# Patient Record
Sex: FEMALE | Race: BLACK OR AFRICAN AMERICAN | Employment: OTHER | URBAN - METROPOLITAN AREA
[De-identification: names, ages, dates, MRNs, and addresses within clinical notes are randomized per-mention and may not be internally consistent; named-entity substitution may affect disease eponyms.]

---

## 2017-01-18 ENCOUNTER — OFFICE VISIT (OUTPATIENT)
Dept: CARDIOLOGY CLINIC | Age: 82
End: 2017-01-18

## 2017-01-18 VITALS
OXYGEN SATURATION: 95 % | WEIGHT: 231 LBS | DIASTOLIC BLOOD PRESSURE: 82 MMHG | BODY MASS INDEX: 38.49 KG/M2 | HEIGHT: 65 IN | HEART RATE: 66 BPM | SYSTOLIC BLOOD PRESSURE: 158 MMHG

## 2017-01-18 DIAGNOSIS — E78.2 MIXED HYPERLIPIDEMIA: ICD-10-CM

## 2017-01-18 DIAGNOSIS — R60.0 LOCALIZED EDEMA: ICD-10-CM

## 2017-01-18 DIAGNOSIS — Z95.820 S/P ANGIOPLASTY WITH STENT: ICD-10-CM

## 2017-01-18 DIAGNOSIS — I25.10 CORONARY ARTERY DISEASE INVOLVING NATIVE CORONARY ARTERY OF NATIVE HEART WITHOUT ANGINA PECTORIS: Primary | ICD-10-CM

## 2017-01-18 DIAGNOSIS — I10 ESSENTIAL HYPERTENSION, BENIGN: ICD-10-CM

## 2017-01-18 NOTE — PROGRESS NOTES
[facility]   [address] [facility], [address] [place] [address]                                          NEW PATIENT HPI/FOLLOW-UP      NAME:  Rob Hall   :   1933   MRN:   [id number]   PCP:  Kyara Car MD           Subjective: The patient is a 80y.o. year old female who returns for a routine follow-up. Since the last visit on 6/3/2016, patient reports no new symptoms. States MURILLO and LE edema which is constant and unchanged. Denies change in exercise tolerance, chest pain, medication intolerance, palpitations,  PND/orthopnea wheezing, sputum, syncope, dizziness or light headedness. Doing satisfactorily. Review of Systems  General: Pt denies excessive weight gain or loss. Pt is able to conduct ADL's. Respiratory: +shortness of breath, MURILLO, Denies wheezing or stridor.   Cardiovascular: +edema, Denies precordial pain, palpitations, or PND  Gastrointestinal: Denies poor appetite, indigestion, abdominal pain or blood in stool  Peripheral vascular: Denies claudication, leg cramps  Neuropsychiatric: Denies paresthesias,tingling,numbness,anxiety,depression,fatigue  Musculoskeletal: Denies pain,tenderness, soreness,swelling      Past Medical History   Diagnosis Date    Arthritis 3/25/2010    Back pain 3/26/2010    CAD (coronary artery disease)     Chest pain, unspecified     Chronic pain     Edema     Essential hypertension     High cholesterol 3/25/2010    HTN (hypertension) 3/25/2010    Kidney failure, acute ([address] 2012    Lacrimal passage stenosis 2014    Onychomycosis 2010    Other acute and subacute form of ischemic heart disease     Other and unspecified angina pectoris ([address]     Other ill-defined conditions(799.89)      excessive watering of right eye    Shortness of breath     Shoulder pain, bilateral 3/26/2010     Patient Active Problem List    Diagnosis Date Noted    Edema--non-pitting 2016    Lacrimal passage stenosis 2014  Preop cardiovascular exam 04/09/2014    Prediabetes 01/03/2014    Abnormal urinalysis 08/30/2013    Lacrimal duct stenosis 08/14/2013    Conjunctivitis 05/10/2013    Watery eyes 08/29/2012    S/P angioplasty with stent--RCA 6/12 06/22/2012    Multiple bruises 06/22/2012    CAD (coronary artery disease) 06/07/2012    Post PTCA 06/07/2012    Mixed hyperlipidemia 06/01/2012    Essential hypertension, benign 06/01/2012    Abnormal nuclear stress test 06/01/2012    Chest pain, unspecified 04/25/2012    Essential hypertension, benign 04/20/2012    Type 2 diabetes mellitus without complication ([facility] 88/86/1027    SOB (shortness of breath) on exertion 04/06/2012    Vitamin D deficiency 04/06/2012    Kidney failure, acute ([facility] 04/06/2012    Heme positive stool 11/12/2011    Abdominal pain, other specified site 11/12/2011    Inguinal hernia, left 11/12/2011    Decreased vision 10/12/2011    Edema leg 10/12/2011    Cough 08/17/2011    Tick bite, infected 08/17/2011    Onychomycosis 08/19/2010    Shoulder pain, bilateral 03/26/2010    Back pain 03/26/2010    Arthritis 03/25/2010      Past Surgical History   Procedure Laterality Date    Hx lumbar fusion      Hx knee replacement      Hx heent       bilat cateract extraction    Hx hysterectomy      Hx orthopaedic       bilateral TKR    Hx orthopaedic       neck and back surgery    Hx orthopaedic       bilateral bunionectomy    Hx orthopaedic       bilateral cataracts removed    Hx heart catheterization      Hx ptca       Allergies   Allergen Reactions    Lisinopril Cough      Family History   Problem Relation Age of Onset    Hypertension Mother     Coronary Artery Disease Father     Hypertension Sister     Diabetes Sister     Coronary Artery Disease Sister     Coronary Artery Disease Brother     Stroke Mother     Other Mother      arthritis    Heart Disease Father     Cancer Sister      lung    Cancer Brother      lung    Heart Disease Brother       Social History     Social History    Marital status:      Spouse name: N/A    Number of children: N/A    Years of education: N/A     Occupational History    Not on file. Social History Main Topics    Smoking status: Never Smoker    Smokeless tobacco: Never Used    Alcohol use Yes      Comment: occasional     Drug use: Yes     Special: Prescription, OTC    Sexual activity: No     Other Topics Concern    Not on file     Social History Narrative    ** Merged History Encounter **           Current Outpatient Prescriptions   Medication Sig    atenolol (TENORMIN) 50 mg tablet Take 1 Tab by mouth daily.  bumetanide (BUMEX) 1 mg tablet Take 1 Tab by mouth daily.  ergocalciferol (ERGOCALCIFEROL) 50,000 unit capsule Take 1 Cap by mouth every seven (7) days.  losartan (COZAAR) 100 mg tablet Take 1 Tab by mouth daily.  NIFEdipine ER (NIFEDICAL XL) 30 mg ER tablet Take 1 Tab by mouth daily.  oxyCODONE-acetaminophen (PERCOCET) 5-325 mg per tablet Take 2 Tabs by mouth every eight (8) hours as needed for Pain.  nitroglycerin (NITROSTAT) 0.4 mg SL tablet 1 Tab by SubLINGual route every five (5) minutes as needed for Chest Pain.  potassium chloride (K-DUR, KLOR-CON) 10 mEq tablet Take 1 Tab by mouth daily.  pravastatin (PRAVACHOL) 40 mg tablet take 1 tablet by mouth NIGHTLY    WELCHOL 3.75 gram pwpk powder packet take 1 packet by mouth once daily    MISCELLANEOUS MEDICAL SUPPLY (COMPRESSION STOCKINGS) daily.  ASPIRIN LOW DOSE 81 mg tablet daily.  fenofibrate nanocrystallized (TRICOR) 48 mg tablet Take 1 Tab by mouth daily.  aspirin delayed-release 81 mg tablet Take 1 Tab by mouth daily. No current facility-administered medications for this visit. I have reviewed the nurses notes, vitals, problem list, allergy list, medical history, family medical, social history and medications.       Objective:     Physical Exam:     Vitals:    01/18/17 0853   BP: 158/82   Pulse: 66   SpO2: 95%   Weight: 231 lb (104.8 kg)   Height: 5' 5\" (1.651 m)    Body mass index is 38.44 kg/(m^2). General: WDWN adult female, in no acute distress. Pleasant affect. HEENT: No carotid bruits, no JVD, trach is midline. Heart:  Normal S1/S2 negative S3 or S4. Regular, no murmur, gallop or rub.   Respiratory: Clear bilaterally, no wheezing or rales  Abdomen:   Soft, non-tender, bowel sounds are active.   Extremities:  1+ pitting edema BLE to mid-tibia, non-ttp, normal cap refill, no cyanosis. Neuro: A&Ox3, speech clear, gait stable. Skin: Skin color is normal. No rashes or lesions. No diaphoresis. Vascular: 2+ pulses symmetric in all extremities        Data Review:       Cardiographics:    EKG: NSR.      Cardiology Labs:    Results for orders placed or performed during the hospital encounter of 06/06/12   EKG, 12 LEAD, INITIAL   Result Value Ref Range    Ventricular Rate 66 BPM    Atrial Rate 66 BPM    P-R Interval 188 ms    QRS Duration 84 ms    Q-T Interval 418 ms    QTC Calculation (Bezet) 438 ms    Calculated P Axis 58 degrees    Calculated R Axis 16 degrees    Calculated T Axis 42 degrees    Diagnosis       Sinus rhythm with premature atrial complexes  When compared with ECG of 06-JUN-2012 10:45,  premature atrial complexes are now present  Confirmed by Neel [place] ([id number]) on 6/7/2012 8:37:17 AM       Lab Results   Component Value Date/Time    Cholesterol, total 191 11/10/2016 08:38 AM    HDL Cholesterol 61 11/10/2016 08:38 AM    LDL, calculated 110 11/10/2016 08:38 AM    Triglyceride 102 11/10/2016 08:38 AM    CHOL/HDL Ratio 4.5 08/19/2010 12:27 PM       Lab Results   Component Value Date/Time    Sodium 142 11/10/2016 08:38 AM    Potassium 4.7 11/10/2016 08:38 AM    Chloride 103 11/10/2016 08:38 AM    CO2 23 11/10/2016 08:38 AM    Anion gap 7 06/07/2012 03:00 AM    Glucose 116 11/10/2016 08:38 AM    BUN 32 11/10/2016 08:38 AM    Creatinine 1.32 11/10/2016 08:38 AM    BUN/Creatinine ratio 24 11/10/2016 08:38 AM    GFR est AA 43 11/10/2016 08:38 AM    GFR est non-AA 37 11/10/2016 08:38 AM    Calcium 10.1 11/10/2016 08:38 AM    Bilirubin, total 0.3 11/10/2016 08:38 AM    ALT 16 11/10/2016 08:38 AM    AST 23 11/10/2016 08:38 AM    Alk. phosphatase 60 11/10/2016 08:38 AM    Protein, total 6.8 11/10/2016 08:38 AM    Albumin 4.3 11/10/2016 08:38 AM    Globulin 4.0 11/12/2011 11:03 AM    A-G Ratio 1.7 11/10/2016 08:38 AM          Assessment:       ICD-10-CM ICD-9-CM    1. 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 Essential hypertension, benign I10 401.1 AMB POC EKG ROUTINE W/ 12 LEADS, INTER & REP   3. Localized edema R60.0 782.3    4. Mixed hyperlipidemia E78.2 272.2          Discussion: Patient presents at this time stable from a cardiac perspective. Pleased with present status. Plan: 1. Continue same meds. Lipid profile and labs followed by PCP. 2.Encouraged to exercise to tolerance, lose weight and follow low fat, low cholesterol, low sodium predominantly Plant-based (consider Mediterranean) diet. Call with questions or concerns. Will follow up any test results by phone and/or f/u here in office if needed. Jose Szymanski 3.Follow up: 6 months    I have discussed the diagnosis with the patient and the intended plan as seen in the above orders. The patient has received an after-visit summary and questions were answered concerning future plans. I have discussed any concerning medication side effects and warnings with the patient as well. Patient seen and examined. All pertinent data reviewed. I have reviewed detailed note as outlined by Galo Mauricio. Case discussed with Nursing/medical assistant staff and Galo Mauricio. Patient cardiac stable. Plans as outlined.     Esvin Gray PA-C  1/18/2017     PATY MERCADO Vijay Arita

## 2017-01-18 NOTE — MR AVS SNAPSHOT
Visit Information Date & Time Provider Department Dept. Phone Encounter #  
 1/18/2017  9:00 AM Shannon Acosta MD [address] [facility] 1 [id number] Your Appointments 3/9/2017  8:30 AM  
ROUTINE CARE with Ewa Zapata MD  
[address] [place] [address] ([address]) Appt Note: 6 mo f/u; 6 mo f/u  
 [address] [facility] [id number] [phone] [id number] Upcoming Health Maintenance Date Due ZOSTER VACCINE AGE 60> 1/14/1993 MEDICARE YEARLY EXAM 11/30/2016 EYE EXAM RETINAL OR DILATED Q1 12/4/2016 HEMOGLOBIN A1C Q6M 5/10/2017 FOOT EXAM Q1 6/15/2017 MICROALBUMIN Q1 6/15/2017 LIPID PANEL Q1 11/10/2017 GLAUCOMA SCREENING Q2Y 12/4/2017 DTaP/Tdap/Td series (2 - Td) 11/30/2025 Allergies as of 1/18/2017  Review Complete On: 1/18/2017 By: Hammad Smith Severity Noted Reaction Type Reactions Lisinopril  08/17/2011    Cough Current Immunizations  Reviewed on 11/10/2016 Name Date Influenza High Dose Vaccine PF 10/31/2016, 11/1/2015 Influenza Vaccine Whole 10/22/2011 Pneumococcal Polysaccharide (PPSV-23) 8/15/2014 Pneumococcal Vaccine (Unspecified Type) 1/15/1996 Tdap 11/30/2015 Not reviewed this visit You Were Diagnosed With   
  
 Codes Comments Coronary artery disease involving native coronary artery of native heart without angina pectoris    -  Primary ICD-10-CM: I25.10 ICD-9-CM: 414.01 Essential hypertension, benign     ICD-10-CM: I10 
ICD-9-CM: 401.1 Vitals BP Pulse Height(growth percentile) Weight(growth percentile) SpO2 BMI  
 158/82 66 5' 5\" (1.651 m) 231 lb (104.8 kg) 95% 38.44 kg/m2 OB Status Smoking Status Hysterectomy Never Smoker Vitals History BMI and BSA Data Body Mass Index Body Surface Area 38.44 kg/m 2 2.19 m 2 Preferred Pharmacy Pharmacy Name Phone [phone] [place]. Peggy [place], [address] [phone] Your Updated Medication List  
  
   
This list is accurate as of: 1/18/17  9:20 AM.  Always use your most recent med list.  
  
  
  
  
 * aspirin delayed-release 81 mg tablet Take 1 Tab by mouth daily. * ASPIRIN LOW DOSE 81 mg tablet Generic drug:  aspirin delayed-release  
daily. atenolol 50 mg tablet Commonly known as:  TENORMIN Take 1 Tab by mouth daily. bumetanide 1 mg tablet Commonly known as:  [place] [employer] Take 1 Tab by mouth daily. COMPRESSION STOCKINGS  
daily. ergocalciferol 50,000 unit capsule Commonly known as:  ERGOCALCIFEROL Take 1 Cap by mouth every seven (7) days. fenofibrate nanocrystallized 48 mg tablet Commonly known as:  [employer] Take 1 Tab by mouth daily. losartan 100 mg tablet Commonly known as:  COZAAR Take 1 Tab by mouth daily. NIFEdipine ER 30 mg ER tablet Commonly known as:  NIFEDICAL XL Take 1 Tab by mouth daily. nitroglycerin 0.4 mg SL tablet Commonly known as:  NITROSTAT  
1 Tab by SubLINGual route every five (5) minutes as needed for Chest Pain. oxyCODONE-acetaminophen 5-325 mg per tablet Commonly known as:  PERCOCET Take 2 Tabs by mouth every eight (8) hours as needed for Pain.  
  
 potassium chloride 10 mEq tablet Commonly known as:  K-DUR, KLOR-CON Take 1 Tab by mouth daily. pravastatin 40 mg tablet Commonly known as:  PRAVACHOL  
take 1 tablet by mouth NIGHTLY WELCHOL 3.75 gram Pwpk powder packet Generic drug:  colesevelam  
take 1 packet by mouth once daily * Notice: This list has 2 medication(s) that are the same as other medications prescribed for you. Read the directions carefully, and ask your doctor or other care provider to review them with you. We Performed the Following AMB POC EKG ROUTINE W/ 12 LEADS, INTER & REP [29106 CPT(R)] Patient Instructions -- Make follow up appointment in 6 months 
-- Please call with any questions or medication needs in the meantime Heart-Healthy Diet: Care Instructions Your Care Instructions A heart-healthy diet has lots of vegetables, fruits, nuts, beans, and whole grains, and is low in salt. It limits foods that are high in saturated fat, such as meats, cheeses, and fried foods. It may be hard to change your diet, but even small changes can lower your risk of heart attack and heart disease. Follow-up care is a key part of your treatment and safety. Be sure to make and go to all appointments, and call your doctor if you are having problems. It's also a good idea to know your test results and keep a list of the medicines you take. How can you care for yourself at home? Watch your portions · Learn what a serving is. A \"serving\" and a \"portion\" are not always the same thing. Make sure that you are not eating larger portions than are recommended. For example, a serving of pasta is ½ cup. A serving size of meat is 2 to 3 ounces. A 3-ounce serving is about the size of a deck of cards. Measure serving sizes until you are good at [place]" them. Keep in mind that restaurants often serve portions that are 2 or 3 times the size of one serving. · To keep your energy level up and keep you from feeling hungry, eat often but in smaller portions. · Eat only the number of calories you need to stay at a healthy weight. If you need to lose weight, eat fewer calories than your body burns (through exercise and other physical activity). Eat more fruits and vegetables · Eat a variety of fruit and vegetables every day. Dark green, deep orange, red, or yellow fruits and vegetables are especially good for you. Examples include spinach, carrots, peaches, and berries. · Keep carrots, celery, and other veggies handy for snacks.  Buy fruit that is in season and store it where you can see it so that you will be tempted to eat it. · Cook dishes that have a lot of veggies in them, such as stir-fries and soups. Limit saturated and trans fat · Read food labels, and try to avoid saturated and trans fats. They increase your risk of heart disease. Trans fat is found in many processed foods such as cookies and crackers. · Use olive or canola oil when you cook. Try cholesterol-lowering spreads, such as Benecol or Take Control. · Bake, broil, grill, or steam foods instead of frying them. · Choose lean meats instead of high-fat meats such as hot dogs and sausages. Cut off all visible fat when you prepare meat. · Eat fish, skinless poultry, and meat alternatives such as soy products instead of high-fat meats. Soy products, such as tofu, may be especially good for your heart. · Choose low-fat or fat-free milk and dairy products. Eat fish · Eat at least two servings of fish a week. Certain fish, such as salmon and tuna, contain omega-3 fatty acids, which may help reduce your risk of heart attack. Eat foods high in fiber · Eat a variety of grain products every day. Include whole-grain foods that have lots of fiber and nutrients. Examples of whole-grain foods include oats, whole wheat bread, and brown rice. · Buy whole-grain breads and cereals, instead of white bread or pastries. Limit salt and sodium · Limit how much salt and sodium you eat to help lower your blood pressure. · Taste food before you salt it. Add only a little salt when you think you need it. With time, your taste buds will adjust to less salt. · Eat fewer snack items, fast foods, and other high-salt, processed foods. Check food labels for the amount of sodium in packaged foods. · Choose low-sodium versions of canned goods (such as soups, vegetables, and beans). Limit sugar · Limit drinks and foods with added sugar. These include candy, desserts, and soda pop. Limit alcohol · Limit alcohol to no more than 2 drinks a day for men and 1 drink a day for women. Too much alcohol can cause health problems. When should you call for help? Watch closely for changes in your health, and be sure to contact your doctor if: 
· You would like help planning heart-healthy meals. Where can you learn more? Go to http://brianne-ger.info/. Enter V137 in the search box to learn more about \"Heart-Healthy Diet: Care Instructions. \" Current as of: January 27, 2016 Content Version: 11.1 © 7681-3817 [employer]. Care instructions adapted under license by [employer] (which disclaims liability or warranty for this information). If you have questions about a medical condition or this instruction, always ask your healthcare professional. [address] any warranty or liability for your use of this information. Introducing [facility]! Dear Nick Us: 
Thank you for requesting a INCHRON account. Our records indicate that you have previously registered for a INCHRON account but its currently inactive. Please call our INCHRON support line at [phone]. Additional Information If you have questions, please visit the Frequently Asked Questions section of the INCHRON website at https://[employer] [place]. [employer] [place]/. Remember, INCHRON is NOT to be used for urgent needs. For medical emergencies, dial 911. Now available from your iPhone and Android! Please provide this summary of care documentation to your next provider. Your primary care clinician is listed as Hakeem Tabares. If you have any questions after today's visit, please call [phone].

## 2017-01-18 NOTE — PATIENT INSTRUCTIONS
-- Make follow up appointment in 6 months  -- Please call with any questions or medication needs in the meantime     Heart-Healthy Diet: Care Instructions  Your Care Instructions    A heart-healthy diet has lots of vegetables, fruits, nuts, beans, and whole grains, and is low in salt. It limits foods that are high in saturated fat, such as meats, cheeses, and fried foods. It may be hard to change your diet, but even small changes can lower your risk of heart attack and heart disease. Follow-up care is a key part of your treatment and safety. Be sure to make and go to all appointments, and call your doctor if you are having problems. It's also a good idea to know your test results and keep a list of the medicines you take. How can you care for yourself at home? Watch your portions  · Learn what a serving is. A \"serving\" and a \"portion\" are not always the same thing. Make sure that you are not eating larger portions than are recommended. For example, a serving of pasta is ½ cup. A serving size of meat is 2 to 3 ounces. A 3-ounce serving is about the size of a deck of cards. Measure serving sizes until you are good at [place]" them. Keep in mind that restaurants often serve portions that are 2 or 3 times the size of one serving. · To keep your energy level up and keep you from feeling hungry, eat often but in smaller portions. · Eat only the number of calories you need to stay at a healthy weight. If you need to lose weight, eat fewer calories than your body burns (through exercise and other physical activity). Eat more fruits and vegetables  · Eat a variety of fruit and vegetables every day. Dark green, deep orange, red, or yellow fruits and vegetables are especially good for you. Examples include spinach, carrots, peaches, and berries. · Keep carrots, celery, and other veggies handy for snacks. Buy fruit that is in season and store it where you can see it so that you will be tempted to eat it.   · Jason Deluna dishes that have a lot of veggies in them, such as stir-fries and soups. Limit saturated and trans fat  · Read food labels, and try to avoid saturated and trans fats. They increase your risk of heart disease. Trans fat is found in many processed foods such as cookies and crackers. · Use olive or canola oil when you cook. Try cholesterol-lowering spreads, such as Benecol or Take Control. · Bake, broil, grill, or steam foods instead of frying them. · Choose lean meats instead of high-fat meats such as hot dogs and sausages. Cut off all visible fat when you prepare meat. · Eat fish, skinless poultry, and meat alternatives such as soy products instead of high-fat meats. Soy products, such as tofu, may be especially good for your heart. · Choose low-fat or fat-free milk and dairy products. Eat fish  · Eat at least two servings of fish a week. Certain fish, such as salmon and tuna, contain omega-3 fatty acids, which may help reduce your risk of heart attack. Eat foods high in fiber  · Eat a variety of grain products every day. Include whole-grain foods that have lots of fiber and nutrients. Examples of whole-grain foods include oats, whole wheat bread, and brown rice. · Buy whole-grain breads and cereals, instead of white bread or pastries. Limit salt and sodium  · Limit how much salt and sodium you eat to help lower your blood pressure. · Taste food before you salt it. Add only a little salt when you think you need it. With time, your taste buds will adjust to less salt. · Eat fewer snack items, fast foods, and other high-salt, processed foods. Check food labels for the amount of sodium in packaged foods. · Choose low-sodium versions of canned goods (such as soups, vegetables, and beans). Limit sugar  · Limit drinks and foods with added sugar. These include candy, desserts, and soda pop. Limit alcohol  · Limit alcohol to no more than 2 drinks a day for men and 1 drink a day for women.  Too much alcohol can cause health problems. When should you call for help? Watch closely for changes in your health, and be sure to contact your doctor if:  · You would like help planning heart-healthy meals. Where can you learn more? Go to http://brianne-ger.info/. Enter V137 in the search box to learn more about \"Heart-Healthy Diet: Care Instructions. \"  Current as of: January 27, 2016  Content Version: 11.1  © 2006-2016 [employer], [employer]. Care instructions adapted under license by [employer] (which disclaims liability or warranty for this information). If you have questions about a medical condition or this instruction, always ask your healthcare professional. [place] [address] any warranty or liability for your use of this information.

## 2017-03-09 ENCOUNTER — OFFICE VISIT (OUTPATIENT)
Dept: FAMILY MEDICINE CLINIC | Age: 82
End: 2017-03-09

## 2017-03-09 VITALS
BODY MASS INDEX: 38.29 KG/M2 | SYSTOLIC BLOOD PRESSURE: 141 MMHG | TEMPERATURE: 97.7 F | DIASTOLIC BLOOD PRESSURE: 69 MMHG | RESPIRATION RATE: 14 BRPM | OXYGEN SATURATION: 95 % | WEIGHT: 229.8 LBS | HEIGHT: 65 IN | HEART RATE: 66 BPM

## 2017-03-09 DIAGNOSIS — M54.50 CHRONIC MIDLINE LOW BACK PAIN WITHOUT SCIATICA: ICD-10-CM

## 2017-03-09 DIAGNOSIS — M19.90 ARTHRITIS: ICD-10-CM

## 2017-03-09 DIAGNOSIS — E78.2 MIXED HYPERLIPIDEMIA: ICD-10-CM

## 2017-03-09 DIAGNOSIS — G89.29 CHRONIC LEFT-SIDED THORACIC BACK PAIN: ICD-10-CM

## 2017-03-09 DIAGNOSIS — M25.512 CHRONIC PAIN OF BOTH SHOULDERS: ICD-10-CM

## 2017-03-09 DIAGNOSIS — R60.0 EDEMA OF LOWER EXTREMITY, UNSPECIFIED LATERALITY: ICD-10-CM

## 2017-03-09 DIAGNOSIS — G89.29 CHRONIC PAIN OF BOTH SHOULDERS: ICD-10-CM

## 2017-03-09 DIAGNOSIS — E11.9 TYPE 2 DIABETES MELLITUS WITHOUT COMPLICATION, UNSPECIFIED LONG TERM INSULIN USE STATUS: Primary | ICD-10-CM

## 2017-03-09 DIAGNOSIS — M25.511 CHRONIC PAIN OF BOTH SHOULDERS: ICD-10-CM

## 2017-03-09 DIAGNOSIS — M54.6 CHRONIC LEFT-SIDED THORACIC BACK PAIN: ICD-10-CM

## 2017-03-09 DIAGNOSIS — E78.00 HIGH CHOLESTEROL: ICD-10-CM

## 2017-03-09 DIAGNOSIS — G89.29 CHRONIC MIDLINE LOW BACK PAIN WITHOUT SCIATICA: ICD-10-CM

## 2017-03-09 RX ORDER — OXYCODONE AND ACETAMINOPHEN 5; 325 MG/1; MG/1
2 TABLET ORAL
Qty: 180 TAB | Refills: 0 | Status: SHIPPED | OUTPATIENT
Start: 2017-03-09 | End: 2017-09-11 | Stop reason: SDUPTHER

## 2017-03-09 RX ORDER — NIFEDIPINE 30 MG/1
30 TABLET, EXTENDED RELEASE ORAL DAILY
Qty: 90 TAB | Refills: 4 | Status: SHIPPED | OUTPATIENT
Start: 2017-03-09 | End: 2017-12-05 | Stop reason: SDUPTHER

## 2017-03-09 RX ORDER — BUMETANIDE 1 MG/1
1 TABLET ORAL DAILY
Qty: 90 TAB | Refills: 4 | Status: SHIPPED | OUTPATIENT
Start: 2017-03-09 | End: 2017-06-14 | Stop reason: SDUPTHER

## 2017-03-09 NOTE — PROGRESS NOTES
Adonis Mcgowan        Name and  verified        Chief Complaint   Patient presents with    Diabetes    Swelling     Edema         Health Maintenance reviewed-discussed with patient.

## 2017-03-09 NOTE — MR AVS SNAPSHOT
Visit Information Date & Time Provider Department Dept. Phone Encounter #  
 3/9/2017  8:30 AM Cindy Sinclair MD [address] [place] [address] OFFICE-ANNEX [phone] [id number] Your Appointments 7/18/2017  9:15 AM  
ESTABLISHED PATIENT with Bud Song MD  
[facility]) Appt Note: 6 MO. F/U  
 [address]  
[phone] [address] Upcoming Health Maintenance Date Due ZOSTER VACCINE AGE 60> 1/14/1993 MEDICARE YEARLY EXAM 11/30/2016 EYE EXAM RETINAL OR DILATED Q1 12/4/2016 HEMOGLOBIN A1C Q6M 5/10/2017 FOOT EXAM Q1 6/15/2017 MICROALBUMIN Q1 6/15/2017 LIPID PANEL Q1 11/10/2017 GLAUCOMA SCREENING Q2Y 12/4/2017 DTaP/Tdap/Td series (2 - Td) 11/30/2025 Allergies as of 3/9/2017  Review Complete On: 1/18/2017 By: Bud Song MD  
  
 Severity Noted Reaction Type Reactions Lisinopril  08/17/2011    Cough Current Immunizations  Reviewed on 11/10/2016 Name Date Influenza High Dose Vaccine PF 10/31/2016, 11/1/2015 Influenza Vaccine Whole 10/22/2011 Pneumococcal Polysaccharide (PPSV-23) 8/15/2014 Pneumococcal Vaccine (Unspecified Type) 1/15/1996 Tdap 11/30/2015 Not reviewed this visit You Were Diagnosed With   
  
 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   
 Mixed hyperlipidemia     ICD-10-CM: E78.2 ICD-9-CM: 272.2 Type 2 diabetes mellitus without complication, unspecified long term insulin use status (HCC)     ICD-10-CM: E11.9 ICD-9-CM: 250.00 Vitals OB Status Smoking Status Hysterectomy Never Smoker Preferred Pharmacy [facility]acy Name Phone [facility] [id number] [address] [phone] Your Updated Medication List  
  
   
This list is accurate as of: 3/9/17  8:43 AM.  Always use your most recent med list.  
  
  
  
  
 * aspirin delayed-release 81 mg tablet Take 1 Tab by mouth daily. * ASPIRIN LOW DOSE 81 mg tablet Generic drug:  aspirin delayed-release  
daily. atenolol 50 mg tablet Commonly known as:  TENORMIN Take 1 Tab by mouth daily. bumetanide 1 mg tablet Commonly known as:  Caro King Take 1 Tab by mouth daily. COMPRESSION STOCKINGS  
daily. ergocalciferol 50,000 unit capsule Commonly known as:  ERGOCALCIFEROL Take 1 Cap by mouth every seven (7) days. fenofibrate nanocrystallized 48 mg tablet Commonly known as:  [employer] Take 1 Tab by mouth daily. losartan 100 mg tablet Commonly known as:  COZAAR Take 1 Tab by mouth daily. NIFEdipine ER 30 mg ER tablet Commonly known as:  NIFEDICAL XL Take 1 Tab by mouth daily. nitroglycerin 0.4 mg SL tablet Commonly known as:  NITROSTAT  
1 Tab by SubLINGual route every five (5) minutes as needed for Chest Pain. oxyCODONE-acetaminophen 5-325 mg per tablet Commonly known as:  PERCOCET Take 2 Tabs by mouth every eight (8) hours as needed for Pain.  
  
 potassium chloride 10 mEq tablet Commonly known as:  K-DUR, KLOR-CON Take 1 Tab by mouth daily. pravastatin 40 mg tablet Commonly known as:  PRAVACHOL  
take 1 tablet by mouth NIGHTLY WELCHOL 3.75 gram Pwpk powder packet Generic drug:  colesevelam  
 take 1 packet by mouth once daily * Notice: This list has 2 medication(s) that are the same as other medications prescribed for you. Read the directions carefully, and ask your doctor or other care provider to review them with you. Prescriptions Printed Refills  
 oxyCODONE-acetaminophen (PERCOCET) 5-325 mg per tablet 0 Sig: Take 2 Tabs by mouth every eight (8) hours as needed for Pain. Class: Print Route: Oral  
  
Prescriptions Sent to Pharmacy Refills NIFEdipine ER (NIFEDICAL XL) 30 mg ER tablet 4 Sig: Take 1 Tab by mouth daily. Class: Normal  
 [facility] [address] Ph #: [phone] Route: Oral  
 bumetanide (BUMEX) 1 mg tablet 4 Sig: Take 1 Tab by mouth daily. Class: Normal  
 [facility]: [facility] [address] Ph #: [phone] Route: Oral  
  
We Performed the Following CBC W/O DIFF [99609 CPT(R)] HEMOGLOBIN A1C WITH EAG [56828 CPT(R)] LIPID PANEL [67071 CPT(R)] METABOLIC PANEL, COMPREHENSIVE [38237 CPT(R)] TSH 3RD GENERATION [85404 CPT(R)] Patient Instructions Learning About How to Have a Healthy Back What causes back pain? Back pain is often caused by overuse, strain, or injury. For example, people often hurt their backs playing sports or working in the yard, being jolted in a car accident, or lifting something too heavy. Aging plays a part too. Your bones and muscles tend to lose strength as you age, which makes injury more likely. The spongy discs between the bones of the spine (vertebrae) may suffer from wear and tear and no longer provide enough cushion between the bones. A disc that bulges or breaks open (herniated disc) can press on nerves, causing back pain. In some people, back pain is the result of arthritis, broken vertebrae caused by bone loss (osteoporosis), illness, or a spine problem. Although most people have back pain at one time or another, there are steps you can take to make it less likely. How can you have a healthy back? Reduce stress on your back through good posture Slumping or slouching alone may not cause low back pain. But after the back has been strained or injured, bad posture can make pain worse. · Sleep in a position that maintains your back's normal curves and on a mattress that feels comfortable. Sleep on your side with a pillow between your knees, or sleep on your back with a pillow under your knees. These positions can reduce strain on your back. · Stand and sit up straight. \"Good posture\" generally means your ears, shoulders, and hips are in a straight line. · If you must stand for a long time, put one foot on a stool, ledge, or box. Switch feet every now and then. · Sit in a chair that is low enough to let you place both feet flat on the floor with both knees nearly level with your hips. If your chair or desk is too high, use a footrest to raise your knees. Place a small pillow, a rolled-up towel, or a lumbar roll in the curve of your back if you need extra support. · Try a kneeling chair, which helps tilt your hips forward. This takes pressure off your lower back. · Try sitting on an exercise ball. It can rock from side to side, which helps keep your back loose. · When driving, keep your knees nearly level with your hips. Sit straight, and drive with both hands on the steering wheel. Your arms should be in a slightly bent position. Reduce stress on your back through careful lifting · Squat down, bending at the hips and knees only. If you need to, put one knee to the floor and extend your other knee in front of you, bent at a right angle (half kneeling). · Press your chest straight forward. This helps keep your upper back straight while keeping a slight arch in your low back.  
· Hold the load as close to your body as possible, at the level of your belly button (navel). · Use your feet to change direction, taking small steps. · Lead with your hips as you change direction. Keep your shoulders in line with your hips as you move. · Set down your load carefully, squatting with your knees and hips only. Exercise and stretch your back · Do some exercise on most days of the week, if your doctor says it is okay. You can walk, run, swim, or cycle. · Stretch your back muscles. Here are a few exercises to try: ¨ Lie on your back, and gently pull one bent knee to your chest. Put that foot back on the floor, and then pull the other knee to your chest. 
¨ Do pelvic tilts. Lie on your back with your knees bent. Tighten your stomach muscles. Pull your belly button (navel) in and up toward your ribs. You should feel like your back is pressing to the floor and your hips and pelvis are slightly lifting off the floor. Hold for 6 seconds while breathing smoothly. ¨ Sit with your back flat against a wall. · Keep your core muscles strong. The muscles of your back, belly (abdomen), and buttocks support your spine. ¨ Pull in your belly and imagine pulling your navel toward your spine. Hold this for 6 seconds, then relax. Remember to keep breathing normally as you tense your muscles. ¨ Do curl-ups. Always do them with your knees bent. Keep your low back on the floor, and curl your shoulders toward your knees using a smooth, slow motion. Keep your arms folded across your chest. If this bothers your neck, try putting your hands behind your neck (not your head), with your elbows spread apart. ¨ Lie on your back with your knees bent and your feet flat on the floor. Tighten your belly muscles, and then push with your feet and raise your buttocks up a few inches. Hold this position 6 seconds as you continue to breathe normally, then lower yourself slowly to the floor. Repeat 8 to 12 times. ¨ If you like group exercise, try Pilates or yoga.  These classes have poses that strengthen the core muscles. Lead a healthy lifestyle · Stay at a healthy weight to avoid strain on your back. · Do not smoke. Smoking increases the risk of osteoporosis, which weakens the spine. If you need help quitting, talk to your doctor about stop-smoking programs and medicines. These can increase your chances of quitting for good. Where can you learn more? Go to http://brianne-ger.info/. Enter L315 in the search box to learn more about \"Learning About How to Have a Healthy Back. \" Current as of: May 23, 2016 Content Version: 11.1 © 3399-2863 [employer]. Care instructions adapted under license by [employer] (which disclaims liability or warranty for this information). If you have questions about a medical condition or this instruction, always ask your healthcare professional. [address] any warranty or liability for your use of this information. Learning About Relief for Back Pain What is back tension and strain? Back strain happens when you overstretch, or pull, a muscle in your back. You may hurt your back in an accident or when you exercise or lift something. Most back pain will get better with rest and time. You can take care of yourself at home to help your back heal. 
What can you do first to relieve back pain? When you first feel back pain, try these steps: 
· Walk. Take a short walk (10 to 20 minutes) on a level surface (no slopes, hills, or stairs) every 2 to 3 hours. Walk only distances you can manage without pain, especially leg pain. · Relax. Find a comfortable position for rest. Some people are comfortable on the floor or a medium-firm bed with a small pillow under their head and another under their knees. Some people prefer to lie on their side with a pillow between their knees. Don't stay in one position for too long. · Try heat or ice.  Try using a heating pad on a low or medium setting, or take a warm shower, for 15 to 20 minutes every 2 to 3 hours. Or you can buy single-use heat wraps that last up to 8 hours. You can also try an ice pack for 10 to 15 minutes every 2 to 3 hours. You can use an ice pack or a bag of frozen vegetables wrapped in a thin towel. There is not strong evidence that either heat or ice will help, but you can try them to see if they help. You may also want to try switching between heat and cold. · Take pain medicine exactly as directed. ¨ If the doctor gave you a prescription medicine for pain, take it as prescribed. ¨ If you are not taking a prescription pain medicine, ask your doctor if you can take an over-the-counter medicine. What else can you do? · Stretch and exercise. Exercises that increase flexibility may relieve your pain and make it easier for your muscles to keep your spine in a good, neutral position. And don't forget to keep walking. · Do self-massage. You can use self-massage to unwind after work or school or to energize yourself in the morning. You can easily massage your feet, hands, or neck. Self-massage works best if you are in comfortable clothes and are sitting or lying in a comfortable position. Use oil or lotion to massage bare skin. · Reduce stress. Back pain can lead to a vicious [place]: Distress about the pain tenses the muscles in your back, which in turn causes more pain. Learn how to relax your mind and your muscles to lower your stress. Where can you learn more? Go to http://brianne-ger.info/. Enter Q274 in the search box to learn more about \"Learning About Relief for Back Pain. \" Current as of: May 23, 2016 Content Version: 11.1 © 9396-5982 [employer]. Care instructions adapted under license by [employer] (which disclaims liability or warranty for this information).  If you have questions about a medical condition or this instruction, always ask your healthcare professional. [employer] disclaims any warranty or liability for your use of this information. Back Pain, Emergency or Urgent Symptoms: Care Instructions Your Care Instructions Many people have back pain at one time or another. In most cases, pain gets better with self-care that includes over-the-counter pain medicine, ice, heat, and exercises. Unless you have symptoms of a severe injury or heart attack, you may be able to give yourself a few days before you call a doctor. But some back problems are very serious. Do not ignore symptoms that need to be checked right away. Follow-up care is a key part of your treatment and safety. Be sure to make and go to all appointments, and call your doctor if you are having problems. It's also a good idea to know your test results and keep a list of the medicines you take. How can you care for yourself at home? · Sit or lie in positions that are most comfortable and that reduce your pain. Try one of these positions when you lie down: ¨ Lie on your back with your knees bent and supported by large pillows. ¨ Lie on the floor with your legs on the seat of a sofa or chair. Dotty Able on your side with your knees and hips bent and a pillow between your legs. ¨ Lie on your stomach if it does not make pain worse. · Do not sit up in bed, and avoid soft couches and twisted positions. Bed rest can help relieve pain at first, but it delays healing. Avoid bed rest after the first day. · Change positions every 30 minutes. If you must sit for long periods of time, take breaks from sitting. Get up and walk around, or lie flat. · Try using a heating pad on a low or medium setting, for 15 to 20 minutes every 2 or 3 hours. Try a warm shower in place of one session with the heating pad. You can also buy single-use heat wraps that last up to 8 hours. You can also try ice or cold packs on your back for 10 to 20 minutes at a time, several times a day.  (Put a thin cloth between the ice pack and your skin.) This reduces pain and makes it easier to be active and exercise. · Take pain medicines exactly as directed. ¨ If the doctor gave you a prescription medicine for pain, take it as prescribed. ¨ If you are not taking a prescription pain medicine, ask your doctor if you can take an over-the-counter medicine. When should you call for help? Call 911 anytime you think you may need emergency care. For example, call if: 
· You are unable to move a leg at all. · You have back pain with severe belly pain. · You have symptoms of a heart attack. These may include: ¨ Chest pain or pressure, or a strange feeling in the chest. 
¨ Sweating. ¨ Shortness of breath. ¨ Nausea or vomiting. ¨ Pain, pressure, or a strange feeling in the back, neck, jaw, or upper belly or in one or both shoulders or arms. ¨ Lightheadedness or sudden weakness. ¨ A fast or irregular heartbeat. After you call 911, the  may tell you to chew 1 adult-strength or 2 to 4 low-dose aspirin. Wait for an ambulance. Do not try to drive yourself. Call your doctor now or seek immediate medical care if: 
· You have new or worse symptoms in your arms, legs, chest, belly, or buttocks. Symptoms may include: ¨ Numbness or tingling. ¨ Weakness. ¨ Pain. · You lose bladder or bowel control. · You have back pain and: 
¨ You have injured your back while lifting or doing some other activity. Call if the pain is severe, has not gone away after 1 or 2 days, and you cannot do your normal daily activities. ¨ You have had a back injury before that needed treatment. ¨ Your pain has lasted longer than 4 weeks. ¨ You have had weight loss you cannot explain. ¨ You are age 48 or older. ¨ You have cancer now or have had it before. Watch closely for changes in your health, and be sure to contact your doctor if you are not getting better as expected. Where can you learn more? Go to http://brianne-ger.info/. Enter J890 in the search box to learn more about \"Back Pain, Emergency or Urgent Symptoms: Care Instructions. \" Current as of: May 27, 2016 Content Version: 11.1 © 6519-8429 [employer]. Care instructions adapted under license by [employer] (which disclaims liability or warranty for this information). If you have questions about a medical condition or this instruction, always ask your healthcare professional. [place] [address] any warranty or liability for your use of this information. Back Stretches: Exercises Your Care Instructions Here are some examples of exercises for stretching your back. Start each exercise slowly. Ease off the exercise if you start to have pain. Your doctor or physical therapist will tell you when you can start these exercises and which ones will work best for you. How to do the exercises Overhead stretch 1. Stand comfortably with your feet shoulder-width apart. 2. Looking straight ahead, raise both arms over your head and reach toward the ceiling. Do not allow your head to tilt back. 3. Hold for 15 to 30 seconds, then lower your arms to your sides. 4. Repeat 2 to 4 times. Side stretch 1. Stand comfortably with your feet shoulder-width apart. 2. Raise one arm over your head, and then lean to the other side. 3. Slide your hand down your leg as you let the weight of your arm gently stretch your side muscles. Hold for 15 to 30 seconds. 4. Repeat 2 to 4 times on each side. Press-up 1. Lie on your stomach, supporting your body with your forearms. 2. Press your elbows down into the floor to raise your upper back. As you do this, relax your stomach muscles and allow your back to arch without using your back muscles. As your press up, do not let your hips or pelvis come off the floor. 3. Hold for 15 to 30 seconds, then relax. 4. Repeat 2 to 4 times.  
Relax and rest 
 
 1. Lie on your back with a rolled towel under your neck and a pillow under your knees. Extend your arms comfortably to your sides. 2. Relax and breathe normally. 3. Remain in this position for about 10 minutes. 4. If you can, do this 2 or 3 times each day. Follow-up care is a key part of your treatment and safety. Be sure to make and go to all appointments, and call your doctor if you are having problems. It's also a good idea to know your test results and keep a list of the medicines you take. Where can you learn more? Go to http://brianne-ger.info/. Enter N750 in the search box to learn more about \"Back Stretches: Exercises. \" Current as of: May 23, 2016 Content Version: 11.1 © 2631-9615 [employer]. Care instructions adapted under license by [employer] (which disclaims liability or warranty for this information). If you have questions about a medical condition or this instruction, always ask your healthcare professional. [address] any warranty or liability for your use of this information. Learning About Diabetes Food Guidelines Your Care Instructions Meal planning is important to manage diabetes. It helps keep your blood sugar at a target level (which you set with your doctor). You don't have to eat special foods. You can eat what your family eats, including sweets once in a while. But you do have to pay attention to how often you eat and how much you eat of certain foods. You may want to work with a dietitian or a certified diabetes educator (CDE) to help you plan meals and snacks. A dietitian or CDE can also help you lose weight if that is one of your goals. What should you know about eating carbs? Managing the amount of carbohydrate (carbs) you eat is an important part of healthy meals when you have diabetes. Carbohydrate is found in many foods. · Learn which foods have carbs.  And learn the amounts of carbs in different foods. ¨ Bread, cereal, pasta, and rice have about 15 grams of carbs in a serving. A serving is 1 slice of bread (1 ounce), ½ cup of cooked cereal, or 1/3 cup of cooked pasta or rice. ¨ Fruits have 15 grams of carbs in a serving. A serving is 1 small fresh fruit, such as an apple or orange; ½ of a banana; ½ cup of cooked or canned fruit; ½ cup of fruit juice; 1 cup of melon or raspberries; or 2 tablespoons of dried fruit. ¨ Milk and no-sugar-added yogurt have 15 grams of carbs in a serving. A serving is 1 cup of milk or 2/3 cup of no-sugar-added yogurt. ¨ Starchy vegetables have 15 grams of carbs in a serving. A serving is ½ cup of mashed potatoes or sweet potato; 1 cup winter squash; ½ of a small baked potato; ½ cup of cooked beans; or ½ cup cooked corn or green peas. · Learn how much carbs to eat each day and at each meal. A dietitian or CDE can teach you how to keep track of the amount of carbs you eat. This is called carbohydrate counting. · If you are not sure how to count carbohydrate grams, use the Plate Method to plan meals. It is a good, quick way to make sure that you have a balanced meal. It also helps you spread carbs throughout the day. ¨ Divide your plate by types of foods. Put non-starchy vegetables on half the plate, meat or other protein food on one-quarter of the plate, and a grain or starchy vegetable in the final quarter of the plate. To this you can add a small piece of fruit and 1 cup of milk or yogurt, depending on how many carbs you are supposed to eat at a meal. 
· Try to eat about the same amount of carbs at each meal. Do not \"save up\" your daily allowance of carbs to eat at one meal. 
· Proteins have very little or no carbs per serving. Examples of proteins are beef, chicken, turkey, fish, eggs, tofu, cheese, cottage cheese, and peanut butter.  A serving size of meat is 3 ounces, which is about the size of a deck of cards. Examples of meat substitute serving sizes (equal to 1 ounce of meat) are 1/4 cup of cottage cheese, 1 egg, 1 tablespoon of peanut butter, and ½ cup of tofu. How can you eat out and still eat healthy? · Learn to estimate the serving sizes of foods that have carbohydrate. If you measure food at home, it will be easier to estimate the amount in a serving of restaurant food. · If the meal you order has too much carbohydrate (such as potatoes, corn, or baked beans), ask to have a low-carbohydrate food instead. Ask for a salad or green vegetables. · If you use insulin, check your blood sugar before and after eating out to help you plan how much to eat in the future. · If you eat more carbohydrate at a meal than you had planned, take a walk or do other exercise. This will help lower your blood sugar. What else should you know? · Limit saturated fat, such as the fat from meat and dairy products. This is a healthy choice because people who have diabetes are at higher risk of heart disease. So choose lean cuts of meat and nonfat or low-fat dairy products. Use olive or canola oil instead of butter or shortening when cooking. · Don't skip meals. Your blood sugar may drop too low if you skip meals and take insulin or certain medicines for diabetes. · Check with your doctor before you drink alcohol. Alcohol can cause your blood sugar to drop too low. Alcohol can also cause a bad reaction if you take certain diabetes medicines. Follow-up care is a key part of your treatment and safety. Be sure to make and go to all appointments, and call your doctor if you are having problems. It's also a good idea to know your test results and keep a list of the medicines you take. Where can you learn more? Go to http://brianne-ger.info/. Enter K902 in the search box to learn more about \"Learning About Diabetes Food Guidelines. \" Current as of: May 23, 2016 Content Version: 11.1 © 9559-5102 Healthwise, [employer]. Care instructions adapted under license by [employer] (which disclaims liability or warranty for this information). If you have questions about a medical condition or this instruction, always ask your healthcare professional. [address] any warranty or liability for your use of this information. Nutrition Tips for Diabetes: After Your Visit Your Care Instructions A healthy diet is important to manage diabetes. It helps you lose weight (if you need to) and keep it off. It gives you the nutrition and energy your body needs and helps prevent heart disease. But a diet for diabetes does not mean that you have to eat special foods. You can eat what your family eats, including occasional sweets and other favorites. But you do have to pay attention to how often you eat and how much you eat of certain foods. The right plan for you will give you meals that help you keep your blood sugar at healthy levels. Try to eat a variety of foods and to spread carbohydrate throughout the day. Carbohydrate raises blood sugar higher and more quickly than any other nutrient does. Carbohydrate is found in sugar, breads and cereals, fruit, starchy vegetables such as potatoes and corn, and milk and yogurt. You may want to work with a dietitian or diabetes educator to help you plan meals and snacks. A dietitian or diabetes educator also can help you lose weight if that is one of your goals. The following tips can help you enjoy your meals and stay healthy. Follow-up care is a key part of your treatment and safety. Be sure to make and go to all appointments, and call your doctor if you are having problems. Its also a good idea to know your test results and keep a list of the medicines you take. How can you care for yourself at home? · Learn which foods have carbohydrate and how much carbohydrate to eat.  A dietitian or diabetes educator can help you learn to keep track of how much carbohydrate you eat. · Spread carbohydrate throughout the day. Eat some carbohydrate at all meals, but do not eat too much at any one time. · Plan meals to include food from all the food groups. These are the food groups and some example portion sizes: ¨ Grains: 1 slice of bread (1 ounce), ½ cup of cooked cereal, and 1/3 cup of cooked pasta or rice. These have about 15 grams of carbohydrate in a serving. Choose whole grains such as whole wheat bread or crackers, oatmeal, and brown rice more often than refined grains. ¨ Fruit: 1 small fresh fruit, such as an apple or orange; ½ of a banana; ½ cup of chopped, cooked, or canned fruit; ½ cup of fruit juice; 1 cup of melon or raspberries; and 2 tablespoons of dried fruit. These have about 15 grams of carbohydrate in a serving. ¨ Dairy: 1 cup of nonfat or low-fat milk and 2/3 cup of plain yogurt. These have about 15 grams of carbohydrate in a serving. ¨ Protein foods: Beef, chicken, turkey, fish, eggs, tofu, cheese, cottage cheese, and peanut butter. A serving size of meat is 3 ounces, which is about the size of a deck of cards. Examples of meat substitute serving sizes (equal to 1 ounce of meat) are 1/4 cup of cottage cheese, 1 egg, 1 tablespoon of peanut butter, and ½ cup of tofu. These have very little or no carbohydrate per serving. ¨ Vegetables: Starchy vegetables such as ½ cup of cooked dried beans, peas, potatoes, or corn have about 15 grams of carbohydrate. Nonstarchy vegetables have very little carbohydrate, such as 1 cup of raw leafy vegetables (such as spinach), ½ cup of other vegetables (cooked or chopped), and 3/4 cup of vegetable juice. · Use the plate format to plan meals. It is a good, quick way to make sure that you have a balanced meal. It also helps you spread carbohydrate throughout the day. You divide your plate by types of foods.  Put vegetables on half the plate, meat or meat substitutes on one-quarter of the plate, and a grain or starchy vegetable (such as brown rice or a potato) in the final quarter of the plate. To this you can add a small piece of fruit and 1 cup of milk or yogurt, depending on how much carbohydrate you are supposed to eat at a meal. 
· Talk to your dietitian or diabetes educator about ways to add limited amounts of sweets into your meal plan. You can eat these foods now and then, as long as you include the amount of carbohydrate they have in your daily carbohydrate allowance. · If you drink alcohol, limit it to no more than 1 drink a day for women and 2 drinks a day for men. If you are pregnant, no amount of alcohol is known to be safe. · Protein, fat, and fiber do not raise blood sugar as much as carbohydrate does. If you eat a lot of these nutrients in a meal, your blood sugar will rise more slowly than it would otherwise. · Limit saturated fats, such as those from meat and dairy products. Try to replace it with monounsaturated fat, such as olive oil. This is a healthier choice because people who have diabetes are at higher-than-average risk of heart disease. But use a modest amount of olive oil. A tablespoon of olive oil has 14 grams of fat and 120 calories. · Exercise lowers blood sugar. If you take insulin by shots or pump, you can use less than you would if you were not exercising. Keep in mind that timing matters. If you exercise within 1 hour after a meal, your body may need less insulin for that meal than it would if you exercised 3 hours after the meal. Test your blood sugar to find out how exercise affects your need for insulin. · Exercise on most days of the week. Aim for at least 30 minutes. Exercise helps you stay at a healthy weight and helps your body use insulin. Walking is an easy way to get exercise.  Gradually increase the amount you walk every day. You also may want to swim, bike, or do other activities. When you eat out · Learn to estimate the serving sizes of foods that have carbohydrate. If you measure food at home, it will be easier to estimate the amount in a serving of restaurant food. · If the meal you order has too much carbohydrate (such as potatoes, corn, or baked beans), ask to have a low-carbohydrate food instead. Ask for a salad or green vegetables. · If you use insulin, check your blood sugar before and after eating out to help you plan how much to eat in the future. · If you eat more carbohydrate at a meal than you had planned, take a walk or do other exercise. This will help lower your blood sugar. Where can you learn more? Go to Last.fm.be Enter U359 in the search box to learn more about \"Nutrition Tips for Diabetes: After Your Visit. \"  
© 9758-9607 Healthwise, [employer]. Care instructions adapted under license by [employer] (which disclaims liability or warranty for this information). This care instruction is for use with your licensed healthcare professional. If you have questions about a medical condition or this instruction, always ask your healthcare professional. [address] any warranty or liability for your use of this information. Content Version: 87.6.061508; Current as of: June 4, 2014 Introducing [facility]! Baljeet [employer] introduces [employer] patient portal. Now you can access parts of your medical record, email your doctor's office, and request medication refills online. 1. In your internet browser, go to [email or web address] 2. Click on the First Time User? Click Here link in the Sign In box. You will see the New Member Sign Up page. 3. Enter your [employer] [facility] Access Code exactly as it appears below. You will not need to use this code after youve completed the sign-up process.  If you do not sign up before the expiration date, you must request a new code. · [employer] Access Code: [email or web address] Expires: 4/18/2017  9:24 AM 
 
4. Enter the last four digits of your Social Security Number (xxxx) and Date of Birth (mm/dd/yyyy) as indicated and click Submit. You will be taken to the next sign-up page. 5. Create a [employer] ID. This will be your [employer] login ID and cannot be changed, so think of one that is secure and easy to remember. 6. Create a [employer] password. You can change your password at any time. 7. Enter your Password Reset Question and Answer. This can be used at a later time if you forget your password. 8. Enter your e-mail address. You will receive e-mail notification when new information is available in [address] 9. Click Sign Up. You can now view and download portions of your medical record. 10. Click the Download Summary menu link to download a portable copy of your medical information. If you have questions, please visit the Frequently Asked Questions section of the [employer] website. Remember, [employer] is NOT to be used for urgent needs. For medical emergencies, dial 911. Now available from your iPhone and Android! Please provide this summary of care documentation to your next provider. Your primary care clinician is listed as Bartolo Steel. If you have any questions after today's visit, please call [phone].

## 2017-03-09 NOTE — PROGRESS NOTES
HISTORY OF PRESENT ILLNESS  Sophy Moody is a 80 y.o. female. HPI     Chronic low-mid back and knees pain syndrome  The patient's pain has been an ongoing concerning problem, it all Started few yrs ago when the wt started to creep out of control little by little and is aware that the current BMI is a big contributor to the pt current joints pain,   Patient states that the current dosage of the meds given, not strong enough, but it is helping, regardless of all the concerns of the opioid based medications side effects,    In addition with the current medications dosage, the pt capable of doing things specially the activity of the daily living, and also they are improving the quality of the pt's life which the pt states are very cumbersome.    The pt has tried otc pain meds, prescribed pain meds but they did not help and not only that,  they created ++ abdominal upset from NSAIDS, this pt has tried all the other Non- Narcotic meds and none have been able to help ease down the pain, the current opioid based pain meds are the only ones when taken ease the current pain level at this time,   pt states that the pt is not [facility] shopping     The intensity of the pain is at10/10 w/out med,  persist without medication, a chronic condition, dull radiating to the lower legs, not better, and, compliant with medication takes it as prescribed,     Patient present with b/lSwelling of the lower ext,   Stating that she does a lot of walking but unfortunately walking has not been helping the swelling to go away, on the pt last visits, she did not have much of the legs swelling and her weight was also better than today's weight,  Today the patient denies leg pain, denies rash, and legs lesion, in addition the pt stated that denial of dizziness,  the legs B swelling not only not better but also the wt went up by close to 10 pounds,     ckf   Stable getting better if this time worse then will do referral to nephrologist    Current Outpatient Prescriptions   Medication Sig Dispense Refill    NIFEdipine ER (NIFEDICAL XL) 30 mg ER tablet Take 1 Tab by mouth daily. 90 Tab 4    bumetanide (BUMEX) 1 mg tablet Take 1 Tab by mouth daily. 90 Tab 4    oxyCODONE-acetaminophen (PERCOCET) 5-325 mg per tablet Take 2 Tabs by mouth every eight (8) hours as needed for Pain. 180 Tab 0    atenolol (TENORMIN) 50 mg tablet Take 1 Tab by mouth daily. 90 Tab 4    ergocalciferol (ERGOCALCIFEROL) 50,000 unit capsule Take 1 Cap by mouth every seven (7) days. 4 Cap 11    losartan (COZAAR) 100 mg tablet Take 1 Tab by mouth daily. 90 Tab 3    nitroglycerin (NITROSTAT) 0.4 mg SL tablet 1 Tab by SubLINGual route every five (5) minutes as needed for Chest Pain. 15 Tab 1    potassium chloride (K-DUR, KLOR-CON) 10 mEq tablet Take 1 Tab by mouth daily. 90 Tab 3    pravastatin (PRAVACHOL) 40 mg tablet take 1 tablet by mouth NIGHTLY 90 Tab 2    WELCHOL 3.75 gram pwpk powder packet take 1 packet by mouth once daily 90 Packet 6    MISCELLANEOUS MEDICAL SUPPLY (COMPRESSION STOCKINGS) daily.  ASPIRIN LOW DOSE 81 mg tablet daily.  fenofibrate nanocrystallized (TRICOR) 48 mg tablet Take 1 Tab by mouth daily. 90 Tab 3    aspirin delayed-release 81 mg tablet Take 1 Tab by mouth daily.  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    Onychomycosis 8/19/2010    Other acute and subacute form of ischemic heart disease     Other and unspecified angina pectoris ([facility] [address]     Other ill-defined conditions(799.89)     excessive watering of right eye    Shortness of breath     Shoulder pain, bilateral 3/26/2010     Past Surgical History:   Procedure Laterality Date    HX HEART CATHETERIZATION      HX HEENT      bilat cateract extraction    HX HYSTERECTOMY      HX KNEE REPLACEMENT      HX LUMBAR FUSION      HX ORTHOPAEDIC      bilateral TKR    HX ORTHOPAEDIC      neck and back surgery    HX ORTHOPAEDIC      bilateral bunionectomy    HX ORTHOPAEDIC      bilateral cataracts removed    HX PTCA       Family History   Problem Relation Age of Onset    Hypertension Mother     Coronary Artery Disease Father     Hypertension Sister     Diabetes Sister     Coronary Artery Disease Sister     Coronary Artery Disease Brother     Stroke Mother     Other Mother      arthritis    Heart Disease Father     Cancer Sister      lung    Cancer Brother      lung    Heart Disease Brother      Social History   Substance Use Topics    Smoking status: Never Smoker    Smokeless tobacco: Never Used    Alcohol use Yes      Comment: occasional       Lab Results  Component Value Date/Time   WBC 5.4 11/10/2016 08:38 AM   WBC 7.2 06/19/2012 08:28 AM   HGB 12.9 11/10/2016 08:38 AM   HCT 38.7 11/10/2016 08:38 AM   PLATELET 569 19/94/8268 08:38 AM   MCV 89 11/10/2016 08:38 AM       Lab Results  Component Value Date/Time   Hemoglobin A1c 6.1 11/30/2015 10:00 AM   Hemoglobin A1c 6.4 08/15/2014 09:39 AM   Hemoglobin A1c 6.2 04/09/2014 01:25 PM   Glucose 116 11/10/2016 08:38 AM   Microalb/Creat ratio (ug/mg creat.) 13.9 06/15/2016 08:40 AM   LDL, calculated 110 11/10/2016 08:38 AM   Creatinine (POC) 1.3 11/30/2009 01:34 PM   Creatinine 1.32 11/10/2016 08:38 AM      Lab Results  Component Value Date/Time   Cholesterol, total 191 11/10/2016 08:38 AM   HDL Cholesterol 61 11/10/2016 08:38 AM   LDL, calculated 110 11/10/2016 08:38 AM   Triglyceride 102 11/10/2016 08:38 AM   CHOL/HDL Ratio 4.5 08/19/2010 12:27 PM       Lab Results  Component Value Date/Time   ALT (SGPT) 16 11/10/2016 08:38 AM   AST (SGOT) 23 11/10/2016 08:38 AM   Alk. phosphatase 60 11/10/2016 08:38 AM   Bilirubin, total 0.3 11/10/2016 08:38 AM       Lab Results  Component Value Date/Time   GFR est AA 43 11/10/2016 08:38 AM   GFR est non-AA 37 11/10/2016 08:38 AM   Creatinine (POC) 1.3 11/30/2009 01:34 PM   Creatinine 1.32 11/10/2016 08:38 AM   BUN 32 11/10/2016 08:38 AM   Sodium 142 11/10/2016 08:38 AM   Potassium 4.7 11/10/2016 08:38 AM   Chloride 103 11/10/2016 08:38 AM   CO2 23 11/10/2016 08:38 AM      Lab Results  Component Value Date/Time   TSH 1.320 11/10/2016 08:38 AM         Review of Systems   Constitutional: Negative for chills and fever. HENT: Negative for ear pain and nosebleeds. Eyes: Negative for blurred vision, pain and discharge. Respiratory: Negative for shortness of breath. Cardiovascular: Negative for chest pain and leg swelling. Gastrointestinal: Negative for constipation, diarrhea, nausea and vomiting. Genitourinary: Negative for frequency. Musculoskeletal: Negative for joint pain. Skin: Negative for itching and rash. Neurological: Negative for headaches. Psychiatric/Behavioral: Negative for depression. The patient is not nervous/anxious. Physical Exam   Constitutional: She is oriented to person, place, and time. She appears well-developed and well-nourished. HENT:   Head: Normocephalic and atraumatic. Eyes: Conjunctivae and EOM are normal.   Neck: Normal range of motion. Neck supple. Cardiovascular: Normal rate, regular rhythm and normal heart sounds. No murmur heard. Pulmonary/Chest: Effort normal and breath sounds normal.   Abdominal: Soft. Bowel sounds are normal. She exhibits no distension. Musculoskeletal: Normal range of motion. She exhibits edema, tenderness and deformity. Neurological: She is alert and oriented to person, place, and time. Skin: No erythema. Psychiatric: Her behavior is normal.   Nursing note and vitals reviewed.       ASSESSMENT and PLAN  Sophy was seen today for diabetes and swelling. Diagnoses and all orders for this visit:    Type 2 diabetes mellitus without complication, unspecified long term insulin use status (HCC)  -     HEMOGLOBIN A1C WITH EAG  -     REFERRAL TO OPHTHALMOLOGY    High cholesterol  -     NIFEdipine ER (NIFEDICAL XL) 30 mg ER tablet; Take 1 Tab by mouth daily. -     bumetanide (BUMEX) 1 mg tablet; Take 1 Tab by mouth daily.  -     CBC W/O DIFF  -     METABOLIC PANEL, COMPREHENSIVE  -     TSH 3RD GENERATION  -     LIPID PANEL    Arthritis  -     bumetanide (BUMEX) 1 mg tablet; Take 1 Tab by mouth daily. -     oxyCODONE-acetaminophen (PERCOCET) 5-325 mg per tablet; Take 2 Tabs by mouth every eight (8) hours as needed for Pain.  -     CBC W/O DIFF  -     METABOLIC PANEL, COMPREHENSIVE  -     TSH 3RD GENERATION  -     LIPID PANEL    Chronic pain of both shoulders  -     oxyCODONE-acetaminophen (PERCOCET) 5-325 mg per tablet; Take 2 Tabs by mouth every eight (8) hours as needed for Pain.  -     CBC W/O DIFF  -     METABOLIC PANEL, COMPREHENSIVE  -     TSH 3RD GENERATION  -     LIPID PANEL    Chronic left-sided thoracic back pain  -     oxyCODONE-acetaminophen (PERCOCET) 5-325 mg per tablet; Take 2 Tabs by mouth every eight (8) hours as needed for Pain.  -     CBC W/O DIFF  -     METABOLIC PANEL, COMPREHENSIVE  -     TSH 3RD GENERATION  -     LIPID PANEL    Chronic midline low back pain without sciatica  -     oxyCODONE-acetaminophen (PERCOCET) 5-325 mg per tablet;  Take 2 Tabs by mouth every eight (8) hours as needed for Pain.  -     CBC W/O DIFF  -     METABOLIC PANEL, COMPREHENSIVE  -     TSH 3RD GENERATION  -     LIPID PANEL    Edema of lower extremity, unspecified laterality    Mixed hyperlipidemia  -     HEMOGLOBIN A1C WITH EAG

## 2017-03-09 NOTE — PATIENT INSTRUCTIONS
Learning About How to Have a Healthy Back  What causes back pain? Back pain is often caused by overuse, strain, or injury. For example, people often hurt their backs playing sports or working in the yard, being jolted in a car accident, or lifting something too heavy. Aging plays a part too. Your bones and muscles tend to lose strength as you age, which makes injury more likely. The spongy discs between the bones of the spine (vertebrae) may suffer from wear and tear and no longer provide enough cushion between the bones. A disc that bulges or breaks open (herniated disc) can press on nerves, causing back pain. In some people, back pain is the result of arthritis, broken vertebrae caused by bone loss (osteoporosis), illness, or a spine problem. Although most people have back pain at one time or another, there are steps you can take to make it less likely. How can you have a healthy back? Reduce stress on your back through good posture  Slumping or slouching alone may not cause low back pain. But after the back has been strained or injured, bad posture can make pain worse. · Sleep in a position that maintains your back's normal curves and on a mattress that feels comfortable. Sleep on your side with a pillow between your knees, or sleep on your back with a pillow under your knees. These positions can reduce strain on your back. · Stand and sit up straight. \"Good posture\" generally means your ears, shoulders, and hips are in a straight line. · If you must stand for a long time, put one foot on a stool, ledge, or box. Switch feet every now and then. · Sit in a chair that is low enough to let you place both feet flat on the floor with both knees nearly level with your hips. If your chair or desk is too high, use a footrest to raise your knees. Place a small pillow, a rolled-up towel, or a lumbar roll in the curve of your back if you need extra support.   · Try a kneeling chair, which helps tilt your hips forward. This takes pressure off your lower back. · Try sitting on an exercise ball. It can rock from side to side, which helps keep your back loose. · When driving, keep your knees nearly level with your hips. Sit straight, and drive with both hands on the steering wheel. Your arms should be in a slightly bent position. Reduce stress on your back through careful lifting  · Squat down, bending at the hips and knees only. If you need to, put one knee to the floor and extend your other knee in front of you, bent at a right angle (half kneeling). · Press your chest straight forward. This helps keep your upper back straight while keeping a slight arch in your low back. · Hold the load as close to your body as possible, at the level of your belly button (navel). · Use your feet to change direction, taking small steps. · Lead with your hips as you change direction. Keep your shoulders in line with your hips as you move. · Set down your load carefully, squatting with your knees and hips only. Exercise and stretch your back  · Do some exercise on most days of the week, if your doctor says it is okay. You can walk, run, swim, or cycle. · Stretch your back muscles. Here are a few exercises to try:  Chris German on your back, and gently pull one bent knee to your chest. Put that foot back on the floor, and then pull the other knee to your chest.  ¨ Do pelvic tilts. Lie on your back with your knees bent. Tighten your stomach muscles. Pull your belly button (navel) in and up toward your ribs. You should feel like your back is pressing to the floor and your hips and pelvis are slightly lifting off the floor. Hold for 6 seconds while breathing smoothly. ¨ Sit with your back flat against a wall. · Keep your core muscles strong. The muscles of your back, belly (abdomen), and buttocks support your spine. ¨ Pull in your belly and imagine pulling your navel toward your spine. Hold this for 6 seconds, then relax.  Remember to keep breathing normally as you tense your muscles. ¨ Do curl-ups. Always do them with your knees bent. Keep your low back on the floor, and curl your shoulders toward your knees using a smooth, slow motion. Keep your arms folded across your chest. If this bothers your neck, try putting your hands behind your neck (not your head), with your elbows spread apart. ¨ Lie on your back with your knees bent and your feet flat on the floor. Tighten your belly muscles, and then push with your feet and raise your buttocks up a few inches. Hold this position 6 seconds as you continue to breathe normally, then lower yourself slowly to the floor. Repeat 8 to 12 times. ¨ If you like group exercise, try Pilates or yoga. These classes have poses that strengthen the core muscles. Lead a healthy lifestyle  · Stay at a healthy weight to avoid strain on your back. · Do not smoke. Smoking increases the risk of osteoporosis, which weakens the spine. If you need help quitting, talk to your doctor about stop-smoking programs and medicines. These can increase your chances of quitting for good. Where can you learn more? Go to http://brianneEd4Uger.info/. Enter L315 in the search box to learn more about \"Learning About How to Have a Healthy Back. \"  Current as of: May 23, 2016  Content Version: 11.1  © 8399-4508 [employer]. Care instructions adapted under license by [employer] (which disclaims liability or warranty for this information). If you have questions about a medical condition or this instruction, always ask your healthcare professional. [place] [address] any warranty or liability for your use of this information. Learning About Relief for Back Pain  What is back tension and strain? Back strain happens when you overstretch, or pull, a muscle in your back. You may hurt your back in an accident or when you exercise or lift something.   Most back pain will get better with rest and time. You can take care of yourself at home to help your back heal.  What can you do first to relieve back pain? When you first feel back pain, try these steps:  · Walk. Take a short walk (10 to 20 minutes) on a level surface (no slopes, hills, or stairs) every 2 to 3 hours. Walk only distances you can manage without pain, especially leg pain. · Relax. Find a comfortable position for rest. Some people are comfortable on the floor or a medium-firm bed with a small pillow under their head and another under their knees. Some people prefer to lie on their side with a pillow between their knees. Don't stay in one position for too long. · Try heat or ice. Try using a heating pad on a low or medium setting, or take a warm shower, for 15 to 20 minutes every 2 to 3 hours. Or you can buy single-use heat wraps that last up to 8 hours. You can also try an ice pack for 10 to 15 minutes every 2 to 3 hours. You can use an ice pack or a bag of frozen vegetables wrapped in a thin towel. There is not strong evidence that either heat or ice will help, but you can try them to see if they help. You may also want to try switching between heat and cold. · Take pain medicine exactly as directed. ¨ If the doctor gave you a prescription medicine for pain, take it as prescribed. ¨ If you are not taking a prescription pain medicine, ask your doctor if you can take an over-the-counter medicine. What else can you do? · Stretch and exercise. Exercises that increase flexibility may relieve your pain and make it easier for your muscles to keep your spine in a good, neutral position. And don't forget to keep walking. · Do self-massage. You can use self-massage to unwind after work or school or to energize yourself in the morning. You can easily massage your feet, hands, or neck. Self-massage works best if you are in comfortable clothes and are sitting or lying in a comfortable position.  Use oil or lotion to massage bare skin.  · Reduce stress. Back pain can lead to a vicious [place]: Distress about the pain tenses the muscles in your back, which in turn causes more pain. Learn how to relax your mind and your muscles to lower your stress. Where can you learn more? Go to http://brianne-ger.info/. Enter R582 in the search box to learn more about \"Learning About Relief for Back Pain. \"  Current as of: May 23, 2016  Content Version: 11.1  © 0156-6035 [employer]. Care instructions adapted under license by [employer] (which disclaims liability or warranty for this information). If you have questions about a medical condition or this instruction, always ask your healthcare professional. [place] [address] any warranty or liability for your use of this information. Back Pain, Emergency or Urgent Symptoms: Care Instructions  Your Care Instructions  Many people have back pain at one time or another. In most cases, pain gets better with self-care that includes over-the-counter pain medicine, ice, heat, and exercises. Unless you have symptoms of a severe injury or heart attack, you may be able to give yourself a few days before you call a doctor. But some back problems are very serious. Do not ignore symptoms that need to be checked right away. Follow-up care is a key part of your treatment and safety. Be sure to make and go to all appointments, and call your doctor if you are having problems. It's also a good idea to know your test results and keep a list of the medicines you take. How can you care for yourself at home? · Sit or lie in positions that are most comfortable and that reduce your pain. Try one of these positions when you lie down:  ¨ Lie on your back with your knees bent and supported by large pillows. ¨ Lie on the floor with your legs on the seat of a sofa or chair. Dotty Able on your side with your knees and hips bent and a pillow between your legs.   Dotty Able on your stomach if it does not make pain worse. · Do not sit up in bed, and avoid soft couches and twisted positions. Bed rest can help relieve pain at first, but it delays healing. Avoid bed rest after the first day. · Change positions every 30 minutes. If you must sit for long periods of time, take breaks from sitting. Get up and walk around, or lie flat. · Try using a heating pad on a low or medium setting, for 15 to 20 minutes every 2 or 3 hours. Try a warm shower in place of one session with the heating pad. You can also buy single-use heat wraps that last up to 8 hours. You can also try ice or cold packs on your back for 10 to 20 minutes at a time, several times a day. (Put a thin cloth between the ice pack and your skin.) This reduces pain and makes it easier to be active and exercise. · Take pain medicines exactly as directed. ¨ If the doctor gave you a prescription medicine for pain, take it as prescribed. ¨ If you are not taking a prescription pain medicine, ask your doctor if you can take an over-the-counter medicine. When should you call for help? Call 911 anytime you think you may need emergency care. For example, call if:  · You are unable to move a leg at all. · You have back pain with severe belly pain. · You have symptoms of a heart attack. These may include:  ¨ Chest pain or pressure, or a strange feeling in the chest.  ¨ Sweating. ¨ Shortness of breath. ¨ Nausea or vomiting. ¨ Pain, pressure, or a strange feeling in the back, neck, jaw, or upper belly or in one or both shoulders or arms. ¨ Lightheadedness or sudden weakness. ¨ A fast or irregular heartbeat. After you call 911, the  may tell you to chew 1 adult-strength or 2 to 4 low-dose aspirin. Wait for an ambulance. Do not try to drive yourself. Call your doctor now or seek immediate medical care if:  · You have new or worse symptoms in your arms, legs, chest, belly, or buttocks.  Symptoms may include:  ¨ Numbness or tingling. ¨ Weakness. ¨ Pain. · You lose bladder or bowel control. · You have back pain and:  ¨ You have injured your back while lifting or doing some other activity. Call if the pain is severe, has not gone away after 1 or 2 days, and you cannot do your normal daily activities. ¨ You have had a back injury before that needed treatment. ¨ Your pain has lasted longer than 4 weeks. ¨ You have had weight loss you cannot explain. ¨ You are age 48 or older. ¨ You have cancer now or have had it before. Watch closely for changes in your health, and be sure to contact your doctor if you are not getting better as expected. Where can you learn more? Go to http://brianneSilicon Biologyger.info/. Enter F120 in the search box to learn more about \"Back Pain, Emergency or Urgent Symptoms: Care Instructions. \"  Current as of: May 27, 2016  Content Version: 11.1  © 8418-8855 [employer]. Care instructions adapted under license by [employer] (which disclaims liability or warranty for this information). If you have questions about a medical condition or this instruction, always ask your healthcare professional. [place] [address] any warranty or liability for your use of this information. Back Stretches: Exercises  Your Care Instructions  Here are some examples of exercises for stretching your back. Start each exercise slowly. Ease off the exercise if you start to have pain. Your doctor or physical therapist will tell you when you can start these exercises and which ones will work best for you. How to do the exercises  Overhead stretch    1. Stand comfortably with your feet shoulder-width apart. 2. Looking straight ahead, raise both arms over your head and reach toward the ceiling. Do not allow your head to tilt back. 3. Hold for 15 to 30 seconds, then lower your arms to your sides. 4. Repeat 2 to 4 times.   Side stretch    1. Stand comfortably with your feet shoulder-width apart. 2. Raise one arm over your head, and then lean to the other side. 3. Slide your hand down your leg as you let the weight of your arm gently stretch your side muscles. Hold for 15 to 30 seconds. 4. Repeat 2 to 4 times on each side. Press-up    1. Lie on your stomach, supporting your body with your forearms. 2. Press your elbows down into the floor to raise your upper back. As you do this, relax your stomach muscles and allow your back to arch without using your back muscles. As your press up, do not let your hips or pelvis come off the floor. 3. Hold for 15 to 30 seconds, then relax. 4. Repeat 2 to 4 times. Relax and rest    1. Lie on your back with a rolled towel under your neck and a pillow under your knees. Extend your arms comfortably to your sides. 2. Relax and breathe normally. 3. Remain in this position for about 10 minutes. 4. If you can, do this 2 or 3 times each day. Follow-up care is a key part of your treatment and safety. Be sure to make and go to all appointments, and call your doctor if you are having problems. It's also a good idea to know your test results and keep a list of the medicines you take. Where can you learn more? Go to http://brianne-ger.info/. Enter W648 in the search box to learn more about \"Back Stretches: Exercises. \"  Current as of: May 23, 2016  Content Version: 11.1  © 2303-0566 [employer]. Care instructions adapted under license by [employer] (which disclaims liability or warranty for this information). If you have questions about a medical condition or this instruction, always ask your healthcare professional. [address] any warranty or liability for your use of this information. Learning About Diabetes Food Guidelines  Your Care Instructions  Meal planning is important to manage diabetes. It helps keep your blood sugar at a target level (which you set with your doctor). You don't have to eat special foods. You can eat what your family eats, including sweets once in a while. But you do have to pay attention to how often you eat and how much you eat of certain foods. You may want to work with a dietitian or a certified diabetes educator (CDE) to help you plan meals and snacks. A dietitian or CDE can also help you lose weight if that is one of your goals. What should you know about eating carbs? Managing the amount of carbohydrate (carbs) you eat is an important part of healthy meals when you have diabetes. Carbohydrate is found in many foods. · Learn which foods have carbs. And learn the amounts of carbs in different foods. ¨ Bread, cereal, pasta, and rice have about 15 grams of carbs in a serving. A serving is 1 slice of bread (1 ounce), ½ cup of cooked cereal, or 1/3 cup of cooked pasta or rice. ¨ Fruits have 15 grams of carbs in a serving. A serving is 1 small fresh fruit, such as an apple or orange; ½ of a banana; ½ cup of cooked or canned fruit; ½ cup of fruit juice; 1 cup of melon or raspberries; or 2 tablespoons of dried fruit. ¨ Milk and no-sugar-added yogurt have 15 grams of carbs in a serving. A serving is 1 cup of milk or 2/3 cup of no-sugar-added yogurt. ¨ Starchy vegetables have 15 grams of carbs in a serving. A serving is ½ cup of mashed potatoes or sweet potato; 1 cup winter squash; ½ of a small baked potato; ½ cup of cooked beans; or ½ cup cooked corn or green peas. · Learn how much carbs to eat each day and at each meal. A dietitian or CDE can teach you how to keep track of the amount of carbs you eat. This is called carbohydrate counting. · If you are not sure how to count carbohydrate grams, use the Plate Method to plan meals. It is a good, quick way to make sure that you have a balanced meal. It also helps you spread carbs throughout the day. ¨ Divide your plate by types of foods.  Put non-starchy vegetables on half the plate, meat or other protein food on one-quarter of the plate, and a grain or starchy vegetable in the final quarter of the plate. To this you can add a small piece of fruit and 1 cup of milk or yogurt, depending on how many carbs you are supposed to eat at a meal.  · Try to eat about the same amount of carbs at each meal. Do not \"save up\" your daily allowance of carbs to eat at one meal.  · Proteins have very little or no carbs per serving. Examples of proteins are beef, chicken, turkey, fish, eggs, tofu, cheese, cottage cheese, and peanut butter. A serving size of meat is 3 ounces, which is about the size of a deck of cards. Examples of meat substitute serving sizes (equal to 1 ounce of meat) are 1/4 cup of cottage cheese, 1 egg, 1 tablespoon of peanut butter, and ½ cup of tofu. How can you eat out and still eat healthy? · Learn to estimate the serving sizes of foods that have carbohydrate. If you measure food at home, it will be easier to estimate the amount in a serving of restaurant food. · If the meal you order has too much carbohydrate (such as potatoes, corn, or baked beans), ask to have a low-carbohydrate food instead. Ask for a salad or green vegetables. · If you use insulin, check your blood sugar before and after eating out to help you plan how much to eat in the future. · If you eat more carbohydrate at a meal than you had planned, take a walk or do other exercise. This will help lower your blood sugar. What else should you know? · Limit saturated fat, such as the fat from meat and dairy products. This is a healthy choice because people who have diabetes are at higher risk of heart disease. So choose lean cuts of meat and nonfat or low-fat dairy products. Use olive or canola oil instead of butter or shortening when cooking. · Don't skip meals. Your blood sugar may drop too low if you skip meals and take insulin or certain medicines for diabetes. · Check with your doctor before you drink alcohol.  Alcohol can cause your blood sugar to drop too low. Alcohol can also cause a bad reaction if you take certain diabetes medicines. Follow-up care is a key part of your treatment and safety. Be sure to make and go to all appointments, and call your doctor if you are having problems. It's also a good idea to know your test results and keep a list of the medicines you take. Where can you learn more? Go to http://brianne-ger.info/. Enter I382 in the search box to learn more about \"Learning About Diabetes Food Guidelines. \"  Current as of: May 23, 2016  Content Version: 11.1  © 6503-8766 [employer]. Care instructions adapted under license by [employer] (which disclaims liability or warranty for this information). If you have questions about a medical condition or this instruction, always ask your healthcare professional. [address] any warranty or liability for your use of this information. Nutrition Tips for Diabetes: After Your Visit  Your Care Instructions  A healthy diet is important to manage diabetes. It helps you lose weight (if you need to) and keep it off. It gives you the nutrition and energy your body needs and helps prevent heart disease. But a diet for diabetes does not mean that you have to eat special foods. You can eat what your family eats, including occasional sweets and other favorites. But you do have to pay attention to how often you eat and how much you eat of certain foods. The right plan for you will give you meals that help you keep your blood sugar at healthy levels. Try to eat a variety of foods and to spread carbohydrate throughout the day. Carbohydrate raises blood sugar higher and more quickly than any other nutrient does. Carbohydrate is found in sugar, breads and cereals, fruit, starchy vegetables such as potatoes and corn, and milk and yogurt. You may want to work with a dietitian or diabetes educator to help you plan meals and snacks.  A dietitian or diabetes educator also can help you lose weight if that is one of your goals. The following tips can help you enjoy your meals and stay healthy. Follow-up care is a key part of your treatment and safety. Be sure to make and go to all appointments, and call your doctor if you are having problems. Its also a good idea to know your test results and keep a list of the medicines you take. How can you care for yourself at home? · Learn which foods have carbohydrate and how much carbohydrate to eat. A dietitian or diabetes educator can help you learn to keep track of how much carbohydrate you eat. · Spread carbohydrate throughout the day. Eat some carbohydrate at all meals, but do not eat too much at any one time. · Plan meals to include food from all the food groups. These are the food groups and some example portion sizes:  ¨ Grains: 1 slice of bread (1 ounce), ½ cup of cooked cereal, and 1/3 cup of cooked pasta or rice. These have about 15 grams of carbohydrate in a serving. Choose whole grains such as whole wheat bread or crackers, oatmeal, and brown rice more often than refined grains. ¨ Fruit: 1 small fresh fruit, such as an apple or orange; ½ of a banana; ½ cup of chopped, cooked, or canned fruit; ½ cup of fruit juice; 1 cup of melon or raspberries; and 2 tablespoons of dried fruit. These have about 15 grams of carbohydrate in a serving. ¨ Dairy: 1 cup of nonfat or low-fat milk and 2/3 cup of plain yogurt. These have about 15 grams of carbohydrate in a serving. ¨ Protein foods: Beef, chicken, turkey, fish, eggs, tofu, cheese, cottage cheese, and peanut butter. A serving size of meat is 3 ounces, which is about the size of a deck of cards. Examples of meat substitute serving sizes (equal to 1 ounce of meat) are 1/4 cup of cottage cheese, 1 egg, 1 tablespoon of peanut butter, and ½ cup of tofu. These have very little or no carbohydrate per serving.   ¨ Vegetables: Starchy vegetables such as ½ cup of cooked dried beans, peas, potatoes, or corn have about 15 grams of carbohydrate. Nonstarchy vegetables have very little carbohydrate, such as 1 cup of raw leafy vegetables (such as spinach), ½ cup of other vegetables (cooked or chopped), and 3/4 cup of vegetable juice. · Use the plate format to plan meals. It is a good, quick way to make sure that you have a balanced meal. It also helps you spread carbohydrate throughout the day. You divide your plate by types of foods. Put vegetables on half the plate, meat or meat substitutes on one-quarter of the plate, and a grain or starchy vegetable (such as brown rice or a potato) in the final quarter of the plate. To this you can add a small piece of fruit and 1 cup of milk or yogurt, depending on how much carbohydrate you are supposed to eat at a meal.  · Talk to your dietitian or diabetes educator about ways to add limited amounts of sweets into your meal plan. You can eat these foods now and then, as long as you include the amount of carbohydrate they have in your daily carbohydrate allowance. · If you drink alcohol, limit it to no more than 1 drink a day for women and 2 drinks a day for men. If you are pregnant, no amount of alcohol is known to be safe. · Protein, fat, and fiber do not raise blood sugar as much as carbohydrate does. If you eat a lot of these nutrients in a meal, your blood sugar will rise more slowly than it would otherwise. · Limit saturated fats, such as those from meat and dairy products. Try to replace it with monounsaturated fat, such as olive oil. This is a healthier choice because people who have diabetes are at higher-than-average risk of heart disease. But use a modest amount of olive oil. A tablespoon of olive oil has 14 grams of fat and 120 calories. · Exercise lowers blood sugar. If you take insulin by shots or pump, you can use less than you would if you were not exercising. Keep in mind that timing matters.  If you exercise within 1 hour after a meal, your body may need less insulin for that meal than it would if you exercised 3 hours after the meal. Test your blood sugar to find out how exercise affects your need for insulin. · Exercise on most days of the week. Aim for at least 30 minutes. Exercise helps you stay at a healthy weight and helps your body use insulin. Walking is an easy way to get exercise. Gradually increase the amount you walk every day. You also may want to swim, bike, or do other activities. When you eat out  · Learn to estimate the serving sizes of foods that have carbohydrate. If you measure food at home, it will be easier to estimate the amount in a serving of restaurant food. · If the meal you order has too much carbohydrate (such as potatoes, corn, or baked beans), ask to have a low-carbohydrate food instead. Ask for a salad or green vegetables. · If you use insulin, check your blood sugar before and after eating out to help you plan how much to eat in the future. · If you eat more carbohydrate at a meal than you had planned, take a walk or do other exercise. This will help lower your blood sugar. Where can you learn more? Go to Fittr.be  Enter W692 in the search box to learn more about \"Nutrition Tips for Diabetes: After Your Visit. \"   © 4581-3772 Healthwise, Incorporated. Care instructions adapted under license by [employer] (which disclaims liability or warranty for this information). This care instruction is for use with your licensed healthcare professional. If you have questions about a medical condition or this instruction, always ask your healthcare professional. [place] [address] any warranty or liability for your use of this information.   Content Version: 11.3.228495; Current as of: June 4, 2014

## 2017-03-10 LAB
ALBUMIN SERPL-MCNC: 4.2 G/DL (ref 3.5–4.7)
ALBUMIN/GLOB SERPL: 1.8 {RATIO} (ref 1.1–2.5)
ALP SERPL-CCNC: 59 IU/L (ref 39–117)
ALT SERPL-CCNC: 15 IU/L (ref 0–32)
AST SERPL-CCNC: 22 IU/L (ref 0–40)
BILIRUB SERPL-MCNC: 0.4 MG/DL (ref 0–1.2)
BUN SERPL-MCNC: 28 MG/DL (ref 8–27)
BUN/CREAT SERPL: 20 (ref 11–26)
CALCIUM SERPL-MCNC: 9.7 MG/DL (ref 8.7–10.3)
CHLORIDE SERPL-SCNC: 101 MMOL/L (ref 96–106)
CHOLEST SERPL-MCNC: 180 MG/DL (ref 100–199)
CO2 SERPL-SCNC: 26 MMOL/L (ref 18–29)
CREAT SERPL-MCNC: 1.4 MG/DL (ref 0.57–1)
ERYTHROCYTE [DISTWIDTH] IN BLOOD BY AUTOMATED COUNT: 13.5 % (ref 12.3–15.4)
EST. AVERAGE GLUCOSE BLD GHB EST-MCNC: 131 MG/DL
GLOBULIN SER CALC-MCNC: 2.4 G/DL (ref 1.5–4.5)
GLUCOSE SERPL-MCNC: 106 MG/DL (ref 65–99)
HBA1C MFR BLD: 6.2 % (ref 4.8–5.6)
HCT VFR BLD AUTO: 39.6 % (ref 34–46.6)
HDLC SERPL-MCNC: 69 MG/DL
HGB BLD-MCNC: 13.1 G/DL (ref 11.1–15.9)
INTERPRETATION: NORMAL
LDLC SERPL CALC-MCNC: 91 MG/DL (ref 0–99)
Lab: NORMAL
MCH RBC QN AUTO: 29 PG (ref 26.6–33)
MCHC RBC AUTO-ENTMCNC: 33.1 G/DL (ref 31.5–35.7)
MCV RBC AUTO: 88 FL (ref 79–97)
PLATELET # BLD AUTO: 266 X10E3/UL (ref 150–379)
POTASSIUM SERPL-SCNC: 4.8 MMOL/L (ref 3.5–5.2)
PROT SERPL-MCNC: 6.6 G/DL (ref 6–8.5)
RBC # BLD AUTO: 4.52 X10E6/UL (ref 3.77–5.28)
SODIUM SERPL-SCNC: 142 MMOL/L (ref 134–144)
TRIGL SERPL-MCNC: 98 MG/DL (ref 0–149)
TSH SERPL DL<=0.005 MIU/L-ACNC: 1.63 UIU/ML (ref 0.45–4.5)
VLDLC SERPL CALC-MCNC: 20 MG/DL (ref 5–40)
WBC # BLD AUTO: 5.8 X10E3/UL (ref 3.4–10.8)

## 2017-06-14 ENCOUNTER — OFFICE VISIT (OUTPATIENT)
Dept: FAMILY MEDICINE CLINIC | Age: 82
End: 2017-06-14

## 2017-06-14 ENCOUNTER — TELEPHONE (OUTPATIENT)
Dept: FAMILY MEDICINE CLINIC | Age: 82
End: 2017-06-14

## 2017-06-14 VITALS
TEMPERATURE: 98 F | SYSTOLIC BLOOD PRESSURE: 134 MMHG | RESPIRATION RATE: 18 BRPM | HEIGHT: 65 IN | HEART RATE: 62 BPM | OXYGEN SATURATION: 99 % | BODY MASS INDEX: 37.59 KG/M2 | WEIGHT: 225.6 LBS | DIASTOLIC BLOOD PRESSURE: 54 MMHG

## 2017-06-14 DIAGNOSIS — M54.40 ACUTE RIGHT-SIDED LOW BACK PAIN WITH SCIATICA, SCIATICA LATERALITY UNSPECIFIED: ICD-10-CM

## 2017-06-14 DIAGNOSIS — M25.512 CHRONIC PAIN OF BOTH SHOULDERS: Primary | ICD-10-CM

## 2017-06-14 DIAGNOSIS — M19.90 ARTHRITIS: ICD-10-CM

## 2017-06-14 DIAGNOSIS — R60.0 BILATERAL EDEMA OF LOWER EXTREMITY: ICD-10-CM

## 2017-06-14 DIAGNOSIS — M25.511 CHRONIC PAIN OF BOTH SHOULDERS: Primary | ICD-10-CM

## 2017-06-14 DIAGNOSIS — G89.29 CHRONIC PAIN OF BOTH SHOULDERS: Primary | ICD-10-CM

## 2017-06-14 DIAGNOSIS — E78.00 HIGH CHOLESTEROL: ICD-10-CM

## 2017-06-14 DIAGNOSIS — I10 ESSENTIAL HYPERTENSION: ICD-10-CM

## 2017-06-14 RX ORDER — METHYLPREDNISOLONE 4 MG/1
TABLET ORAL
Qty: 1 DOSE PACK | Refills: 0 | Status: SHIPPED | OUTPATIENT
Start: 2017-06-14 | End: 2017-07-18 | Stop reason: ALTCHOICE

## 2017-06-14 RX ORDER — BUMETANIDE 1 MG/1
2 TABLET ORAL DAILY
Qty: 90 TAB | Refills: 1
Start: 2017-06-14 | End: 2017-12-05 | Stop reason: SDUPTHER

## 2017-06-14 RX ORDER — DICLOFENAC SODIUM 75 MG/1
75 TABLET, DELAYED RELEASE ORAL
Qty: 60 TAB | Refills: 1 | Status: SHIPPED | OUTPATIENT
Start: 2017-06-14 | End: 2017-07-18 | Stop reason: ALTCHOICE

## 2017-06-14 RX ORDER — POTASSIUM CHLORIDE 750 MG/1
20 TABLET, EXTENDED RELEASE ORAL DAILY
Qty: 90 TAB | Refills: 3
Start: 2017-06-14 | End: 2017-12-05 | Stop reason: SDUPTHER

## 2017-06-14 RX ORDER — DM/PSEUDOEPHED/ACETAMINOPH/CPM
TABLET ORAL
COMMUNITY
Start: 2017-03-13 | End: 2017-07-18 | Stop reason: ALTCHOICE

## 2017-06-14 NOTE — TELEPHONE ENCOUNTER
Returned call to pt,  Spoke with grand daughter,  Pt c/o SOB and right shoulder pain x 5 days, advised her to take pt to urgent care for SOB,  She stated pt   Declines urgent care or ER,  Requesting appt.   appt scheduled for [email or web address]

## 2017-06-14 NOTE — PROGRESS NOTES
HISTORY OF PRESENT ILLNESS  Sophy Gutierrez is a 80 y.o. female. HPI    ROS    Physical Exam    ASSESSMENT and PLAN  Sophy was seen today for shoulder pain. Diagnoses and all orders for this visit:    Chronic pain of both shoulders  -     DEXA BONE DENSITY STUDY AXIAL; Future  -     diclofenac EC (VOLTAREN) 75 mg EC tablet; Take 1 Tab by mouth two (2) times daily as needed. -     methylPREDNISolone (MEDROL DOSEPACK) 4 mg tablet; As directed for 6 days  -     bumetanide (BUMEX) 1 mg tablet; Take 2 Tabs by mouth daily. -     XR SHOULDER LT AP/LAT MIN 2 V; Future  -     REFERRAL TO PHYSICAL THERAPY  -     METABOLIC PANEL, COMPREHENSIVE    Acute right-sided low back pain with sciatica, sciatica laterality unspecified  -     DEXA BONE DENSITY STUDY AXIAL; Future  -     diclofenac EC (VOLTAREN) 75 mg EC tablet; Take 1 Tab by mouth two (2) times daily as needed. -     methylPREDNISolone (MEDROL DOSEPACK) 4 mg tablet; As directed for 6 days  -     bumetanide (BUMEX) 1 mg tablet; Take 2 Tabs by mouth daily. -     XR SHOULDER LT AP/LAT MIN 2 V; Future  -     METABOLIC PANEL, COMPREHENSIVE    Arthritis  -     DEXA BONE DENSITY STUDY AXIAL; Future  -     diclofenac EC (VOLTAREN) 75 mg EC tablet; Take 1 Tab by mouth two (2) times daily as needed. -     methylPREDNISolone (MEDROL DOSEPACK) 4 mg tablet; As directed for 6 days  -     bumetanide (BUMEX) 1 mg tablet; Take 2 Tabs by mouth daily. -     XR SHOULDER LT AP/LAT MIN 2 V; Future  -     METABOLIC PANEL, COMPREHENSIVE    High cholesterol  -     bumetanide (BUMEX) 1 mg tablet; Take 2 Tabs by mouth daily.  -     METABOLIC PANEL, COMPREHENSIVE    Bilateral edema of lower extremity  -     potassium chloride (KLOR-CON) 10 mEq tablet; Take 2 Tabs by mouth daily.  -     METABOLIC PANEL, COMPREHENSIVE    Essential hypertension  -     potassium chloride (KLOR-CON) 10 mEq tablet;  Take 2 Tabs by mouth daily.  -     METABOLIC PANEL, COMPREHENSIVE    Other orders  -     CKD REPORT

## 2017-06-14 NOTE — TELEPHONE ENCOUNTER
----- Message from Aspen Holbrook sent at 6/14/2017  8:39 AM EDT -----  Regarding: Dr. Weller Likes would like to see about getting her grandmother in sooner then July for shoulder pain and shortness of breath. Declined ER.  Contact is [phone]

## 2017-06-14 NOTE — PROGRESS NOTES
HISTORY OF PRESENT ILLNESS  Sophy Bolivar is a 80 y.o. female. HPI     Shoulder Pain   The history is provided by the patient. This is a chronic problem. Episode onset: few yrs ago, + obese, she is adifficulty with overhead activity, raising her arm and awakens her at night,  The problem occurs constantly. The problem has not changed since onset. The pain is present in the shoulder. The quality of the pain is described as dull. The pain is at a severity of 8/10. Associated symptoms include limited range of motion. Pertinent negatives include no numbness, no stiffness, no tingling, no itching, no back pain and + neck pain. The symptoms are aggravated by movement and palpation. There has been no history of extremity trauma. Current Outpatient Prescriptions   Medication Sig Dispense Refill    TRIPLE ANTIBIOTIC PLUS 3.5-500-10,000 mg-unit-unit/g oint       NIFEdipine ER (NIFEDICAL XL) 30 mg ER tablet Take 1 Tab by mouth daily. 90 Tab 4    bumetanide (BUMEX) 1 mg tablet Take 1 Tab by mouth daily. 90 Tab 4    oxyCODONE-acetaminophen (PERCOCET) 5-325 mg per tablet Take 2 Tabs by mouth every eight (8) hours as needed for Pain. 180 Tab 0    atenolol (TENORMIN) 50 mg tablet Take 1 Tab by mouth daily. 90 Tab 4    ergocalciferol (ERGOCALCIFEROL) 50,000 unit capsule Take 1 Cap by mouth every seven (7) days. 4 Cap 11    losartan (COZAAR) 100 mg tablet Take 1 Tab by mouth daily. 90 Tab 3    potassium chloride (K-DUR, KLOR-CON) 10 mEq tablet Take 1 Tab by mouth daily. 90 Tab 3    pravastatin (PRAVACHOL) 40 mg tablet take 1 tablet by mouth NIGHTLY 90 Tab 2    WELCHOL 3.75 gram pwpk powder packet take 1 packet by mouth once daily 90 Packet 6    MISCELLANEOUS MEDICAL SUPPLY (COMPRESSION STOCKINGS) daily.  fenofibrate nanocrystallized (TRICOR) 48 mg tablet Take 1 Tab by mouth daily. 90 Tab 3    aspirin delayed-release 81 mg tablet Take 1 Tab by mouth daily.  30 Tab 11    nitroglycerin (NITROSTAT) 0.4 mg SL tablet 1 Tab by SubLINGual route every five (5) minutes as needed for Chest Pain.  15 Tab 1     Allergies   Allergen Reactions    Lisinopril Cough     Past Medical History:   Diagnosis Date    Arthritis 3/25/2010    Back pain 3/26/2010    CAD (coronary artery disease)     Chest pain, unspecified     Chronic pain     Edema     Essential hypertension     High cholesterol 3/25/2010    HTN (hypertension) 3/25/2010    Kidney failure, acute ([address] 4/6/2012    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    Onychomycosis 8/19/2010    Other acute and subacute form of ischemic heart disease     Other and unspecified angina pectoris     Other ill-defined conditions     excessive watering of right eye    Shortness of breath     Shoulder pain, bilateral 3/26/2010     Past Surgical History:   Procedure Laterality Date    HX HEART CATHETERIZATION      HX HEENT      bilat cateract extraction    HX HYSTERECTOMY      HX KNEE REPLACEMENT      HX LUMBAR FUSION      HX ORTHOPAEDIC      bilateral TKR    HX ORTHOPAEDIC      neck and back surgery    HX ORTHOPAEDIC      bilateral bunionectomy    HX ORTHOPAEDIC      bilateral cataracts removed    HX PTCA       Family History   Problem Relation Age of Onset    Hypertension Mother     Stroke Mother     Other Mother      arthritis    Coronary Artery Disease Father     Heart Disease Father     Hypertension Sister     Diabetes Sister     Coronary Artery Disease Sister     Cancer Sister      lung    Coronary Artery Disease Brother     Cancer Brother      lung    Heart Disease Brother      Social History   Substance Use Topics    Smoking status: Never Smoker    Smokeless tobacco: Never Used    Alcohol use Yes      Comment: occasional       Lab Results  Component Value Date/Time   WBC 5.8 03/09/2017 08:57 AM   HGB 13.1 03/09/2017 08:57 AM   HCT 39.6 03/09/2017 08:57 AM   PLATELET 226 10/39/5977 08:57 AM   MCV 88 03/09/2017 08:57 AM     Lab Results  Component Value Date/Time   GFR est non-AA 35 03/09/2017 08:57 AM   GFR, non-AA (POC) 40 11/30/2009 01:34 PM   GFR est AA 40 03/09/2017 08:57 AM   GFR-AA (POC) 48 11/30/2009 01:34 PM   Creatinine 1.40 03/09/2017 08:57 AM   Creatinine (POC) 1.3 11/30/2009 01:34 PM   BUN 28 03/09/2017 08:57 AM   Sodium 142 03/09/2017 08:57 AM   Potassium 4.8 03/09/2017 08:57 AM   Chloride 101 03/09/2017 08:57 AM   CO2 26 03/09/2017 08:57 AM        Review of Systems   Constitutional: Negative for chills and fever. HENT: Negative for ear pain and nosebleeds. Eyes: Negative for blurred vision, pain and discharge. Respiratory: Negative for shortness of breath. Cardiovascular: Negative for chest pain and leg swelling. Gastrointestinal: Negative for constipation, diarrhea, nausea and vomiting. Genitourinary: Negative for frequency. Musculoskeletal: Positive for joint pain and myalgias. Skin: Negative for itching and rash. Neurological: Negative for headaches. Psychiatric/Behavioral: Negative for depression. The patient is not nervous/anxious. Physical Exam   Constitutional: She is oriented to person, place, and time. She appears well-developed and well-nourished. HENT:   Head: Normocephalic and atraumatic. Eyes: Conjunctivae and EOM are normal.   Neck: Normal range of motion. Neck supple. Cardiovascular: Normal rate, regular rhythm and normal heart sounds. No murmur heard. Pulmonary/Chest: Effort normal and breath sounds normal.   Abdominal: Soft. Bowel sounds are normal. She exhibits no distension. Musculoskeletal: She exhibits tenderness and deformity. She exhibits no edema. Right shoulder: She exhibits decreased range of motion, tenderness, pain and spasm. Left shoulder: She exhibits decreased range of motion, tenderness, pain and spasm. Neurological: She is alert and oriented to person, place, and time. Skin: No erythema.    Psychiatric: Her behavior is normal.   Nursing note and vitals reviewed. ASSESSMENT and PLAN  Sophy was seen today for shoulder pain. Diagnoses and all orders for this visit:    Chronic pain of both shoulders  -     DEXA BONE DENSITY STUDY AXIAL; Future  -     diclofenac EC (VOLTAREN) 75 mg EC tablet; Take 1 Tab by mouth two (2) times daily as needed. -     methylPREDNISolone (MEDROL DOSEPACK) 4 mg tablet; As directed for 6 days  -     bumetanide (BUMEX) 1 mg tablet; Take 2 Tabs by mouth daily. -     XR SHOULDER LT AP/LAT MIN 2 V; Future  -     REFERRAL TO PHYSICAL THERAPY  -     METABOLIC PANEL, COMPREHENSIVE    Acute right-sided low back pain with sciatica, sciatica laterality unspecified  -     DEXA BONE DENSITY STUDY AXIAL; Future  -     diclofenac EC (VOLTAREN) 75 mg EC tablet; Take 1 Tab by mouth two (2) times daily as needed. -     methylPREDNISolone (MEDROL DOSEPACK) 4 mg tablet; As directed for 6 days  -     bumetanide (BUMEX) 1 mg tablet; Take 2 Tabs by mouth daily. -     XR SHOULDER LT AP/LAT MIN 2 V; Future  -     METABOLIC PANEL, COMPREHENSIVE    Arthritis  -     DEXA BONE DENSITY STUDY AXIAL; Future  -     diclofenac EC (VOLTAREN) 75 mg EC tablet; Take 1 Tab by mouth two (2) times daily as needed. -     methylPREDNISolone (MEDROL DOSEPACK) 4 mg tablet; As directed for 6 days  -     bumetanide (BUMEX) 1 mg tablet; Take 2 Tabs by mouth daily. -     XR SHOULDER LT AP/LAT MIN 2 V; Future  -     METABOLIC PANEL, COMPREHENSIVE    High cholesterol  -     bumetanide (BUMEX) 1 mg tablet; Take 2 Tabs by mouth daily.  -     METABOLIC PANEL, COMPREHENSIVE    Bilateral edema of lower extremity  -     potassium chloride (KLOR-CON) 10 mEq tablet; Take 2 Tabs by mouth daily.  -     METABOLIC PANEL, COMPREHENSIVE    Essential hypertension  -     potassium chloride (KLOR-CON) 10 mEq tablet;  Take 2 Tabs by mouth daily.  -     METABOLIC PANEL, COMPREHENSIVE    Other orders  -     CKD REPORT        Patient was provided evidence based informations with the regard of their expected course,  In addition was told to help with weight reduction, spinal manipulations, massage therapy, exercise therapy:  Patient was also told to remain active but to avoid heavy lifting and pushing at this time for the next 6 weeks which is the time of the recovery  Advised for self cared options such as: 1. NSAID's and Tylenol for pain, take meds w/ food and water, if develop abdominal upsets, such as heart burn and sour stomach. Please take some OTC antacids or Nexium 30 min before the first meal once daily and watch for discolored stool. Also do the exercise therapy: Ice therapy 2-30min tid daily, daily stretching x2 daily for 5-10 min, rom strengthening with resistance banding 3-4 times per week  Most of the how to do informations printed and handed out to the patient,   and finally the stool softner if opioid base meds given, in addition, pt was told to avoid machinary operation and driving while on any opioid based medications that will cause dizziness, drowsiness, and sleepiness. Dependency and tolerancy were also addressed,  meds side effects and compliancy advised,  Call or rtc if worsens,   Pt agreed with today's recommendations. ;

## 2017-06-14 NOTE — MR AVS SNAPSHOT
Visit Information Date & Time Provider Department Dept. Phone Encounter #  
 6/14/2017  3:45 PM Jose Edwards MD [address] [facility] [phone] [id number] Your Appointments 7/18/2017  9:15 AM  
ESTABLISHED PATIENT with José Luis Munoz MD  
[facility]) Appt Note: 6 MO. F/U  
 [address]  
[phone] [address]  
  
    
 9/11/2017  8:30 AM  
ROUTINE CARE with Jose Edwards MD  
[address] [facility] ([address]) Appt Note: 6 mo f/u  
 [address] [facility] [id number] [phone] [id number] Upcoming Health Maintenance Date Due ZOSTER VACCINE AGE 60> 1/14/1993 MEDICARE YEARLY EXAM 11/30/2016 EYE EXAM RETINAL OR DILATED Q1 12/4/2016 FOOT EXAM Q1 6/15/2017 MICROALBUMIN Q1 6/15/2017 INFLUENZA AGE 9 TO ADULT 8/1/2017 HEMOGLOBIN A1C Q6M 9/9/2017 GLAUCOMA SCREENING Q2Y 12/4/2017 LIPID PANEL Q1 3/9/2018 DTaP/Tdap/Td series (2 - Td) 11/30/2025 Allergies as of 6/14/2017  Review Complete On: 6/14/2017 By: Vamshi Gonzalez LPN Severity Noted Reaction Type Reactions Lisinopril  08/17/2011    Cough Current Immunizations  Reviewed on 11/10/2016 Name Date Influenza High Dose Vaccine PF 10/31/2016, 11/1/2015 Influenza Vaccine Whole 10/22/2011 Pneumococcal Polysaccharide (PPSV-23) 8/15/2014 Pneumococcal Vaccine (Unspecified Type) 1/15/1996 Tdap 11/30/2015 Not reviewed this visit You Were Diagnosed With   
  
 Codes Comments Chronic pain of both shoulders    -  Primary ICD-10-CM: M25.512, G89.29, M25.511 ICD-9-CM: 719.41, 338.29 Acute right-sided low back pain with sciatica, sciatica laterality unspecified     ICD-10-CM: M54.40 ICD-9-CM: 724.2, 724.3 Arthritis     ICD-10-CM: M19.90 ICD-9-CM: 716.90 High cholesterol     ICD-10-CM: E78.00 ICD-9-CM: 272.0 Bilateral edema of lower extremity     ICD-10-CM: R60.0 ICD-9-CM: 782.3 Essential hypertension     ICD-10-CM: I10 
ICD-9-CM: 401.9 Vitals BP Pulse Temp Resp Height(growth percentile) Weight(growth percentile) 134/54 (BP 1 Location: Right arm, BP Patient Position: Sitting) 62 98 °F (36.7 °C) (Oral) 18 5' 5\" (1.651 m) 225 lb 9.6 oz (102.3 kg) SpO2 BMI OB Status Smoking Status 99% 37.54 kg/m2 Hysterectomy Never Smoker BMI and BSA Data Body Mass Index Body Surface Area  
 37.54 kg/m 2 2.17 m 2 Preferred Pharmacy Pharmacy Name Phone [facility] [id number] [place], [address] [facility] [phone] Your Updated Medication List  
  
   
This list is accurate as of: 6/14/17  4:33 PM.  Always use your most recent med list.  
  
  
  
  
 aspirin delayed-release 81 mg tablet Take 1 Tab by mouth daily. atenolol 50 mg tablet Commonly known as:  TENORMIN Take 1 Tab by mouth daily. bumetanide 1 mg tablet Commonly known as:  Shelva Mulligan Take 2 Tabs by mouth daily. COMPRESSION STOCKINGS  
daily. diclofenac EC 75 mg EC tablet Commonly known as:  VOLTAREN Take 1 Tab by mouth two (2) times daily as needed. ergocalciferol 50,000 unit capsule Commonly known as:  ERGOCALCIFEROL Take 1 Cap by mouth every seven (7) days. fenofibrate nanocrystallized 48 mg tablet Commonly known as:  [employer] Take 1 Tab by mouth daily. losartan 100 mg tablet Commonly known as:  COZAAR Take 1 Tab by mouth daily. methylPREDNISolone 4 mg tablet Commonly known as:  Auther Faulkner As directed for 6 days NIFEdipine ER 30 mg ER tablet Commonly known as:  NIFEDICAL XL Take 1 Tab by mouth daily. nitroglycerin 0.4 mg SL tablet Commonly known as:  NITROSTAT 1 Tab by SubLINGual route every five (5) minutes as needed for Chest Pain. oxyCODONE-acetaminophen 5-325 mg per tablet Commonly known as:  PERCOCET Take 2 Tabs by mouth every eight (8) hours as needed for Pain.  
  
 potassium chloride 10 mEq tablet Commonly known as:  KLOR-CON Take 2 Tabs by mouth daily. pravastatin 40 mg tablet Commonly known as:  PRAVACHOL  
take 1 tablet by mouth NIGHTLY TRIPLE ANTIBIOTIC PLUS 3.5-500-10,000 mg-unit-unit/g Oint Generic drug:  Oqeqz-Zwopf-Qythbhd-Pramoxine WELCHOL 3.75 gram Pwpk powder packet Generic drug:  colesevelam  
take 1 packet by mouth once daily Prescriptions Sent to Pharmacy Refills  
 diclofenac EC (VOLTAREN) 75 mg EC tablet 1 Sig: Take 1 Tab by mouth two (2) times daily as needed. Class: Normal  
 Pharmacy: [address] Ph #: [phone] Route: Oral  
 methylPREDNISolone (MEDROL DOSEPACK) 4 mg tablet 0 Sig: As directed for 6 days Class: Normal  
 Pharmacy: [address] Ph #: [phone] We Performed the Following METABOLIC PANEL, COMPREHENSIVE [55726 CPT(R)] REFERRAL TO PHYSICAL THERAPY [UWE49 Custom] Comments:  
 Please evaluate patient for rotator cuff Please schedule and authorize patient for services 3x times daily To-Do List   
 06/14/2017 Imaging:  DEXA BONE DENSITY STUDY AXIAL   
  
 06/14/2017 Imaging:  XR SHOULDER LT AP/LAT MIN 2 V Referral Information Referral ID Referred By Referred To  
  
 [id number] Jones Melendez Not Available Visits Status Start Date End Date 1 New Request 6/14/17 6/14/18 If your referral has a status of pending review or denied, additional information will be sent to support the outcome of this decision. Introducing [facility]! [facility] introduces [employer] patient portal. Now you can access parts of your medical record, email your doctor's office, and request medication refills online. 1. In your internet browser, go to [email or web address] 2. Click on the First Time User? Click Here link in the Sign In box. You will see the New Member Sign Up page. 3. Enter your [employer] Things Access Code exactly as it appears below. You will not need to use this code after youve completed the sign-up process. If you do not sign up before the expiration date, you must request a new code. · Bright Things Access Code: [email or web address] Expires: 9/12/2017  4:33 PM 
 
4. Enter the last four digits of your Social Security Number (xxxx) and Date of Birth (mm/dd/yyyy) as indicated and click Submit. You will be taken to the next sign-up page. 5. Create a Bright Things ID. This will be your [employer] Things login ID and cannot be changed, so think of one that is secure and easy to remember. 6. Create a [employer] Things password. You can change your password at any time. 7. Enter your Password Reset Question and Answer. This can be used at a later time if you forget your password. 8. Enter your e-mail address. You will receive e-mail notification when new information is available in [address] 9. Click Sign Up. You can now view and download portions of your medical record. 10. Click the Download Summary menu link to download a portable copy of your medical information. If you have questions, please visit the Frequently Asked Questions section of the [employer] website. Remember, [employer] Things is NOT to be used for urgent needs. For medical emergencies, dial 911. Now available from your iPhone and Android! Please provide this summary of care documentation to your next provider. Your primary care clinician is listed as Yazmin Keenan. If you have any questions after today's visit, please call [phone].

## 2017-06-15 LAB
ALBUMIN SERPL-MCNC: 4.2 G/DL (ref 3.5–4.7)
ALBUMIN/GLOB SERPL: 1.4 {RATIO} (ref 1.2–2.2)
ALP SERPL-CCNC: 62 IU/L (ref 39–117)
ALT SERPL-CCNC: 14 IU/L (ref 0–32)
AST SERPL-CCNC: 25 IU/L (ref 0–40)
BILIRUB SERPL-MCNC: 0.3 MG/DL (ref 0–1.2)
BUN SERPL-MCNC: 28 MG/DL (ref 8–27)
BUN/CREAT SERPL: 19 (ref 12–28)
CALCIUM SERPL-MCNC: 9.9 MG/DL (ref 8.7–10.3)
CHLORIDE SERPL-SCNC: 101 MMOL/L (ref 96–106)
CO2 SERPL-SCNC: 23 MMOL/L (ref 18–29)
CREAT SERPL-MCNC: 1.45 MG/DL (ref 0.57–1)
GLOBULIN SER CALC-MCNC: 2.9 G/DL (ref 1.5–4.5)
GLUCOSE SERPL-MCNC: 86 MG/DL (ref 65–99)
INTERPRETATION: NORMAL
POTASSIUM SERPL-SCNC: 4.9 MMOL/L (ref 3.5–5.2)
PROT SERPL-MCNC: 7.1 G/DL (ref 6–8.5)
SODIUM SERPL-SCNC: 143 MMOL/L (ref 134–144)

## 2017-06-21 DIAGNOSIS — E78.2 MIXED HYPERLIPIDEMIA: ICD-10-CM

## 2017-06-26 RX ORDER — FENOFIBRATE 48 MG/1
TABLET, COATED ORAL
Qty: 90 TAB | Refills: 3 | Status: SHIPPED | OUTPATIENT
Start: 2017-06-26 | End: 2017-12-05 | Stop reason: ALTCHOICE

## 2017-07-18 ENCOUNTER — OFFICE VISIT (OUTPATIENT)
Dept: CARDIOLOGY CLINIC | Age: 82
End: 2017-07-18

## 2017-07-18 VITALS
HEART RATE: 63 BPM | DIASTOLIC BLOOD PRESSURE: 67 MMHG | HEIGHT: 66 IN | WEIGHT: 225 LBS | OXYGEN SATURATION: 97 % | SYSTOLIC BLOOD PRESSURE: 126 MMHG | BODY MASS INDEX: 36.16 KG/M2

## 2017-07-18 DIAGNOSIS — M15.9 PRIMARY OSTEOARTHRITIS INVOLVING MULTIPLE JOINTS: ICD-10-CM

## 2017-07-18 DIAGNOSIS — E78.2 MIXED HYPERLIPIDEMIA: ICD-10-CM

## 2017-07-18 DIAGNOSIS — I25.10 CORONARY ARTERY DISEASE INVOLVING NATIVE CORONARY ARTERY OF NATIVE HEART WITHOUT ANGINA PECTORIS: Primary | ICD-10-CM

## 2017-07-18 DIAGNOSIS — R60.9 EDEMA, UNSPECIFIED TYPE: ICD-10-CM

## 2017-07-18 DIAGNOSIS — R07.9 CHEST PAIN, UNSPECIFIED: ICD-10-CM

## 2017-07-18 DIAGNOSIS — I10 ESSENTIAL HYPERTENSION, BENIGN: ICD-10-CM

## 2017-07-18 NOTE — MR AVS SNAPSHOT
Visit Information Date & Time Provider Department Dept. Phone Encounter #  
 7/18/2017  9:15 AM Jazzy De Leon MD [facility] at [address] [place], [place] [id number] Your Appointments 9/11/2017  8:30 AM  
ROUTINE CARE with Syed Pagan MD  
[facility] OFFICE[facility] ([address]) Appt Note: 6 mo f/u  
 [address] [facility] [place] [address] [phone]  
[phone] [id number] Upcoming Health Maintenance Date Due ZOSTER VACCINE AGE 60> 1/14/1993 MEDICARE YEARLY EXAM 11/30/2016 EYE EXAM RETINAL OR DILATED Q1 12/4/2016 FOOT EXAM Q1 6/15/2017 MICROALBUMIN Q1 6/15/2017 INFLUENZA AGE 9 TO ADULT 8/1/2017 HEMOGLOBIN A1C Q6M 9/9/2017 GLAUCOMA SCREENING Q2Y 12/4/2017 LIPID PANEL Q1 3/9/2018 DTaP/Tdap/Td series (2 - Td) 11/30/2025 Allergies as of 7/18/2017  Review Complete On: 6/14/2017 By: Larisa King LPN Severity Noted Reaction Type Reactions Lisinopril  08/17/2011    Cough Current Immunizations  Reviewed on 11/10/2016 Name Date Influenza High Dose Vaccine PF 10/31/2016, 11/1/2015 Influenza Vaccine Whole 10/22/2011 Pneumococcal Polysaccharide (PPSV-23) 8/15/2014 Pneumococcal Vaccine (Unspecified Type) 1/15/1996 Tdap 11/30/2015 Not reviewed this visit You Were Diagnosed With   
  
 Codes Comments Coronary artery disease involving native coronary artery of native heart without angina pectoris    -  Primary ICD-10-CM: I25.10 ICD-9-CM: 414.01 Essential hypertension, benign     ICD-10-CM: I10 
ICD-9-CM: 223. 1 Chest pain, unspecified     ICD-10-CM: R07.9 ICD-9-CM: 786.50 Vitals BP Pulse Height(growth percentile) Weight(growth percentile) SpO2 BMI  
 126/67 63 5' 6\" (1.676 m) 225 lb (102.1 kg) 97% 36.32 kg/m2 OB Status Smoking Status Hysterectomy Never Smoker Vitals History BMI and BSA Data Body Mass Index Body Surface Area  
 36.32 kg/m 2 2.18 m 2 Preferred Pharmacy Pharmacy Name Phone [facility] [id number] [place], [address] [facility] [phone] Your Updated Medication List  
  
   
This list is accurate as of: 7/18/17  9:40 AM.  Always use your most recent med list.  
  
  
  
  
 aspirin delayed-release 81 mg tablet Take 1 Tab by mouth daily. atenolol 50 mg tablet Commonly known as:  TENORMIN Take 1 Tab by mouth daily. bumetanide 1 mg tablet Commonly known as:  Rumalda Fritter Take 2 Tabs by mouth daily. COMPRESSION STOCKINGS  
daily. ergocalciferol 50,000 unit capsule Commonly known as:  ERGOCALCIFEROL Take 1 Cap by mouth every seven (7) days. fenofibrate nanocrystallized 48 mg tablet Commonly known as:  TRICOR  
take 1 tablet by mouth once daily  
  
 losartan 100 mg tablet Commonly known as:  COZAAR Take 1 Tab by mouth daily. NIFEdipine ER 30 mg ER tablet Commonly known as:  NIFEDICAL XL Take 1 Tab by mouth daily. nitroglycerin 0.4 mg SL tablet Commonly known as:  NITROSTAT  
1 Tab by SubLINGual route every five (5) minutes as needed for Chest Pain. oxyCODONE-acetaminophen 5-325 mg per tablet Commonly known as:  PERCOCET Take 2 Tabs by mouth every eight (8) hours as needed for Pain.  
  
 potassium chloride 10 mEq tablet Commonly known as:  KLOR-CON Take 2 Tabs by mouth daily. pravastatin 40 mg tablet Commonly known as:  PRAVACHOL  
take 1 tablet by mouth NIGHTLY WELCHOL 3.75 gram Pwpk powder packet Generic drug:  colesevelam  
take 1 packet by mouth once daily We Performed the Following AMB POC EKG ROUTINE W/ 12 LEADS, INTER & REP [05431 CPT(R)] Introducing [facility]!    
 [facility] introduces [employer] patient portal. Now you can access parts of your medical record, email your doctor's office, and request medication refills online. 1. In your internet browser, go to [email or web address] 2. Click on the First Time User? Click Here link in the Sign In box. You will see the New Member Sign Up page. 3. Enter your [email or web address] Access Code exactly as it appears below. You will not need to use this code after youve completed the sign-up process. If you do not sign up before the expiration date, you must request a new code. · [email or web address] Access Code: [email or web address] Expires: 9/12/2017  4:33 PM 
 
4. Enter the last four digits of your Social Security Number (xxxx) and Date of Birth (mm/dd/yyyy) as indicated and click Submit. You will be taken to the next sign-up page. 5. Create a [email or web address] ID. This will be your [email or web address] login ID and cannot be changed, so think of one that is secure and easy to remember. 6. Create a [email or web address] password. You can change your password at any time. 7. Enter your Password Reset Question and Answer. This can be used at a later time if you forget your password. 8. Enter your e-mail address. You will receive e-mail notification when new information is available in [address] 9. Click Sign Up. You can now view and download portions of your medical record. 10. Click the Download Summary menu link to download a portable copy of your medical information. If you have questions, please visit the Frequently Asked Questions section of the [email or web address] website. Remember, [email or web address] is NOT to be used for urgent needs. For medical emergencies, dial 911. Now available from your iPhone and Android! Please provide this summary of care documentation to your next provider. Your primary care clinician is listed as Bautista Leija. If you have any questions after today's visit, please call [phone].

## 2017-07-18 NOTE — PROGRESS NOTES
[facility]   [address] [facility], [address] [place] [address]                                          NEW PATIENT HPI/FOLLOW-UP      NAME:  Alysa Posey   :   1933   MRN:   [id number]   PCP:  Betty Campbell MD           Subjective: The patient is a 80y.o. year old female  who returns for a routine follow-up. Since the last visit, patient reports no new symptoms. Denies change in exercise tolerance, chest pain, edema, medication intolerance, palpitations, shortness of breath, PND/orthopnea wheezing, sputum, syncope, dizziness or light headedness. Doing satisfactorily. Review of Systems  General: Pt denies excessive weight gain or loss. Pt is able to conduct ADL's. Respiratory: Denies shortness of breath, MURILLO, wheezing or stridor.   Cardiovascular: Denies precordial pain, palpitations, edema or PND  Gastrointestinal: Denies poor appetite, indigestion, abdominal pain or blood in stool  Peripheral vascular: Denies claudication, leg cramps  Neuropsychiatric: Denies paresthesias,tingling,numbness,anxiety,depression,fatigue  Musculoskeletal: Denies pain,tenderness, soreness,swelling      Past Medical History:   Diagnosis Date    Arthritis 3/25/2010    Back pain 3/26/2010    CAD (coronary artery disease)     Chest pain, unspecified     Chronic pain     Edema     Essential hypertension     High cholesterol 3/25/2010    HTN (hypertension) 3/25/2010    Kidney failure, acute ([facility] [address] 2012    Lacrimal passage stenosis 2014    Onychomycosis 2010    Other acute and subacute form of ischemic heart disease     Other and unspecified angina pectoris     Other ill-defined conditions     excessive watering of right eye    Shortness of breath     Shoulder pain, bilateral 3/26/2010     Patient Active Problem List    Diagnosis Date Noted    Edema--non-pitting 2016    Lacrimal passage stenosis 2014    Preop cardiovascular exam 2014    Prediabetes 01/03/2014    Abnormal urinalysis 08/30/2013    Lacrimal duct stenosis 08/14/2013    Conjunctivitis 05/10/2013    Watery eyes 08/29/2012    S/P angioplasty with stent--RCA 6/12 06/22/2012    Multiple bruises 06/22/2012    CAD (coronary artery disease) 06/07/2012    Post PTCA 06/07/2012    Mixed hyperlipidemia 06/01/2012    Essential hypertension, benign 06/01/2012    Abnormal nuclear stress test 06/01/2012    Chest pain, unspecified 04/25/2012    Essential hypertension, benign 04/20/2012    Type 2 diabetes mellitus without complication ([facility] [address] 85/07/4413    SOB (shortness of breath) on exertion 04/06/2012    Vitamin D deficiency 04/06/2012    Kidney failure, acute ([facility] [address] 04/06/2012    Heme positive stool 11/12/2011    Abdominal pain, other specified site 11/12/2011    Inguinal hernia, left 11/12/2011    Decreased vision 10/12/2011    Edema leg 10/12/2011    Cough 08/17/2011    Tick bite, infected 08/17/2011    Onychomycosis 08/19/2010    Shoulder pain, bilateral 03/26/2010    Back pain 03/26/2010    Arthritis 03/25/2010      Past Surgical History:   Procedure Laterality Date    HX HEART CATHETERIZATION      HX HEENT      bilat cateract extraction    HX HYSTERECTOMY      HX KNEE REPLACEMENT      HX LUMBAR FUSION      HX ORTHOPAEDIC      bilateral TKR    HX ORTHOPAEDIC      neck and back surgery    HX ORTHOPAEDIC      bilateral bunionectomy    HX ORTHOPAEDIC      bilateral cataracts removed    HX PTCA       Allergies   Allergen Reactions    Lisinopril Cough      Family History   Problem Relation Age of Onset    Hypertension Mother     Stroke Mother     Other Mother      arthritis    Coronary Artery Disease Father     Heart Disease Father     Hypertension Sister     Diabetes Sister     Coronary Artery Disease Sister     Cancer Sister      lung    Coronary Artery Disease Brother     Cancer Brother      lung    Heart Disease Brother       Social History     Social History  Marital status:      Spouse name: N/A    Number of children: N/A    Years of education: N/A     Occupational History    Not on file. Social History Main Topics    Smoking status: Never Smoker    Smokeless tobacco: Never Used    Alcohol use Yes      Comment: occasional     Drug use: Yes     Special: Prescription, OTC    Sexual activity: No     Other Topics Concern    Not on file     Social History Narrative    ** Merged History Encounter **           Current Outpatient Prescriptions   Medication Sig    fenofibrate nanocrystallized (TRICOR) 48 mg tablet take 1 tablet by mouth once daily    bumetanide (BUMEX) 1 mg tablet Take 2 Tabs by mouth daily.  potassium chloride (KLOR-CON) 10 mEq tablet Take 2 Tabs by mouth daily.  NIFEdipine ER (NIFEDICAL XL) 30 mg ER tablet Take 1 Tab by mouth daily.  oxyCODONE-acetaminophen (PERCOCET) 5-325 mg per tablet Take 2 Tabs by mouth every eight (8) hours as needed for Pain.  atenolol (TENORMIN) 50 mg tablet Take 1 Tab by mouth daily.  ergocalciferol (ERGOCALCIFEROL) 50,000 unit capsule Take 1 Cap by mouth every seven (7) days.  losartan (COZAAR) 100 mg tablet Take 1 Tab by mouth daily.  nitroglycerin (NITROSTAT) 0.4 mg SL tablet 1 Tab by SubLINGual route every five (5) minutes as needed for Chest Pain.  pravastatin (PRAVACHOL) 40 mg tablet take 1 tablet by mouth NIGHTLY    WELCHOL 3.75 gram pwpk powder packet take 1 packet by mouth once daily    MISCELLANEOUS MEDICAL SUPPLY (COMPRESSION STOCKINGS) daily.  aspirin delayed-release 81 mg tablet Take 1 Tab by mouth daily. No current facility-administered medications for this visit. I have reviewed the nurses notes, vitals, problem list, allergy list, medical history, family medical, social history and medications.         Objective:     Physical Exam:     Vitals:    07/18/17 0913   BP: 126/67   Pulse: 63   SpO2: 97%   Weight: 225 lb (102.1 kg)   Height: 5' 6\" (1.676 m) Body mass index is 36.32 kg/(m^2). General: Well developed, in no acute distress. Wwalks with cane. HEENT: No carotid bruits, no JVD, trach is midline. Heart:  Normal S1/S2 negative S3 or S4. Regular, no murmur, gallop or rub.   Respiratory: Clear bilaterally, no wheezing or rales  Abdomen:   Soft, non-tender, bowel sounds are active.   Extremities:  No edema, normal cap refill, no cyanosis. Neuro: A&Ox3, speech clear, gait stable. Skin: Skin color is normal. No rashes or lesions. No diaphoresis.   Vascular: 2+ pulses symmetric in all extremities        Data Review:       Cardiographics:    EKG: NSR,1 st degree AV HB    Cardiology Labs:    Results for orders placed or performed during the hospital encounter of 06/06/12   EKG, 12 LEAD, INITIAL   Result Value Ref Range    Ventricular Rate 66 BPM    Atrial Rate 66 BPM    P-R Interval 188 ms    QRS Duration 84 ms    Q-T Interval 418 ms    QTC Calculation (Bezet) 438 ms    Calculated P Axis 58 degrees    Calculated R Axis 16 degrees    Calculated T Axis 42 degrees    Diagnosis       Sinus rhythm with premature atrial complexes  When compared with ECG of 06-JUN-2012 10:45,  premature atrial complexes are now present  Confirmed by Mark Dean ([id number]) on 6/7/2012 8:37:17 AM       Lab Results   Component Value Date/Time    Cholesterol, total 180 03/09/2017 08:57 AM    HDL Cholesterol 69 03/09/2017 08:57 AM    LDL, calculated 91 03/09/2017 08:57 AM    Triglyceride 98 03/09/2017 08:57 AM    CHOL/HDL Ratio 4.5 08/19/2010 12:27 PM       Lab Results   Component Value Date/Time    Sodium 143 06/14/2017 04:35 PM    Potassium 4.9 06/14/2017 04:35 PM    Chloride 101 06/14/2017 04:35 PM    CO2 23 06/14/2017 04:35 PM    Anion gap 7 06/07/2012 03:00 AM    Glucose 86 06/14/2017 04:35 PM    BUN 28 06/14/2017 04:35 PM    Creatinine 1.45 06/14/2017 04:35 PM    BUN/Creatinine ratio 19 06/14/2017 04:35 PM    GFR est AA 38 06/14/2017 04:35 PM    GFR est non-AA 33 06/14/2017 04:35 PM    Calcium 9.9 06/14/2017 04:35 PM    Bilirubin, total 0.3 06/14/2017 04:35 PM    AST (SGOT) 25 06/14/2017 04:35 PM    Alk. phosphatase 62 06/14/2017 04:35 PM    Protein, total 7.1 06/14/2017 04:35 PM    Albumin 4.2 06/14/2017 04:35 PM    Globulin 4.0 11/12/2011 11:03 AM    A-G Ratio 1.4 06/14/2017 04:35 PM    ALT (SGPT) 14 06/14/2017 04:35 PM          Assessment:       ICD-10-CM ICD-9-CM    1. 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 Essential hypertension, benign I10 401.1 AMB POC EKG ROUTINE W/ 12 LEADS, INTER & REP   3. Chest pain, unspecified R07.9 786.50 AMB POC EKG ROUTINE W/ 12 LEADS, INTER & REP   4. Mixed hyperlipidemia E78.2 272.2    5. Edema, unspecified type R60.9 782.3    6. Primary osteoarthritis involving multiple joints M15.0 715.09          Discussion: Patient presents at this time stable from a cardiac perspective. Pleased with present status. Plan: 1. Continue same meds. Lipid profile and labs followed by PCP. 2.Encouraged to exercise to tolerance, lose weight and follow low fat, low cholesterol, low sodium predominantly Plant-based (consider Mediterranean) diet. Call with questions or concerns. Will follow up any test results by phone and/or f/u here in office if needed. Deya Gannon 3.Follow up: 6 monthsd    I have discussed the diagnosis with the patient and the intended plan as seen in the above orders. The patient has received an after-visit summary and questions were answered concerning future plans. I have discussed any concerning medication side effects and warnings with the patient as well.     Nola Ash MD  7/18/2017

## 2017-07-19 DIAGNOSIS — R73.03 PREDIABETES: ICD-10-CM

## 2017-07-19 DIAGNOSIS — I25.10 CORONARY ARTERY DISEASE DUE TO LIPID RICH PLAQUE: ICD-10-CM

## 2017-07-19 DIAGNOSIS — I25.83 CORONARY ARTERY DISEASE DUE TO LIPID RICH PLAQUE: ICD-10-CM

## 2017-07-19 DIAGNOSIS — E78.00 HIGH CHOLESTEROL: ICD-10-CM

## 2017-07-20 RX ORDER — PRAVASTATIN SODIUM 40 MG/1
TABLET ORAL
Qty: 90 TAB | Refills: 2 | Status: SHIPPED | OUTPATIENT
Start: 2017-07-20 | End: 2017-12-05 | Stop reason: SDUPTHER

## 2017-07-20 RX ORDER — COLESEVELAM HYDROCHLORIDE 3.75 G/1
FOR SUSPENSION ORAL
Qty: 90 PACKET | Refills: 6 | Status: SHIPPED | OUTPATIENT
Start: 2017-07-20 | End: 2017-12-05 | Stop reason: ALTCHOICE

## 2017-09-11 ENCOUNTER — OFFICE VISIT (OUTPATIENT)
Dept: FAMILY MEDICINE CLINIC | Age: 82
End: 2017-09-11

## 2017-09-11 VITALS
SYSTOLIC BLOOD PRESSURE: 146 MMHG | TEMPERATURE: 96.9 F | WEIGHT: 224.4 LBS | HEART RATE: 98 BPM | BODY MASS INDEX: 36.07 KG/M2 | DIASTOLIC BLOOD PRESSURE: 63 MMHG | HEIGHT: 66 IN | RESPIRATION RATE: 14 BRPM

## 2017-09-11 DIAGNOSIS — Z13.31 SCREENING FOR DEPRESSION: ICD-10-CM

## 2017-09-11 DIAGNOSIS — G89.29 CHRONIC PAIN OF BOTH SHOULDERS: ICD-10-CM

## 2017-09-11 DIAGNOSIS — M15.9 PRIMARY OSTEOARTHRITIS INVOLVING MULTIPLE JOINTS: ICD-10-CM

## 2017-09-11 DIAGNOSIS — Z13.39 SCREENING FOR ALCOHOLISM: ICD-10-CM

## 2017-09-11 DIAGNOSIS — G89.29 CHRONIC MIDLINE LOW BACK PAIN WITHOUT SCIATICA: ICD-10-CM

## 2017-09-11 DIAGNOSIS — G89.29 CHRONIC LEFT-SIDED THORACIC BACK PAIN: ICD-10-CM

## 2017-09-11 DIAGNOSIS — G89.29 CHRONIC LOW BACK PAIN WITH SCIATICA, SCIATICA LATERALITY UNSPECIFIED, UNSPECIFIED BACK PAIN LATERALITY: ICD-10-CM

## 2017-09-11 DIAGNOSIS — M54.40 CHRONIC LOW BACK PAIN WITH SCIATICA, SCIATICA LATERALITY UNSPECIFIED, UNSPECIFIED BACK PAIN LATERALITY: ICD-10-CM

## 2017-09-11 DIAGNOSIS — M54.6 CHRONIC LEFT-SIDED THORACIC BACK PAIN: ICD-10-CM

## 2017-09-11 DIAGNOSIS — M19.90 ARTHRITIS: ICD-10-CM

## 2017-09-11 DIAGNOSIS — M25.511 CHRONIC PAIN OF BOTH SHOULDERS: ICD-10-CM

## 2017-09-11 DIAGNOSIS — M54.50 CHRONIC MIDLINE LOW BACK PAIN WITHOUT SCIATICA: ICD-10-CM

## 2017-09-11 DIAGNOSIS — E11.9 DIABETES MELLITUS WITHOUT COMPLICATION (HCC): Primary | ICD-10-CM

## 2017-09-11 DIAGNOSIS — M25.512 CHRONIC PAIN OF BOTH SHOULDERS: ICD-10-CM

## 2017-09-11 LAB
BILIRUB UR QL STRIP: NEGATIVE
GLUCOSE UR-MCNC: NEGATIVE MG/DL
HBA1C MFR BLD HPLC: 6 %
KETONES P FAST UR STRIP-MCNC: NEGATIVE MG/DL
PH UR STRIP: 7 [PH] (ref 4.6–8)
PROT UR QL STRIP: NEGATIVE MG/DL
SP GR UR STRIP: 1.01 (ref 1–1.03)
UA UROBILINOGEN AMB POC: NORMAL (ref 0.2–1)
URINALYSIS CLARITY POC: CLEAR
URINALYSIS COLOR POC: YELLOW
URINE BLOOD POC: NEGATIVE
URINE LEUKOCYTES POC: NEGATIVE
URINE NITRITES POC: NEGATIVE

## 2017-09-11 RX ORDER — ASPIRIN 325 MG
TABLET, DELAYED RELEASE (ENTERIC COATED) ORAL
COMMUNITY
End: 2017-12-05 | Stop reason: SDUPTHER

## 2017-09-11 RX ORDER — OXYCODONE AND ACETAMINOPHEN 5; 325 MG/1; MG/1
2 TABLET ORAL
Qty: 180 TAB | Refills: 0 | Status: SHIPPED | OUTPATIENT
Start: 2017-09-11 | End: 2018-02-06 | Stop reason: SDUPTHER

## 2017-09-11 NOTE — PROGRESS NOTES
This is a Subsequent Medicare Annual Wellness Exam (AWV) (Performed 12 months after IPPE or effective date of Medicare Part B enrollment, Once in a lifetime)    I have reviewed the patient's medical history in detail and updated the computerized patient record. History   Present for CPE, last Complete Physical exam was couple yrs ago  ,  Up todate w/ all vaccination, last tetanus vaccine was in < 7yrs aog   .      last mammog was nl and  In ,  last pap smear normal and was in  Few yrs  . last colonoscopy was normal and was 5 yrs Ago,   No past surgical hx,  last bone dexa scan was never     No family hx of breast cancer   no family hx of colon cancer, parent  of old age, not sexaully active and uses Safe sex, not physically active,  compliant w/ meds,++ Rf needed for today for her meds.        Chronic low-mid back and knees pain syndrome  The patient's pain has been an ongoing concerning problem, it all Started few yrs ago when the wt started to creep out of control little by little and is aware that the current BMI is a big contributor to the pt current joints pain,   Patient states that the current dosage of the meds given, not strong enough, but it is helping, regardless of all the concerns of the opioid based medications side effects,    In addition with the current medications dosage, the pt capable of doing things specially the activity of the daily living, and also they are improving the quality of the pt's life which the pt states are very cumbersome.    The pt has tried otc pain meds, prescribed pain meds but they did not help and not only that,  they created ++ abdominal upset from NSAIDS, this pt has tried all the other Non- Narcotic meds     pt states that the pt is not CHERYL RICHARD [facility] shopping  if foul finding pt would be terminated, for which the pt agreed and finally,     The intensity of the pain is at10/10 w/out med,  persist without medication, a chronic condition, dull radiating to the lower legs,compliant with medication takes it as prescribed, keeps meds at a safe place, on stool softener, not operating  machinery while on this med. Past Medical History:   Diagnosis Date    Arthritis 3/25/2010    Back pain 3/26/2010    CAD (coronary artery disease)     Chest pain, unspecified     Chronic pain     Edema     Essential hypertension     High cholesterol 3/25/2010    HTN (hypertension) 3/25/2010    Kidney failure, acute ([facility] [address] 4/6/2012    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    Onychomycosis 8/19/2010    Other acute and subacute form of ischemic heart disease     Other and unspecified angina pectoris     Other ill-defined conditions     excessive watering of right eye    Shortness of breath     Shoulder pain, bilateral 3/26/2010      Past Surgical History:   Procedure Laterality Date    HX HEART CATHETERIZATION      HX HEENT      bilat cateract extraction    HX HYSTERECTOMY      HX KNEE REPLACEMENT      HX LUMBAR FUSION      HX ORTHOPAEDIC      bilateral TKR    HX ORTHOPAEDIC      neck and back surgery    HX ORTHOPAEDIC      bilateral bunionectomy    HX ORTHOPAEDIC      bilateral cataracts removed    HX PTCA       Current Outpatient Prescriptions   Medication Sig Dispense Refill    FLUZONE HIGH-DOSE 2017-18, PF, syrg injection inject 0.5 milliliter intramuscularly  0    Cholecalciferol, Vitamin D3, 50,000 unit cap Take  by mouth every seven (7) days.  WELCHOL 3.75 gram pwpk powder packet USE 1 PACKET BY MOUTH ONCE DAILY 90 Packet 6    pravastatin (PRAVACHOL) 40 mg tablet take 1 tablet by mouth NIGHTLY 90 Tab 2    fenofibrate nanocrystallized (TRICOR) 48 mg tablet take 1 tablet by mouth once daily 90 Tab 3    bumetanide (BUMEX) 1 mg tablet Take 2 Tabs by mouth daily. (Patient taking differently: Take 1 mg by mouth daily.) 90 Tab 1    potassium chloride (KLOR-CON) 10 mEq tablet Take 2 Tabs by mouth daily.  90 Tab 3    NIFEdipine ER (NIFEDICAL XL) 30 mg ER tablet Take 1 Tab by mouth daily. 90 Tab 4    oxyCODONE-acetaminophen (PERCOCET) 5-325 mg per tablet Take 2 Tabs by mouth every eight (8) hours as needed for Pain. 180 Tab 0    atenolol (TENORMIN) 50 mg tablet Take 1 Tab by mouth daily. 90 Tab 4    ergocalciferol (ERGOCALCIFEROL) 50,000 unit capsule Take 1 Cap by mouth every seven (7) days. 4 Cap 11    losartan (COZAAR) 100 mg tablet Take 1 Tab by mouth daily. 90 Tab 3    nitroglycerin (NITROSTAT) 0.4 mg SL tablet 1 Tab by SubLINGual route every five (5) minutes as needed for Chest Pain. 15 Tab 1    MISCELLANEOUS MEDICAL SUPPLY (COMPRESSION STOCKINGS) daily.  aspirin delayed-release 81 mg tablet Take 1 Tab by mouth daily. 27 Tab 11     Allergies   Allergen Reactions    Lisinopril Cough     Family History   Problem Relation Age of Onset   [facility] Hypertension Mother     Stroke Mother     Other Mother      arthritis    Coronary Artery Disease Father     Heart Disease Father     Hypertension Sister     Diabetes Sister     Coronary Artery Disease Sister     Cancer Sister      lung    Coronary Artery Disease Brother     Cancer Brother      lung    Heart Disease Brother      Social History   Substance Use Topics    Smoking status: Never Smoker    Smokeless tobacco: Never Used    Alcohol use Yes      Comment: occasional      Patient Active Problem List   Diagnosis Code    Primary osteoarthritis involving multiple joints M15.0    Shoulder pain, bilateral M25.511, M25.512    Back pain M54.9    Onychomycosis B35.1    Cough R05    Tick bite, infected W57. Bernerd Dapper    Decreased vision H54.7    Edema leg R60.0    Heme positive stool R19.5    Abdominal pain, other specified site R10.9    Inguinal hernia, left K40.90    SOB (shortness of breath) on exertion R06.02    Vitamin D deficiency E55.9    Kidney failure, acute (HCC) N17.9    Chest pain, unspecified R07.9    Mixed hyperlipidemia E78.2    Essential hypertension, benign I10    Abnormal nuclear stress test R94.39    CAD (coronary artery disease) I25.10    Post PTCA Z98.61    Essential hypertension, benign I10    Type 2 diabetes mellitus without complication (HCC) W02.3    S/P angioplasty with stent--RCA 6/12 Z95.9    Multiple bruises T14.8    Watery eyes H04.203    Conjunctivitis H10.9    Lacrimal duct stenosis H04.559    Abnormal urinalysis R82.90    Prediabetes R73.03    Preop cardiovascular exam Z01.810    Lacrimal passage stenosis H04.549    Edema--non-pitting R60.9       Depression Risk Factor Screening:     PHQ over the last two weeks 9/11/2017   Little interest or pleasure in doing things Not at all   Feeling down, depressed or hopeless Not at all   Total Score PHQ 2 0     Alcohol Risk Factor Screening: You do not drink alcohol or very rarely. Functional Ability and Level of Safety:   Hearing Loss  Hearing is good. Activities of Daily Living  The home contains: no safety equipment  Patient does total self care    Fall RiskFall Risk Assessment, last 12 mths 9/11/2017   Able to walk? Yes   Fall in past 12 months?  No       Abuse Screen  Patient is not abused    Cognitive Screening   Evaluation of Cognitive Function:  Has your family/caregiver stated any concerns about your memory: no  Normal    Patient Care Team   Patient Care Team:  Chastity Erickson MD as PCP - General  Melina Arreola RN as [id number] Duque MD as Physician (Cardiology)    Assessment/Plan   Education and counseling provided:  Are appropriate based on today's review and evaluation     Are appropriate based on today's review and evaluation  End-of-Life planning (with patient's consent): Initial ACP discussion, pt wants the DaughterTahir [phone] as SDM,  Pneumococcal Vaccuptodateine, uptodate  Influenza Vaccine,   Hepatitis B Vaccine, declined    Colorectal cancer screening tests,not ordered today  Cardiovascular screening blood test, addressed  Bone mass measurement (DEXA), not ordered await for approval  Screening for glaucoma, done  Diabetes screening test, done today, no hx of it  Medical nutrition therapy for individuals with diabetes or renal disease, addressed info given  Diabetes outpatient self-management training services, informed    Diagnoses and all orders for this visit:    1. Diabetes mellitus without complication (HCC)  -     AMB POC HEMOGLOBIN A1C  -     MICROALBUMIN, UR, RAND W/ MICROALBUMIN/CREA RATIO  -     AMB POC FUNDUS PHOTOGRAPHY WITH INTERP AND REPORT  -     CBC W/O DIFF  -     AMB POC URINALYSIS DIP STICK AUTO W/O MICRO  -     METABOLIC PANEL, COMPREHENSIVE  -     REFERRAL TO PODIATRY  -     REFERRAL TO PHYSICAL THERAPY    2. Screening for alcoholism  -     Annual  Alcohol Screen 15 min ()  -     REFERRAL TO PHYSICAL THERAPY    3. Screening for depression  -     Depression Screen Annual  -     REFERRAL TO PHYSICAL THERAPY    4. Primary osteoarthritis involving multiple joints  -     REFERRAL TO PHYSICAL THERAPY  -     PAIN MGMT PANEL W/REFL, UR    5. Chronic pain of both shoulders  -     oxyCODONE-acetaminophen (PERCOCET) 5-325 mg per tablet; Take 2 Tabs by mouth every eight (8) hours as needed for Pain.  -     REFERRAL TO PHYSICAL THERAPY  -     PAIN MGMT PANEL W/REFL, UR    6. Chronic low back pain with sciatica, sciatica laterality unspecified, unspecified back pain laterality  -     REFERRAL TO PHYSICAL THERAPY  -     PAIN MGMT PANEL W/REFL, UR    7. Arthritis  -     oxyCODONE-acetaminophen (PERCOCET) 5-325 mg per tablet; Take 2 Tabs by mouth every eight (8) hours as needed for Pain.  -     REFERRAL TO PHYSICAL THERAPY  -     PAIN MGMT PANEL W/REFL, UR    8. Chronic left-sided thoracic back pain  -     oxyCODONE-acetaminophen (PERCOCET) 5-325 mg per tablet; Take 2 Tabs by mouth every eight (8) hours as needed for Pain.  -     REFERRAL TO PHYSICAL THERAPY  -     PAIN MGMT PANEL W/REFL, UR    9.  Chronic midline low back pain without sciatica  - oxyCODONE-acetaminophen (PERCOCET) 5-325 mg per tablet;  Take 2 Tabs by mouth every eight (8) hours as needed for Pain.  -     REFERRAL TO PHYSICAL THERAPY  -     PAIN MGMT PANEL W/REFL, UR    Other orders  -     CKD REPORT  -     DIABETES PATIENT EDUCATION        Health Maintenance Due   Topic Date Due    ZOSTER VACCINE AGE 60>  11/14/1992    MEDICARE YEARLY EXAM  11/30/2016    EYE EXAM RETINAL OR DILATED Q1  12/04/2016    FOOT EXAM Q1  06/15/2017    MICROALBUMIN Q1  06/15/2017    HEMOGLOBIN A1C Q6M  09/09/2017

## 2017-09-11 NOTE — PATIENT INSTRUCTIONS
Medicare Wellness Visit, Female    The best way to live healthy is to have a healthy lifestyle by eating a well-balanced diet, exercising regularly, limiting alcohol and stopping smoking. Regular physical exams and screening tests are another way to keep healthy. Preventive exams provided by your health care provider can find health problems before they become diseases or illnesses. Preventive services including immunizations, screening tests, monitoring and exams can help you take care of your own health. All people over age 72 should have a pneumovax  and and a prevnar shot to prevent pneumonia. These are once in a lifetime unless you and your provider decide differently. All people over 65 should have a yearly flu shot and a tetanus vaccine every 10 years. A bone mass density to screen for osteoporosis or thinning of the bones should be done every 2 years after 65. Screening for diabetes mellitus with a blood sugar test should be done every year. Glaucoma is a disease of the eye due to increased ocular pressure that can lead to blindness and it should be done every year by an eye professional.    Cardiovascular screening tests that check for elevated lipids (fatty part of blood) which can lead to heart disease and strokes should be done every 5 years. Colorectal screening that evaluates for blood or polyps in your colon should be done yearly as a stool test or every five years as a flexible sigmoidoscope or every 10 years as a colonoscopy up to age 76. Breast cancer screening with a mammogram is recommended biennially  for women age 54-69. Screening for cervical cancer with a pap smear and pelvic exam is recommended for women after age 72 years every 2 years up to age 79 or when the provider and patient decide to stop. If there is a history of cervical abnormalities or other increased risk for cancer then the test is recommended yearly.     Hepatitis C screening is also recommended for anyone born between [birth year] through [address]. A shingles vaccine is also recommended once in a lifetime after age 61. Your Medicare Wellness Exam is recommended annually. Here is a list of your current Health Maintenance items with a due date:  Health Maintenance Due   Topic Date Due    Shingles Vaccine  11/14/1992    Annual Well Visit  11/30/2016    Eye Exam  12/04/2016    Diabetic Foot Care  06/15/2017    Albumin Urine Test  06/15/2017    Hemoglobin A1C    09/09/2017            Kidney Disease and Diabetes: Care Instructions  Your Care Instructions  When you have diabetes, your body cannot make enough insulin or use it the way it should. Your body needs insulin to help sugar move from the blood to the cells. Without it, your blood sugar gets too high. High blood sugar damages your kidneys and makes it hard for them to filter blood. This causes fluid and waste to build up in your blood. If you have diabetes, it is very important to keep your blood sugar in your target range. There are many steps you can take to do this. If you can control your blood sugar, you will have the best chance to slow or stop damage to your kidneys. Follow-up care is a key part of your treatment and safety. Be sure to make and go to all appointments, and call your doctor if you are having problems. It's also a good idea to know your test results and keep a list of the medicines you take. How can you care for yourself at home? To control your diabetes and slow or stop damage to your kidneys  · Keep your blood sugar in your target range. The American Diabetes Association recommends a hemoglobin A1c (Hb A1c) target level of less than 7%. Talk to your doctor about your target. The lower your A1c, the better your chance of stopping kidney damage. · Keep your blood pressure in your target range. Doctors recommend specific types of blood pressure medicines for people who have diabetes and kidney disease.  Examples include ACE inhibitors and angiotensin II receptor blockers (ARBs). Your doctor may have you take one of these even if you don't have high blood pressure. · Take all of your medicines. You may have several. For example, you may take medicines for diabetes, cholesterol, and blood pressure. It's very important to take all of them just as your doctor tells you to and to keep taking them. · Make good food choices. Follow an eating plan that is best for your diabetes and your kidneys. You may want to work with a dietitian to make a plan. A good plan will make sure that you spread carbohydrate throughout the day. It will also make sure that you get the right amount of salt (sodium), fluids, and protein. · Stay at a healthy weight. If you need help to lose weight, talk to your doctor or dietitian. Even small changes can make a difference. Try to be aware of your portion sizes, eat more fruits and vegetables, and add some activity to your daily routine. · Exercise. Get at least 30 minutes of activity on most days of the week. Walking is a great exercise that most people can do. Being more active can help you control your blood sugar and stay at a healthy weight. It also can help you lower cholesterol and blood pressure. To improve your kidney health  · Lower your cholesterol. Keep your LDL less than 100 mg/dL and HDL levels more than 40 mg/dL for men and 50 mg/dL for women. If you have high cholesterol, your doctor may prescribe medicine. He or she may also tell you to eat less saturated fat. · Follow your treatment plan. Check your blood sugar as many times a day as your doctor recommends. Go to all of your follow-up appointments, and be sure to have all the tests your doctor orders. Call your doctor if you think you are having a problem with your medicines. · Take a low-dose aspirin every day if your doctor suggests it. Most doctors believe this can reduce the risk of heart disease and stroke.  Your risk of these diseases is much greater than your risk of kidney failure. · Avoid tobacco. Do not smoke or use other tobacco products. If you need help quitting, talk to your doctor about stop-smoking programs and medicines. These can increase your chances of quitting for good. When should you call for help? Call 911 anytime you think you may need emergency care. For example, call if:  · You passed out (lost consciousness). Call your doctor now or seek immediate medical care if:  · You have high blood sugar that stays above the desired range after you take steps to lower blood sugar. · Your breath smells fruity. · You are confused or have trouble thinking clearly. · You feel weaker or more tired than usual.  · You are very thirsty, lightheaded, or dizzy. · You have nausea and vomiting. Watch closely for changes in your health, and be sure to contact your doctor if:  · You have new or worse swelling of your arms or feet. Where can you learn more? Go to http://brianne-ger.info/. Enter C726 in the search box to learn more about \"Kidney Disease and Diabetes: Care Instructions. \"  Current as of: April 3, 2017  Content Version: 11.3  © 0872-5465 [employer]. Care instructions adapted under license by [employer] (which disclaims liability or warranty for this information). If you have questions about a medical condition or this instruction, always ask your healthcare professional. [place] [address] any warranty or liability for your use of this information. Statins: Care Instructions  Your Care Instructions  Statins are medicines that lower your cholesterol and your risk for a heart attack and stroke. Cholesterol is a type of fat in your blood. If you have too much cholesterol, it can build up in blood vessels. This raises your risk of heart disease, heart attack, and stroke. Statins lower cholesterol by blocking how much cholesterol your body makes.  This prevents cholesterol from building up in your blood vessels. This is called hardening of the arteries. It is the starting point for some heart and blood flow problems, such as heart disease. Statins may also reduce inflammation around the buildup (called plaque). This can lower the risk that the plaque will break apart and lead to a heart attack or stroke. A heart-healthy lifestyle is important for lowering your risk whether you take statins or not. This includes eating healthy foods, being active, staying at a healthy weight, and not smoking. You must take statins regularly for them to work well. If you stop, your cholesterol and your risk will go back up. Examples of statins include:  · Atorvastatin (Lipitor). · Lovastatin (Mevacor). · Pravastatin (Pravachol). · Simvastatin (Zocor). Statins interact with many medicines. So tell your doctor all of the other medicines that you take. These include prescription medicines, over-the-counter medicines, dietary supplements, and herbal products. Follow-up care is a key part of your treatment and safety. Be sure to make and go to all appointments, and call your doctor if you are having problems. It's also a good idea to know your test results and keep a list of the medicines you take. How can you care for yourself at home? · Take statins exactly as your doctor tells you. High cholesterol has no symptoms. So it is easy to forget to take the pills. Try to make a system that reminds you to take them. · Do not take two or more medicines at the same time unless the doctor told you to. Statins can interact with other medicines. · Always tell your doctor if you think you are having a side effect. If side effects are a problem with one medicine, a different one may be used. · Keep making the lifestyle changes your doctor suggests. Eat heart-healthy foods, be active, don't smoke, and stay at a healthy weight.   · Talk to your doctor about avoiding grapefruit juice if you take statins. Grapefruit juice can raise the level of this medicine in your blood. This could increase side effects. When should you call for help? Watch closely for changes in your health, and be sure to contact your doctor if:  · You have side effects of statins. These include:  ¨ Fatigue. ¨ Upset stomach. ¨ Gas. ¨ Constipation. ¨ Pain or cramps in the belly. ¨ Muscle aches. · You have any new symptoms or side effects. Where can you learn more? Go to http://brianne-ger.info/. Enter R358 in the search box to learn more about \"Statins: Care Instructions. \"  Current as of: April 3, 2017  Content Version: 11.3  © 9833-9241 [employer]. Care instructions adapted under license by [employer] (which disclaims liability or warranty for this information). If you have questions about a medical condition or this instruction, always ask your healthcare professional. [address] any warranty or liability for your use of this information. Shoulder Stretches: Exercises  Your Care Instructions  Here are some examples of exercises for your shoulder. Start each exercise slowly. Ease off the exercise if you start to have pain. Your doctor or physical therapist will tell you when you can start these exercises and which ones will work best for you. How to do the exercises  Note: These exercises should cause you to feel a gentle stretch, but no pain. Shoulder stretch    1.  a doorway and place one arm against the door frame. Your elbow should be a little higher than your shoulder. 2. Relax your shoulders as you lean forward, allowing your chest and shoulder muscles to stretch. You can also turn your body slightly away from your arm to stretch the muscles even more. 3. Hold for 15 to 30 seconds. 4. Repeat 2 to 4 times with each arm. Shoulder and chest stretch    Shoulder and chest stretch  1.  While sitting, relax your upper body so you slump slightly in your chair. 2. As you breathe in, straighten your back and open your arms out to the sides. 3. Gently pull your shoulder blades back and downward. 4. Hold for 15 to 30 seconds as your breathe normally. 5. Repeat 2 to 4 times. Overhead stretch    1. Reach up over your head with both arms. 2. Hold for 15 to 30 seconds. 3. Repeat 2 to 4 times. Follow-up care is a key part of your treatment and safety. Be sure to make and go to all appointments, and call your doctor if you are having problems. It's also a good idea to know your test results and keep a list of the medicines you take. Where can you learn more? Go to http://brianne-ger.info/. Enter S254 in the search box to learn more about \"Shoulder Stretches: Exercises. \"  Current as of: March 21, 2017  Content Version: 11.3  © 3380-9053 [employer] Care instructions adapted under license by [employer] (which disclaims liability or warranty for this information). If you have questions about a medical condition or this instruction, always ask your healthcare professional. [place] [address] any warranty or liability for your use of this information. Rotator Cuff Rehabilitation  What is rotator cuff rehabilitation? Rotator cuff rehabilitation is a series of exercises you do after your surgery. It helps you get back your shoulder's range of motion and strength. You will work with your doctor and physical therapist to plan this exercise program. To get the best results, you need to do the exercises correctly and as often as your doctor tells you. Before you start any exercises, talk with your doctor or physical therapist. It is important that you know exactly how to do the exercises. Stop and call your doctor if you are not sure that you are doing the exercises correctly or if you have any pain.  Hearing clicks and pops during exercise is not always cause for concern, but a grinding feeling may mean a more serious problem. Ice your shoulder after exercising if it is sore. Follow-up care is a key part of your treatment and safety. Be sure to make and go to all appointments, and call your doctor if you are having problems. It's also a good idea to know your test results and keep a list of the medicines you take. Stretching exercises  Do not start doing stretching exercises until your doctor says you can. Your doctor will tell you which exercises to do, and how often and how long to do them. Posterior stretch  · Stand upright with your feet shoulder-width apart. · Put the hand of your affected arm on the opposite shoulder, and hold the elbow to your body. · Then, using your good arm, hold the elbow of your affected arm and move it gently up, away from, and across your body. External rotation  · Hold a lightweight stick or laure in your good arm. It should be about 2 feet long. A curtain laure may work well. · Lie on your back with your elbows next to your sides. Rest the elbow of your affected arm on a small pillow or folded towel. · Set your arms so that the elbows are bent at a 90-degree angle, like the letter \"L. \" Your hands will point straight up. · Hold the stick with both hands. Use your good arm to push the stick toward the affected arm so the affected arm moves outward, away from your body. Stop when you feel the arm stretching. Strength exercises  Do not start strength exercises until your doctor says you can. Usually, this is at least 6 to 8 weeks after surgery. Your doctor will tell you how often and how long to do the exercises. Arm raises to the side  · Stand upright with your feet shoulder-width apart and your affected arm at your side. · Slowly raise your injured arm to the side, with your thumb facing up. Raise your arm 60 degrees at the most (shoulder level is 90 degrees). · After holding the position for 3 to 5 seconds, lower your arm back to your side.  If you need to, bring your \"good\" arm across your body and place it under the elbow as you lower your injured arm. Use your good arm to keep your injured arm from dropping down too fast during the downward motion. · Repeat 8 to 12 times. · When you first start out, don't hold any additional weight in your hand. As your strength improves, you may use a 1- to 2-pound dumbbell or a small can of food. Shoulder flexor  · Stand facing a wall. Your body should be about 6 inches away from the wall. · Keep your affected arm and elbow to your side, and bend your elbow so that your arm is pointing toward the wall. · Make a closed fist with your thumb on top. · Push your hand into the wall and hold it for 6 seconds. Push with 25% to 50% of the force you have. Shoulder extension  · Stand with your back flat against a wall. · Keep your affected arm and elbow at your side, and bend your elbow so that your upper arm is against the wall and your lower arm is pointing straight ahead. Make a closed fist with your thumb on top. · Push your elbow gently back against the wall, holding for 6 seconds. Push with 25% to 50% of the force you have. Where can you learn more? Go to http://brianne-ger.info/. Enter Y563 in the search box to learn more about \"Rotator Cuff Rehabilitation. \"  Current as of: March 21, 2017  Content Version: 11.3  © 2835-6740 [employer]. Care instructions adapted under license by [employer] (which disclaims liability or warranty for this information). If you have questions about a medical condition or this instruction, always ask your healthcare professional. [address] any warranty or liability for your use of this information. Shoulder Stretches: Exercises  Your Care Instructions  Here are some examples of exercises for your shoulder. Start each exercise slowly. Ease off the exercise if you start to have pain.   Your doctor or physical therapist will tell you when you can start these exercises and which ones will work best for you. How to do the exercises  Note: These exercises should cause you to feel a gentle stretch, but no pain. Shoulder stretch    5.  a doorway and place one arm against the door frame. Your elbow should be a little higher than your shoulder. 6. Relax your shoulders as you lean forward, allowing your chest and shoulder muscles to stretch. You can also turn your body slightly away from your arm to stretch the muscles even more. 7. Hold for 15 to 30 seconds. 8. Repeat 2 to 4 times with each arm. Shoulder and chest stretch    Shoulder and chest stretch  6. While sitting, relax your upper body so you slump slightly in your chair. 7. As you breathe in, straighten your back and open your arms out to the sides. 8. Gently pull your shoulder blades back and downward. 9. Hold for 15 to 30 seconds as your breathe normally. 10. Repeat 2 to 4 times. Overhead stretch    4. Reach up over your head with both arms. 5. Hold for 15 to 30 seconds. 6. Repeat 2 to 4 times. Follow-up care is a key part of your treatment and safety. Be sure to make and go to all appointments, and call your doctor if you are having problems. It's also a good idea to know your test results and keep a list of the medicines you take. Where can you learn more? Go to http://brianne-ger.info/. Enter S254 in the search box to learn more about \"Shoulder Stretches: Exercises. \"  Current as of: March 21, 2017  Content Version: 11.3  © 7701-7598 [employer]. Care instructions adapted under license by [employer] (which disclaims liability or warranty for this information). If you have questions about a medical condition or this instruction, always ask your healthcare professional. [address] any warranty or liability for your use of this information.

## 2017-09-11 NOTE — PROGRESS NOTES
Rochelle Rona          Name and  verified        Chief Complaint   Patient presents with   [facility] Annual Wellness Visit         Health Maintenance reviewed-discussed with patient. Advance Directives Care Planning Information given, patient acknowledged understanding.

## 2017-09-12 LAB
ALBUMIN SERPL-MCNC: 4.5 G/DL (ref 3.5–4.7)
ALBUMIN/GLOB SERPL: 1.7 {RATIO} (ref 1.2–2.2)
ALP SERPL-CCNC: 61 IU/L (ref 39–117)
ALT SERPL-CCNC: 13 IU/L (ref 0–32)
AST SERPL-CCNC: 24 IU/L (ref 0–40)
BILIRUB SERPL-MCNC: 0.4 MG/DL (ref 0–1.2)
BUN SERPL-MCNC: 31 MG/DL (ref 8–27)
BUN/CREAT SERPL: 23 (ref 12–28)
CALCIUM SERPL-MCNC: 10.2 MG/DL (ref 8.7–10.3)
CHLORIDE SERPL-SCNC: 103 MMOL/L (ref 96–106)
CO2 SERPL-SCNC: 26 MMOL/L (ref 18–29)
CREAT SERPL-MCNC: 1.37 MG/DL (ref 0.57–1)
ERYTHROCYTE [DISTWIDTH] IN BLOOD BY AUTOMATED COUNT: 13.8 % (ref 12.3–15.4)
GLOBULIN SER CALC-MCNC: 2.7 G/DL (ref 1.5–4.5)
GLUCOSE SERPL-MCNC: 107 MG/DL (ref 65–99)
HCT VFR BLD AUTO: 41.4 % (ref 34–46.6)
HGB BLD-MCNC: 13.6 G/DL (ref 11.1–15.9)
INTERPRETATION: NORMAL
Lab: NORMAL
MCH RBC QN AUTO: 29.5 PG (ref 26.6–33)
MCHC RBC AUTO-ENTMCNC: 32.9 G/DL (ref 31.5–35.7)
MCV RBC AUTO: 90 FL (ref 79–97)
PLATELET # BLD AUTO: 266 X10E3/UL (ref 150–379)
POTASSIUM SERPL-SCNC: 4.7 MMOL/L (ref 3.5–5.2)
PROT SERPL-MCNC: 7.2 G/DL (ref 6–8.5)
RBC # BLD AUTO: 4.61 X10E6/UL (ref 3.77–5.28)
SODIUM SERPL-SCNC: 145 MMOL/L (ref 134–144)
WBC # BLD AUTO: 5.6 X10E3/UL (ref 3.4–10.8)

## 2017-09-18 LAB
ALBUMIN/CREAT UR: <2.9 MG/G CREAT (ref 0–30)
AMPHETAMINES UR QL SCN: NEGATIVE NG/ML
BARBITURATES UR QL SCN: NEGATIVE NG/ML
BENZODIAZ UR QL SCN: NEGATIVE NG/ML
BZE UR QL SCN: NEGATIVE NG/ML
CANNABINOIDS UR QL SCN: NEGATIVE NG/ML
CREAT UR-MCNC: 102.1 MG/DL
CREAT UR-MCNC: 111.2 MG/DL (ref 20–300)
FENTANYL+NORFENTANYL UR QL SCN: NEGATIVE PG/ML
Lab: NORMAL
MEPERIDINE UR QL: NEGATIVE NG/ML
METHADONE UR QL SCN: NEGATIVE NG/ML
MICROALBUMIN UR-MCNC: <3 UG/ML
OPIATES UR QL SCN: NEGATIVE NG/ML
OXYCODONE+OXYMORPHONE UR QL SCN: POSITIVE
PCP UR QL: NEGATIVE NG/ML
PH UR: 6.4 [PH] (ref 4.5–8.9)
PLEASE NOTE:, 733157: ABNORMAL
PROPOXYPH UR QL SCN: NEGATIVE NG/ML
SP GR UR: 1.01
TRAMADOL UR QL SCN: NEGATIVE NG/ML

## 2017-11-06 DIAGNOSIS — E55.9 VITAMIN D DEFICIENCY: ICD-10-CM

## 2017-11-06 RX ORDER — ERGOCALCIFEROL 1.25 MG/1
CAPSULE ORAL
Qty: 4 CAP | Refills: 11 | Status: SHIPPED | OUTPATIENT
Start: 2017-11-06 | End: 2017-12-05 | Stop reason: ALTCHOICE

## 2017-12-05 ENCOUNTER — TELEPHONE (OUTPATIENT)
Dept: FAMILY MEDICINE CLINIC | Age: 82
End: 2017-12-05

## 2017-12-05 ENCOUNTER — OFFICE VISIT (OUTPATIENT)
Dept: FAMILY MEDICINE CLINIC | Age: 82
End: 2017-12-05

## 2017-12-05 VITALS
OXYGEN SATURATION: 98 % | BODY MASS INDEX: 35.32 KG/M2 | SYSTOLIC BLOOD PRESSURE: 142 MMHG | DIASTOLIC BLOOD PRESSURE: 61 MMHG | HEIGHT: 66 IN | TEMPERATURE: 96.7 F | WEIGHT: 219.8 LBS | RESPIRATION RATE: 14 BRPM | HEART RATE: 59 BPM

## 2017-12-05 DIAGNOSIS — M25.512 CHRONIC PAIN OF BOTH SHOULDERS: ICD-10-CM

## 2017-12-05 DIAGNOSIS — N18.30 CHRONIC KIDNEY FAILURE, STAGE 3 (MODERATE) (HCC): ICD-10-CM

## 2017-12-05 DIAGNOSIS — I25.83 CORONARY ARTERY DISEASE DUE TO LIPID RICH PLAQUE: ICD-10-CM

## 2017-12-05 DIAGNOSIS — M54.50 CHRONIC MIDLINE LOW BACK PAIN WITHOUT SCIATICA: ICD-10-CM

## 2017-12-05 DIAGNOSIS — Z01.818 PREOPERATIVE GENERAL PHYSICAL EXAMINATION: ICD-10-CM

## 2017-12-05 DIAGNOSIS — M19.90 ARTHRITIS: Primary | ICD-10-CM

## 2017-12-05 DIAGNOSIS — M54.6 CHRONIC LEFT-SIDED THORACIC BACK PAIN: ICD-10-CM

## 2017-12-05 DIAGNOSIS — M54.40 ACUTE RIGHT-SIDED LOW BACK PAIN WITH SCIATICA, SCIATICA LATERALITY UNSPECIFIED: ICD-10-CM

## 2017-12-05 DIAGNOSIS — I25.10 CORONARY ARTERY DISEASE DUE TO CALCIFIED CORONARY LESION: ICD-10-CM

## 2017-12-05 DIAGNOSIS — G89.29 CHRONIC MIDLINE LOW BACK PAIN WITHOUT SCIATICA: ICD-10-CM

## 2017-12-05 DIAGNOSIS — R73.03 PREDIABETES: ICD-10-CM

## 2017-12-05 DIAGNOSIS — M25.511 CHRONIC PAIN OF BOTH SHOULDERS: ICD-10-CM

## 2017-12-05 DIAGNOSIS — G89.29 CHRONIC LEFT-SIDED THORACIC BACK PAIN: ICD-10-CM

## 2017-12-05 DIAGNOSIS — G89.29 CHRONIC PAIN OF BOTH SHOULDERS: ICD-10-CM

## 2017-12-05 DIAGNOSIS — R60.0 BILATERAL EDEMA OF LOWER EXTREMITY: ICD-10-CM

## 2017-12-05 DIAGNOSIS — I25.10 CORONARY ARTERY DISEASE DUE TO LIPID RICH PLAQUE: ICD-10-CM

## 2017-12-05 DIAGNOSIS — E78.00 HIGH CHOLESTEROL: ICD-10-CM

## 2017-12-05 DIAGNOSIS — I10 ESSENTIAL HYPERTENSION: ICD-10-CM

## 2017-12-05 DIAGNOSIS — I25.84 CORONARY ARTERY DISEASE DUE TO CALCIFIED CORONARY LESION: ICD-10-CM

## 2017-12-05 RX ORDER — BUMETANIDE 1 MG/1
1 TABLET ORAL DAILY
Qty: 90 TAB | Refills: 1
Start: 2017-12-05 | End: 2019-04-04 | Stop reason: ALTCHOICE

## 2017-12-05 RX ORDER — LOSARTAN POTASSIUM 100 MG/1
100 TABLET ORAL DAILY
Qty: 90 TAB | Refills: 1 | Status: SHIPPED | OUTPATIENT
Start: 2017-12-05 | End: 2018-05-07 | Stop reason: SDUPTHER

## 2017-12-05 RX ORDER — POTASSIUM CHLORIDE 750 MG/1
20 TABLET, EXTENDED RELEASE ORAL DAILY
Qty: 90 TAB | Refills: 1 | Status: SHIPPED | OUTPATIENT
Start: 2017-12-05 | End: 2019-04-04 | Stop reason: SDUPTHER

## 2017-12-05 RX ORDER — ATENOLOL 50 MG/1
50 TABLET ORAL DAILY
Qty: 90 TAB | Refills: 1 | Status: SHIPPED | OUTPATIENT
Start: 2017-12-05 | End: 2018-05-07 | Stop reason: SDUPTHER

## 2017-12-05 RX ORDER — ACETAMINOPHEN 500 MG
TABLET ORAL
COMMUNITY
End: 2019-04-04 | Stop reason: ALTCHOICE

## 2017-12-05 RX ORDER — OXYCODONE AND ACETAMINOPHEN 5; 325 MG/1; MG/1
2 TABLET ORAL
Qty: 180 TAB | Refills: 0 | Status: CANCELLED | OUTPATIENT
Start: 2017-12-05

## 2017-12-05 RX ORDER — MULTIVITAMIN
1 TABLET ORAL 2 TIMES DAILY
Qty: 60 TAB | Refills: 11 | Status: SHIPPED | OUTPATIENT
Start: 2017-12-05 | End: 2018-08-06 | Stop reason: SDUPTHER

## 2017-12-05 RX ORDER — PRAVASTATIN SODIUM 40 MG/1
TABLET ORAL
Qty: 90 TAB | Refills: 1 | Status: SHIPPED | OUTPATIENT
Start: 2017-12-05 | End: 2018-05-07 | Stop reason: SDUPTHER

## 2017-12-05 RX ORDER — NIFEDIPINE 30 MG/1
30 TABLET, EXTENDED RELEASE ORAL DAILY
Qty: 90 TAB | Refills: 1 | Status: SHIPPED | OUTPATIENT
Start: 2017-12-05 | End: 2018-08-06 | Stop reason: SDUPTHER

## 2017-12-05 NOTE — PROGRESS NOTES
Elise Rico      Name and  verified      Chief Complaint   Patient presents with    Hypertension         Health Maintenance reviewed-discussed with patient. 1. Have you been to the ER, urgent care clinic since your last visit? Hospitalized since your last visit? No    2. Have you seen or consulted any other health care providers outside of the [address] [place] [address] since your last visit? Include any pap smears or colon screening.  No

## 2017-12-05 NOTE — MR AVS SNAPSHOT
Visit Information Date & Time Provider Department Dept. Phone Encounter #  
 12/5/2017  8:45 AM Beryle Clam, MD [address] [facility] [phone] [id number] Follow-up Instructions Return in about 4 months (around 4/5/2018), or if symptoms worsen or fail to improve. Your Appointments 1/23/2018  9:15 AM  
ESTABLISHED PATIENT with Allison Drake MD  
[facility]) Appt Note: 6 MO. F/U  
 [address] [place]  
[phone] [address]  
  
    
 3/12/2018  8:30 AM  
ROUTINE CARE with Beryle Clam, MD  
[address] [facility] ([address]) Appt Note: 6 mo f/u  
 [address] [place] [id number]  
[phone] [address] [id number] Upcoming Health Maintenance Date Due  
 GLAUCOMA SCREENING Q2Y 12/4/2017 LIPID PANEL Q1 3/9/2018 HEMOGLOBIN A1C Q6M 3/11/2018 MICROALBUMIN Q1 9/11/2018 EYE EXAM RETINAL OR DILATED Q1 9/11/2018 MEDICARE YEARLY EXAM 9/12/2018 FOOT EXAM Q1 9/29/2018 DTaP/Tdap/Td series (2 - Td) 11/30/2025 Allergies as of 12/5/2017  Review Complete On: 7/18/2017 By: Allison Drake MD  
  
 Severity Noted Reaction Type Reactions Lisinopril  08/17/2011    Cough Current Immunizations  Reviewed on 11/10/2016 Name Date Influenza High Dose Vaccine PF 8/17/2017, 10/31/2016, 11/1/2015 Influenza Vaccine Whole 10/22/2011 Pneumococcal Polysaccharide (PPSV-23) 8/15/2014 Tdap 11/30/2015 ZZZ-RETIRED (DO NOT USE) Pneumococcal Vaccine (Unspecified Type) 1/15/1996 Zoster Vaccine, Live 11/11/2016 Not reviewed this visit You Were Diagnosed With   
  
 Codes Comments Arthritis    -  Primary ICD-10-CM: M19.90 ICD-9-CM: 716.90   
 Chronic pain of both shoulders     ICD-10-CM: M25.511, G89.29, M25.512 ICD-9-CM: 719.41, 338.29 Chronic left-sided thoracic back pain     ICD-10-CM: M54.6, G89.29 ICD-9-CM: 724.1, 338.29 Chronic midline low back pain without sciatica     ICD-10-CM: M54.5, G89.29 ICD-9-CM: 724.2, 338.29 High cholesterol     ICD-10-CM: E78.00 ICD-9-CM: 272.0 Coronary artery disease due to calcified coronary lesion     ICD-10-CM: I25.10, I25.84 ICD-9-CM: 414.00, 414.4 Bilateral edema of lower extremity     ICD-10-CM: R60.0 ICD-9-CM: 782.3 Essential hypertension     ICD-10-CM: I10 
ICD-9-CM: 401.9 Preoperative general physical examination     ICD-10-CM: N20.943 ICD-9-CM: V72.83 Coronary artery disease due to lipid rich plaque     ICD-10-CM: I25.10, I25.83 ICD-9-CM: 414.00, 414.3 Prediabetes     ICD-10-CM: R73.03 
ICD-9-CM: 790.29 Acute right-sided low back pain with sciatica, sciatica laterality unspecified     ICD-10-CM: M54.40 ICD-9-CM: 724.2, 724.3 Chronic kidney failure, stage 3 (moderate)     ICD-10-CM: N18.3 ICD-9-CM: 472. 3 Vitals BP Pulse Temp Resp Height(growth percentile) Weight(growth percentile) 142/61 (BP 1 Location: Left arm, BP Patient Position: At rest) (!) 59 96.7 °F (35.9 °C) (Oral) 14 5' 6\" (1.676 m) 219 lb 12.8 oz (99.7 kg) SpO2 BMI OB Status Smoking Status 98% 35.48 kg/m2 Hysterectomy Never Smoker BMI and BSA Data Body Mass Index Body Surface Area  
 35.48 kg/m 2 2.15 m 2 Preferred Pharmacy Pharmacy Name Phone [facility] [id number] [facility], [address] [phone] Your Updated Medication List  
  
   
This list is accurate as of: 12/5/17  9:49 AM.  Always use your most recent med list.  
  
  
  
  
 acetaminophen 500 mg tablet Commonly known as:  TYLENOL Take  by mouth every six (6) hours as needed for Pain. aspirin delayed-release 81 mg tablet Take 1 Tab by mouth daily. atenolol 50 mg tablet Commonly known as:  TENORMIN Take 1 Tab by mouth daily. bumetanide 1 mg tablet Commonly known as:  Marygorge Baas Take 1 Tab by mouth daily. calcium-cholecalciferol (D3) tablet Commonly known as:  CALTRATE 600+D Take 1 Tab by mouth two (2) times a day. COMPRESSION STOCKINGS  
daily. FLUZONE HIGH-DOSE 2017-18 (PF) Syrg injection Generic drug:  influenza vaccine 2017-18 (65 yrs+)(PF)  
inject 0.5 milliliter intramuscularly  
  
 losartan 100 mg tablet Commonly known as:  COZAAR Take 1 Tab by mouth daily. NIFEdipine ER 30 mg ER tablet Commonly known as:  NIFEDICAL XL Take 1 Tab by mouth daily. nitroglycerin 0.4 mg SL tablet Commonly known as:  NITROSTAT  
1 Tab by SubLINGual route every five (5) minutes as needed for Chest Pain. oxyCODONE-acetaminophen 5-325 mg per tablet Commonly known as:  PERCOCET Take 2 Tabs by mouth every eight (8) hours as needed for Pain.  
  
 potassium chloride 10 mEq tablet Commonly known as:  KLOR-CON Take 2 Tabs by mouth daily. pravastatin 40 mg tablet Commonly known as:  PRAVACHOL  
take 1 tablet by mouth NIGHTLY Prescriptions Sent to Pharmacy Refills  
 losartan (COZAAR) 100 mg tablet 1 Sig: Take 1 Tab by mouth daily. Class: Normal  
 Pharmacy: [address] Ph #: [phone] Route: Oral  
 potassium chloride (KLOR-CON) 10 mEq tablet 1 Sig: Take 2 Tabs by mouth daily. Class: Normal  
 Pharmacy: [address] Ph #: [phone] Route: Oral  
 NIFEdipine ER (NIFEDICAL XL) 30 mg ER tablet 1 Sig: Take 1 Tab by mouth daily. Class: Normal  
 Pharmacy: [address] Ph #: [phone]  Route: Oral  
 atenolol (TENORMIN) 50 mg tablet 1  
 Sig: Take 1 Tab by mouth daily. Class: Normal  
 [facility] [address] Ph #: [phone] Route: Oral  
 pravastatin (PRAVACHOL) 40 mg tablet 1 Sig: take 1 tablet by mouth NIGHTLY Class: Normal  
 Pharmacy:  [address] Ph #: [phone]  
 calcium-cholecalciferol, D3, (CALTRATE 600+D) tablet 11 Sig: Take 1 Tab by mouth two (2) times a day. Class: Normal  
 [facility] [address] Ph #: [phone] Route: Oral  
  
We Performed the Following REFERRAL TO NEPHROLOGY [TKV92 Custom] REFERRAL TO PHYSICAL THERAPY [DST44 Custom] Follow-up Instructions Return in about 4 months (around 4/5/2018), or if symptoms worsen or fail to improve. Referral Information Referral ID Referred By Referred To  
  
 [id number] Romana Drew Not Available Visits Status Start Date End Date 1 New Request 12/5/17 12/5/18 If your referral has a status of pending review or denied, additional information will be sent to support the outcome of this decision. Referral ID Referred By Referred To  
 [id number] SERGIO, [address] [facility] [address] [facility] Phone: [phone] Visits Status Start Date End Date 1 New Request 12/5/17 12/5/18 If your referral has a status of pending review or denied, additional information will be sent to support the outcome of this decision. Patient Instructions Medicines to Avoid With Kidney Disease: Care Instructions Your Care Instructions Kidney disease means that your kidneys are not able to get rid of waste from the blood. So they can't keep your body's fluids and chemicals in balance. Usually, the kidneys get rid of waste from the blood through the urine. And they balance the fluids in the body. When your kidneys don't work as they should, you have to be careful about some medicines. They may harm your kidneys. Your doctor may tell you not to take them. Or he or she may change the dose. Medicines for pain and swelling, such as ibuprofen (Advil or Motrin) or naproxen (Aleve), can cause harm. So can some antibiotics and antacids. And you need to be careful about some drugs that treat cancer, lower blood pressure, or get rid of water from the body. Some herbal products could cause harm too. Follow-up care is a key part of your treatment and safety. Be sure to make and go to all appointments, and call your doctor if you are having problems. It's also a good idea to know your test results and keep a list of the medicines you take. How can you care for yourself at home? · Tell your doctor all the prescription, herbal, or over-the-counter medicines you take. Do not take any new ones unless you talk to your doctor first. 
· Do not take anti-inflammatory medicines. These include ibuprofen (Advil, Motrin) and naproxen (Aleve). You can use acetaminophen (Tylenol) for pain. · Do not take two or more pain medicines at the same time unless the doctor told you to. Many pain medicines have acetaminophen, which is Tylenol. Too much acetaminophen (Tylenol) can be harmful. · Tell all doctors and others who work with your health care that you have kidney disease. · Wear medical alert jewelry that lists your health problem. You can buy this at most drugstores. Where can you learn more? Go to http://brianne-ger.info/. Enter G384 in the search box to learn more about \"Medicines to Avoid With Kidney Disease: Care Instructions. \" Current as of: May 12, 2017 Content Version: 11.4 © 2910-7933 [employer]. Care instructions adapted under license by [employer] (which disclaims liability or warranty for this information).  If you have questions about a medical condition or this instruction, always ask your healthcare professional. [address] any warranty or liability for your use of this information. Kidney Disease and Your Bones: Care Instructions Your Care Instructions Healthy bones are constantly growing and changing. To keep your bones strong, your kidneys must keep the right balance of several important substances. These include phosphorus, calcium, parathyroid hormone, and vitamin D. When you have chronic kidney disease, your body no longer keeps the right balance of these substances. This can lead to bone disease. When bone disease is caused by kidney problems, it is called renal bone disease. Renal bone disease is called a \"silent disease\" because the bone changes begin long before symptoms occur. As renal bone disease gets worse, it may cause: 
· Calcium deposits in the blood vessels, which may lead to heart disease. · Itchy skin. · Pain in your bones and joints. · Weak muscles. · Broken bones. · Crooked bones or short height in children. By working with your doctor, you can take steps to reduce your chance of having weak or broken bones. Follow-up care is a key part of your treatment and safety. Be sure to make and go to all appointments, and call your doctor if you are having problems. It's also a good idea to know your test results and keep a list of the medicines you take. How can you care for yourself at home? How can you prevent renal bone disease? You may be able to avoid renal bone disease by: · Getting regular exercise, which can help keep your bones strong. · Getting the right amount of phosphorus, calcium, and vitamin D. Follow the diet your doctor or dietitian gives you. How can you treat renal bone disease? There are many types of treatment for renal bone disease. You may need to: 
· Eat a diet that is low in phosphorus.  A dietitian can help you make an eating plan that is low in phosphorus. Your eating plan may advise you to: ¨ Limit dairy products, such as milk, yogurt, or ice cream. 
¨ Avoid nuts, peanut butter, seeds, lentils, peas, and beans. ¨ Avoid drinks such as beer, cola, and cocoa. · Take a medicine called a phosphate binder. A phosphate binder helps control the level of phosphorus in your blood. · Take vitamin D and calcium pills. · Take medicine to control your parathyroid glands. In some cases, these glands need to be removed by a surgeon. · Exercise to strengthen your bones. To make sure that an exercise program is safe for you, talk to your doctor before you begin. What else can you do if you have renal bone disease? · Work closely with your doctor, have regular testing, and follow all treatment steps. · Your doctor can advise you about how often you need testing. If you are on dialysis, you will probably be tested every 6 months. When should you call for help? Call 911 anytime you think you may need emergency care. For example, call if: 
? · You passed out (lost consciousness). ?Call your doctor now or seek immediate medical care if: 
? · You have new or worse nausea and vomiting. ? · You have much less urine than normal, or you have no urine. ? · You are feeling confused or cannot think clearly. ? · You have new or more blood in your urine. ? · You have new swelling. ? · You are dizzy or lightheaded, or feel like you may faint. ? Watch closely for changes in your health, and be sure to contact your doctor if: 
? · You do not get better as expected. Where can you learn more? Go to http://brianne-ger.info/. Enter X955 in the search box to learn more about \"Kidney Disease and Your Bones: Care Instructions. \" Current as of: May 12, 2017 Content Version: 11.4 © 4692-0141 Healthwise, [employer].  Care instructions adapted under license by [employer] (which disclaims liability or warranty for this information). If you have questions about a medical condition or this instruction, always ask your healthcare professional. [address] any warranty or liability for your use of this information. Kidney Disease and High Blood Pressure: Care Instructions Your Care Instructions Long-term (chronic) kidney disease happens when the kidneys cannot remove waste and keep your body's fluids and chemicals in balance. Usually, the kidneys remove waste from the blood through the urine. When the kidneys are not working well, waste can build up so much that it poisons the body. Kidney disease can make you very tired. It also can cause swelling, or edema, in your legs or other areas of your body. High blood pressure is one of the major causes of chronic kidney disease. And kidney disease can also cause high blood pressure. No matter which came first, having high blood pressure damages the tiny blood vessels in the kidneys. If you have high blood pressure, it is important to lower it. There are many things you can do to lower your blood pressure, which may help slow or stop the damage to your kidneys. Follow-up care is a key part of your treatment and safety. Be sure to make and go to all appointments, and call your doctor if you are having problems. It's also a good idea to know your test results and keep a list of the medicines you take. How can you care for yourself at home? · Be safe with medicines. Take your medicines exactly as prescribed. Call your doctor if you have any problems with your medicine. You will probably need more than one medicine to lower your blood pressure. You will get more details on the specific medicines your doctor prescribes. · Work with your doctor and a dietitian to plan meals that have the right amount of nutrients for you. You will probably have to limit salt, fluids, and protein. · Stay at a healthy weight. This is very important if you put on weight around the waist. Losing even 10 pounds can help you lower your blood pressure. · Manage other health problems such as diabetes and high cholesterol. You can help lower your risk for heart disease and blood vessel problems with a healthy lifestyle along with medicines. · Do not take ibuprofen (Advil, Motrin) or naproxen (Aleve), or similar medicines, unless your doctor tells you to. They may make chronic kidney disease worse. It is okay to take acetaminophen (Tylenol). · If your doctor recommends it, get more exercise. Walking is a good choice. Bit by bit, increase the amount you walk every day. Try for at least 30 minutes on most days of the week. You also may want to swim, bike, or do other activities. · Limit or avoid alcohol. Talk to your doctor about whether you can drink any alcohol. · Do not smoke or allow others to smoke around you. If you need help quitting, talk to your doctor about stop-smoking programs and medicines. These can increase your chances of quitting for good. When should you call for help? Call 911 anytime you think you may need emergency care. For example, call if: 
? · You passed out (lost consciousness). ?Call your doctor now or seek immediate medical care if: 
? · You have new or worse nausea and vomiting. ? · You have much less urine than normal, or you have no urine. ? · You are feeling confused or cannot think clearly. ? · You have new or more blood in your urine. ? · You have new swelling. ? · You are dizzy or lightheaded, or you feel like you may faint. ? Watch closely for changes in your health, and be sure to contact your doctor if: 
? · You do not get better as expected. Where can you learn more? Go to http://brianne-ger.info/. Enter S431 in the search box to learn more about \"Kidney Disease and High Blood Pressure: Care Instructions. \" Current as of: May 12, 2017 Content Version: 11.4 © 3873-6496 [employer]Fox. Care instructions adapted under license by [employer] (which disclaims liability or warranty for this information). If you have questions about a medical condition or this instruction, always ask your healthcare professional. [address] any warranty or liability for your use of this information. Low Sodium Diet (2,000 Milligram): Care Instructions Your Care Instructions Too much sodium causes your body to hold on to extra water. This can raise your blood pressure and force your heart and kidneys to work harder. In very serious cases, this could cause you to be put in the hospital. It might even be life-threatening. By limiting sodium, you will feel better and lower your risk of serious problems. The most common source of sodium is salt. People get most of the salt in their diet from canned, prepared, and packaged foods. Fast food and restaurant meals also are very high in sodium. Your doctor will probably limit your sodium to less than 2,000 milligrams (mg) a day. This limit counts all the sodium in prepared and packaged foods and any salt you add to your food. Follow-up care is a key part of your treatment and safety. Be sure to make and go to all appointments, and call your doctor if you are having problems. It's also a good idea to know your test results and keep a list of the medicines you take. How can you care for yourself at home? Read food labels · Read labels on cans and food packages. The labels tell you how much sodium is in each serving. Make sure that you look at the serving size. If you eat more than the serving size, you have eaten more sodium. · Food labels also tell you the Percent Daily Value for sodium. Choose products with low Percent Daily Values for sodium.  
· Be aware that sodium can come in forms other than salt, including monosodium glutamate (MSG), sodium citrate, and sodium bicarbonate (baking soda). MSG is often added to Asian food. When you eat out, you can sometimes ask for food without MSG or added salt. Buy low-sodium foods · Buy foods that are labeled \"unsalted\" (no salt added), \"sodium-free\" (less than 5 mg of sodium per serving), or \"low-sodium\" (less than 140 mg of sodium per serving). Foods labeled \"reduced-sodium\" and \"light sodium\" may still have too much sodium. Be sure to read the label to see how much sodium you are getting. · Buy fresh vegetables, or frozen vegetables without added sauces. Buy low-sodium versions of canned vegetables, soups, and other canned goods. Prepare low-sodium meals · Cut back on the amount of salt you use in cooking. This will help you adjust to the taste. Do not add salt after cooking. One teaspoon of salt has about 2,300 mg of sodium. · Take the salt shaker off the table. · Flavor your food with garlic, lemon juice, onion, vinegar, herbs, and spices. Do not use soy sauce, lite soy sauce, steak sauce, onion salt, garlic salt, celery salt, mustard, or ketchup on your food. · Use low-sodium salad dressings, sauces, and ketchup. Or make your own salad dressings and sauces without adding salt. · Use less salt (or none) when recipes call for it. You can often use half the salt a recipe calls for without losing flavor. Other foods such as rice, pasta, and grains do not need added salt. · Rinse canned vegetables, and cook them in fresh water. This removes some-but not all-of the salt. · Avoid water that is naturally high in sodium or that has been treated with water softeners, which add sodium. Call your local water company to find out the sodium content of your water supply. If you buy bottled water, read the label and choose a sodium-free brand. Avoid high-sodium foods · Avoid eating: ¨ Smoked, cured, salted, and canned meat, fish, and poultry. ¨ Ham, castle, hot dogs, and luncheon meats. ¨ Regular, hard, and processed cheese and regular peanut butter. ¨ Crackers with salted tops, and other salted snack foods such as pretzels, chips, and salted popcorn. ¨ Frozen prepared meals, unless labeled low-sodium. ¨ Canned and dried soups, broths, and bouillon, unless labeled sodium-free or low-sodium. ¨ Canned vegetables, unless labeled sodium-free or low-sodium. ¨ Western Lisa fries, pizza, tacos, and other fast foods. ¨ Pickles, olives, ketchup, and other condiments, especially soy sauce, unless labeled sodium-free or low-sodium. Where can you learn more? Go to http://brianne-ger.info/. Enter U909 in the search box to learn more about \"Low Sodium Diet (2,000 Milligram): Care Instructions. \" Current as of: May 12, 2017 Content Version: 11.4 © 0261-5506 [employer]. Care instructions adapted under license by [employer] (which disclaims liability or warranty for this information). If you have questions about a medical condition or this instruction, always ask your healthcare professional. [place] [address] any warranty or liability for your use of this information. Diet for Chronic Kidney Disease (Before Dialysis): Care Instructions Your Care Instructions When you have chronic kidney disease, you need to change your diet to avoid foods that make your kidneys worse. You may need to limit salt, fluids, and protein. You also may need to limit minerals such as potassium and phosphorus. A diet for chronic kidney disease takes planning. A dietitian who specializes in kidney disease can help you plan meals that meet your needs. These guidelines are for people who are not on dialysis. Talk with your doctor or dietitian to make sure your diet is right for your condition. Do not change your diet without talking to your doctor or dietitian. Follow-up care is a key part of your treatment and safety. Be sure to make and go to all appointments, and call your doctor if you are having problems. It's also a good idea to know your test results and keep a list of the medicines you take. How can you care for yourself at home? · Work with your doctor or dietitian to create a food plan. · Do not skip meals or go for many hours without eating. If you do not feel very hungry, try to eat 4 or 5 small meals instead of 1 or 2 big meals. · If you have a hard time eating enough, talk to your doctor or dietitian about ways you can add calories to your diet. · Do not take any vitamins or minerals, supplements, or herbal products without talking to your doctor first. 
· Check with your doctor about whether it is safe for you to drink alcohol. To get the right amount of protein · Ask your doctor or dietitian how much protein you can have each day. Most people with chronic kidney disease need to limit the amount of protein they eat. But you still need some protein to stay healthy. · Include all sources of protein in your daily protein count. Besides meat, poultry, fish, and eggs, protein is found in milk and milk products, beans and nuts, breads, cereals, and vegetables. To limit salt · Read food labels on cans and food packages. The labels tell you how much sodium is in each serving. Make sure that you look at the serving size. If you eat more than the serving size, you will get more sodium than what is listed on the label. · Do not add salt to your food. Avoid foods that list salt, sodium, or monosodium glutamate (MSG) as an ingredient. · Buy foods that are labeled \"no salt added,\" \"sodium-free,\" or \"low-sodium. \" Foods labeled \"reduced-sodium\" and \"light sodium\" may still have too much sodium. · Limit processed foods, fast food, and restaurant foods. These types of food are very high in sodium. · Avoid salted pretzels, chips, popcorn, and other salted snacks. · Avoid smoked, cured, salted, and canned meat, fish, and poultry. This includes ham, castle, hot dogs, and luncheon meats. · You may use lemon, herbs, and spices to flavor your meals. To limit potassium · Choose low-potassium fruits such as applesauce, pineapple, grapes, blueberries, and raspberries. · Choose low-potassium vegetables such as lettuce, green beans, cucumber, and radishes. · Choose low-potassium foods such as hummus, spaghetti, macaroni, rice, tortillas, and bagels. · Limit or avoid high-potassium foods such as milk, bananas, oranges, cantaloupe, potatoes, spinach, tomatoes, broccoli, and sweet potatoes. · Do not use a salt substitute or lite salt unless your doctor says it is okay. Most salt substitutes and lite salts are high in potassium. To limit phosphorus · Follow your food plan to know how much milk and milk products you can have. · Limit nuts, peanut butter, seeds, lentils, beans, organ meats, and sardines. · Avoid cola drinks. · Avoid bran breads or bran cereals. If you need to limit fluids · Know how much fluid you can drink. Every day fill a pitcher with that amount of water. If you drink another fluid (such as coffee) that day, pour an equal amount of water out of the pitcher. · Count foods that are liquid at room temperature, such as gelatin dessert and ice cream, as fluids. Where can you learn more? Go to http://brianne-ger.info/. Enter X527 in the search box to learn more about \"Diet for Chronic Kidney Disease (Before Dialysis): Care Instructions. \" Current as of: May 12, 2017 Content Version: 11.4 © 3430-0594 [employer]. Care instructions adapted under license by [employer] (which disclaims liability or warranty for this information).  If you have questions about a medical condition or this instruction, always ask your healthcare professional. [employer] disclaims any warranty or liability for your use of this information. Introducing [facility]! [employer] introduces [employer] patient portal. Now you can access parts of your medical record, email your doctor's office, and request medication refills online. 1. In your internet browser, go to [email or web address] 2. Click on the First Time User? Click Here link in the Sign In box. You will see the New Member Sign Up page. 3. Enter your [employer] Access Code exactly as it appears below. You will not need to use this code after youve completed the sign-up process. If you do not sign up before the expiration date, you must request a new code. · [employer] Access Code: [email or web address] Expires: 3/5/2018  9:49 AM 
 
4. Enter the last four digits of your Social Security Number (xxxx) and Date of Birth (mm/dd/yyyy) as indicated and click Submit. You will be taken to the next sign-up page. 5. Create a [employer] ID. This will be your [employer] login ID and cannot be changed, so think of one that is secure and easy to remember. 6. Create a [employer] password. You can change your password at any time. 7. Enter your Password Reset Question and Answer. This can be used at a later time if you forget your password. 8. Enter your e-mail address. You will receive e-mail notification when new information is available in [address] 9. Click Sign Up. You can now view and download portions of your medical record. 10. Click the Download Summary menu link to download a portable copy of your medical information. If you have questions, please visit the Frequently Asked Questions section of the [employer] website. Remember, [employer] is NOT to be used for urgent needs. For medical emergencies, dial 911. Now available from your iPhone and Android! Please provide this summary of care documentation to your next provider. Your primary care clinician is listed as Reese Borrego. If you have any questions after today's visit, please call [phone].

## 2017-12-05 NOTE — TELEPHONE ENCOUNTER
Received call from pt,  Please call pts daughter with referral appointments.        Kelsea Guy  [phone]      Message sent to Farhad Samaniego, vik

## 2017-12-05 NOTE — PROGRESS NOTES
HISTORY OF PRESENT ILLNESS  Sophy Dunham is a 80 y.o. female. HPI   1./Want to come off some of her med  bumex once and recently bid lost 12 pounds  2. Wanted to know if she wants diabetic med  3. Results  4. Arm pain, was calcium deposit and now the other shoulder acting up  5. Wanted to talke abotu the kidney  6. Also concening about loosing wt the daughter worries that this is the wrong thing to do with pt with bmi of >35 to loose wt        Current Outpatient Prescriptions   Medication Sig Dispense Refill    acetaminophen (TYLENOL) 500 mg tablet Take  by mouth every six (6) hours as needed for Pain.  VITAMIN D2 50,000 unit capsule take 1 capsule BY MOUTH EVERY 7 DAYS 4 Cap 11    FLUZONE HIGH-DOSE 2017-18, PF, syrg injection inject 0.5 milliliter intramuscularly  0    oxyCODONE-acetaminophen (PERCOCET) 5-325 mg per tablet Take 2 Tabs by mouth every eight (8) hours as needed for Pain. 180 Tab 0    WELCHOL 3.75 gram pwpk powder packet USE 1 PACKET BY MOUTH ONCE DAILY 90 Packet 6    pravastatin (PRAVACHOL) 40 mg tablet take 1 tablet by mouth NIGHTLY 90 Tab 2    fenofibrate nanocrystallized (TRICOR) 48 mg tablet take 1 tablet by mouth once daily 90 Tab 3    bumetanide (BUMEX) 1 mg tablet Take 2 Tabs by mouth daily. (Patient taking differently: Take 1 mg by mouth daily.) 90 Tab 1    potassium chloride (KLOR-CON) 10 mEq tablet Take 2 Tabs by mouth daily. 90 Tab 3    NIFEdipine ER (NIFEDICAL XL) 30 mg ER tablet Take 1 Tab by mouth daily. 90 Tab 4    atenolol (TENORMIN) 50 mg tablet Take 1 Tab by mouth daily. 90 Tab 4    losartan (COZAAR) 100 mg tablet Take 1 Tab by mouth daily. 90 Tab 3    nitroglycerin (NITROSTAT) 0.4 mg SL tablet 1 Tab by SubLINGual route every five (5) minutes as needed for Chest Pain. 15 Tab 1    MISCELLANEOUS MEDICAL SUPPLY (COMPRESSION STOCKINGS) daily.  aspirin delayed-release 81 mg tablet Take 1 Tab by mouth daily.  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    Onychomycosis 8/19/2010    Other acute and subacute form of ischemic heart disease     Other and unspecified angina pectoris     Other ill-defined conditions(799.89)     excessive watering of right eye    Shortness of breath     Shoulder pain, bilateral 3/26/2010     Past Surgical History:   Procedure Laterality Date    HX HEART CATHETERIZATION      HX HEENT      bilat cateract extraction    HX HYSTERECTOMY      HX KNEE REPLACEMENT      HX LUMBAR FUSION      HX ORTHOPAEDIC      bilateral TKR    HX ORTHOPAEDIC      neck and back surgery    HX ORTHOPAEDIC      bilateral bunionectomy    HX ORTHOPAEDIC      bilateral cataracts removed    HX PTCA       Family History   Problem Relation Age of Onset    Hypertension Mother     Stroke Mother     Other Mother      arthritis    Coronary Artery Disease Father     Heart Disease Father     Hypertension Sister     Diabetes Sister     Coronary Artery Disease Sister     Cancer Sister      lung    Coronary Artery Disease Brother     Cancer Brother      lung    Heart Disease Brother      Social History   Substance Use Topics    Smoking status: Never Smoker    Smokeless tobacco: Never Used    Alcohol use Yes      Comment: occasional       Lab Results  Component Value Date/Time   WBC 5.6 09/11/2017 09:56 AM   HGB 13.6 09/11/2017 09:56 AM   HCT 41.4 09/11/2017 09:56 AM   PLATELET 203 80/95/6645 09:56 AM   MCV 90 09/11/2017 09:56 AM     Lab Results  Component Value Date/Time   Hemoglobin A1c 6.2 03/09/2017 08:57 AM   Hemoglobin A1c 6.1 11/30/2015 10:00 AM   Hemoglobin A1c 6.4 08/15/2014 09:39 AM   Glucose 107 09/11/2017 09:56 AM   Microalb/Creat ratio (ug/mg creat.) <2.9 09/11/2017 10:04 AM   LDL, calculated 91 03/09/2017 08:57 AM   Creatinine (POC) 1.3 11/30/2009 01:34 PM   Creatinine 1.37 09/11/2017 09:56 AM      Lab Results  Component Value Date/Time   Cholesterol, total 180 03/09/2017 08:57 AM   HDL Cholesterol 69 03/09/2017 08:57 AM   LDL, calculated 91 03/09/2017 08:57 AM   Triglyceride 98 03/09/2017 08:57 AM   CHOL/HDL Ratio 4.5 08/19/2010 12:27 PM   Lab Results  Component Value Date/Time   ALT (SGPT) 13 09/11/2017 09:56 AM   AST (SGOT) 24 09/11/2017 09:56 AM   Alk. phosphatase 61 09/11/2017 09:56 AM   Bilirubin, total 0.4 09/11/2017 09:56 AM   Albumin 4.5 09/11/2017 09:56 AM   Protein, total 7.2 09/11/2017 09:56 AM   INR 1.0 06/02/2012 12:00 AM   Prothrombin time 11.2 06/02/2012 12:00 AM   PLATELET 119 44/00/9418 09:56 AM       Lab Results  Component Value Date/Time   GFR est non-AA 35 09/11/2017 09:56 AM   GFRNA, POC 40 11/30/2009 01:34 PM   GFR est AA 41 09/11/2017 09:56 AM   GFRAA, POC 48 11/30/2009 01:34 PM   Creatinine 1.37 09/11/2017 09:56 AM   Creatinine (POC) 1.3 11/30/2009 01:34 PM   BUN 31 09/11/2017 09:56 AM   Sodium 145 09/11/2017 09:56 AM   Potassium 4.7 09/11/2017 09:56 AM   Chloride 103 09/11/2017 09:56 AM   CO2 26 09/11/2017 09:56 AM   Lab Results  Component Value Date/Time   TSH 1.630 03/09/2017 08:57 AM                     Review of Systems   Constitutional: Negative for chills and fever. HENT: Negative for nosebleeds. Eyes: Negative for pain. Respiratory: Negative for cough and wheezing. Cardiovascular: Negative for chest pain and leg swelling. Gastrointestinal: Negative for constipation, diarrhea and nausea. Genitourinary: Negative for frequency. Musculoskeletal: Negative for joint pain and myalgias. Skin: Negative for rash. Neurological: Negative for loss of consciousness. Endo/Heme/Allergies: Does not bruise/bleed easily.    Psychiatric/Behavioral: Negative for depression. The patient is not nervous/anxious and does not have insomnia. All other systems reviewed and are negative. Physical Exam   Constitutional: She is oriented to person, place, and time. She appears well-developed and well-nourished. HENT:   Head: Normocephalic and atraumatic. Eyes: Conjunctivae and EOM are normal.   Neck: Normal range of motion. Neck supple. Cardiovascular: Normal rate, regular rhythm and normal heart sounds. No murmur heard. Pulmonary/Chest: Effort normal and breath sounds normal.   Abdominal: Soft. Bowel sounds are normal. She exhibits no distension. Musculoskeletal: Normal range of motion. She exhibits no edema. Neurological: She is alert and oriented to person, place, and time. Skin: No erythema. Psychiatric: Her behavior is normal.   Nursing note and vitals reviewed. ASSESSMENT and PLAN  Diagnoses and all orders for this visit:    1. Arthritis  -     bumetanide (BUMEX) 1 mg tablet; Take 1 Tab by mouth daily.  -     [facility]    2. Chronic pain of both shoulders  -     bumetanide (BUMEX) 1 mg tablet; Take 1 Tab by mouth daily. 3. Chronic left-sided thoracic back pain    4. 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 High cholesterol  -     losartan (COZAAR) 100 mg tablet; Take 1 Tab by mouth daily.  -     NIFEdipine ER (NIFEDICAL XL) 30 mg ER tablet; Take 1 Tab by mouth daily. -     pravastatin (PRAVACHOL) 40 mg tablet; take 1 tablet by mouth NIGHTLY  -     bumetanide (BUMEX) 1 mg tablet; Take 1 Tab by mouth daily. 6. Coronary artery disease due to calcified coronary lesion  -     losartan (COZAAR) 100 mg tablet; Take 1 Tab by mouth daily. 7. Bilateral edema of lower extremity  -     losartan (COZAAR) 100 mg tablet; Take 1 Tab by mouth daily. -     potassium chloride (KLOR-CON) 10 mEq tablet; Take 2 Tabs by mouth daily. 8. Essential hypertension  -     losartan (COZAAR) 100 mg tablet;  Take 1 Tab by mouth daily. -     potassium chloride (KLOR-CON) 10 mEq tablet; Take 2 Tabs by mouth daily. 9. Preoperative general physical examination  -     atenolol (TENORMIN) 50 mg tablet; Take 1 Tab by mouth daily. 10. Coronary artery disease due to lipid rich plaque  -     pravastatin (PRAVACHOL) 40 mg tablet; take 1 tablet by mouth NIGHTLY    11. Prediabetes  -     pravastatin (PRAVACHOL) 40 mg tablet; take 1 tablet by mouth NIGHTLY    12. Acute right-sided low back pain with sciatica, sciatica laterality unspecified  -     bumetanide (BUMEX) 1 mg tablet; Take 1 Tab by mouth daily. 13. Chronic kidney failure, stage 3 (moderate)  -     REFERRAL TO NEPHROLOGY    Other orders  -     calcium-cholecalciferol, D3, (CALTRATE 600+D) tablet; Take 1 Tab by mouth two (2) times a day. All questions were answered including the 6 detail HPI all was discussed in details spent good amount of the time indicating all the daughters who was involved talker of this whole conversation with the pain and notebooks copying every single detail as it was explained to her all meds adjusted labs discussed r/w.  Referral nephrologis ref pt, loss of wt was discussed

## 2017-12-05 NOTE — PATIENT INSTRUCTIONS
Medicines to Avoid With Kidney Disease: Care Instructions  Your Care Instructions    Kidney disease means that your kidneys are not able to get rid of waste from the blood. So they can't keep your body's fluids and chemicals in balance. Usually, the kidneys get rid of waste from the blood through the urine. And they balance the fluids in the body. When your kidneys don't work as they should, you have to be careful about some medicines. They may harm your kidneys. Your doctor may tell you not to take them. Or he or she may change the dose. Medicines for pain and swelling, such as ibuprofen (Advil or Motrin) or naproxen (Aleve), can cause harm. So can some antibiotics and antacids. And you need to be careful about some drugs that treat cancer, lower blood pressure, or get rid of water from the body. Some herbal products could cause harm too. Follow-up care is a key part of your treatment and safety. Be sure to make and go to all appointments, and call your doctor if you are having problems. It's also a good idea to know your test results and keep a list of the medicines you take. How can you care for yourself at home? · Tell your doctor all the prescription, herbal, or over-the-counter medicines you take. Do not take any new ones unless you talk to your doctor first.  · Do not take anti-inflammatory medicines. These include ibuprofen (Advil, Motrin) and naproxen (Aleve). You can use acetaminophen (Tylenol) for pain. · Do not take two or more pain medicines at the same time unless the doctor told you to. Many pain medicines have acetaminophen, which is Tylenol. Too much acetaminophen (Tylenol) can be harmful. · Tell all doctors and others who work with your health care that you have kidney disease. · Wear medical alert jewelry that lists your health problem. You can buy this at most drugstores. Where can you learn more? Go to http://brianne-ger.info/.   Enter R773 in the search box to learn more about \"Medicines to Avoid With Kidney Disease: Care Instructions. \"  Current as of: May 12, 2017  Content Version: 11.4  © 8308-1759 [employer]. Care instructions adapted under license by [employer] (which disclaims liability or warranty for this information). If you have questions about a medical condition or this instruction, always ask your healthcare professional. [address] any warranty or liability for your use of this information. Kidney Disease and Your Bones: Care Instructions  Your Care Instructions    Healthy bones are constantly growing and changing. To keep your bones strong, your kidneys must keep the right balance of several important substances. These include phosphorus, calcium, parathyroid hormone, and vitamin D. When you have chronic kidney disease, your body no longer keeps the right balance of these substances. This can lead to bone disease. When bone disease is caused by kidney problems, it is called renal bone disease. Renal bone disease is called a \"silent disease\" because the bone changes begin long before symptoms occur. As renal bone disease gets worse, it may cause:  · Calcium deposits in the blood vessels, which may lead to heart disease. · Itchy skin. · Pain in your bones and joints. · Weak muscles. · Broken bones. · Crooked bones or short height in children. By working with your doctor, you can take steps to reduce your chance of having weak or broken bones. Follow-up care is a key part of your treatment and safety. Be sure to make and go to all appointments, and call your doctor if you are having problems. It's also a good idea to know your test results and keep a list of the medicines you take. How can you care for yourself at home? How can you prevent renal bone disease? You may be able to avoid renal bone disease by:  · Getting regular exercise, which can help keep your bones strong.   · Getting the right amount of phosphorus, calcium, and vitamin D. Follow the diet your doctor or dietitian gives you. How can you treat renal bone disease? There are many types of treatment for renal bone disease. You may need to:  · Eat a diet that is low in phosphorus. A dietitian can help you make an eating plan that is low in phosphorus. Your eating plan may advise you to:  ¨ Limit dairy products, such as milk, yogurt, or ice cream.  ¨ Avoid nuts, peanut butter, seeds, lentils, peas, and beans. ¨ Avoid drinks such as beer, cola, and cocoa. · Take a medicine called a phosphate binder. A phosphate binder helps control the level of phosphorus in your blood. · Take vitamin D and calcium pills. · Take medicine to control your parathyroid glands. In some cases, these glands need to be removed by a surgeon. · Exercise to strengthen your bones. To make sure that an exercise program is safe for you, talk to your doctor before you begin. What else can you do if you have renal bone disease? · Work closely with your doctor, have regular testing, and follow all treatment steps. · Your doctor can advise you about how often you need testing. If you are on dialysis, you will probably be tested every 6 months. When should you call for help? Call 911 anytime you think you may need emergency care. For example, call if:  ? · You passed out (lost consciousness). ?Call your doctor now or seek immediate medical care if:  ? · You have new or worse nausea and vomiting. ? · You have much less urine than normal, or you have no urine. ? · You are feeling confused or cannot think clearly. ? · You have new or more blood in your urine. ? · You have new swelling. ? · You are dizzy or lightheaded, or feel like you may faint. ? Watch closely for changes in your health, and be sure to contact your doctor if:  ? · You do not get better as expected. Where can you learn more? Go to http://brianne-ger.info/.   Enter W535 in the search box to learn more about \"Kidney Disease and Your Bones: Care Instructions. \"  Current as of: May 12, 2017  Content Version: 11.4  © 6710-8451 [employer]. Care instructions adapted under license by [employer] (which disclaims liability or warranty for this information). If you have questions about a medical condition or this instruction, always ask your healthcare professional. [address] any warranty or liability for your use of this information. Kidney Disease and High Blood Pressure: Care Instructions  Your Care Instructions    Long-term (chronic) kidney disease happens when the kidneys cannot remove waste and keep your body's fluids and chemicals in balance. Usually, the kidneys remove waste from the blood through the urine. When the kidneys are not working well, waste can build up so much that it poisons the body. Kidney disease can make you very tired. It also can cause swelling, or edema, in your legs or other areas of your body. High blood pressure is one of the major causes of chronic kidney disease. And kidney disease can also cause high blood pressure. No matter which came first, having high blood pressure damages the tiny blood vessels in the kidneys. If you have high blood pressure, it is important to lower it. There are many things you can do to lower your blood pressure, which may help slow or stop the damage to your kidneys. Follow-up care is a key part of your treatment and safety. Be sure to make and go to all appointments, and call your doctor if you are having problems. It's also a good idea to know your test results and keep a list of the medicines you take. How can you care for yourself at home? · Be safe with medicines. Take your medicines exactly as prescribed. Call your doctor if you have any problems with your medicine. You will probably need more than one medicine to lower your blood pressure.  You will get more details on the specific medicines your doctor prescribes. · Work with your doctor and a dietitian to plan meals that have the right amount of nutrients for you. You will probably have to limit salt, fluids, and protein. · Stay at a healthy weight. This is very important if you put on weight around the waist. Losing even 10 pounds can help you lower your blood pressure. · Manage other health problems such as diabetes and high cholesterol. You can help lower your risk for heart disease and blood vessel problems with a healthy lifestyle along with medicines. · Do not take ibuprofen (Advil, Motrin) or naproxen (Aleve), or similar medicines, unless your doctor tells you to. They may make chronic kidney disease worse. It is okay to take acetaminophen (Tylenol). · If your doctor recommends it, get more exercise. Walking is a good choice. Bit by bit, increase the amount you walk every day. Try for at least 30 minutes on most days of the week. You also may want to swim, bike, or do other activities. · Limit or avoid alcohol. Talk to your doctor about whether you can drink any alcohol. · Do not smoke or allow others to smoke around you. If you need help quitting, talk to your doctor about stop-smoking programs and medicines. These can increase your chances of quitting for good. When should you call for help? Call 911 anytime you think you may need emergency care. For example, call if:  ? · You passed out (lost consciousness). ?Call your doctor now or seek immediate medical care if:  ? · You have new or worse nausea and vomiting. ? · You have much less urine than normal, or you have no urine. ? · You are feeling confused or cannot think clearly. ? · You have new or more blood in your urine. ? · You have new swelling. ? · You are dizzy or lightheaded, or you feel like you may faint. ? Watch closely for changes in your health, and be sure to contact your doctor if:  ? · You do not get better as expected.    Where can you learn more? Go to http://brianne-ger.info/. Enter Q400 in the search box to learn more about \"Kidney Disease and High Blood Pressure: Care Instructions. \"  Current as of: May 12, 2017  Content Version: 11.4  © 5385-0251 [employer]. Care instructions adapted under license by [employer] (which disclaims liability or warranty for this information). If you have questions about a medical condition or this instruction, always ask your healthcare professional. [address] any warranty or liability for your use of this information. Low Sodium Diet (2,000 Milligram): Care Instructions  Your Care Instructions    Too much sodium causes your body to hold on to extra water. This can raise your blood pressure and force your heart and kidneys to work harder. In very serious cases, this could cause you to be put in the hospital. It might even be life-threatening. By limiting sodium, you will feel better and lower your risk of serious problems. The most common source of sodium is salt. People get most of the salt in their diet from canned, prepared, and packaged foods. Fast food and restaurant meals also are very high in sodium. Your doctor will probably limit your sodium to less than 2,000 milligrams (mg) a day. This limit counts all the sodium in prepared and packaged foods and any salt you add to your food. Follow-up care is a key part of your treatment and safety. Be sure to make and go to all appointments, and call your doctor if you are having problems. It's also a good idea to know your test results and keep a list of the medicines you take. How can you care for yourself at home? Read food labels  · Read labels on cans and food packages. The labels tell you how much sodium is in each serving. Make sure that you look at the serving size. If you eat more than the serving size, you have eaten more sodium.   · Food labels also tell you the Percent Daily Value for sodium. Choose products with low Percent Daily Values for sodium. · Be aware that sodium can come in forms other than salt, including monosodium glutamate (MSG), sodium citrate, and sodium bicarbonate (baking soda). MSG is often added to Asian food. When you eat out, you can sometimes ask for food without MSG or added salt. Buy low-sodium foods  · Buy foods that are labeled \"unsalted\" (no salt added), \"sodium-free\" (less than 5 mg of sodium per serving), or \"low-sodium\" (less than 140 mg of sodium per serving). Foods labeled \"reduced-sodium\" and \"light sodium\" may still have too much sodium. Be sure to read the label to see how much sodium you are getting. · Buy fresh vegetables, or frozen vegetables without added sauces. Buy low-sodium versions of canned vegetables, soups, and other canned goods. Prepare low-sodium meals  · Cut back on the amount of salt you use in cooking. This will help you adjust to the taste. Do not add salt after cooking. One teaspoon of salt has about 2,300 mg of sodium. · Take the salt shaker off the table. · Flavor your food with garlic, lemon juice, onion, vinegar, herbs, and spices. Do not use soy sauce, lite soy sauce, steak sauce, onion salt, garlic salt, celery salt, mustard, or ketchup on your food. · Use low-sodium salad dressings, sauces, and ketchup. Or make your own salad dressings and sauces without adding salt. · Use less salt (or none) when recipes call for it. You can often use half the salt a recipe calls for without losing flavor. Other foods such as rice, pasta, and grains do not need added salt. · Rinse canned vegetables, and cook them in fresh water. This removes some-but not all-of the salt. · Avoid water that is naturally high in sodium or that has been treated with water softeners, which add sodium. Call your local water company to find out the sodium content of your water supply.  If you buy bottled water, read the label and choose a sodium-free brand. Avoid high-sodium foods  · Avoid eating:  ¨ Smoked, cured, salted, and canned meat, fish, and poultry. ¨ Ham, castle, hot dogs, and luncheon meats. ¨ Regular, hard, and processed cheese and regular peanut butter. ¨ Crackers with salted tops, and other salted snack foods such as pretzels, chips, and salted popcorn. ¨ Frozen prepared meals, unless labeled low-sodium. ¨ Canned and dried soups, broths, and bouillon, unless labeled sodium-free or low-sodium. ¨ Canned vegetables, unless labeled sodium-free or low-sodium. ¨ Western Lisa fries, pizza, tacos, and other fast foods. ¨ Pickles, olives, ketchup, and other condiments, especially soy sauce, unless labeled sodium-free or low-sodium. Where can you learn more? Go to http://brianneYuenimeiger.info/. Enter W964 in the search box to learn more about \"Low Sodium Diet (2,000 Milligram): Care Instructions. \"  Current as of: May 12, 2017  Content Version: 11.4  © 2013-1144 [employer]. Care instructions adapted under license by [employer] (which disclaims liability or warranty for this information). If you have questions about a medical condition or this instruction, always ask your healthcare professional. [address] any warranty or liability for your use of this information. Diet for Chronic Kidney Disease (Before Dialysis): Care Instructions  Your Care Instructions  When you have chronic kidney disease, you need to change your diet to avoid foods that make your kidneys worse. You may need to limit salt, fluids, and protein. You also may need to limit minerals such as potassium and phosphorus. A diet for chronic kidney disease takes planning. A dietitian who specializes in kidney disease can help you plan meals that meet your needs. These guidelines are for people who are not on dialysis. Talk with your doctor or dietitian to make sure your diet is right for your condition.  Do not change your diet without talking to your doctor or dietitian. Follow-up care is a key part of your treatment and safety. Be sure to make and go to all appointments, and call your doctor if you are having problems. It's also a good idea to know your test results and keep a list of the medicines you take. How can you care for yourself at home? · Work with your doctor or dietitian to create a food plan. · Do not skip meals or go for many hours without eating. If you do not feel very hungry, try to eat 4 or 5 small meals instead of 1 or 2 big meals. · If you have a hard time eating enough, talk to your doctor or dietitian about ways you can add calories to your diet. · Do not take any vitamins or minerals, supplements, or herbal products without talking to your doctor first.  · Check with your doctor about whether it is safe for you to drink alcohol. To get the right amount of protein  · Ask your doctor or dietitian how much protein you can have each day. Most people with chronic kidney disease need to limit the amount of protein they eat. But you still need some protein to stay healthy. · Include all sources of protein in your daily protein count. Besides meat, poultry, fish, and eggs, protein is found in milk and milk products, beans and nuts, breads, cereals, and vegetables. To limit salt  · Read food labels on cans and food packages. The labels tell you how much sodium is in each serving. Make sure that you look at the serving size. If you eat more than the serving size, you will get more sodium than what is listed on the label. · Do not add salt to your food. Avoid foods that list salt, sodium, or monosodium glutamate (MSG) as an ingredient. · Buy foods that are labeled \"no salt added,\" \"sodium-free,\" or \"low-sodium. \" Foods labeled \"reduced-sodium\" and \"light sodium\" may still have too much sodium. · Limit processed foods, fast food, and restaurant foods.  These types of food are very high in sodium. · Avoid salted pretzels, chips, popcorn, and other salted snacks. · Avoid smoked, cured, salted, and canned meat, fish, and poultry. This includes ham, castle, hot dogs, and luncheon meats. · You may use lemon, herbs, and spices to flavor your meals. To limit potassium  · Choose low-potassium fruits such as applesauce, pineapple, grapes, blueberries, and raspberries. · Choose low-potassium vegetables such as lettuce, green beans, cucumber, and radishes. · Choose low-potassium foods such as hummus, spaghetti, macaroni, rice, tortillas, and bagels. · Limit or avoid high-potassium foods such as milk, bananas, oranges, cantaloupe, potatoes, spinach, tomatoes, broccoli, and sweet potatoes. · Do not use a salt substitute or lite salt unless your doctor says it is okay. Most salt substitutes and lite salts are high in potassium. To limit phosphorus  · Follow your food plan to know how much milk and milk products you can have. · Limit nuts, peanut butter, seeds, lentils, beans, organ meats, and sardines. · Avoid cola drinks. · Avoid bran breads or bran cereals. If you need to limit fluids  · Know how much fluid you can drink. Every day fill a pitcher with that amount of water. If you drink another fluid (such as coffee) that day, pour an equal amount of water out of the pitcher. · Count foods that are liquid at room temperature, such as gelatin dessert and ice cream, as fluids. Where can you learn more? Go to http://brianne-ger.info/. Enter U206 in the search box to learn more about \"Diet for Chronic Kidney Disease (Before Dialysis): Care Instructions. \"  Current as of: May 12, 2017  Content Version: 11.4  © 1910-7478 [email or web address]. Care instructions adapted under license by [employer] (which disclaims liability or warranty for this information).  If you have questions about a medical condition or this instruction, always ask your healthcare professional. [employer] disclaims any warranty or liability for your use of this information.

## 2018-01-02 DIAGNOSIS — I25.10 CORONARY ARTERY DISEASE DUE TO LIPID RICH PLAQUE: ICD-10-CM

## 2018-01-02 DIAGNOSIS — I25.83 CORONARY ARTERY DISEASE DUE TO LIPID RICH PLAQUE: ICD-10-CM

## 2018-01-02 DIAGNOSIS — R06.02 SOB (SHORTNESS OF BREATH) ON EXERTION: ICD-10-CM

## 2018-01-02 DIAGNOSIS — Z95.820 S/P ANGIOPLASTY WITH STENT: ICD-10-CM

## 2018-01-03 RX ORDER — NITROGLYCERIN 0.4 MG/1
TABLET SUBLINGUAL
Qty: 25 TAB | Refills: 1 | Status: SHIPPED | OUTPATIENT
Start: 2018-01-03 | End: 2019-04-04 | Stop reason: SDUPTHER

## 2018-01-23 ENCOUNTER — OFFICE VISIT (OUTPATIENT)
Dept: CARDIOLOGY CLINIC | Age: 83
End: 2018-01-23

## 2018-01-23 VITALS
HEIGHT: 66 IN | SYSTOLIC BLOOD PRESSURE: 157 MMHG | WEIGHT: 220 LBS | BODY MASS INDEX: 35.36 KG/M2 | DIASTOLIC BLOOD PRESSURE: 75 MMHG

## 2018-01-23 DIAGNOSIS — R73.03 PREDIABETES: ICD-10-CM

## 2018-01-23 DIAGNOSIS — E78.2 MIXED HYPERLIPIDEMIA: ICD-10-CM

## 2018-01-23 DIAGNOSIS — Z95.820 S/P ANGIOPLASTY WITH STENT: ICD-10-CM

## 2018-01-23 DIAGNOSIS — R60.0 EDEMA LEG: Primary | ICD-10-CM

## 2018-01-23 DIAGNOSIS — I10 ESSENTIAL HYPERTENSION, BENIGN: ICD-10-CM

## 2018-01-23 DIAGNOSIS — N18.30 CKD (CHRONIC KIDNEY DISEASE) STAGE 3, GFR 30-59 ML/MIN (HCC): ICD-10-CM

## 2018-01-23 DIAGNOSIS — R06.02 SOB (SHORTNESS OF BREATH) ON EXERTION: ICD-10-CM

## 2018-01-23 DIAGNOSIS — M15.9 PRIMARY OSTEOARTHRITIS INVOLVING MULTIPLE JOINTS: ICD-10-CM

## 2018-01-23 PROBLEM — E11.21 TYPE 2 DIABETES MELLITUS WITH NEPHROPATHY (HCC): Status: ACTIVE | Noted: 2018-01-23

## 2018-01-23 NOTE — PROGRESS NOTES
Chief Complaint   Patient presents with   [facility] Establish Care     6 month f/u. pt c/o sob ,swelling ankle/leg bilateral.     1. Have you been to the ER, urgent care clinic since your last visit? Hospitalized since your last visit? No    2. Have you seen or consulted any other health care providers outside of the [address] [place] [address] since your last visit? Include any pap smears or colon screening.  No

## 2018-01-23 NOTE — PROGRESS NOTES
[facility]   [address] [facility], [address] [place] [address]                                          NEW PATIENT HPI/FOLLOW-UP      NAME:  Jessica Fernández   :   1933   MRN:   [id number]   PCP:  James Hill MD           Subjective: The patient is a 80y.o. year old female  who returns for a routine follow-up. Since the last visit, patient reports recurrent progressive leg swelling up both legs. In past resolved increasing Bumex dose to 1 mg BID--lost 15-20 lbs. Now on maintenance 1 mg every day. Referred by PCP to exclude cardiac contribution. Felt that progressive renal dysfunction may be contributing to leg edema. Denies change in exercise tolerance, chest pain, edema, medication intolerance, palpitations, shortness of breath, PND/orthopnea wheezing, sputum, syncope, dizziness or light headedness. Doing satisfactorily. Review of Systems  General: Pt denies excessive weight gain or loss. Pt is able to conduct ADL's. Respiratory: Denies shortness of breath, MURILLO, wheezing or stridor.   Cardiovascular: Denies precordial pain, palpitations, +edema or PND  Gastrointestinal: Denies poor appetite, indigestion, abdominal pain or blood in stool  Peripheral vascular: Denies claudication, leg cramps  Neuropsychiatric: Denies paresthesias,tingling,numbness,anxiety,depression,fatigue  Musculoskeletal: Denies pain,tenderness, soreness,swelling      Past Medical History:   Diagnosis Date    Arthritis 3/25/2010    Back pain 3/26/2010    CAD (coronary artery disease)     Chest pain, unspecified     Chronic kidney failure, stage 3 (moderate) 2017    Chronic pain     Edema     Essential hypertension     High cholesterol 3/25/2010    HTN (hypertension) 3/25/2010    Kidney failure, acute ([facility] [address] 2012    Lacrimal passage stenosis 2014    Onychomycosis 2010    Other acute and subacute form of ischemic heart disease     Other and unspecified angina pectoris     Other ill-defined conditions(799.89)     excessive watering of right eye    Shortness of breath     Shoulder pain, bilateral 3/26/2010     Patient Active Problem List    Diagnosis Date Noted    Type 2 diabetes mellitus with nephropathy ([facility] [address] 01/23/2018    Chronic kidney failure, stage 3 (moderate) 12/05/2017    Edema--non-pitting 06/03/2016    Lacrimal passage stenosis 04/17/2014    Preop cardiovascular exam 04/09/2014    Prediabetes 01/03/2014    Abnormal urinalysis 08/30/2013    Lacrimal duct stenosis 08/14/2013    Conjunctivitis 05/10/2013    Watery eyes 08/29/2012    S/P angioplasty with stent--RCA 6/12 06/22/2012    Multiple bruises 06/22/2012    CAD (coronary artery disease) 06/07/2012    Post PTCA 06/07/2012    Mixed hyperlipidemia 06/01/2012    Essential hypertension, benign 06/01/2012    Abnormal nuclear stress test 06/01/2012    Chest pain, unspecified 04/25/2012    Essential hypertension, benign 04/20/2012    Type 2 diabetes mellitus without complication ([address] 58/36/0694    SOB (shortness of breath) on exertion 04/06/2012    Vitamin D deficiency 04/06/2012    Kidney failure, acute ([address] 04/06/2012    Heme positive stool 11/12/2011    Abdominal pain, other specified site 11/12/2011    Inguinal hernia, left 11/12/2011    Decreased vision 10/12/2011    Edema leg 10/12/2011    Cough 08/17/2011    Tick bite, infected 08/17/2011    Onychomycosis 08/19/2010    Shoulder pain, bilateral 03/26/2010    Back pain 03/26/2010    Primary osteoarthritis involving multiple joints 03/25/2010      Past Surgical History:   Procedure Laterality Date    HX HEART CATHETERIZATION      HX HEENT      bilat cateract extraction    HX HYSTERECTOMY      HX KNEE REPLACEMENT      HX LUMBAR FUSION      HX ORTHOPAEDIC      bilateral TKR    HX ORTHOPAEDIC      neck and back surgery    HX ORTHOPAEDIC      bilateral bunionectomy    HX ORTHOPAEDIC      bilateral cataracts removed    HX PTCA Allergies   Allergen Reactions    Lisinopril Cough      Family History   Problem Relation Age of Onset   [address] [facility] Hypertension Mother     Stroke Mother     Other Mother      arthritis    Coronary Artery Disease Father     Heart Disease Father     Hypertension Sister     Diabetes Sister     Coronary Artery Disease Sister     Cancer Sister      lung    Coronary Artery Disease Brother     Cancer Brother      lung    Heart Disease Brother       Social History     Social History    Marital status:      Spouse name: N/A    Number of children: N/A    Years of education: N/A     Occupational History    Not on file. Social History Main Topics    Smoking status: Never Smoker    Smokeless tobacco: Never Used    Alcohol use Yes      Comment: occasional     Drug use: Yes     Special: Prescription, OTC    Sexual activity: No     Other Topics Concern    Not on file     Social History Narrative    ** Merged History Encounter **           Current Outpatient Prescriptions   Medication Sig    nitroglycerin (NITROSTAT) 0.4 mg SL tablet PLACE 1 TABLET UNDER THE TONGUE EVERY 5 MINS AS NEEDED FOR CHEST PAIN    acetaminophen (TYLENOL) 500 mg tablet Take  by mouth every six (6) hours as needed for Pain.  losartan (COZAAR) 100 mg tablet Take 1 Tab by mouth daily.  potassium chloride (KLOR-CON) 10 mEq tablet Take 2 Tabs by mouth daily.  atenolol (TENORMIN) 50 mg tablet Take 1 Tab by mouth daily.  pravastatin (PRAVACHOL) 40 mg tablet take 1 tablet by mouth NIGHTLY    calcium-cholecalciferol, D3, (CALTRATE 600+D) tablet Take 1 Tab by mouth two (2) times a day.  bumetanide (BUMEX) 1 mg tablet Take 1 Tab by mouth daily.  FLUZONE HIGH-DOSE 2017-18, PF, syrg injection inject 0.5 milliliter intramuscularly    oxyCODONE-acetaminophen (PERCOCET) 5-325 mg per tablet Take 2 Tabs by mouth every eight (8) hours as needed for Pain.  MISCELLANEOUS MEDICAL SUPPLY (COMPRESSION STOCKINGS) daily.     aspirin delayed-release 81 mg tablet Take 1 Tab by mouth daily.  NIFEdipine ER (NIFEDICAL XL) 30 mg ER tablet Take 1 Tab by mouth daily. No current facility-administered medications for this visit. I have reviewed the nurses notes, vitals, problem list, allergy list, medical history, family medical, social history and medications. Objective:     Physical Exam:     Vitals:    01/23/18 0918   BP: 157/75   Weight: 220 lb (99.8 kg)   Height: 5' 6\" (1.676 m)    Body mass index is 35.51 kg/(m^2). General: Well developed, in no acute distress. HEENT: No carotid bruits, no JVD, trach is midline. Heart:  Normal S1/S2 negative S3 or S4. Regular, no murmur, gallop or rub.   Respiratory: Clear bilaterally, no wheezing or rales  Abdomen:   Soft, non-tender, bowel sounds are active.   Extremities:  No edema, normal cap refill, no cyanosis. Neuro: A&Ox3, speech clear, gait stable. Skin: Skin color is normal. No rashes or lesions. No diaphoresis.   Vascular: 2+ pulses symmetric in all extremities        Data Review:       Cardiographics:    EKG: SB,LAE    Cardiology Labs:    Results for orders placed or performed during the hospital encounter of 06/06/12   EKG, 12 LEAD, INITIAL   Result Value Ref Range    Ventricular Rate 66 BPM    Atrial Rate 66 BPM    P-R Interval 188 ms    QRS Duration 84 ms    Q-T Interval 418 ms    QTC Calculation (Bezet) 438 ms    Calculated P Axis 58 degrees    Calculated R Axis 16 degrees    Calculated T Axis 42 degrees    Diagnosis       Sinus rhythm with premature atrial complexes  When compared with ECG of 06-JUN-2012 10:45,  premature atrial complexes are now present  Confirmed by Severiano Cutter ([id number]) on 6/7/2012 8:37:17 AM       Lab Results   Component Value Date/Time    Cholesterol, total 180 03/09/2017 08:57 AM    HDL Cholesterol 69 03/09/2017 08:57 AM    LDL, calculated 91 03/09/2017 08:57 AM    Triglyceride 98 03/09/2017 08:57 AM    CHOL/HDL Ratio 4.5 08/19/2010 12:27 PM       Lab Results   Component Value Date/Time    Sodium 145 09/11/2017 09:56 AM    Potassium 4.7 09/11/2017 09:56 AM    Chloride 103 09/11/2017 09:56 AM    CO2 26 09/11/2017 09:56 AM    Anion gap 7 06/07/2012 03:00 AM    Glucose 107 09/11/2017 09:56 AM    BUN 31 09/11/2017 09:56 AM    Creatinine 1.37 09/11/2017 09:56 AM    BUN/Creatinine ratio 23 09/11/2017 09:56 AM    GFR est AA 41 09/11/2017 09:56 AM    GFR est non-AA 35 09/11/2017 09:56 AM    Calcium 10.2 09/11/2017 09:56 AM    Bilirubin, total 0.4 09/11/2017 09:56 AM    AST (SGOT) 24 09/11/2017 09:56 AM    Alk. phosphatase 61 09/11/2017 09:56 AM    Protein, total 7.2 09/11/2017 09:56 AM    Albumin 4.5 09/11/2017 09:56 AM    Globulin 4.0 11/12/2011 11:03 AM    A-G Ratio 1.7 09/11/2017 09:56 AM    ALT (SGPT) 13 09/11/2017 09:56 AM          Assessment:       ICD-10-CM ICD-9-CM    1. Edema leg R60.0 782.3 AMB POC EKG ROUTINE W/ 12 LEADS, INTER & REP      2D ECHO COMPLETE ADULT (TTE) W OR WO CONTR   2. SOB (shortness of breath) on exertion R06.02 786.05 AMB POC EKG ROUTINE W/ 12 LEADS, INTER & REP      2D ECHO COMPLETE ADULT (TTE) W OR WO CONTR   3. CKD (chronic kidney disease) stage 3, GFR 30-59 ml/min N18.3 585.3    4. Mixed hyperlipidemia E78.2 272.2 AMB POC EKG ROUTINE W/ 12 LEADS, INTER & REP      2D ECHO COMPLETE ADULT (TTE) W OR WO CONTR   5. Essential hypertension, benign I10 401.1 AMB POC EKG ROUTINE W/ 12 LEADS, INTER & REP      2D ECHO COMPLETE ADULT (TTE) W OR WO CONTR   6. Primary osteoarthritis involving multiple joints M15.0 715.09 AMB POC EKG ROUTINE W/ 12 LEADS, INTER & REP      2D ECHO COMPLETE ADULT (TTE) W OR WO CONTR   7. S/P angioplasty with stent--RCA 6/12 Z95.9 V45.89 AMB POC EKG ROUTINE W/ 12 LEADS, INTER & REP      2D ECHO COMPLETE ADULT (TTE) W OR WO CONTR   8. Prediabetes R73.03 790.29          Discussion: Patient presents at this time stable from a cardiac perspective. Leg edema appears to be non-cardiac. Suspect CVI,salt excess. Has had good response in past on short term increase in diuretic dose. Advised increase in Bumex dose to 1mg BID for 1-3 days depending on response along with extra K tab. Call with response. Reduce atenolol down to 25 mg every day due to SB. May need additional BP med but diuresis likely to lower BP further. Will obtain echo to assess both LV and RV EF. Has appt with Renal. Pleased with present cardiac status. Plan: 1. Continue same meds. Lipid profile and labs followed by PCP. 2.Encouraged to exercise to tolerance, lose weight and follow low fat, low cholesterol, low sodium predominantly Plant-based (consider Mediterranean) diet. Call with questions or concerns. Will follow up any test results by phone and/or f/u here in office if needed. Kaur Dies 3.Follow up: 6 MONTHS    I have discussed the diagnosis with the patient and the intended plan as seen in the above orders. The patient has received an after-visit summary and questions were answered concerning future plans. I have discussed any concerning medication side effects and warnings with the patient as well.     Laurence Mullen MD  1/23/2018

## 2018-01-23 NOTE — MR AVS SNAPSHOT
[address] [facility], [place], operated [facility] 
 
 
 [address] [place] [address] 
[phone] Patient: Tray Ruvalcaba MRN: [id number] MZZ:[id number] Visit Information Date & Time Provider Department Dept. Phone Encounter #  
 1/23/2018  9:15 AM Saturnino Chau MD [facility] at [facility] [id number] Your Appointments 2/6/2018  8:45 AM  
ROUTINE CARE with Maliha Acosta MD  
[address] [place] [address] OFFICE-ANNEX ([address] [facility]) Appt Note: 6 mo f/u; 2 mo f/u  
 [address] [facility] [address] [phone] [id number] Upcoming Health Maintenance Date Due  
 GLAUCOMA SCREENING Q2Y 12/4/2017 LIPID PANEL Q1 3/9/2018 HEMOGLOBIN A1C Q6M 3/11/2018 MICROALBUMIN Q1 9/11/2018 EYE EXAM RETINAL OR DILATED Q1 9/11/2018 MEDICARE YEARLY EXAM 9/12/2018 FOOT EXAM Q1 9/29/2018 DTaP/Tdap/Td series (2 - Td) 11/30/2025 Allergies as of 1/23/2018  Review Complete On: 7/18/2017 By: Saturnino Chau MD  
  
 Severity Noted Reaction Type Reactions Lisinopril  08/17/2011    Cough Current Immunizations  Reviewed on 11/10/2016 Name Date Influenza High Dose Vaccine PF 8/17/2017, 10/31/2016, 11/1/2015 Influenza Vaccine Whole 10/22/2011 Pneumococcal Polysaccharide (PPSV-23) 8/15/2014 Tdap 11/30/2015 ZZZ-RETIRED (DO NOT USE) Pneumococcal Vaccine (Unspecified Type) 1/15/1996 Zoster Vaccine, Live 11/11/2016 Not reviewed this visit You Were Diagnosed With   
  
 Codes Comments SOB (shortness of breath) on exertion    -  Primary ICD-10-CM: R06.02 
ICD-9-CM: 786.05 Mixed hyperlipidemia     ICD-10-CM: E78.2 ICD-9-CM: 272.2 Essential hypertension, benign     ICD-10-CM: I10 
ICD-9-CM: 401.1 Primary osteoarthritis involving multiple joints     ICD-10-CM: M15.0 ICD-9-CM: 715.09   
 S/P angioplasty with stent     ICD-10-CM: Z95.9 ICD-9-CM: V45.89 Edema leg     ICD-10-CM: R60.0 ICD-9-CM: 379. 3 Vitals BP Height(growth percentile) Weight(growth percentile) BMI OB Status Smoking Status 157/75 (BP 1 Location: Left arm, BP Patient Position: Sitting) 5' 6\" (1.676 m) 220 lb (99.8 kg) 35.51 kg/m2 Hysterectomy Never Smoker Vitals History BMI and BSA Data Body Mass Index Body Surface Area 35.51 kg/m 2 2.16 m 2 Preferred Pharmacy Pharmacy Name [facility] [id number] [place] [address], [address] [phone] Your Updated Medication List  
  
   
This list is accurate as of: 1/23/18 10:29 AM.  Always use your most recent med list.  
  
  
  
  
 acetaminophen 500 mg tablet Commonly known as:  TYLENOL Take  by mouth every six (6) hours as needed for Pain. aspirin delayed-release 81 mg tablet Take 1 Tab by mouth daily. atenolol 50 mg tablet Commonly known as:  TENORMIN Take 1 Tab by mouth daily. bumetanide 1 mg tablet Commonly known as:  Earlene Slight Take 1 Tab by mouth daily. calcium-cholecalciferol (D3) tablet Commonly known as:  CALTRATE 600+D Take 1 Tab by mouth two (2) times a day. COMPRESSION STOCKINGS  
daily. FLUZONE HIGH-DOSE 2017-18 (PF) Syrg injection Generic drug:  influenza vaccine 2017-18 (65 yrs+)(PF)  
inject 0.5 milliliter intramuscularly  
  
 losartan 100 mg tablet Commonly known as:  COZAAR Take 1 Tab by mouth daily. NIFEdipine ER 30 mg ER tablet Commonly known as:  NIFEDICAL XL Take 1 Tab by mouth daily. nitroglycerin 0.4 mg SL tablet Commonly known as:  NITROSTAT PLACE 1 TABLET UNDER THE TONGUE EVERY 5 MINS AS NEEDED FOR CHEST PAIN  
  
 oxyCODONE-acetaminophen 5-325 mg per tablet Commonly known as:  PERCOCET Take 2 Tabs by mouth every eight (8) hours as needed for Pain.  
  
 potassium chloride 10 mEq tablet Commonly known as:  KLOR-CON Take 2 Tabs by mouth daily. pravastatin 40 mg tablet Commonly known as:  PRAVACHOL  
take 1 tablet by mouth NIGHTLY We Performed the Following AMB POC EKG ROUTINE W/ 12 LEADS, INTER & REP [09595 CPT(R)] To-Do List   
 01/25/2018 ECHO:  2D ECHO COMPLETE ADULT (TTE) W OR WO CONTR Introducing hospitals & HEALTH SERVICES! Musa Flores introduces [facility] patient portal. Now you can access parts of your medical record, email your doctor's office, and request medication refills online. 1. In your internet browser, go to [email or web address] 2. Click on the First Time User? Click Here link in the Sign In box. You will see the New Member Sign Up page. 3. Enter your [facility] Access Code exactly as it appears below. You will not need to use this code after youve completed the sign-up process. If you do not sign up before the expiration date, you must request a new code. · [facility] Access Code: [email or web address] Expires: 3/5/2018  9:49 AM 
 
4. Enter the last four digits of your Social Security Number (xxxx) and Date of Birth (mm/dd/yyyy) as indicated and click Submit. You will be taken to the next sign-up page. 5. Create a [facility] ID. This will be your [facility] login ID and cannot be changed, so think of one that is secure and easy to remember. 6. Create a [facility] password. You can change your password at any time. 7. Enter your Password Reset Question and Answer. This can be used at a later time if you forget your password. 8. Enter your e-mail address. You will receive e-mail notification when new information is available in [address] 9. Click Sign Up. You can now view and download portions of your medical record. 10. Click the Download Summary menu link to download a portable copy of your medical information.  
 
If you have questions, please visit the Frequently Asked Questions section of the [employer]. Remember, [employer] is NOT to be used for urgent needs. For medical emergencies, dial 911. Now available from your iPhone and Android! Please provide this summary of care documentation to your next provider. Your primary care clinician is listed as Giles Braxton. If you have any questions after today's visit, please call [phone].

## 2018-02-05 ENCOUNTER — TELEPHONE (OUTPATIENT)
Dept: CARDIOLOGY CLINIC | Age: 83
End: 2018-02-05

## 2018-02-06 ENCOUNTER — OFFICE VISIT (OUTPATIENT)
Dept: FAMILY MEDICINE CLINIC | Age: 83
End: 2018-02-06

## 2018-02-06 VITALS
TEMPERATURE: 97.8 F | SYSTOLIC BLOOD PRESSURE: 140 MMHG | HEIGHT: 66 IN | DIASTOLIC BLOOD PRESSURE: 70 MMHG | WEIGHT: 223 LBS | RESPIRATION RATE: 14 BRPM | OXYGEN SATURATION: 98 % | HEART RATE: 53 BPM | BODY MASS INDEX: 35.84 KG/M2

## 2018-02-06 DIAGNOSIS — M25.561 CHRONIC PAIN OF BOTH KNEES: ICD-10-CM

## 2018-02-06 DIAGNOSIS — G89.29 CHRONIC MIDLINE LOW BACK PAIN WITHOUT SCIATICA: ICD-10-CM

## 2018-02-06 DIAGNOSIS — M25.512 CHRONIC PAIN OF BOTH SHOULDERS: ICD-10-CM

## 2018-02-06 DIAGNOSIS — G89.29 CHRONIC LEFT-SIDED THORACIC BACK PAIN: ICD-10-CM

## 2018-02-06 DIAGNOSIS — M54.50 CHRONIC MIDLINE LOW BACK PAIN WITHOUT SCIATICA: ICD-10-CM

## 2018-02-06 DIAGNOSIS — M19.90 ARTHRITIS: Primary | ICD-10-CM

## 2018-02-06 DIAGNOSIS — G89.29 CHRONIC PAIN OF BOTH SHOULDERS: ICD-10-CM

## 2018-02-06 DIAGNOSIS — G89.29 CHRONIC PAIN OF BOTH KNEES: ICD-10-CM

## 2018-02-06 DIAGNOSIS — M25.511 CHRONIC PAIN OF BOTH SHOULDERS: ICD-10-CM

## 2018-02-06 DIAGNOSIS — M25.562 CHRONIC PAIN OF BOTH KNEES: ICD-10-CM

## 2018-02-06 DIAGNOSIS — M54.6 CHRONIC LEFT-SIDED THORACIC BACK PAIN: ICD-10-CM

## 2018-02-06 RX ORDER — OXYCODONE AND ACETAMINOPHEN 5; 325 MG/1; MG/1
1 TABLET ORAL
Qty: 60 TAB | Refills: 0 | Status: SHIPPED | OUTPATIENT
Start: 2018-03-06 | End: 2018-05-07 | Stop reason: SDUPTHER

## 2018-02-06 RX ORDER — OXYCODONE AND ACETAMINOPHEN 5; 325 MG/1; MG/1
2 TABLET ORAL
Qty: 60 TAB | Refills: 0 | Status: SHIPPED | OUTPATIENT
Start: 2018-02-06 | End: 2018-05-07 | Stop reason: SDUPTHER

## 2018-02-06 NOTE — PROGRESS NOTES
HISTORY OF PRESENT ILLNESS  Sophy Philippe is a 80 y.o. female.   HPI   Patient presented with the daughter for medication refill regarding her chronic pain syndrome including significant disabling chronic low-mid upper back stating the entire spine, knees shoulders and feet  and hands pain, has been dealing with pain for >10yrs, has been on opioid based meds for many yrs,  The patient's pain has been an ongoing struggling concerning problem, present for refills, never abused any prescribed meds, no hx of illicit nor etoh abuse, the pain has been bothering the pt for greater than 1 decades, pt aware that there are no other alternative approach for the current pain that exist with her except for the available opioid based meds with their huge addictive properties, w/out the current pain meds not able to do any of her ADL, and no instrumental ADL  they all Started few yrs ago with hx of worsening months after months and not responding to other offered therapy by the specialists including physical therapist.   the pt's wt started and the current BMI is not a helpfull contributor to the pt current joints pain, last visit patient was advised to try to decrease current body mass index and her weight 5-7% per year unfortunately not succeeded at this time  Patient states that the current dosage of the meds given, not strong enough, but it is helping, and better than being in significant amount of disabling pain that does keeps her up at night and awakens her while asleep for which she does not wish this upon any other individual,   with the current medications,  the pt capable of doing things specially the activity of the daily living, and also stating that the current pain medications are improving the quality of the pt's life,   this pt has tried all the other Non- Narcotic meds, but has not done any surgical repairs and procedures, stating that none have been able to ease down the pain,  Based on the [place] PM report, the pt is not a doctor , and the pt is aware of random [facility] drug screen, patient was told that if foul findings pt would be terminated, for which the pt agreed and she signed couple of contracts regarding the medication significant side effects and its dependency and its policies involved with the opioid-based medication,   The intensity of her pain is at10/10 w/out her current pain med,  Pt's pain persist without medication, is a chronic condition,  compliant with medication takes it as prescribed, keeps meds at a safe place, on stool softener, 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 Current Outpatient Prescriptions   Medication Sig Dispense Refill    nitroglycerin (NITROSTAT) 0.4 mg SL tablet PLACE 1 TABLET UNDER THE TONGUE EVERY 5 MINS AS NEEDED FOR CHEST PAIN 25 Tab 1    acetaminophen (TYLENOL) 500 mg tablet Take  by mouth every six (6) hours as needed for Pain.  losartan (COZAAR) 100 mg tablet Take 1 Tab by mouth daily. 90 Tab 1    potassium chloride (KLOR-CON) 10 mEq tablet Take 2 Tabs by mouth daily. 90 Tab 1    NIFEdipine ER (NIFEDICAL XL) 30 mg ER tablet Take 1 Tab by mouth daily. 90 Tab 1    atenolol (TENORMIN) 50 mg tablet Take 1 Tab by mouth daily. 90 Tab 1    pravastatin (PRAVACHOL) 40 mg tablet take 1 tablet by mouth NIGHTLY 90 Tab 1    calcium-cholecalciferol, D3, (CALTRATE 600+D) tablet Take 1 Tab by mouth two (2) times a day. 60 Tab 11    bumetanide (BUMEX) 1 mg tablet Take 1 Tab by mouth daily. 90 Tab 1    FLUZONE HIGH-DOSE 2017-18, PF, syrg injection inject 0.5 milliliter intramuscularly  0    oxyCODONE-acetaminophen (PERCOCET) 5-325 mg per tablet Take 2 Tabs by mouth every eight (8) hours as needed for Pain. 180 Tab 0    MISCELLANEOUS MEDICAL SUPPLY (COMPRESSION STOCKINGS) daily.  aspirin delayed-release 81 mg tablet Take 1 Tab by mouth daily.  30 Tab 11     Allergies   Allergen Reactions    Lisinopril Cough     Past Medical History:   Diagnosis Date    Arthritis 3/25/2010    Back pain 3/26/2010    CAD (coronary artery disease)     Chest pain, unspecified     Chronic kidney failure, stage 3 (moderate) 12/5/2017    Chronic pain     Edema     Essential hypertension     High cholesterol 3/25/2010    HTN (hypertension) 3/25/2010    Kidney failure, acute ([facility] [address] 4/6/2012    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    Onychomycosis 8/19/2010    Other acute and subacute form of ischemic heart disease     Other and unspecified angina pectoris     Other ill-defined conditions(799.89)     excessive watering of right eye    Shortness of breath     Shoulder pain, bilateral 3/26/2010     Past Surgical History:   Procedure Laterality Date    HX HEART CATHETERIZATION      HX HEENT      bilat cateract extraction    HX HYSTERECTOMY      HX KNEE REPLACEMENT      HX LUMBAR FUSION      HX ORTHOPAEDIC      bilateral TKR    HX ORTHOPAEDIC      neck and back surgery    HX ORTHOPAEDIC      bilateral bunionectomy    HX ORTHOPAEDIC      bilateral cataracts removed    HX PTCA       Family History   Problem Relation Age of Onset    Hypertension Mother     Stroke Mother     Other Mother      arthritis    Coronary Artery Disease Father     Heart Disease Father     Hypertension Sister     Diabetes Sister     Coronary Artery Disease Sister     Cancer Sister      lung    Coronary Artery Disease Brother     Cancer Brother      lung    Heart Disease Brother      Social History   Substance Use Topics    Smoking status: Never Smoker    Smokeless tobacco: Never Used    Alcohol use Yes      Comment: occasional       Lab Results  Component Value Date/Time   WBC 5.6 09/11/2017 09:56 AM   HGB 13.6 09/11/2017 09:56 AM   HCT 41.4 09/11/2017 09:56 AM   PLATELET 893 25/81/5664 09:56 AM   MCV 90 09/11/2017 09:56 AM     Lab Results  Component Value Date/Time   TSH 1.630 03/09/2017 08:57 AM         Review of Systems   Constitutional: Negative for chills, fever and malaise/fatigue. HENT: Negative for congestion and nosebleeds. Eyes: Negative for blurred vision and pain. Respiratory: Negative for shortness of breath and wheezing. Cardiovascular: Negative for chest pain, palpitations and leg swelling. Gastrointestinal: Negative for constipation, diarrhea, nausea and vomiting. Genitourinary: Negative for dysuria and frequency. Musculoskeletal: Positive for back pain, joint pain, myalgias and neck pain. Skin: Negative for itching and rash. Neurological: Negative for dizziness, loss of consciousness and headaches. Endo/Heme/Allergies: Does not bruise/bleed easily. Psychiatric/Behavioral: Negative for depression. The patient has insomnia. The patient is not nervous/anxious. All other systems reviewed and are negative. Physical Exam   Constitutional: She is oriented to person, place, and time. She appears well-developed and well-nourished. HENT:   Head: Normocephalic and atraumatic. Eyes: Conjunctivae and EOM are normal.   Neck: Normal range of motion. Neck supple. No JVD present. Cardiovascular: Normal rate, regular rhythm and normal heart sounds. No murmur heard. Pulmonary/Chest: Effort normal and breath sounds normal. No stridor. Abdominal: Soft. Bowel sounds are normal. She exhibits no distension and no mass. There is no tenderness. Musculoskeletal: She exhibits tenderness. She exhibits no edema. Right hip: She exhibits decreased range of motion, decreased strength and tenderness. Left hip: She exhibits decreased range of motion and decreased strength. Right knee: She exhibits decreased range of motion. Tenderness found. Left knee: She exhibits decreased range of motion. Tenderness found. Cervical back: She exhibits decreased range of motion, tenderness, bony tenderness, pain and spasm. Lumbar back: She exhibits decreased range of motion, tenderness, pain and spasm.    Nl pulses, nl visual inspection nl monoF, +dystrophy and elongated Nails   Lymphadenopathy:     She has no cervical adenopathy. Neurological: She is alert and oriented to person, place, and time. Skin: No erythema. Psychiatric: Her behavior is normal.   Nursing note and vitals reviewed. ASSESSMENT and PLAN  Diagnoses and all orders for this visit:    1. Arthritis  -     oxyCODONE-acetaminophen (PERCOCET) 5-325 mg per tablet; Take 2 Tabs by mouth two (2) times daily as needed for Pain. Max Daily Amount: 4 Tabs. -     oxyCODONE-acetaminophen (PERCOCET) 5-325 mg per tablet; Take 1 Tab by mouth two (2) times daily as needed for Pain. Max Daily Amount: 2 Tabs. -     PAIN MGMT PANEL W/REFL, UR  -     REFERRAL TO PHYSICAL THERAPY  -     [place] [address]    2. Chronic pain of both shoulders  -     oxyCODONE-acetaminophen (PERCOCET) 5-325 mg per tablet; Take 2 Tabs by mouth two (2) times daily as needed for Pain. Max Daily Amount: 4 Tabs. -     oxyCODONE-acetaminophen (PERCOCET) 5-325 mg per tablet; Take 1 Tab by mouth two (2) times daily as needed for Pain. Max Daily Amount: 2 Tabs. -     PAIN MGMT PANEL W/REFL, UR  -     REFERRAL TO PHYSICAL THERAPY  [place] [id number]    3. Chronic left-sided thoracic back pain  -     oxyCODONE-acetaminophen (PERCOCET) 5-325 mg per tablet; Take 2 Tabs by mouth two (2) times daily as needed for Pain. Max Daily Amount: 4 Tabs. -     oxyCODONE-acetaminophen (PERCOCET) 5-325 mg per tablet; Take 1 Tab by mouth two (2) times daily as needed for Pain. Max Daily Amount: 2 Tabs. -     PAIN MGMT PANEL W/REFL, UR  -     REFERRAL TO PHYSICAL THERAPY  -     [facility]    4. Chronic midline low back pain without sciatica  -     oxyCODONE-acetaminophen (PERCOCET) 5-325 mg per tablet; Take 2 Tabs by mouth two (2) times daily as needed for Pain. Max Daily Amount: 4 Tabs. -     oxyCODONE-acetaminophen (PERCOCET) 5-325 mg per tablet; Take 1 Tab by mouth two (2) times daily as needed for Pain. Max Daily Amount: 2 Tabs. -     PAIN MGMT PANEL W/REFL, UR  -     REFERRAL TO PHYSICAL THERAPY  -     [place] [address] [facility]    5. Chronic pain of both knees  -     oxyCODONE-acetaminophen (PERCOCET) 5-325 mg per tablet; Take 2 Tabs by mouth two (2) times daily as needed for Pain. Max Daily Amount: 4 Tabs. -     oxyCODONE-acetaminophen (PERCOCET) 5-325 mg per tablet; Take 1 Tab by mouth two (2) times daily as needed for Pain. Max Daily Amount: 2 Tabs. -     PAIN MGMT PANEL W/REFL, UR  -     REFERRAL TO PHYSICAL THERAPY  -     [facility] [address] [place] [address]      Patient medications were refilled after signing the contract consent and no harm documentations and again was told to lessen the amount of opioid-based medication continue with weight reduction do some massage therapy chiropractor exercise therapy such as resistance banding avoid heavy lifting heavy pushing at this time include ibuprofen and Tylenol over-the-counter topical cream for  day views of concerns patient was told to take some Tums or over-the-counter PPI if abdominal upset ice therapy he therapy side effect of current opioid-based medication significantly explained in detail patient acknowledged understanding and agreed with recommendation  in addition, pt was told to avoid machinary operation and driving while on any opioid based medications that will cause dizziness, drowsiness, and sleepiness. Dependency and tolerancy were also addressed,  meds side effects and compliancy advised,  Call or rtc if worsens, radiology results and schedule of future radiology studies reviewed with patient. Pt agreed with today's recommendations.

## 2018-02-06 NOTE — MR AVS SNAPSHOT
[address] [facility] [address] 
[phone] Patient: Vanesa Rothman MRN: [id number] NKQ:2/77/1949 Visit Information Date & Time Provider Department Dept. Phone Encounter #  
 2/6/2018  8:45 AM Azalia Sinclair MD [address] OFFICE-ANNEX [phone] [id number] Follow-up Instructions Return in about 3 months (around 5/6/2018), or if symptoms worsen or fail to improve. Your Appointments 4/24/2018  9:00 AM  
ESTABLISHED PATIENT with MD Lonny Vargas [facility]) Appt Note: 3 MO. F/U FOLLOWING ECHO  
 [address] [facility] [address] 1400 [address] [place] [address] [address]  
  
    
 5/7/2018  8:30 AM  
ROUTINE CARE with Azalia Sinclair MD  
[facility] Appt Note: 3 mo f/u  
 [address] [facility] [address]  
[phone] [phone] Upcoming Health Maintenance Date Due  
 GLAUCOMA SCREENING Q2Y 12/4/2017 LIPID PANEL Q1 3/9/2018 HEMOGLOBIN A1C Q6M 3/11/2018 MICROALBUMIN Q1 9/11/2018 EYE EXAM RETINAL OR DILATED Q1 9/11/2018 MEDICARE YEARLY EXAM 9/12/2018 FOOT EXAM Q1 9/29/2018 DTaP/Tdap/Td series (2 - Td) 11/30/2025 Allergies as of 2/6/2018  Review Complete On: 2/6/2018 By: Azalia Sinclair MD  
  
 Severity Noted Reaction Type Reactions Lisinopril  08/17/2011    Cough Current Immunizations  Reviewed on 11/10/2016 Name Date Influenza High Dose Vaccine PF 8/17/2017, 10/31/2016, 11/1/2015 Influenza Vaccine Whole 10/22/2011 Pneumococcal Polysaccharide (PPSV-23) 8/15/2014 Tdap 11/30/2015 ZZZ-RETIRED (DO NOT USE) Pneumococcal Vaccine (Unspecified Type) 1/15/1996 Zoster Vaccine, Live 11/11/2016 Not reviewed this visit You Were Diagnosed With   
  
 Codes Comments Arthritis    -  Primary ICD-10-CM: M19.90 ICD-9-CM: 716.90 Chronic pain of both shoulders     ICD-10-CM: M25.511, G89.29, M25.512 ICD-9-CM: 719.41, 338.29 Chronic left-sided thoracic back pain     ICD-10-CM: M54.6, G89.29 ICD-9-CM: 724.1, 338.29 Chronic midline low back pain without sciatica     ICD-10-CM: M54.5, G89.29 ICD-9-CM: 724.2, 338.29 Chronic pain of both knees     ICD-10-CM: M25.561, M25.562, G89.29 ICD-9-CM: 719.46, 338.29 Vitals BP Pulse Temp Resp Height(growth percentile) Weight(growth percentile) 140/70 (BP 1 Location: Left arm, BP Patient Position: At rest) (!) 53 97.8 °F (36.6 °C) (Oral) 14 5' 6\" (1.676 m) 223 lb (101.2 kg) SpO2 BMI OB Status Smoking Status 98% 35.99 kg/m2 Hysterectomy Never Smoker Vitals History BMI and BSA Data Body Mass Index Body Surface Area 35.99 kg/m 2 2.17 m 2 Preferred Pharmacy Pharmacy Name Phone [facility] [address], [address] [phone] Your Updated Medication List  
  
   
This list is accurate as of: 2/6/18 10:26 AM.  Always use your most recent med list.  
  
  
  
  
 acetaminophen 500 mg tablet Commonly known as:  TYLENOL Take  by mouth every six (6) hours as needed for Pain. aspirin delayed-release 81 mg tablet Take 1 Tab by mouth daily. atenolol 50 mg tablet Commonly known as:  TENORMIN Take 1 Tab by mouth daily. bumetanide 1 mg tablet Commonly known as:  Delynn Mering Take 1 Tab by mouth daily. calcium-cholecalciferol (D3) tablet Commonly known as:  CALTRATE 600+D Take 1 Tab by mouth two (2) times a day. COMPRESSION STOCKINGS  
daily. losartan 100 mg tablet Commonly known as:  COZAAR Take 1 Tab by mouth daily. NIFEdipine ER 30 mg ER tablet Commonly known as:  NIFEDICAL XL Take 1 Tab by mouth daily. nitroglycerin 0.4 mg SL tablet Commonly known as:  NITROSTAT PLACE 1 TABLET UNDER THE TONGUE EVERY 5 MINS AS NEEDED FOR CHEST PAIN  
  
 * oxyCODONE-acetaminophen 5-325 mg per tablet Commonly known as:  PERCOCET Take 2 Tabs by mouth two (2) times daily as needed for Pain. Max Daily Amount: 4 Tabs. * oxyCODONE-acetaminophen 5-325 mg per tablet Commonly known as:  PERCOCET Take 1 Tab by mouth two (2) times daily as needed for Pain. Max Daily Amount: 2 Tabs. Start taking on:  3/6/2018 potassium chloride 10 mEq tablet Commonly known as:  KLOR-CON Take 2 Tabs by mouth daily. pravastatin 40 mg tablet Commonly known as:  PRAVACHOL  
take 1 tablet by mouth NIGHTLY * Notice: This list has 2 medication(s) that are the same as other medications prescribed for you. Read the directions carefully, and ask your doctor or other care provider to review them with you. Prescriptions Printed Refills  
 oxyCODONE-acetaminophen (PERCOCET) 5-325 mg per tablet 0 Sig: Take 2 Tabs by mouth two (2) times daily as needed for Pain. Max Daily Amount: 4 Tabs. Class: Print Route: Oral  
 oxyCODONE-acetaminophen (PERCOCET) 5-325 mg per tablet 0 Starting on: 3/6/2018 Sig: Take 1 Tab by mouth two (2) times daily as needed for Pain. Max Daily Amount: 2 Tabs. Class: Print Route: Oral  
  
We Performed the Following PAIN MGMT PANEL W/REFL, UR [[id number] Custom] REFERRAL TO ORTHOPEDICS [TPH528 Custom] Comments: Many years of multiple joint pain REFERRAL TO PHYSICAL THERAPY [VEV28 Custom] Comments: Many years of entire spine bilateral shoulder bilateral knee pain Follow-up Instructions Return in about 3 months (around 5/6/2018), or if symptoms worsen or fail to improve. Referral Information Referral ID Referred By Referred To  
  
 [id number] Chuckie Scott Not Available Visits Status Start Date End Date 1 New Request 2/6/18 2/6/19 If your referral has a status of pending review or denied, additional information will be sent to support the outcome of this decision. Referral ID Referred By Referred To  
 [id number] SHARDA HILL  
   [facility] [address] [place], [address] [facility] Phone: [phone] Visits Status Start Date End Date 1 New Request 2/6/18 2/6/19 If your referral has a status of pending review or denied, additional information will be sent to support the outcome of this decision. Patient Instructions Shoulder Pain: Care Instructions Your Care Instructions You can hurt your shoulder by using it too much during an activity, such as fishing or baseball. It can also happen as part of the everyday wear and tear of getting older. Shoulder injuries can be slow to heal, but your shoulder should get better with time. Your doctor may recommend a sling to rest your shoulder. If you have injured your shoulder, you may need testing and treatment. Follow-up care is a key part of your treatment and safety. Be sure to make and go to all appointments, and call your doctor if you are having problems. It's also a good idea to know your test results and keep a list of the medicines you take. How can you care for yourself at home? · Take pain medicines exactly as directed. ¨ If the doctor gave you a prescription medicine for pain, take it as prescribed. ¨ If you are not taking a prescription pain medicine, ask your doctor if you can take an over-the-counter medicine. ¨ Do not take two or more pain medicines at the same time unless the doctor told you to. Many pain medicines contain acetaminophen, which is Tylenol. Too much acetaminophen (Tylenol) can be harmful. · If your doctor recommends that you wear a sling, use it as directed. Do not take it off before your doctor tells you to. · Put ice or a cold pack on the sore area for 10 to 20 minutes at a time. Put a thin cloth between the ice and your skin. · If there is no swelling, you can put moist heat, a heating pad, or a warm cloth on your shoulder. Some doctors suggest alternating between hot and cold. · Rest your shoulder for a few days. If your doctor recommends it, you can then begin gentle exercise of the shoulder, but do not lift anything heavy. When should you call for help? Call 911 anytime you think you may need emergency care. For example, call if: 
? · You have chest pain or pressure. This may occur with: ¨ Sweating. ¨ Shortness of breath. ¨ Nausea or vomiting. ¨ Pain that spreads from the chest to the neck, jaw, or one or both shoulders or arms. ¨ Dizziness or lightheadedness. ¨ A fast or uneven pulse. After calling 911, chew 1 adult-strength aspirin. Wait for an ambulance. Do not try to drive yourself. ? · Your arm or hand is cool or pale or changes color. ?Call your doctor now or seek immediate medical care if: 
? · You have signs of infection, such as: 
¨ Increased pain, swelling, warmth, or redness in your shoulder. ¨ Red streaks leading from a place on your shoulder. ¨ Pus draining from an area of your shoulder. ¨ Swollen lymph nodes in your neck, armpits, or groin. ¨ A fever. ? Watch closely for changes in your health, and be sure to contact your doctor if: 
? · You cannot use your shoulder. ? · Your shoulder does not get better as expected. Where can you learn more? Go to http://brianne-ger.info/. Enter T651 in the search box to learn more about \"Shoulder Pain: Care Instructions. \" Current as of: March 21, 2017 Content Version: 11.4 © 9303-5299 Pop Up Archive. Care instructions adapted under license by CausePlay (which disclaims liability or warranty for this information).  If you have questions about a medical condition or this instruction, always ask your healthcare professional. [place] [address] any warranty or liability for your use of this information. Learning About Relief for Back Pain What is back tension and strain? Back strain happens when you overstretch, or pull, a muscle in your back. You may hurt your back in an accident or when you exercise or lift something. Most back pain will get better with rest and time. You can take care of yourself at home to help your back heal. 
What can you do first to relieve back pain? When you first feel back pain, try these steps: 
· Walk. Take a short walk (10 to 20 minutes) on a level surface (no slopes, hills, or stairs) every 2 to 3 hours. Walk only distances you can manage without pain, especially leg pain. · Relax. Find a comfortable position for rest. Some people are comfortable on the floor or a medium-firm bed with a small pillow under their head and another under their knees. Some people prefer to lie on their side with a pillow between their knees. Don't stay in one position for too long. · Try heat or ice. Try using a heating pad on a low or medium setting, or take a warm shower, for 15 to 20 minutes every 2 to 3 hours. Or you can buy single-use heat wraps that last up to 8 hours. You can also try an ice pack for 10 to 15 minutes every 2 to 3 hours. You can use an ice pack or a bag of frozen vegetables wrapped in a thin towel. There is not strong evidence that either heat or ice will help, but you can try them to see if they help. You may also want to try switching between heat and cold. · Take pain medicine exactly as directed. ¨ If the doctor gave you a prescription medicine for pain, take it as prescribed. ¨ If you are not taking a prescription pain medicine, ask your doctor if you can take an over-the-counter medicine. What else can you do? · Stretch and exercise.  Exercises that increase flexibility may relieve your pain and make it easier for your muscles to keep your spine in a good, neutral position. And don't forget to keep walking. · Do self-massage. You can use self-massage to unwind after work or school or to energize yourself in the morning. You can easily massage your feet, hands, or neck. Self-massage works best if you are in comfortable clothes and are sitting or lying in a comfortable position. Use oil or lotion to massage bare skin. · Reduce stress. Back pain can lead to a vicious [place]: Distress about the pain tenses the muscles in your back, which in turn causes more pain. Learn how to relax your mind and your muscles to lower your stress. Where can you learn more? Go to http://brianne-ger.info/. Enter D813 in the search box to learn more about \"Learning About Relief for Back Pain. \" Current as of: March 21, 2017 Content Version: 11.4 © 4168-8093 EMRes Technologies. Care instructions adapted under license by eGym (which disclaims liability or warranty for this information). If you have questions about a medical condition or this instruction, always ask your healthcare professional. [address] any warranty or liability for your use of this information. Introducing [facility]! [facility] introduces Liftago patient portal. Now you can access parts of your medical record, email your doctor's office, and request medication refills online. 1. In your internet browser, go to [email or web address] 2. Click on the First Time User? Click Here link in the Sign In box. You will see the New Member Sign Up page. 3. Enter your Liftago Access Code exactly as it appears below. You will not need to use this code after youve completed the sign-up process. If you do not sign up before the expiration date, you must request a new code. · Liftago Access Code: [email or web address] Expires: 3/5/2018  9:49 AM 
 
 4. Enter the last four digits of your Social Security Number (xxxx) and Date of Birth (mm/dd/yyyy) as indicated and click Submit. You will be taken to the next sign-up page. 5. Create a SSN Logistics ID. This will be your SSN Logistics login ID and cannot be changed, so think of one that is secure and easy to remember. 6. Create a SSN Logistics password. You can change your password at any time. 7. Enter your Password Reset Question and Answer. This can be used at a later time if you forget your password. 8. Enter your e-mail address. You will receive e-mail notification when new information is available in [address] 9. Click Sign Up. You can now view and download portions of your medical record. 10. Click the Download Summary menu link to download a portable copy of your medical information. If you have questions, please visit the Frequently Asked Questions section of the SSN Logistics website. Remember, SSN Logistics is NOT to be used for urgent needs. For medical emergencies, dial 911. Now available from your iPhone and Android! Please provide this summary of care documentation to your next provider. Your primary care clinician is listed as Devante Jay. If you have any questions after today's visit, please call [phone].

## 2018-02-06 NOTE — PATIENT INSTRUCTIONS
Shoulder Pain: Care Instructions  Your Care Instructions    You can hurt your shoulder by using it too much during an activity, such as fishing or baseball. It can also happen as part of the everyday wear and tear of getting older. Shoulder injuries can be slow to heal, but your shoulder should get better with time. Your doctor may recommend a sling to rest your shoulder. If you have injured your shoulder, you may need testing and treatment. Follow-up care is a key part of your treatment and safety. Be sure to make and go to all appointments, and call your doctor if you are having problems. It's also a good idea to know your test results and keep a list of the medicines you take. How can you care for yourself at home? · Take pain medicines exactly as directed. ¨ If the doctor gave you a prescription medicine for pain, take it as prescribed. ¨ If you are not taking a prescription pain medicine, ask your doctor if you can take an over-the-counter medicine. ¨ Do not take two or more pain medicines at the same time unless the doctor told you to. Many pain medicines contain acetaminophen, which is Tylenol. Too much acetaminophen (Tylenol) can be harmful. · If your doctor recommends that you wear a sling, use it as directed. Do not take it off before your doctor tells you to. · Put ice or a cold pack on the sore area for 10 to 20 minutes at a time. Put a thin cloth between the ice and your skin. · If there is no swelling, you can put moist heat, a heating pad, or a warm cloth on your shoulder. Some doctors suggest alternating between hot and cold. · Rest your shoulder for a few days. If your doctor recommends it, you can then begin gentle exercise of the shoulder, but do not lift anything heavy. When should you call for help? Call 911 anytime you think you may need emergency care. For example, call if:  ? · You have chest pain or pressure. This may occur with:  ¨ Sweating.   ¨ Shortness of breath. ¨ Nausea or vomiting. ¨ Pain that spreads from the chest to the neck, jaw, or one or both shoulders or arms. ¨ Dizziness or lightheadedness. ¨ A fast or uneven pulse. After calling 911, chew 1 adult-strength aspirin. Wait for an ambulance. Do not try to drive yourself. ? · Your arm or hand is cool or pale or changes color. ?Call your doctor now or seek immediate medical care if:  ? · You have signs of infection, such as:  ¨ Increased pain, swelling, warmth, or redness in your shoulder. ¨ Red streaks leading from a place on your shoulder. ¨ Pus draining from an area of your shoulder. ¨ Swollen lymph nodes in your neck, armpits, or groin. ¨ A fever. ? Watch closely for changes in your health, and be sure to contact your doctor if:  ? · You cannot use your shoulder. ? · Your shoulder does not get better as expected. Where can you learn more? Go to http://brianne-ger.info/. Enter Z360 in the search box to learn more about \"Shoulder Pain: Care Instructions. \"  Current as of: March 21, 2017  Content Version: 11.4  © 0209-0262 [employer]bek [employer]. Care instructions adapted under license by Snapfish (which disclaims liability or warranty for this information). If you have questions about a medical condition or this instruction, always ask your healthcare professional. [place] [address] any warranty or liability for your use of this information. Learning About Relief for Back Pain  What is back tension and strain? Back strain happens when you overstretch, or pull, a muscle in your back. You may hurt your back in an accident or when you exercise or lift something. Most back pain will get better with rest and time. You can take care of yourself at home to help your back heal.  What can you do first to relieve back pain? When you first feel back pain, try these steps:  · Walk.  Take a short walk (10 to 20 minutes) on a level surface (no slopes, hills, or stairs) every 2 to 3 hours. Walk only distances you can manage without pain, especially leg pain. · Relax. Find a comfortable position for rest. Some people are comfortable on the floor or a medium-firm bed with a small pillow under their head and another under their knees. Some people prefer to lie on their side with a pillow between their knees. Don't stay in one position for too long. · Try heat or ice. Try using a heating pad on a low or medium setting, or take a warm shower, for 15 to 20 minutes every 2 to 3 hours. Or you can buy single-use heat wraps that last up to 8 hours. You can also try an ice pack for 10 to 15 minutes every 2 to 3 hours. You can use an ice pack or a bag of frozen vegetables wrapped in a thin towel. There is not strong evidence that either heat or ice will help, but you can try them to see if they help. You may also want to try switching between heat and cold. · Take pain medicine exactly as directed. ¨ If the doctor gave you a prescription medicine for pain, take it as prescribed. ¨ If you are not taking a prescription pain medicine, ask your doctor if you can take an over-the-counter medicine. What else can you do? · Stretch and exercise. Exercises that increase flexibility may relieve your pain and make it easier for your muscles to keep your spine in a good, neutral position. And don't forget to keep walking. · Do self-massage. You can use self-massage to unwind after work or school or to energize yourself in the morning. You can easily massage your feet, hands, or neck. Self-massage works best if you are in comfortable clothes and are sitting or lying in a comfortable position. Use oil or lotion to massage bare skin. · Reduce stress. Back pain can lead to a vicious [place]: Distress about the pain tenses the muscles in your back, which in turn causes more pain. Learn how to relax your mind and your muscles to lower your stress. Where can you learn more?   Go to http://brianne-ger.info/. Enter A856 in the search box to learn more about \"Learning About Relief for Back Pain. \"  Current as of: March 21, 2017  Content Version: 11.4  © 6662-1570 Healthwise, Incorporated. Care instructions adapted under license by [employer] (which disclaims liability or warranty for this information). If you have questions about a medical condition or this instruction, always ask your healthcare professional. [address] any warranty or liability for your use of this information.

## 2018-02-06 NOTE — PROGRESS NOTES
Name and  verified        Chief Complaint   Patient presents with    Medication Refill    Pain (Chronic)         Health Maintenance reviewed-discussed with patient. 1. Have you been to the ER, urgent care clinic since your last visit? Hospitalized since your last visit? No    2. Have you seen or consulted any other health care providers outside of the [address] [place] [address] since your last visit? Include any pap smears or colon screening. No    Order placed for Urine Drug Screen, per Verbal Order from Dr. Bautista Reynolds on 2018 due to chronic pain.

## 2018-02-07 DIAGNOSIS — R60.0 EDEMA LEG: ICD-10-CM

## 2018-02-07 DIAGNOSIS — I10 ESSENTIAL HYPERTENSION, BENIGN: Primary | ICD-10-CM

## 2018-02-07 DIAGNOSIS — I25.10 CORONARY ARTERY DISEASE INVOLVING NATIVE CORONARY ARTERY OF NATIVE HEART WITHOUT ANGINA PECTORIS: ICD-10-CM

## 2018-02-11 LAB
AMPHETAMINES UR QL SCN: NEGATIVE NG/ML
BARBITURATES UR QL SCN: NEGATIVE NG/ML
BENZODIAZ UR QL SCN: NEGATIVE NG/ML
BZE UR QL SCN: NEGATIVE NG/ML
CANNABINOIDS UR QL SCN: NEGATIVE NG/ML
CREAT UR-MCNC: 81.9 MG/DL (ref 20–300)
FENTANYL+NORFENTANYL UR QL SCN: NEGATIVE PG/ML
MEPERIDINE UR QL: NEGATIVE NG/ML
METHADONE UR QL SCN: NEGATIVE NG/ML
OPIATES UR QL SCN: NEGATIVE NG/ML
OXYCODONE+OXYMORPHONE UR QL SCN: POSITIVE
PCP UR QL: NEGATIVE NG/ML
PH UR: 6.2 [PH] (ref 4.5–8.9)
PLEASE NOTE:, 733157: ABNORMAL
PROPOXYPH UR QL SCN: NEGATIVE NG/ML
SP GR UR: 1.01
TRAMADOL UR QL SCN: NEGATIVE NG/ML

## 2018-02-16 ENCOUNTER — HOSPITAL ENCOUNTER (OUTPATIENT)
Dept: NON INVASIVE DIAGNOSTICS | Age: 83
Discharge: HOME | End: 2018-02-16
Attending: PHYSICIAN ASSISTANT
Payer: MEDICARE

## 2018-02-16 DIAGNOSIS — I10 ESSENTIAL HYPERTENSION, BENIGN: ICD-10-CM

## 2018-02-16 DIAGNOSIS — R60.0 EDEMA LEG: ICD-10-CM

## 2018-02-16 DIAGNOSIS — I25.10 CORONARY ARTERY DISEASE INVOLVING NATIVE CORONARY ARTERY OF NATIVE HEART WITHOUT ANGINA PECTORIS: ICD-10-CM

## 2018-02-16 PROCEDURE — 93306 TTE W/DOPPLER COMPLETE: CPT

## 2018-03-14 NOTE — MR AVS SNAPSHOT
Visit Information Date & Time Provider Department Dept. Phone Encounter #  
 9/11/2017  8:30 AM Syed Pagan MD [facility] [phone] [id number] Follow-up Instructions Return in about 6 months (around 3/11/2018), or if symptoms worsen or fail to improve. Your Appointments 1/23/2018  9:15 AM  
ESTABLISHED PATIENT with Jazzy De Leon MD  
[facility] ([place], [place])) Appt Note: 6 MO. F/U  
 [address]  
[phone] [address] Upcoming Health Maintenance Date Due ZOSTER VACCINE AGE 60> 11/14/1992 MEDICARE YEARLY EXAM 11/30/2016 EYE EXAM RETINAL OR DILATED Q1 12/4/2016 FOOT EXAM Q1 6/15/2017 MICROALBUMIN Q1 6/15/2017 HEMOGLOBIN A1C Q6M 9/9/2017 GLAUCOMA SCREENING Q2Y 12/4/2017 LIPID PANEL Q1 3/9/2018 DTaP/Tdap/Td series (2 - Td) 11/30/2025 Allergies as of 9/11/2017  Review Complete On: 7/18/2017 By: Jazzy De Leon MD  
  
 Severity Noted Reaction Type Reactions Lisinopril  08/17/2011    Cough Current Immunizations  Reviewed on 11/10/2016 Name Date Influenza High Dose Vaccine PF 8/17/2017, 10/31/2016, 11/1/2015 Influenza Vaccine Whole 10/22/2011 Pneumococcal Polysaccharide (PPSV-23) 8/15/2014 Tdap 11/30/2015 ZZZ-RETIRED (DO NOT USE) Pneumococcal Vaccine (Unspecified Type) 1/15/1996 Zoster Vaccine, Live 11/11/2016 Not reviewed this visit You Were Diagnosed With   
  
 Codes Comments Diabetes mellitus without complication ([facility] [address]    -  Primary ICD-10-CM: E11.9 ICD-9-CM: 250.00 Screening for alcoholism     ICD-10-CM: Z13.89 ICD-9-CM: V79.1 Screening for depression     ICD-10-CM: Z13.89 ICD-9-CM: V79.0 Primary osteoarthritis involving multiple joints     ICD-10-CM: M15.0 ICD-9-CM: 715.09   
 Chronic pain of both shoulders     ICD-10-CM: M25.512, G89.29, M25.511 ICD-9-CM: 719.41, 338.29 Chronic low back pain with sciatica, sciatica laterality unspecified, unspecified back pain laterality     ICD-10-CM: M54.40, G89.29 ICD-9-CM: 724.2, 724.3, 338.29 Arthritis     ICD-10-CM: M19.90 ICD-9-CM: 716.90 Chronic left-sided thoracic back pain     ICD-10-CM: M54.6, G89.29 ICD-9-CM: 724.1, 338.29 Chronic midline low back pain without sciatica     ICD-10-CM: M54.5, G89.29 ICD-9-CM: 724.2, 338.29 Vitals BP Pulse Temp Resp Height(growth percentile) Weight(growth percentile) 146/63 (BP 1 Location: Left arm, BP Patient Position: At rest) 98 96.9 °F (36.1 °C) (Oral) 14 5' 6\" (1.676 m) 224 lb 6.4 oz (101.8 kg) BMI OB Status Smoking Status 36.22 kg/m2 Hysterectomy Never Smoker Vitals History BMI and BSA Data Body Mass Index Body Surface Area  
 36.22 kg/m 2 2.18 m 2 Preferred Pharmacy Pharmacy Name Phone [phone] [place] [phone] Your Updated Medication List  
  
   
This list is accurate as of: 9/11/17  9:45 AM.  Always use your most recent med list.  
  
  
  
  
 aspirin delayed-release 81 mg tablet Take 1 Tab by mouth daily. atenolol 50 mg tablet Commonly known as:  TENORMIN Take 1 Tab by mouth daily. bumetanide 1 mg tablet Commonly known as:  Bernell Rumble Take 2 Tabs by mouth daily. Cholecalciferol (Vitamin D3) 50,000 unit Cap Take  by mouth every seven (7) days. COMPRESSION STOCKINGS  
daily. ergocalciferol 50,000 unit capsule Commonly known as:  ERGOCALCIFEROL Take 1 Cap by mouth every seven (7) days. fenofibrate nanocrystallized 48 mg tablet Commonly known as:  TRICOR  
take 1 tablet by mouth once daily FLUZONE HIGH-DOSE 2017-18 (PF) Syrg injection Generic drug:  influenza vaccine 2017-18 (65 yrs+)(PF)  
 inject 0.5 milliliter intramuscularly  
  
 losartan 100 mg tablet Commonly known as:  COZAAR Take 1 Tab by mouth daily. NIFEdipine ER 30 mg ER tablet Commonly known as:  NIFEDICAL XL Take 1 Tab by mouth daily. nitroglycerin 0.4 mg SL tablet Commonly known as:  NITROSTAT  
1 Tab by SubLINGual route every five (5) minutes as needed for Chest Pain. oxyCODONE-acetaminophen 5-325 mg per tablet Commonly known as:  PERCOCET Take 2 Tabs by mouth every eight (8) hours as needed for Pain.  
  
 potassium chloride 10 mEq tablet Commonly known as:  KLOR-CON Take 2 Tabs by mouth daily. pravastatin 40 mg tablet Commonly known as:  PRAVACHOL  
take 1 tablet by mouth NIGHTLY WELCHOL 3.75 gram Pwpk powder packet Generic drug:  colesevelam  
USE 1 PACKET BY MOUTH ONCE DAILY Prescriptions Printed Refills  
 oxyCODONE-acetaminophen (PERCOCET) 5-325 mg per tablet 0 Sig: Take 2 Tabs by mouth every eight (8) hours as needed for Pain. Class: Print Route: Oral  
  
We Performed the Following AMB POC FUNDUS PHOTOGRAPHY WITH INTERP AND REPORT [84468 CPT(R)] AMB POC HEMOGLOBIN A1C [78029 CPT(R)] AMB POC URINALYSIS DIP STICK AUTO W/O MICRO [73110 CPT(R)] CBC W/O DIFF [60147 CPT(R)] [address] [[id number] [facility]] METABOLIC PANEL, COMPREHENSIVE [71273 CPT(R)] MICROALBUMIN, UR, RAND W/ MICROALBUMIN/CREA RATIO [id number] CPT(R)] PAIN MGMT PANEL W/REFL, UR [[id number] Custom] DE ANNUAL ALCOHOL SCREEN 15 MIN [id number] [facility]] REFERRAL TO PHYSICAL THERAPY [OCZ41 Custom] REFERRAL TO PODIATRY [REF90 Custom] Comments:  
 Please evaluate patient for dm. Follow-up Instructions Return in about 6 months (around 3/11/2018), or if symptoms worsen or fail to improve. Referral Information Referral ID Referred By Referred To  
  
 [id number] Magdaleno HILL [facility], [address] [place] [address] [facility], [address] Phone: [phone] Visits Status Start Date End Date 1 New Request 9/11/17 9/11/18 If your referral has a status of pending review or denied, additional information will be sent to support the outcome of this decision. Referral ID Referred By Referred To  
 [id number] Rebecca Parekh Not Available Visits Status Start Date End Date 1 New Request 9/11/17 9/11/18 If your referral has a status of pending review or denied, additional information will be sent to support the outcome of this decision. Patient Instructions Medicare Wellness Visit, Female The best way to live healthy is to have a healthy lifestyle by eating a well-balanced diet, exercising regularly, limiting alcohol and stopping smoking. Regular physical exams and screening tests are another way to keep healthy. Preventive exams provided by your health care provider can find health problems before they become diseases or illnesses. Preventive services including immunizations, screening tests, monitoring and exams can help you take care of your own health. All people over age 72 should have a pneumovax  and and a prevnar shot to prevent pneumonia. These are once in a lifetime unless you and your provider decide differently. All people over 65 should have a yearly flu shot and a tetanus vaccine every 10 years. A bone mass density to screen for osteoporosis or thinning of the bones should be done every 2 years after 65. Screening for diabetes mellitus with a blood sugar test should be done every year. Glaucoma is a disease of the eye due to increased ocular pressure that can lead to blindness and it should be done every year by an eye professional. 
 
Cardiovascular screening tests that check for elevated lipids (fatty part of blood) which can lead to heart disease and strokes should be done every 5 years.  
 
Colorectal screening that evaluates for blood or polyps in your colon should be done yearly as a stool test or every five years as a flexible sigmoidoscope or every 10 years as a colonoscopy up to age 76. Breast cancer screening with a mammogram is recommended biennially  for women age 54-69. Screening for cervical cancer with a pap smear and pelvic exam is recommended for women after age 72 years every 2 years up to age 79 or when the provider and patient decide to stop. If there is a history of cervical abnormalities or other increased risk for cancer then the test is recommended yearly. Hepatitis C screening is also recommended for anyone born between 80 through [address]. A shingles vaccine is also recommended once in a lifetime after age 61. Your Medicare Wellness Exam is recommended annually. Here is a list of your current Health Maintenance items with a due date: 
Health Maintenance Due Topic Date Due  Shingles Vaccine  11[birth year]14/1992 Pati Woodard Annual Well Visit  11/30/2016 Pati Woodard Eye Exam  12/04/2016 Pati Woodard Diabetic Foot Care  06/15/2017  Albumin Urine Test  06/15/2017  Hemoglobin A1C    09/09/2017 Kidney Disease and Diabetes: Care Instructions Your Care Instructions When you have diabetes, your body cannot make enough insulin or use it the way it should. Your body needs insulin to help sugar move from the blood to the cells. Without it, your blood sugar gets too high. High blood sugar damages your kidneys and makes it hard for them to filter blood. This causes fluid and waste to build up in your blood. If you have diabetes, it is very important to keep your blood sugar in your target range. There are many steps you can take to do this. If you can control your blood sugar, you will have the best chance to slow or stop damage to your kidneys. Follow-up care is a key part of your treatment and safety.  Be sure to make and go to all appointments, and call your doctor if you are having problems. It's also a good idea to know your test results and keep a list of the medicines you take. How can you care for yourself at home? To control your diabetes and slow or stop damage to your kidneys · Keep your blood sugar in your target range. The American Diabetes Association recommends a hemoglobin A1c (Hb A1c) target level of less than 7%. Talk to your doctor about your target. The lower your A1c, the better your chance of stopping kidney damage. · Keep your blood pressure in your target range. Doctors recommend specific types of blood pressure medicines for people who have diabetes and kidney disease. Examples include ACE inhibitors and angiotensin II receptor blockers (ARBs). Your doctor may have you take one of these even if you don't have high blood pressure. · Take all of your medicines. You may have several. For example, you may take medicines for diabetes, cholesterol, and blood pressure. It's very important to take all of them just as your doctor tells you to and to keep taking them. · Make good food choices. Follow an eating plan that is best for your diabetes and your kidneys. You may want to work with a dietitian to make a plan. A good plan will make sure that you spread carbohydrate throughout the day. It will also make sure that you get the right amount of salt (sodium), fluids, and protein. · Stay at a healthy weight. If you need help to lose weight, talk to your doctor or dietitian. Even small changes can make a difference. Try to be aware of your portion sizes, eat more fruits and vegetables, and add some activity to your daily routine. · Exercise. Get at least 30 minutes of activity on most days of the week. Walking is a great exercise that most people can do. Being more active can help you control your blood sugar and stay at a healthy weight. It also can help you lower cholesterol and blood pressure. To improve your kidney health · Lower your cholesterol. Keep your LDL less than 100 mg/dL and HDL levels more than 40 mg/dL for men and 50 mg/dL for women. If you have high cholesterol, your doctor may prescribe medicine. He or she may also tell you to eat less saturated fat. · Follow your treatment plan. Check your blood sugar as many times a day as your doctor recommends. Go to all of your follow-up appointments, and be sure to have all the tests your doctor orders. Call your doctor if you think you are having a problem with your medicines. · Take a low-dose aspirin every day if your doctor suggests it. Most doctors believe this can reduce the risk of heart disease and stroke. Your risk of these diseases is much greater than your risk of kidney failure. · Avoid tobacco. Do not smoke or use other tobacco products. If you need help quitting, talk to your doctor about stop-smoking programs and medicines. These can increase your chances of quitting for good. When should you call for help? Call 911 anytime you think you may need emergency care. For example, call if: 
· You passed out (lost consciousness). Call your doctor now or seek immediate medical care if: 
· You have high blood sugar that stays above the desired range after you take steps to lower blood sugar. · Your breath smells fruity. · You are confused or have trouble thinking clearly. · You feel weaker or more tired than usual. 
· You are very thirsty, lightheaded, or dizzy. · You have nausea and vomiting. Watch closely for changes in your health, and be sure to contact your doctor if: 
· You have new or worse swelling of your arms or feet. Where can you learn more? Go to http://brianne-ger.info/. Enter C726 in the search box to learn more about \"Kidney Disease and Diabetes: Care Instructions. \" Current as of: April 3, 2017 Content Version: 11.3 © 9890-0722 [employer].  Care instructions adapted under license by [address] (which disclaims liability or warranty for this information). If you have questions about a medical condition or this instruction, always ask your healthcare professional. [address] any warranty or liability for your use of this information. Statins: Care Instructions Your Care Instructions Statins are medicines that lower your cholesterol and your risk for a heart attack and stroke. Cholesterol is a type of fat in your blood. If you have too much cholesterol, it can build up in blood vessels. This raises your risk of heart disease, heart attack, and stroke. Statins lower cholesterol by blocking how much cholesterol your body makes. This prevents cholesterol from building up in your blood vessels. This is called hardening of the arteries. It is the starting point for some heart and blood flow problems, such as heart disease. Statins may also reduce inflammation around the buildup (called plaque). This can lower the risk that the plaque will break apart and lead to a heart attack or stroke. A heart-healthy lifestyle is important for lowering your risk whether you take statins or not. This includes eating healthy foods, being active, staying at a healthy weight, and not smoking. You must take statins regularly for them to work well. If you stop, your cholesterol and your risk will go back up. Examples of statins include: · Atorvastatin (Lipitor). · Lovastatin (Mevacor). · Pravastatin (Pravachol). · Simvastatin (Zocor). Statins interact with many medicines. So tell your doctor all of the other medicines that you take. These include prescription medicines, over-the-counter medicines, dietary supplements, and herbal products. Follow-up care is a key part of your treatment and safety. Be sure to make and go to all appointments, and call your doctor if you are having problems.  It's also a good idea to know your test results and keep a list Airway patent, Nasal mucosa clear. Mouth with normal mucosa. Throat has no vesicles, no oropharyngeal exudates and uvula is midline. of the medicines you take. How can you care for yourself at home? · Take statins exactly as your doctor tells you. High cholesterol has no symptoms. So it is easy to forget to take the pills. Try to make a system that reminds you to take them. · Do not take two or more medicines at the same time unless the doctor told you to. Statins can interact with other medicines. · Always tell your doctor if you think you are having a side effect. If side effects are a problem with one medicine, a different one may be used. · Keep making the lifestyle changes your doctor suggests. Eat heart-healthy foods, be active, don't smoke, and stay at a healthy weight. · Talk to your doctor about avoiding grapefruit juice if you take statins. Grapefruit juice can raise the level of this medicine in your blood. This could increase side effects. When should you call for help? Watch closely for changes in your health, and be sure to contact your doctor if: 
· You have side effects of statins. These include: ¨ Fatigue. ¨ Upset stomach. ¨ Gas. ¨ Constipation. ¨ Pain or cramps in the belly. ¨ Muscle aches. · You have any new symptoms or side effects. Where can you learn more? Go to http://brianne-ger.info/. Enter R358 in the search box to learn more about \"Statins: Care Instructions. \" Current as of: April 3, 2017 Content Version: 11.3 © 9531-5935 [employer]. Care instructions adapted under license by [employer] (which disclaims liability or warranty for this information). If you have questions about a medical condition or this instruction, always ask your healthcare professional. [place] [address] any warranty or liability for your use of this information. Shoulder Stretches: Exercises Your Care Instructions Here are some examples of exercises for your shoulder. Start each exercise slowly. Ease off the exercise if you start to have pain. Your doctor or physical therapist will tell you when you can start these exercises and which ones will work best for you. How to do the exercises Note: These exercises should cause you to feel a gentle stretch, but no pain. Shoulder stretch 1.  a doorway and place one arm against the door frame. Your elbow should be a little higher than your shoulder. 2. Relax your shoulders as you lean forward, allowing your chest and shoulder muscles to stretch. You can also turn your body slightly away from your arm to stretch the muscles even more. 3. Hold for 15 to 30 seconds. 4. Repeat 2 to 4 times with each arm. Shoulder and chest stretch Shoulder and chest stretch 1. While sitting, relax your upper body so you slump slightly in your chair. 2. As you breathe in, straighten your back and open your arms out to the sides. 3. Gently pull your shoulder blades back and downward. 4. Hold for 15 to 30 seconds as your breathe normally. 5. Repeat 2 to 4 times. Overhead stretch 1. Reach up over your head with both arms. 2. Hold for 15 to 30 seconds. 3. Repeat 2 to 4 times. Follow-up care is a key part of your treatment and safety. Be sure to make and go to all appointments, and call your doctor if you are having problems. It's also a good idea to know your test results and keep a list of the medicines you take. Where can you learn more? Go to http://brianne-ger.info/. Enter S254 in the search box to learn more about \"Shoulder Stretches: Exercises. \" Current as of: March 21, 2017 Content Version: 11.3 © 5264-0719 [employer]. Care instructions adapted under license by [employer] (which disclaims liability or warranty for this information). If you have questions about a medical condition or this instruction, always ask your healthcare professional. [address] any warranty or liability for your use of this information. Rotator Cuff Rehabilitation What is rotator cuff rehabilitation? Rotator cuff rehabilitation is a series of exercises you do after your surgery. It helps you get back your shoulder's range of motion and strength. You will work with your doctor and physical therapist to plan this exercise program. To get the best results, you need to do the exercises correctly and as often as your doctor tells you. Before you start any exercises, talk with your doctor or physical therapist. It is important that you know exactly how to do the exercises. Stop and call your doctor if you are not sure that you are doing the exercises correctly or if you have any pain. Hearing clicks and pops during exercise is not always cause for concern, but a grinding feeling may mean a more serious problem. Ice your shoulder after exercising if it is sore. Follow-up care is a key part of your treatment and safety. Be sure to make and go to all appointments, and call your doctor if you are having problems. It's also a good idea to know your test results and keep a list of the medicines you take. Stretching exercises Do not start doing stretching exercises until your doctor says you can. Your doctor will tell you which exercises to do, and how often and how long to do them. Posterior stretch · Stand upright with your feet shoulder-width apart. · Put the hand of your affected arm on the opposite shoulder, and hold the elbow to your body. · Then, using your good arm, hold the elbow of your affected arm and move it gently up, away from, and across your body. External rotation · Hold a lightweight stick or laure in your good arm. It should be about 2 feet long. A curtain laure may work well. · Lie on your back with your elbows next to your sides. Rest the elbow of your affected arm on a small pillow or folded towel. · Set your arms so that the elbows are bent at a 90-degree angle, like the letter \"L. \" Your hands will point straight up. · Hold the stick with both hands. Use your good arm to push the stick toward the affected arm so the affected arm moves outward, away from your body. Stop when you feel the arm stretching. Strength exercises Do not start strength exercises until your doctor says you can. Usually, this is at least 6 to 8 weeks after surgery. Your doctor will tell you how often and how long to do the exercises. Arm raises to the side · Stand upright with your feet shoulder-width apart and your affected arm at your side. · Slowly raise your injured arm to the side, with your thumb facing up. Raise your arm 60 degrees at the most (shoulder level is 90 degrees). · After holding the position for 3 to 5 seconds, lower your arm back to your side. If you need to, bring your \"good\" arm across your body and place it under the elbow as you lower your injured arm. Use your good arm to keep your injured arm from dropping down too fast during the downward motion. · Repeat 8 to 12 times. · When you first start out, don't hold any additional weight in your hand. As your strength improves, you may use a 1- to 2-pound dumbbell or a small can of food. Shoulder flexor · Stand facing a wall. Your body should be about 6 inches away from the wall. · Keep your affected arm and elbow to your side, and bend your elbow so that your arm is pointing toward the wall. · Make a closed fist with your thumb on top. · Push your hand into the wall and hold it for 6 seconds. Push with 25% to 50% of the force you have. Shoulder extension · Stand with your back flat against a wall. · Keep your affected arm and elbow at your side, and bend your elbow so that your upper arm is against the wall and your lower arm is pointing straight ahead. Make a closed fist with your thumb on top. · Push your elbow gently back against the wall, holding for 6 seconds. Push with 25% to 50% of the force you have. Where can you learn more? Go to http://brianne-ger.info/. Enter A965 in the search box to learn more about \"Rotator Cuff Rehabilitation. \" Current as of: March 21, 2017 Content Version: 11.3 © 8689-9617 [employer]. Care instructions adapted under license by [employer] (which disclaims liability or warranty for this information). If you have questions about a medical condition or this instruction, always ask your healthcare professional. [address] any warranty or liability for your use of this information. Shoulder Stretches: Exercises Your Care Instructions Here are some examples of exercises for your shoulder. Start each exercise slowly. Ease off the exercise if you start to have pain. Your doctor or physical therapist will tell you when you can start these exercises and which ones will work best for you. How to do the exercises Note: These exercises should cause you to feel a gentle stretch, but no pain. Shoulder stretch 5.  a doorway and place one arm against the door frame. Your elbow should be a little higher than your shoulder. 6. Relax your shoulders as you lean forward, allowing your chest and shoulder muscles to stretch. You can also turn your body slightly away from your arm to stretch the muscles even more. 7. Hold for 15 to 30 seconds. 8. Repeat 2 to 4 times with each arm. Shoulder and chest stretch Shoulder and chest stretch 6. While sitting, relax your upper body so you slump slightly in your chair. 7. As you breathe in, straighten your back and open your arms out to the sides. 8. Gently pull your shoulder blades back and downward. 9. Hold for 15 to 30 seconds as your breathe normally. 10. Repeat 2 to 4 times. Overhead stretch 4. Reach up over your head with both arms. 5. Hold for 15 to 30 seconds. 6. Repeat 2 to 4 times. Follow-up care is a key part of your treatment and safety.  Be sure to make and go to all appointments, and call your doctor if you are having problems. It's also a good idea to know your test results and keep a list of the medicines you take. Where can you learn more? Go to http://brianne-ger.info/. Enter S254 in the search box to learn more about \"Shoulder Stretches: Exercises. \" Current as of: March 21, 2017 Content Version: 11.3 © 4121-8987 [employer]. Care instructions adapted under license by [employer] (which disclaims liability or warranty for this information). If you have questions about a medical condition or this instruction, always ask your healthcare professional. [address] any warranty or liability for your use of this information. Introducing [address]! [facility] introduces [employer] patient portal. Now you can access parts of your medical record, email your doctor's office, and request medication refills online. 1. In your internet browser, go to [email or web address] 2. Click on the First Time User? Click Here link in the Sign In box. You will see the New Member Sign Up page. 3. Enter your [employer] Access Code exactly as it appears below. You will not need to use this code after youve completed the sign-up process. If you do not sign up before the expiration date, you must request a new code. · [employer] Access Code: [email or web address] Expires: 9/12/2017  4:33 PM 
 
4. Enter the last four digits of your Social Security Number (xxxx) and Date of Birth (mm/dd/yyyy) as indicated and click Submit. You will be taken to the next sign-up page. 5. Create a [employer] ID. This will be your [employer] login ID and cannot be changed, so think of one that is secure and easy to remember. 6. Create a [employer] password. You can change your password at any time. 7. Enter your Password Reset Question and Answer. This can be used at a later time if you forget your password. 8. Enter your e-mail address. You will receive e-mail notification when new information is available in [address] 9. Click Sign Up. You can now view and download portions of your medical record. 10. Click the Download Summary menu link to download a portable copy of your medical information. If you have questions, please visit the Frequently Asked Questions section of the [employer] website. Remember, [employer] is NOT to be used for urgent needs. For medical emergencies, dial 911. Now available from your iPhone and Android! Please provide this summary of care documentation to your next provider. Your primary care clinician is listed as Basil Will. If you have any questions after today's visit, please call [phone].

## 2018-05-07 ENCOUNTER — OFFICE VISIT (OUTPATIENT)
Dept: FAMILY MEDICINE CLINIC | Age: 83
End: 2018-05-07

## 2018-05-07 VITALS
BODY MASS INDEX: 36.45 KG/M2 | TEMPERATURE: 96.3 F | DIASTOLIC BLOOD PRESSURE: 58 MMHG | HEIGHT: 66 IN | OXYGEN SATURATION: 96 % | WEIGHT: 226.8 LBS | RESPIRATION RATE: 18 BRPM | SYSTOLIC BLOOD PRESSURE: 120 MMHG | HEART RATE: 53 BPM

## 2018-05-07 DIAGNOSIS — I10 ESSENTIAL HYPERTENSION: ICD-10-CM

## 2018-05-07 DIAGNOSIS — I25.84 CORONARY ARTERY DISEASE DUE TO CALCIFIED CORONARY LESION: ICD-10-CM

## 2018-05-07 DIAGNOSIS — G89.29 CHRONIC PAIN OF BOTH KNEES: ICD-10-CM

## 2018-05-07 DIAGNOSIS — I25.10 CORONARY ARTERY DISEASE DUE TO CALCIFIED CORONARY LESION: ICD-10-CM

## 2018-05-07 DIAGNOSIS — I25.10 CORONARY ARTERY DISEASE DUE TO LIPID RICH PLAQUE: ICD-10-CM

## 2018-05-07 DIAGNOSIS — R73.03 PREDIABETES: ICD-10-CM

## 2018-05-07 DIAGNOSIS — R60.0 BILATERAL EDEMA OF LOWER EXTREMITY: ICD-10-CM

## 2018-05-07 DIAGNOSIS — M25.561 CHRONIC PAIN OF BOTH KNEES: ICD-10-CM

## 2018-05-07 DIAGNOSIS — G89.29 CHRONIC LEFT-SIDED THORACIC BACK PAIN: ICD-10-CM

## 2018-05-07 DIAGNOSIS — Z01.818 PREOPERATIVE GENERAL PHYSICAL EXAMINATION: ICD-10-CM

## 2018-05-07 DIAGNOSIS — M19.90 ARTHRITIS: ICD-10-CM

## 2018-05-07 DIAGNOSIS — I25.83 CORONARY ARTERY DISEASE DUE TO LIPID RICH PLAQUE: ICD-10-CM

## 2018-05-07 DIAGNOSIS — M54.6 CHRONIC LEFT-SIDED THORACIC BACK PAIN: ICD-10-CM

## 2018-05-07 DIAGNOSIS — E78.00 HIGH CHOLESTEROL: ICD-10-CM

## 2018-05-07 DIAGNOSIS — Z13.5 SCREENING FOR GLAUCOMA: ICD-10-CM

## 2018-05-07 DIAGNOSIS — G89.29 CHRONIC MIDLINE LOW BACK PAIN WITHOUT SCIATICA: ICD-10-CM

## 2018-05-07 DIAGNOSIS — M25.562 CHRONIC PAIN OF BOTH KNEES: ICD-10-CM

## 2018-05-07 DIAGNOSIS — M25.511 CHRONIC PAIN OF BOTH SHOULDERS: ICD-10-CM

## 2018-05-07 DIAGNOSIS — M54.50 CHRONIC MIDLINE LOW BACK PAIN WITHOUT SCIATICA: ICD-10-CM

## 2018-05-07 DIAGNOSIS — M25.512 CHRONIC PAIN OF BOTH SHOULDERS: ICD-10-CM

## 2018-05-07 DIAGNOSIS — E78.2 MIXED HYPERLIPIDEMIA: Primary | ICD-10-CM

## 2018-05-07 DIAGNOSIS — G89.29 CHRONIC PAIN OF BOTH SHOULDERS: ICD-10-CM

## 2018-05-07 PROBLEM — E66.01 SEVERE OBESITY (BMI 35.0-39.9) WITH COMORBIDITY (HCC): Status: ACTIVE | Noted: 2018-05-07

## 2018-05-07 LAB — HBA1C MFR BLD HPLC: 6.1 %

## 2018-05-07 RX ORDER — PRAVASTATIN SODIUM 40 MG/1
TABLET ORAL
Qty: 90 TAB | Refills: 1 | Status: SHIPPED | OUTPATIENT
Start: 2018-05-07 | End: 2019-01-14 | Stop reason: SDUPTHER

## 2018-05-07 RX ORDER — OXYCODONE AND ACETAMINOPHEN 5; 325 MG/1; MG/1
1 TABLET ORAL
Qty: 60 TAB | Refills: 0 | Status: SHIPPED | OUTPATIENT
Start: 2018-05-07 | End: 2018-05-15 | Stop reason: SDUPTHER

## 2018-05-07 RX ORDER — SPIRONOLACTONE 25 MG
600 TABLET ORAL 2 TIMES DAILY
COMMUNITY
Start: 2018-04-04 | End: 2019-02-04 | Stop reason: SDUPTHER

## 2018-05-07 RX ORDER — BUMETANIDE 1 MG/1
TABLET ORAL
COMMUNITY
End: 2018-05-07 | Stop reason: SDUPTHER

## 2018-05-07 RX ORDER — NIFEDIPINE 30 MG/1
TABLET, FILM COATED, EXTENDED RELEASE ORAL
Qty: 90 TAB | Refills: 2 | Status: SHIPPED | OUTPATIENT
Start: 2018-05-07 | End: 2019-02-04 | Stop reason: SDUPTHER

## 2018-05-07 RX ORDER — NIFEDIPINE 30 MG/1
TABLET, FILM COATED, EXTENDED RELEASE ORAL
COMMUNITY
End: 2018-05-07 | Stop reason: SDUPTHER

## 2018-05-07 RX ORDER — LOSARTAN POTASSIUM 100 MG/1
TABLET ORAL
COMMUNITY
End: 2018-05-07 | Stop reason: SDUPTHER

## 2018-05-07 RX ORDER — OXYCODONE AND ACETAMINOPHEN 5; 325 MG/1; MG/1
1 TABLET ORAL
Qty: 60 TAB | Refills: 0 | Status: SHIPPED | OUTPATIENT
Start: 2018-05-07 | End: 2018-05-07 | Stop reason: SDUPTHER

## 2018-05-07 RX ORDER — PRAVASTATIN SODIUM 40 MG/1
TABLET ORAL
COMMUNITY
End: 2018-05-07 | Stop reason: SDUPTHER

## 2018-05-07 RX ORDER — HYDROCODONE BITARTRATE AND ACETAMINOPHEN 5; 325 MG/1; MG/1
TABLET ORAL
COMMUNITY
End: 2018-08-06 | Stop reason: ALTCHOICE

## 2018-05-07 RX ORDER — LOSARTAN POTASSIUM 100 MG/1
100 TABLET ORAL DAILY
Qty: 90 TAB | Refills: 1 | Status: SHIPPED | OUTPATIENT
Start: 2018-05-07 | End: 2018-08-06 | Stop reason: SDUPTHER

## 2018-05-07 RX ORDER — ASPIRIN 81 MG/1
81 TABLET ORAL DAILY
COMMUNITY
Start: 2018-04-04 | End: 2018-05-07 | Stop reason: SDUPTHER

## 2018-05-07 RX ORDER — POTASSIUM CHLORIDE 750 MG/1
TABLET, FILM COATED, EXTENDED RELEASE ORAL
COMMUNITY
End: 2018-05-07 | Stop reason: SDUPTHER

## 2018-05-07 RX ORDER — POTASSIUM CHLORIDE 750 MG/1
TABLET, FILM COATED, EXTENDED RELEASE ORAL
Qty: 90 TAB | Refills: 2 | Status: SHIPPED | OUTPATIENT
Start: 2018-05-07 | End: 2018-08-06 | Stop reason: SDUPTHER

## 2018-05-07 RX ORDER — OXYCODONE AND ACETAMINOPHEN 5; 325 MG/1; MG/1
1 TABLET ORAL
Qty: 60 TAB | Refills: 0 | Status: SHIPPED | OUTPATIENT
Start: 2018-06-07 | End: 2018-08-06 | Stop reason: SDUPTHER

## 2018-05-07 RX ORDER — NALOXONE HYDROCHLORIDE 4 MG/.1ML
SPRAY NASAL
Qty: 2 EACH | Refills: 11 | Status: SHIPPED | OUTPATIENT
Start: 2018-05-07 | End: 2022-07-27

## 2018-05-07 RX ORDER — ATENOLOL 50 MG/1
TABLET ORAL
COMMUNITY
End: 2018-05-07 | Stop reason: SDUPTHER

## 2018-05-07 RX ORDER — ASPIRIN 81 MG/1
TABLET ORAL
COMMUNITY
End: 2018-05-07 | Stop reason: SDUPTHER

## 2018-05-07 RX ORDER — ATENOLOL 50 MG/1
50 TABLET ORAL DAILY
Qty: 90 TAB | Refills: 1 | Status: SHIPPED | OUTPATIENT
Start: 2018-05-07 | End: 2018-12-28 | Stop reason: SDUPTHER

## 2018-05-07 NOTE — PROGRESS NOTES
HISTORY OF PRESENT ILLNESS  Sophy Leon is a 80 y.o. female. HPI     Chronic low-mid  back,  feet. Legs pain The patient's pain has been an ongoing struggling concerning problem, present for refills, never abused any prescribed meds, no hx of illicit nor etoh abuse, the pain has been bothering the pt for many yrs, pt aware that there are no other alternative approach for the current pain that exist except for the available opioid based meds with their huge addictive properties, does not want to do Further PT nor see a surgeon, does some at home  they all Started few yrs ago with hx of worsening months after months and not responding to other offered therapy by the specialists. 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 Last a1c was at 6% and was taken off diabetic meds  Needs 3 months refills for most of her meds  Statin not causing muscle aceh, fasting state          Current Outpatient Prescriptions   Medication Sig Dispense Refill    calcium carbonate (ZHOU-600 PO) Take 600 mg by mouth two (2) times a day.  NIFEdipine ER (ADALAT CC) 30 mg ER tablet 1 tablet on an empty stomach      potassium chloride SR (KLOR-CON 10) 10 mEq tablet 1 tablet PO once daily      calcium carbonate (ZHOU-600 PO) Take 600 mg by mouth two (2) times a day. 1    CALCIUM 600 WITH VITAMIN D3 600 mg(1,500mg) -400 unit chew Take 600 mg by mouth two (2) times a day.  oxyCODONE-acetaminophen (PERCOCET) 5-325 mg per tablet Take 2 Tabs by mouth two (2) times daily as needed for Pain. Max Daily Amount: 4 Tabs.  60 Tab 0    oxyCODONE-acetaminophen (PERCOCET) 5-325 mg per tablet Take 1 Tab by mouth two (2) times daily as needed for Pain. Max Daily Amount: 2 Tabs. 60 Tab 0    nitroglycerin (NITROSTAT) 0.4 mg SL tablet PLACE 1 TABLET UNDER THE TONGUE EVERY 5 MINS AS NEEDED FOR CHEST PAIN 25 Tab 1    acetaminophen (TYLENOL) 500 mg tablet Take  by mouth every six (6) hours as needed for Pain.  losartan (COZAAR) 100 mg tablet Take 1 Tab by mouth daily. 90 Tab 1    NIFEdipine ER (NIFEDICAL XL) 30 mg ER tablet Take 1 Tab by mouth daily. 90 Tab 1    atenolol (TENORMIN) 50 mg tablet Take 1 Tab by mouth daily. 90 Tab 1    pravastatin (PRAVACHOL) 40 mg tablet take 1 tablet by mouth NIGHTLY 90 Tab 1    calcium-cholecalciferol, D3, (CALTRATE 600+D) tablet Take 1 Tab by mouth two (2) times a day. 60 Tab 11    bumetanide (BUMEX) 1 mg tablet Take 1 Tab by mouth daily. 90 Tab 1    MISCELLANEOUS MEDICAL SUPPLY (COMPRESSION STOCKINGS) daily.  aspirin delayed-release 81 mg tablet Take 1 Tab by mouth daily. 30 Tab 11    HYDROcodone-acetaminophen (NORCO) 5-325 mg per tablet 2 tablets      potassium chloride (KLOR-CON) 10 mEq tablet Take 2 Tabs by mouth daily.  90 Tab 1     Allergies   Allergen Reactions    Diclofenac Unknown (comments)    Lisinopril Cough    Lisinopril-Hydrochlorothiazide Unknown (comments)     Past Medical History:   Diagnosis Date    Arthritis 3/25/2010    Back pain 3/26/2010    CAD (coronary artery disease)     Chest pain, unspecified     Chronic kidney failure, stage 3 (moderate) 12/5/2017    Chronic pain     Chronic pain of both knees 2/6/2018    Edema     Essential hypertension     High cholesterol 3/25/2010    HTN (hypertension) 3/25/2010    Kidney failure, acute ([facility] [address] 4/6/2012    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    Onychomycosis 8/19/2010    Other acute and subacute form of ischemic heart disease     Other and unspecified angina pectoris     Other ill-defined conditions(799.89)     excessive watering of right eye    Shortness of breath     Shoulder pain, bilateral 3/26/2010     Past Surgical History:   Procedure Laterality Date    HX HEART CATHETERIZATION      HX HEENT      bilat cateract extraction    HX HYSTERECTOMY      HX KNEE REPLACEMENT      HX LUMBAR FUSION      HX ORTHOPAEDIC      bilateral TKR    HX ORTHOPAEDIC      neck and back surgery    HX ORTHOPAEDIC      bilateral bunionectomy    HX ORTHOPAEDIC      bilateral cataracts removed    HX PTCA       Family History   Problem Relation Age of Onset   [facility] Hypertension Mother     Stroke Mother     Other Mother      arthritis    Coronary Artery Disease Father     Heart Disease Father     Hypertension Sister     Diabetes Sister     Coronary Artery Disease Sister     Cancer Sister      lung    Coronary Artery Disease Brother     Cancer Brother      lung    Heart Disease Brother      Social History   Substance Use Topics    Smoking status: Never Smoker    Smokeless tobacco: Never Used    Alcohol use Yes      Comment: occasional       Lab Results  Component Value Date/Time   WBC 5.6 09/11/2017 09:56 AM   HGB 13.6 09/11/2017 09:56 AM   HCT 41.4 09/11/2017 09:56 AM   PLATELET 682 72/91/2810 09:56 AM   MCV 90 09/11/2017 09:56 AM     Lab Results  Component Value Date/Time   Cholesterol, total 180 03/09/2017 08:57 AM   HDL Cholesterol 69 03/09/2017 08:57 AM   LDL, calculated 91 03/09/2017 08:57 AM   Triglyceride 98 03/09/2017 08:57 AM   CHOL/HDL Ratio 4.5 08/19/2010 12:27 PM        Review of Systems   Constitutional: Negative for chills and fever. HENT: Negative for congestion and nosebleeds. Eyes: Negative for blurred vision and pain. Respiratory: Negative for cough, shortness of breath and wheezing. Cardiovascular: Negative for chest pain and leg swelling. Gastrointestinal: Negative for constipation, diarrhea, nausea and vomiting. Genitourinary: Negative for dysuria and frequency. Musculoskeletal: Positive for back pain, joint pain, myalgias and neck pain.    Skin: Negative for itching and rash.   Neurological: Negative for dizziness, loss of consciousness and headaches. Psychiatric/Behavioral: Negative for depression. The patient is not nervous/anxious and does not have insomnia. Physical Exam   Constitutional: She is oriented to person, place, and time. She appears well-developed and well-nourished. HENT:   Head: Normocephalic and atraumatic. Eyes: Conjunctivae and EOM are normal. Pupils are equal, round, and reactive to light. Neck: No JVD present. No thyromegaly present. Cardiovascular: Normal rate, regular rhythm, normal heart sounds and intact distal pulses. Exam reveals no gallop and no friction rub. No murmur heard. Pulmonary/Chest: Effort normal and breath sounds normal. No stridor. No respiratory distress. She has no wheezes. She has no rales. Abdominal: Soft. Bowel sounds are normal. She exhibits no distension and no mass. There is no tenderness. Musculoskeletal: Normal range of motion. She exhibits no edema or tenderness. Lymphadenopathy:     She has no cervical adenopathy. Neurological: She is alert and oriented to person, place, and time. She has normal reflexes. No cranial nerve deficit. Skin: No rash noted. No erythema. Psychiatric: She has a normal mood and affect. Her behavior is normal.   Nursing note and vitals reviewed. ASSESSMENT and PLAN  Diagnoses and all orders for this visit:    1. Mixed hyperlipidemia  -     LIPID PANEL  -     AMB POC HEMOGLOBIN A1C    2. Arthritis  -     oxyCODONE-acetaminophen (PERCOCET) 5-325 mg per tablet; Take 1 Tab by mouth two (2) times daily as needed for Pain. Max Daily Amount: 2 Tabs. -     AMB POC HEMOGLOBIN A1C    3. Chronic pain of both shoulders  -     oxyCODONE-acetaminophen (PERCOCET) 5-325 mg per tablet; Take 1 Tab by mouth two (2) times daily as needed for Pain. Max Daily Amount: 2 Tabs. -     AMB POC HEMOGLOBIN A1C    4.  Chronic left-sided thoracic back pain  -     oxyCODONE-acetaminophen (PERCOCET) 5-325 mg per tablet; Take 1 Tab by mouth two (2) times daily as needed for Pain. Max Daily Amount: 2 Tabs. -     AMB POC HEMOGLOBIN A1C    5. Chronic midline low back pain without sciatica  -     oxyCODONE-acetaminophen (PERCOCET) 5-325 mg per tablet; Take 1 Tab by mouth two (2) times daily as needed for Pain. Max Daily Amount: 2 Tabs. -     AMB POC HEMOGLOBIN A1C    6. Chronic pain of both knees  -     oxyCODONE-acetaminophen (PERCOCET) 5-325 mg per tablet; Take 1 Tab by mouth two (2) times daily as needed for Pain. Max Daily Amount: 2 Tabs. -     AMB POC HEMOGLOBIN A1C    7. High cholesterol  -     losartan (COZAAR) 100 mg tablet; Take 1 Tab by mouth daily. -     pravastatin (PRAVACHOL) 40 mg tablet; take 1 tablet by mouth NIGHTLY  -     AMB POC HEMOGLOBIN A1C    8. Coronary artery disease due to calcified coronary lesion  -     losartan (COZAAR) 100 mg tablet; Take 1 Tab by mouth daily.  -     AMB POC HEMOGLOBIN A1C    9. Bilateral edema of lower extremity  -     losartan (COZAAR) 100 mg tablet; Take 1 Tab by mouth daily.  -     AMB POC HEMOGLOBIN A1C    10. Essential hypertension  -     losartan (COZAAR) 100 mg tablet; Take 1 Tab by mouth daily.  -     AMB POC HEMOGLOBIN A1C    11. Preoperative general physical examination  -     atenolol (TENORMIN) 50 mg tablet; Take 1 Tab by mouth daily.  -     AMB POC HEMOGLOBIN A1C    12. Coronary artery disease due to lipid rich plaque  -     pravastatin (PRAVACHOL) 40 mg tablet; take 1 tablet by mouth NIGHTLY  -     AMB POC HEMOGLOBIN A1C    13. Prediabetes  -     pravastatin (PRAVACHOL) 40 mg tablet; take 1 tablet by mouth NIGHTLY  -     AMB POC HEMOGLOBIN A1C    14.  Screening for glaucoma  -     REFERRAL TO OPTOMETRY  -     AMB POC HEMOGLOBIN A1C    Other orders  -     NIFEdipine ER (ADALAT CC) 30 mg ER tablet; 1 tablet on an empty stomach  -     potassium chloride SR (KLOR-CON 10) 10 mEq tablet; 1 tablet PO once daily  -     naloxone (NARCAN) 4 mg/actuation nasal spray; Use 1 spray intranasally into 1 nostril. Use a new Narcan nasal spray for subsequent doses and administer into alternating nostrils. May repeat every 2 to 3 minutes as needed.

## 2018-05-07 NOTE — PATIENT INSTRUCTIONS
Chronic Pain: Care Instructions  Your Care Instructions    Chronic pain is pain that lasts a long time (months or even years) and may or may not have a clear cause. It is different from acute pain, which usually does have a clear cause-like an injury or illness-and gets better over time. Chronic pain:  · Lasts over time but may vary from day to day. · Does not go away despite efforts to end it. · May disrupt your sleep and lead to fatigue. · May cause depression or anxiety. · May make your muscles tense, causing more pain. · Can disrupt your work, hobbies, home life, and relationships with friends and family. Chronic pain is a very real condition. It is not just in your head. Treatment can help and usually includes several methods used together, such as medicines, physical therapy, exercise, and other treatments. Learning how to relax and changing negative thought patterns can also help you cope. Chronic pain is complex. Taking an active role in your treatment will help you better manage your pain. Tell your doctor if you have trouble dealing with your pain. You may have to try several things before you find what works best for you. Follow-up care is a key part of your treatment and safety. Be sure to make and go to all appointments, and call your doctor if you are having problems. It's also a good idea to know your test results and keep a list of the medicines you take. How can you care for yourself at home? · Pace yourself. Break up large jobs into smaller tasks. Save harder tasks for days when you have less pain, or go back and forth between hard tasks and easier ones. Take rest breaks. · Relax, and reduce stress. Relaxation techniques such as deep breathing or meditation can help. · Keep moving. Gentle, daily exercise can help reduce pain over the long run. Try low- or no-impact exercises such as walking, swimming, and stationary biking. Do stretches to stay flexible.   · Try heat, cold packs, and massage. · Get enough sleep. Chronic pain can make you tired and drain your energy. Talk with your doctor if you have trouble sleeping because of pain. · Think positive. Your thoughts can affect your pain level. Do things that you enjoy to distract yourself when you have pain instead of focusing on the pain. See a movie, read a book, listen to music, or spend time with a friend. · If you think you are depressed, talk to your doctor about treatment. · Keep a daily pain diary. Record how your moods, thoughts, sleep patterns, activities, and medicine affect your pain. You may find that your pain is worse during or after certain activities or when you are feeling a certain emotion. Having a record of your pain can help you and your doctor find the best ways to treat your pain. · Take pain medicines exactly as directed. ¨ If the doctor gave you a prescription medicine for pain, take it as prescribed. ¨ If you are not taking a prescription pain medicine, ask your doctor if you can take an over-the-counter medicine. Reducing constipation caused by pain medicine  · Include fruits, vegetables, beans, and whole grains in your diet each day. These foods are high in fiber. · Drink plenty of fluids, enough so that your urine is light yellow or clear like water. If you have kidney, heart, or liver disease and have to limit fluids, talk with your doctor before you increase the amount of fluids you drink. · If your doctor recommends it, get more exercise. Walking is a good choice. Bit by bit, increase the amount you walk every day. Try for at least 30 minutes on most days of the week. · Schedule time each day for a bowel movement. A daily routine may help. Take your time and do not strain when having a bowel movement. When should you call for help? Call your doctor now or seek immediate medical care if:  ? · Your pain gets worse or is out of control.    ? · You feel down or blue, or you do not enjoy things like you once did. You may be depressed, which is common in people with chronic pain. Depression can be treated. ? · You have vomiting or cramps for more than 2 hours. ? Watch closely for changes in your health, and be sure to contact your doctor if:  ? · You cannot sleep because of pain. ? · You are very worried or anxious about your pain. ? · You have trouble taking your pain medicine. ? · You have any concerns about your pain medicine. ? · You have trouble with bowel movements, such as:  ¨ No bowel movement in 3 days. ¨ Blood in the anal area, in your stool, or on the toilet paper. ¨ Diarrhea for more than 24 hours. Where can you learn more? Go to http://brianne-ger.info/. Enter N004 in the search box to learn more about \"Chronic Pain: Care Instructions. \"  Current as of: October 14, 2016  Content Version: 11.4  © 9577-4096 [employer]. Care instructions adapted under license by [employer] (which disclaims liability or warranty for this information). If you have questions about a medical condition or this instruction, always ask your healthcare professional. [place] [address] any warranty or liability for your use of this information.

## 2018-05-07 NOTE — PROGRESS NOTES
Name and  verified      Chief Complaint   Patient presents with    Pain (Chronic)         Health Maintenance reviewed-discussed with patient. 1. Have you been to the ER, urgent care clinic since your last visit? Hospitalized since your last visit? No    2. Have you seen or consulted any other health care providers outside of the [address] [place] [address] since your last visit? Include any pap smears or colon screening.  No

## 2018-05-07 NOTE — MR AVS SNAPSHOT
[address] [address] 
[phone] Patient: Ricky White MRN: [id number] KYI:[id number] Visit Information Date & Time Provider Department Dept. Phone Encounter #  
 5/7/2018  8:30 AM Waldron Brittle, MD [facility] [phone] [id number] Follow-up Instructions Return if symptoms worsen or fail to improve. Your Appointments 5/15/2018  2:45 PM  
ESTABLISHED PATIENT with Dannielle Obregon MD  
[facility]) Appt Note: 3 MO. F/U FOLLOWING ECHO  
 [address] [facility] 110 1400 [address] [place] [address]  
[phone] [address] Upcoming Health Maintenance Date Due  
 GLAUCOMA SCREENING Q2Y 12/4/2017 LIPID PANEL Q1 3/9/2018 HEMOGLOBIN A1C Q6M 3/11/2018 Influenza Age 5 to Adult 8/1/2018 MICROALBUMIN Q1 9/11/2018 EYE EXAM RETINAL OR DILATED Q1 9/11/2018 MEDICARE YEARLY EXAM 9/12/2018 FOOT EXAM Q1 9/29/2018 DTaP/Tdap/Td series (2 - Td) 11/30/2025 Allergies as of 5/7/2018  Review Complete On: 2/6/2018 By: Nils Tran MD  
  
 Severity Noted Reaction Type Reactions Diclofenac  02/27/2018    Unknown (comments) Lisinopril  08/17/2011    Cough Lisinopril-hydrochlorothiazide  02/27/2018    Unknown (comments) Current Immunizations  Reviewed on 11/10/2016 Name Date Influenza High Dose Vaccine PF 8/17/2017, 10/31/2016, 11/1/2015 Influenza Vaccine Whole 10/22/2011 Pneumococcal Polysaccharide (PPSV-23) 8/15/2014 Tdap 11/30/2015 ZZZ-RETIRED (DO NOT USE) Pneumococcal Vaccine (Unspecified Type) 1/15/1996 Zoster Vaccine, Live 11/11/2016 Not reviewed this visit You Were Diagnosed With   
  
 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 
ICD-9-CM: 401.9 Preoperative general physical examination     ICD-10-CM: O34.837 ICD-9-CM: V72.83 Coronary artery disease due to lipid rich plaque     ICD-10-CM: I25.10, I25.83 ICD-9-CM: 414.00, 414.3 Prediabetes     ICD-10-CM: R73.03 
ICD-9-CM: 790.29 Screening for glaucoma     ICD-10-CM: Z13.5 ICD-9-CM: V80.1 Vitals BP Pulse Temp Resp Height(growth percentile) Weight(growth percentile) 120/58 (BP 1 Location: Left arm, BP Patient Position: At rest) (!) 53 96.3 °F (35.7 °C) (Oral) 18 5' 6\" (1.676 m) 226 lb 12.8 oz (102.9 kg) SpO2 BMI OB Status Smoking Status 96% 36.61 kg/m2 Hysterectomy Never Smoker Vitals History BMI and BSA Data Body Mass Index Body Surface Area  
 36.61 kg/m 2 2.19 m 2 Preferred Pharmacy Pharmacy Name Phone [facility] [id number] [place]ar[place], [address] [facility] [phone] Your Updated Medication List  
  
   
This list is accurate as of 5/7/18  9:22 AM.  Always use your most recent med list.  
  
  
  
  
 acetaminophen 500 mg tablet Commonly known as:  TYLENOL Take  by mouth every six (6) hours as needed for Pain. aspirin delayed-release 81 mg tablet Take 1 Tab by mouth daily. atenolol 50 mg tablet Commonly known as:  TENORMIN Take 1 Tab by mouth daily. bumetanide 1 mg tablet Commonly known as:  Henry Singh Take 1 Tab by mouth daily. * ZHOU-600 PO Take 600 mg by mouth two (2) times a day. * ZHOU-600 PO Take 600 mg by mouth two (2) times a day. * calcium-cholecalciferol (D3) tablet Commonly known as:  CALTRATE 600+D Take 1 Tab by mouth two (2) times a day. * CALCIUM 600 WITH VITAMIN D3 600 mg(1,500mg) -400 unit Chew Generic drug:  Calcium-Cholecalciferol (D3) Take 600 mg by mouth two (2) times a day. COMPRESSION STOCKINGS  
daily. HYDROcodone-acetaminophen 5-325 mg per tablet Commonly known as:  NORCO  
2 tablets  
  
 losartan 100 mg tablet Commonly known as:  COZAAR Take 1 Tab by mouth daily. naloxone 4 mg/actuation nasal spray Commonly known as:  [employer] Use 1 spray intranasally into 1 nostril. Use a new Narcan nasal spray for subsequent doses and administer into alternating nostrils. May repeat every 2 to 3 minutes as needed. * NIFEdipine ER 30 mg ER tablet Commonly known as:  NIFEDICAL XL Take 1 Tab by mouth daily. * NIFEdipine ER 30 mg ER tablet Commonly known as:  ADALAT CC  
1 tablet on an empty stomach  
  
 nitroglycerin 0.4 mg SL tablet Commonly known as:  NITROSTAT PLACE 1 TABLET UNDER THE TONGUE EVERY 5 MINS AS NEEDED FOR CHEST PAIN  
  
 * oxyCODONE-acetaminophen 5-325 mg per tablet Commonly known as:  PERCOCET Take 1 Tab by mouth two (2) times daily as needed for Pain. Max Daily Amount: 2 Tabs. * oxyCODONE-acetaminophen 5-325 mg per tablet Commonly known as:  PERCOCET Take 1 Tab by mouth two (2) times daily as needed for Pain. Max Daily Amount: 2 Tabs. Start taking on:  6/7/2018 * potassium chloride 10 mEq tablet Commonly known as:  KLOR-CON Take 2 Tabs by mouth daily. * potassium chloride SR 10 mEq tablet Commonly known as:  KLOR-CON 10  
1 tablet PO once daily  
  
 pravastatin 40 mg tablet Commonly known as:  PRAVACHOL  
take 1 tablet by mouth NIGHTLY * Notice: This list has 10 medication(s) that are the same as other medications prescribed for you. Read the directions carefully, and ask your doctor or other care provider to review them with you. Prescriptions Printed Refills  
 oxyCODONE-acetaminophen (PERCOCET) 5-325 mg per tablet 0 Sig: Take 1 Tab by mouth two (2) times daily as needed for Pain. Max Daily Amount: 2 Tabs. Class: Print Route: Oral  
 naloxone (NARCAN) 4 mg/actuation nasal spray 11 Sig: Use 1 spray intranasally into 1 nostril. Use a new Narcan nasal spray for subsequent doses and administer into alternating nostrils. May repeat every 2 to 3 minutes as needed. Class: Print  
 oxyCODONE-acetaminophen (PERCOCET) 5-325 mg per tablet 0 Starting on: 2018 Sig: Take 1 Tab by mouth two (2) times daily as needed for Pain. Max Daily Amount: 2 Tabs. Class: Print Route: Oral  
  
Prescriptions Sent to Pharmacy Refills NIFEdipine ER (ADALAT CC) 30 mg ER tablet 2 Si tablet on an empty stomach Class: Normal  
 Pharmacy: [address] Ph #: [phone]  
 potassium chloride SR (KLOR-CON 10) 10 mEq tablet 2 Si tablet PO once daily Class: Normal  
 Pharmacy: [address] Ph #: [phone]  
 losartan (COZAAR) 100 mg tablet 1 Sig: Take 1 Tab by mouth daily. Class: Normal  
 Pharmacy: [address] Ph #: [phone] Route: Oral  
 atenolol (TENORMIN) 50 mg tablet 1 Sig: Take 1 Tab by mouth daily. Class: Normal  
 [facility]: [facility] [address] [facility] Ph #: [phone]  Route: Oral  
 pravastatin (PRAVACHOL) 40 mg tablet 1 Sig: take 1 tablet by mouth NIGHTLY Class: Normal  
 Pharmacy: [address] Henry Chavira [facility] [address] Ph #: [phone] We Performed the Following HEMOGLOBIN A1C WITH EAG [17078 CPT(R)] LIPID PANEL [04784 CPT(R)] REFERRAL TO OPTOMETRY [id number] Custom] Comments:  
 Please evaluate patient for glaucoma Deya Gannon Follow-up Instructions Return if symptoms worsen or fail to improve. Referral Information Referral ID Referred By Referred To  
  
 [id number] SHARDA HILL MD   
   [address]   
   Alexandra Douglas, [address] Phone: [phone] Fax: [phone] Visits Status Start Date End Date 1 New Request 5/7/18 5/7/19 If your referral has a status of pending review or denied, additional information will be sent to support the outcome of this decision. Patient Instructions Chronic Pain: Care Instructions Your Care Instructions Chronic pain is pain that lasts a long time (months or even years) and may or may not have a clear cause. It is different from acute pain, which usually does have a clear cause-like an injury or illness-and gets better over time. Chronic pain: 
· Lasts over time but may vary from day to day. · Does not go away despite efforts to end it. · May disrupt your sleep and lead to fatigue. · May cause depression or anxiety. · May make your muscles tense, causing more pain. · Can disrupt your work, hobbies, home life, and relationships with friends and family. Chronic pain is a very real condition. It is not just in your head. Treatment can help and usually includes several methods used together, such as medicines, physical therapy, exercise, and other treatments. Learning how to relax and changing negative thought patterns can also help you cope. Chronic pain is complex.  Taking an active role in your treatment will help you better manage your pain. Tell your doctor if you have trouble dealing with your pain. You may have to try several things before you find what works best for you. Follow-up care is a key part of your treatment and safety. Be sure to make and go to all appointments, and call your doctor if you are having problems. It's also a good idea to know your test results and keep a list of the medicines you take. How can you care for yourself at home? · Pace yourself. Break up large jobs into smaller tasks. Save harder tasks for days when you have less pain, or go back and forth between hard tasks and easier ones. Take rest breaks. · Relax, and reduce stress. Relaxation techniques such as deep breathing or meditation can help. · Keep moving. Gentle, daily exercise can help reduce pain over the long run. Try low- or no-impact exercises such as walking, swimming, and stationary biking. Do stretches to stay flexible. · Try heat, cold packs, and massage. · Get enough sleep. Chronic pain can make you tired and drain your energy. Talk with your doctor if you have trouble sleeping because of pain. · Think positive. Your thoughts can affect your pain level. Do things that you enjoy to distract yourself when you have pain instead of focusing on the pain. See a movie, read a book, listen to music, or spend time with a friend. · If you think you are depressed, talk to your doctor about treatment. · Keep a daily pain diary. Record how your moods, thoughts, sleep patterns, activities, and medicine affect your pain. You may find that your pain is worse during or after certain activities or when you are feeling a certain emotion. Having a record of your pain can help you and your doctor find the best ways to treat your pain. · Take pain medicines exactly as directed. ¨ If the doctor gave you a prescription medicine for pain, take it as prescribed.  
¨ If you are not taking a prescription pain medicine, ask your doctor if you can take an over-the-counter medicine. Reducing constipation caused by pain medicine · Include fruits, vegetables, beans, and whole grains in your diet each day. These foods are high in fiber. · Drink plenty of fluids, enough so that your urine is light yellow or clear like water. If you have kidney, heart, or liver disease and have to limit fluids, talk with your doctor before you increase the amount of fluids you drink. · If your doctor recommends it, get more exercise. Walking is a good choice. Bit by bit, increase the amount you walk every day. Try for at least 30 minutes on most days of the week. · Schedule time each day for a bowel movement. A daily routine may help. Take your time and do not strain when having a bowel movement. When should you call for help? Call your doctor now or seek immediate medical care if: 
? · Your pain gets worse or is out of control. ? · You feel down or blue, or you do not enjoy things like you once did. You may be depressed, which is common in people with chronic pain. Depression can be treated. ? · You have vomiting or cramps for more than 2 hours. ? Watch closely for changes in your health, and be sure to contact your doctor if: 
? · You cannot sleep because of pain. ? · You are very worried or anxious about your pain. ? · You have trouble taking your pain medicine. ? · You have any concerns about your pain medicine. ? · You have trouble with bowel movements, such as: 
¨ No bowel movement in 3 days. ¨ Blood in the anal area, in your stool, or on the toilet paper. ¨ Diarrhea for more than 24 hours. Where can you learn more? Go to http://brianne-ger.info/. Enter N004 in the search box to learn more about \"Chronic Pain: Care Instructions. \" Current as of: October 14, 2016 Content Version: 11.4 © 9866-1586 [employer].  Care instructions adapted under license by [address] (which disclaims liability or warranty for this information). If you have questions about a medical condition or this instruction, always ask your healthcare professional. [address] any warranty or liability for your use of this information. Introducing [facility]! Marcial Alvaradosebastian introduces e[employer] patient portal. Now you can access parts of your medical record, email your doctor's office, and request medication refills online. 1. In your internet browser, go to [email or web address] 2. Click on the First Time User? Click Here link in the Sign In box. You will see the New Member Sign Up page. 3. Enter your eGames Access Code exactly as it appears below. You will not need to use this code after youve completed the sign-up process. If you do not sign up before the expiration date, you must request a new code. · eGames Access Code: [email or web address] Expires: 6/1/2018  6:32 AM 
 
4. Enter the last four digits of your Social Security Number (xxxx) and Date of Birth (mm/dd/yyyy) as indicated and click Submit. You will be taken to the next sign-up page. 5. Create a eGames ID. This will be your eGames login ID and cannot be changed, so think of one that is secure and easy to remember. 6. Create a eGames password. You can change your password at any time. 7. Enter your Password Reset Question and Answer. This can be used at a later time if you forget your password. 8. Enter your e-mail address. You will receive e-mail notification when new information is available in [address] 9. Click Sign Up. You can now view and download portions of your medical record. 10. Click the Download Summary menu link to download a portable copy of your medical information. If you have questions, please visit the Frequently Asked Questions section of the eGames website.  Remember, eGames is NOT to be used for urgent needs. For medical emergencies, dial 911. Now available from your iPhone and Android! Please provide this summary of care documentation to your next provider. Your primary care clinician is listed as Lurdes Mejia. If you have any questions after today's visit, please call [phone].

## 2018-05-08 LAB
CHOLEST SERPL-MCNC: 234 MG/DL (ref 100–199)
HDLC SERPL-MCNC: 63 MG/DL
LDLC SERPL CALC-MCNC: 148 MG/DL (ref 0–99)
TRIGL SERPL-MCNC: 117 MG/DL (ref 0–149)
VLDLC SERPL CALC-MCNC: 23 MG/DL (ref 5–40)

## 2018-05-15 ENCOUNTER — OFFICE VISIT (OUTPATIENT)
Dept: CARDIOLOGY CLINIC | Age: 83
End: 2018-05-15

## 2018-05-15 VITALS
DIASTOLIC BLOOD PRESSURE: 67 MMHG | RESPIRATION RATE: 12 BRPM | HEART RATE: 59 BPM | BODY MASS INDEX: 36.64 KG/M2 | WEIGHT: 228 LBS | OXYGEN SATURATION: 96 % | SYSTOLIC BLOOD PRESSURE: 127 MMHG | HEIGHT: 66 IN

## 2018-05-15 DIAGNOSIS — Z95.820 S/P ANGIOPLASTY WITH STENT: ICD-10-CM

## 2018-05-15 DIAGNOSIS — N18.30 CKD (CHRONIC KIDNEY DISEASE) STAGE 3, GFR 30-59 ML/MIN (HCC): ICD-10-CM

## 2018-05-15 DIAGNOSIS — E11.9 TYPE 2 DIABETES MELLITUS WITHOUT COMPLICATION, UNSPECIFIED WHETHER LONG TERM INSULIN USE (HCC): ICD-10-CM

## 2018-05-15 DIAGNOSIS — I10 ESSENTIAL HYPERTENSION, BENIGN: Primary | ICD-10-CM

## 2018-05-15 DIAGNOSIS — R06.02 SOB (SHORTNESS OF BREATH) ON EXERTION: ICD-10-CM

## 2018-05-15 DIAGNOSIS — I25.10 CORONARY ARTERY DISEASE INVOLVING NATIVE CORONARY ARTERY OF NATIVE HEART WITHOUT ANGINA PECTORIS: ICD-10-CM

## 2018-05-15 NOTE — PROGRESS NOTES
[facility]   [address] [facility], [address] [place] [address] Road                                          NEW PATIENT HPI/FOLLOW-UP      NAME:  Rochelle Rea   :   1933   MRN:   [id number]   PCP:  Serena Osler, MD           Subjective: The patient is a 80y.o. year old female with h/o SOB, HTN, hyperlipidemia, chest pain, and CKD who returns for a routine follow-up. Since the last visit, patient reports no new symptoms. Continues with bilateral leg swelling, which she says is no better nor worse than usual. Had Echo done in February (since last OV). Denies change in exercise tolerance, chest pain, medication intolerance, palpitations, shortness of breath, PND/orthopnea wheezing, sputum, syncope, dizziness or light headedness. Doing satisfactorily. Review of Systems  General: Pt denies excessive weight gain or loss. Pt is able to conduct ADL's. Respiratory: Denies shortness of breath, MURILLO, wheezing or stridor.   Cardiovascular: +edema Denies precordial pain, palpitations, or PND  Gastrointestinal: Denies poor appetite, indigestion, abdominal pain or blood in stool  Peripheral vascular: Denies claudication, leg cramps  Neuropsychiatric: Denies paresthesias,tingling,numbness,anxiety,depression,fatigue  Musculoskeletal: Denies pain,tenderness, soreness,swelling      Past Medical History:   Diagnosis Date    Arthritis 3/25/2010    Back pain 3/26/2010    CAD (coronary artery disease)     Chest pain, unspecified     Chronic kidney failure, stage 3 (moderate) 2017    Chronic pain     Chronic pain of both knees 2018    Edema     Essential hypertension     High cholesterol 3/25/2010    HTN (hypertension) 3/25/2010    Kidney failure, acute ([facility] [address] 2012    Lacrimal passage stenosis 2014    Onychomycosis 2010    Other acute and subacute form of ischemic heart disease     Other and unspecified angina pectoris     Other ill-defined conditions(799.89)     excessive watering of right eye    Shortness of breath     Shoulder pain, bilateral 3/26/2010     Patient Active Problem List    Diagnosis Date Noted    Severe obesity (BMI 35.0-39. 9) with comorbidity ([facility] 05/07/2018    Chronic pain of both knees 02/06/2018    Type 2 diabetes mellitus with nephropathy ([facility] 01/23/2018    CKD (chronic kidney disease) stage 3, GFR 30-59 ml/min 01/23/2018    Chronic kidney failure, stage 3 (moderate) 12/05/2017    Lacrimal passage stenosis 04/17/2014    Preop cardiovascular exam 04/09/2014    Prediabetes 01/03/2014    Abnormal urinalysis 08/30/2013    Lacrimal duct stenosis 08/14/2013    Conjunctivitis 05/10/2013    Watery eyes 08/29/2012    S/P angioplasty with stent--RCA 6/12 06/22/2012    Multiple bruises 06/22/2012    CAD (coronary artery disease) 06/07/2012    Post PTCA 06/07/2012    Mixed hyperlipidemia 06/01/2012    Essential hypertension, benign 06/01/2012    Abnormal nuclear stress test 06/01/2012    Chest pain, unspecified 04/25/2012    Essential hypertension, benign 04/20/2012    Type 2 diabetes mellitus without complication ([address] 85/11/9799    SOB (shortness of breath) on exertion 04/06/2012    Vitamin D deficiency 04/06/2012    Kidney failure, acute ([address] 04/06/2012    Heme positive stool 11/12/2011    Abdominal pain, other specified site 11/12/2011    Inguinal hernia, left 11/12/2011    Decreased vision 10/12/2011    Edema leg 10/12/2011    Cough 08/17/2011    Tick bite, infected 08/17/2011    Onychomycosis 08/19/2010    Shoulder pain, bilateral 03/26/2010    Back pain 03/26/2010    Primary osteoarthritis involving multiple joints 03/25/2010      Past Surgical History:   Procedure Laterality Date    HX HEART CATHETERIZATION      HX HEENT      bilat cateract extraction    HX HYSTERECTOMY      HX KNEE REPLACEMENT      HX LUMBAR FUSION      HX ORTHOPAEDIC      bilateral TKR    HX ORTHOPAEDIC      neck and back surgery    HX ORTHOPAEDIC      bilateral bunionectomy    HX ORTHOPAEDIC      bilateral cataracts removed    HX PTCA       Allergies   Allergen Reactions    Diclofenac Unknown (comments)    Lisinopril Cough    Lisinopril-Hydrochlorothiazide Unknown (comments)      Family History   Problem Relation Age of Onset    Hypertension Mother     Stroke Mother     Other Mother      arthritis    Coronary Artery Disease Father     Heart Disease Father     Hypertension Sister     Diabetes Sister     Coronary Artery Disease Sister     Cancer Sister      lung    Coronary Artery Disease Brother     Cancer Brother      lung    Heart Disease Brother       Social History     Social History    Marital status:      Spouse name: N/A    Number of children: N/A    Years of education: N/A     Occupational History    Not on file. Social History Main Topics    Smoking status: Never Smoker    Smokeless tobacco: Never Used    Alcohol use Yes      Comment: occasional     Drug use: Yes     Special: Prescription, OTC    Sexual activity: No     Other Topics Concern    Not on file     Social History Narrative    ** Merged History Encounter **           Current Outpatient Prescriptions   Medication Sig    losartan (COZAAR) 100 mg tablet Take 1 Tab by mouth daily.  atenolol (TENORMIN) 50 mg tablet Take 1 Tab by mouth daily.  pravastatin (PRAVACHOL) 40 mg tablet take 1 tablet by mouth NIGHTLY    [START ON 6/7/2018] oxyCODONE-acetaminophen (PERCOCET) 5-325 mg per tablet Take 1 Tab by mouth two (2) times daily as needed for Pain. Max Daily Amount: 2 Tabs.  nitroglycerin (NITROSTAT) 0.4 mg SL tablet PLACE 1 TABLET UNDER THE TONGUE EVERY 5 MINS AS NEEDED FOR CHEST PAIN    acetaminophen (TYLENOL) 500 mg tablet Take  by mouth every six (6) hours as needed for Pain.  NIFEdipine ER (NIFEDICAL XL) 30 mg ER tablet Take 1 Tab by mouth daily.     calcium-cholecalciferol, D3, (CALTRATE 600+D) tablet Take 1 Tab by mouth two (2) times a day.  bumetanide (BUMEX) 1 mg tablet Take 1 Tab by mouth daily.  MISCELLANEOUS MEDICAL SUPPLY (COMPRESSION STOCKINGS) daily.  aspirin delayed-release 81 mg tablet Take 1 Tab by mouth daily.  calcium carbonate (ZHOU-600 PO) Take 600 mg by mouth two (2) times a day.  HYDROcodone-acetaminophen (NORCO) 5-325 mg per tablet 2 tablets    CALCIUM 600 WITH VITAMIN D3 600 mg(1,500mg) -400 unit chew Take 600 mg by mouth two (2) times a day.  NIFEdipine ER (ADALAT CC) 30 mg ER tablet 1 tablet on an empty stomach    potassium chloride SR (KLOR-CON 10) 10 mEq tablet 1 tablet PO once daily    naloxone (NARCAN) 4 mg/actuation nasal spray Use 1 spray intranasally into 1 nostril. Use a new Narcan nasal spray for subsequent doses and administer into alternating nostrils. May repeat every 2 to 3 minutes as needed.  potassium chloride (KLOR-CON) 10 mEq tablet Take 2 Tabs by mouth daily. No current facility-administered medications for this visit. I have reviewed the nurses notes, vitals, problem list, allergy list, medical history, family medical, social history and medications. Objective:     Physical Exam:     Vitals:    05/15/18 1505 05/15/18 1506   BP: 121/65 127/67   Pulse: (!) 59 (!) 59   Resp: 12    SpO2: 96%    Weight: 228 lb (103.4 kg)    Height: 5' 6\" (1.676 m)     Body mass index is 36.8 kg/(m^2). General: WDWN obese elderly woman, in no acute distress. Pleasant affect. HEENT: No carotid bruits, no JVD, trach is midline. Heart:  Normal S1/S2 negative S3 or S4. Regular, no murmur, gallop or rub.   Respiratory: Clear bilaterally, no wheezing or rales  Abdomen:   Soft, non-tender, bowel sounds are active.   Extremities:  Tense BLE edema with some 2+ pitting to mid-tibia, normal cap refill, no cyanosis. Neuro: A&Ox3, speech clear, gait stable. Skin: Skin color changes/petichea to BLE. No rashes or lesions. No diaphoresis.   Vascular: 2+ pulses symmetric in all extremities      Data Review:       Cardiographics:    EKG interpretation:  Rhythm: sinus bradycardia; and regular . Rate (approx.): 57; Axis: normal; P wave: normal; QRS interval: normal ; ST/T wave: normal. This EKG was interpreted by Jess Woodard PA-C. Cardiology Labs:    Results for orders placed or performed during the hospital encounter of 06/06/12   EKG, 12 LEAD, INITIAL   Result Value Ref Range    Ventricular Rate 66 BPM    Atrial Rate 66 BPM    P-R Interval 188 ms    QRS Duration 84 ms    Q-T Interval 418 ms    QTC Calculation (Bezet) 438 ms    Calculated P Axis 58 degrees    Calculated R Axis 16 degrees    Calculated T Axis 42 degrees    Diagnosis       Sinus rhythm with premature atrial complexes  When compared with ECG of 06-JUN-2012 10:45,  premature atrial complexes are now present  Confirmed by Paris Merrill ([id number]) on 6/7/2012 8:37:17 AM       Lab Results   Component Value Date/Time    Cholesterol, total 234 (H) 05/07/2018 09:29 AM    HDL Cholesterol 63 05/07/2018 09:29 AM    LDL, calculated 148 (H) 05/07/2018 09:29 AM    Triglyceride 117 05/07/2018 09:29 AM    CHOL/HDL Ratio 4.5 08/19/2010 12:27 PM       Lab Results   Component Value Date/Time    Sodium 145 (H) 09/11/2017 09:56 AM    Potassium 4.7 09/11/2017 09:56 AM    Chloride 103 09/11/2017 09:56 AM    CO2 26 09/11/2017 09:56 AM    Anion gap 7 06/07/2012 03:00 AM    Glucose 107 (H) 09/11/2017 09:56 AM    BUN 31 (H) 09/11/2017 09:56 AM    Creatinine 1.37 (H) 09/11/2017 09:56 AM    BUN/Creatinine ratio 23 09/11/2017 09:56 AM    GFR est AA 41 (L) 09/11/2017 09:56 AM    GFR est non-AA 35 (L) 09/11/2017 09:56 AM    Calcium 10.2 09/11/2017 09:56 AM    Bilirubin, total 0.4 09/11/2017 09:56 AM    AST (SGOT) 24 09/11/2017 09:56 AM    Alk.  phosphatase 61 09/11/2017 09:56 AM    Protein, total 7.2 09/11/2017 09:56 AM    Albumin 4.5 09/11/2017 09:56 AM    Globulin 4.0 11/12/2011 11:03 AM    A-G Ratio 1.7 09/11/2017 09:56 AM    ALT (SGPT) 13 09/11/2017 09:56 AM        ECHO February 2018 SUMMARY:  Left ventricle: Size was normal. Ejection fraction was estimated in the  range of 70 % to 75 %. There were no regional wall motion abnormalities. Wall thickness was at the upper limits of normal. Doppler parameters were  consistent with abnormal left ventricular relaxation (grade 1 diastolic  dysfunction). Left atrium: The atrium was moderately dilated. Atrial septum: There was redundancy of the septum, with borderline  criteria for aneurysm. The atrial septum appeared intact with borderline  ASA and respirophasic motion. Mitral valve: There was trivial regurgitation. Tricuspid valve: There was trivial regurgitation. Assessment:       ICD-10-CM ICD-9-CM    1. Essential hypertension, benign I10 401.1 AMB POC EKG ROUTINE W/ 12 LEADS, INTER & REP   2. Coronary artery disease involving native coronary artery of native heart without angina pectoris I25.10 414.01    3. CKD (chronic kidney disease) stage 3, GFR 30-59 ml/min N18.3 585.3    4. Type 2 diabetes mellitus without complication, unspecified whether long term insulin use ([facility]) E11.9 250.00    5. S/P angioplasty with stent--RCA 6/12 Z95.9 V45.89    6. SOB (shortness of breath) on exertion R06.02 786.05          Discussion: Patient presents at this time stable from a cardiac perspective. BP was well controlled. LV wall thickness and EF have increased from prior echo done in 2012. EF went from 55-60% to 70-75%. Some left atrial dilatation. Discussed/seen with Dr. Vasquez Sosa: 1. Continue same meds. 2. Lipid profile and labs followed by PCP. 3.Encouraged to exercise to tolerance, lose weight, and follow low fat, low cholesterol, low sodium predominantly Plant-based (consider Mediterranean) diet. 4.Follow up: 6 months   --  Call with questions or concerns. -- Will follow up any test results by phone and/or f/u here in office if needed. .        I have discussed the diagnosis with the patient and the intended plan as seen in the above orders. The patient has received an after-visit summary and questions were answered concerning future plans. I have discussed any concerning medication side effects and warnings with the patient as well. Patient seen and examined. All pertinent data reviewed. I have reviewed detailed note as outlined by Margo Powell. Case discussed with Nursing/medical assistant staff and Margo Powell. Patient presents cardiac stable. Echo unremarkable--EF 70-75%. Plans as outlined. Amadou [facility], PAS[facility]etaC  5/15/2018     Virgil Canseco Conn

## 2018-05-15 NOTE — MR AVS SNAPSHOT
Sonia Harper 
 
 
 [address] 
[phone] Patient: Julien Mac MRN: [id number] DTV:[id number]/[id number] Visit Information Date & Time Provider Department Dept. Phone Encounter #  
 5/15/2018  2:45 PM Iveth Vincent MD [facility] [place] [address] [id number] Your Appointments 8/6/2018  8:30 AM  
ROUTINE CARE with Watson Martinez MD  
[address] OFFICE-ANNEX ([facility]) Appt Note: 3 mo f/u  
 [address] [facility] [employer] [address] [id number] [phone] [address] [phone]  
  
    
 11/27/2018  2:30 PM  
ESTABLISHED PATIENT with Iveth Vincent MD  
[facility]) Appt Note: SIX MONTH FOLLOW UP- [place] [id number] [address]  
[phone] [address] Upcoming Health Maintenance Date Due  
 GLAUCOMA SCREENING Q2Y 12/4/2017 Influenza Age 5 to Adult 8/1/2018 MICROALBUMIN Q1 9/11/2018 EYE EXAM RETINAL OR DILATED Q1 9/11/2018 MEDICARE YEARLY EXAM 9/12/2018 FOOT EXAM Q1 9/29/2018 HEMOGLOBIN A1C Q6M 11/7/2018 LIPID PANEL Q1 5/7/2019 DTaP/Tdap/Td series (2 - Td) 11/30/2025 Allergies as of 5/15/2018  Review Complete On: 5/15/2018 By: Yadiel Sanchez LPN Severity Noted Reaction Type Reactions Diclofenac  02/27/2018    Unknown (comments) Lisinopril  08/17/2011    Cough Lisinopril-hydrochlorothiazide  02/27/2018    Unknown (comments) Current Immunizations  Reviewed on 11/10/2016 Name Date Influenza High Dose Vaccine PF 8/17/2017, 10/31/2016, 11/1/2015 Influenza Vaccine Whole 10/22/2011 Pneumococcal Polysaccharide (PPSV-23) 8/15/2014 Tdap 11/30/2015 ZZZ-RETIRED (DO NOT USE) Pneumococcal Vaccine (Unspecified Type) 1/15/1996 Zoster Vaccine, Live 11/11/2016 Not reviewed this visit You Were Diagnosed With   
  
 Codes Comments Essential hypertension, benign    -  Primary ICD-10-CM: I10 
ICD-9-CM: 401.1 Coronary artery disease involving native coronary artery of native heart without angina pectoris     ICD-10-CM: I25.10 ICD-9-CM: 414.01   
 CKD (chronic kidney disease) stage 3, GFR 30-59 ml/min     ICD-10-CM: N18.3 ICD-9-CM: 461. 3 Type 2 diabetes mellitus without complication, unspecified whether long term insulin use (HCC)     ICD-10-CM: E11.9 ICD-9-CM: 250.00 S/P angioplasty with stent     ICD-10-CM: Z95.9 ICD-9-CM: V45.89   
 SOB (shortness of breath) on exertion     ICD-10-CM: R06.02 
ICD-9-CM: 786.05 Vitals BP Pulse Resp Height(growth percentile) Weight(growth percentile) SpO2  
 127/67 (BP 1 Location: Right arm, BP Patient Position: Sitting) (!) 59 12 5' 6\" (1.676 m) 228 lb (103.4 kg) 96% BMI OB Status Smoking Status 36.8 kg/m2 Hysterectomy Never Smoker Vitals History BMI and BSA Data Body Mass Index Body Surface Area  
 36.8 kg/m 2 2.19 m 2 Preferred Pharmacy Pharmacy Name Phone [id number] [phone] Your Updated Medication List  
  
   
This list is accurate as of 5/15/18  3:51 PM.  Always use your most recent med list.  
  
  
  
  
 acetaminophen 500 mg tablet Commonly known as:  TYLENOL Take  by mouth every six (6) hours as needed for Pain. aspirin delayed-release 81 mg tablet Take 1 Tab by mouth daily. atenolol 50 mg tablet Commonly known as:  TENORMIN Take 1 Tab by mouth daily. bumetanide 1 mg tablet Commonly known as:  Pamalee Garter Take 1 Tab by mouth daily. ZHOU-600 PO Take 600 mg by mouth two (2) times a day. * calcium-cholecalciferol (D3) tablet Commonly known as:  CALTRATE 600+D Take 1 Tab by mouth two (2) times a day. * CALCIUM 600 WITH VITAMIN D3 600 mg(1,500mg) -400 unit Chew Generic drug:  Calcium-Cholecalciferol (D3) Take 600 mg by mouth two (2) times a day. COMPRESSION STOCKINGS  
daily. HYDROcodone-acetaminophen 5-325 mg per tablet Commonly known as:  NORCO  
2 tablets  
  
 losartan 100 mg tablet Commonly known as:  COZAAR Take 1 Tab by mouth daily. naloxone 4 mg/actuation nasal spray Commonly known as:  [employer] Use 1 spray intranasally into 1 nostril. Use a new Narcan nasal spray for subsequent doses and administer into alternating nostrils. May repeat every 2 to 3 minutes as needed. * NIFEdipine ER 30 mg ER tablet Commonly known as:  NIFEDICAL XL Take 1 Tab by mouth daily. * NIFEdipine ER 30 mg ER tablet Commonly known as:  ADALAT CC  
1 tablet on an empty stomach  
  
 nitroglycerin 0.4 mg SL tablet Commonly known as:  NITROSTAT PLACE 1 TABLET UNDER THE TONGUE EVERY 5 MINS AS NEEDED FOR CHEST PAIN  
  
 oxyCODONE-acetaminophen 5-325 mg per tablet Commonly known as:  PERCOCET Take 1 Tab by mouth two (2) times daily as needed for Pain. Max Daily Amount: 2 Tabs. Start taking on:  6/7/2018 * potassium chloride 10 mEq tablet Commonly known as:  KLOR-CON Take 2 Tabs by mouth daily. * potassium chloride SR 10 mEq tablet Commonly known as:  KLOR-CON 10  
1 tablet PO once daily  
  
 pravastatin 40 mg tablet Commonly known as:  PRAVACHOL  
take 1 tablet by mouth NIGHTLY * Notice: This list has 6 medication(s) that are the same as other medications prescribed for you. Read the directions carefully, and ask your doctor or other care provider to review them with you. We Performed the Following AMB POC EKG ROUTINE W/ 12 LEADS, INTER & REP [92438 CPT(R)] Introducing [facility]! [facility] introduces [employer] patient portal. Now you can access parts of your medical record, email your doctor's office, and request medication refills online. 1. In your internet browser, go to [email or web address] 2. Click on the First Time User? Click Here link in the Sign In box. You will see the New Member Sign Up page. 3. Enter your [employer] Access Code exactly as it appears below. You will not need to use this code after youve completed the sign-up process. If you do not sign up before the expiration date, you must request a new code. · [employer] Access Code: [email or web address] Expires: 6/1/2018  6:32 AM 
 
4. Enter the last four digits of your Social Security Number (xxxx) and Date of Birth (mm/dd/yyyy) as indicated and click Submit. You will be taken to the next sign-up page. 5. Create a [employer] ID. This will be your [employer] login ID and cannot be changed, so think of one that is secure and easy to remember. 6. Create a [employer] password. You can change your password at any time. 7. Enter your Password Reset Question and Answer. This can be used at a later time if you forget your password. 8. Enter your e-mail address. You will receive e-mail notification when new information is available in [address] 9. Click Sign Up. You can now view and download portions of your medical record. 10. Click the Download Summary menu link to download a portable copy of your medical information. If you have questions, please visit the Frequently Asked Questions section of the [employer] website. Remember, [employer] is NOT to be used for urgent needs. For medical emergencies, dial 911. Now available from your iPhone and Android! Please provide this summary of care documentation to your next provider. Your primary care clinician is listed as Jen Cooley. If you have any questions after today's visit, please call [phone].

## 2018-05-15 NOTE — PROGRESS NOTES
Chief Complaint   Patient presents with    Hypertension     no other complaints. 1. Have you been to the ER, urgent care clinic since your last visit? Hospitalized since your last visit? No    2. Have you seen or consulted any other health care providers outside of the [facility] since your last visit? Include any pap smears or colon screening.  No

## 2018-06-28 DIAGNOSIS — E78.00 HIGH CHOLESTEROL: ICD-10-CM

## 2018-06-28 DIAGNOSIS — M19.90 ARTHRITIS: ICD-10-CM

## 2018-07-02 RX ORDER — BUMETANIDE 1 MG/1
TABLET ORAL
Qty: 90 TAB | Refills: 4 | Status: SHIPPED | OUTPATIENT
Start: 2018-07-02 | End: 2018-08-06 | Stop reason: SDUPTHER

## 2018-08-06 ENCOUNTER — OFFICE VISIT (OUTPATIENT)
Dept: FAMILY MEDICINE CLINIC | Age: 83
End: 2018-08-06

## 2018-08-06 VITALS
WEIGHT: 226.8 LBS | RESPIRATION RATE: 16 BRPM | BODY MASS INDEX: 36.45 KG/M2 | OXYGEN SATURATION: 96 % | DIASTOLIC BLOOD PRESSURE: 47 MMHG | HEART RATE: 50 BPM | TEMPERATURE: 96.2 F | HEIGHT: 66 IN | SYSTOLIC BLOOD PRESSURE: 114 MMHG

## 2018-08-06 DIAGNOSIS — E78.00 HIGH CHOLESTEROL: ICD-10-CM

## 2018-08-06 DIAGNOSIS — M54.6 CHRONIC LEFT-SIDED THORACIC BACK PAIN: ICD-10-CM

## 2018-08-06 DIAGNOSIS — R60.0 BILATERAL EDEMA OF LOWER EXTREMITY: ICD-10-CM

## 2018-08-06 DIAGNOSIS — I25.10 CORONARY ARTERY DISEASE DUE TO CALCIFIED CORONARY LESION: ICD-10-CM

## 2018-08-06 DIAGNOSIS — G89.29 CHRONIC LEFT-SIDED THORACIC BACK PAIN: ICD-10-CM

## 2018-08-06 DIAGNOSIS — M25.562 CHRONIC PAIN OF BOTH KNEES: ICD-10-CM

## 2018-08-06 DIAGNOSIS — G89.29 CHRONIC PAIN OF BOTH SHOULDERS: ICD-10-CM

## 2018-08-06 DIAGNOSIS — M25.511 CHRONIC PAIN OF BOTH SHOULDERS: ICD-10-CM

## 2018-08-06 DIAGNOSIS — I10 ESSENTIAL HYPERTENSION: ICD-10-CM

## 2018-08-06 DIAGNOSIS — I25.84 CORONARY ARTERY DISEASE DUE TO CALCIFIED CORONARY LESION: ICD-10-CM

## 2018-08-06 DIAGNOSIS — M25.512 CHRONIC PAIN OF BOTH SHOULDERS: ICD-10-CM

## 2018-08-06 DIAGNOSIS — G89.29 CHRONIC PAIN OF BOTH KNEES: ICD-10-CM

## 2018-08-06 DIAGNOSIS — G89.29 CHRONIC MIDLINE LOW BACK PAIN WITHOUT SCIATICA: ICD-10-CM

## 2018-08-06 DIAGNOSIS — Z78.9 PATIENT IS FULL CODE: ICD-10-CM

## 2018-08-06 DIAGNOSIS — M54.50 CHRONIC MIDLINE LOW BACK PAIN WITHOUT SCIATICA: ICD-10-CM

## 2018-08-06 DIAGNOSIS — M25.561 CHRONIC PAIN OF BOTH KNEES: ICD-10-CM

## 2018-08-06 DIAGNOSIS — Z13.5 SCREENING FOR GLAUCOMA: Primary | ICD-10-CM

## 2018-08-06 DIAGNOSIS — M19.90 ARTHRITIS: ICD-10-CM

## 2018-08-06 RX ORDER — ASPIRIN 81 MG/1
81 TABLET ORAL EVERY MORNING
COMMUNITY
Start: 2018-07-05

## 2018-08-06 RX ORDER — SPIRONOLACTONE 25 MG
TABLET ORAL
COMMUNITY
Start: 2018-07-05 | End: 2018-08-06 | Stop reason: SDUPTHER

## 2018-08-06 RX ORDER — LOSARTAN POTASSIUM 100 MG/1
100 TABLET ORAL DAILY
Qty: 90 TAB | Refills: 1 | Status: SHIPPED | OUTPATIENT
Start: 2018-08-06 | End: 2019-02-04 | Stop reason: ALTCHOICE

## 2018-08-06 RX ORDER — OXYCODONE AND ACETAMINOPHEN 5; 325 MG/1; MG/1
1 TABLET ORAL
Qty: 60 TAB | Refills: 0 | Status: SHIPPED | OUTPATIENT
Start: 2018-09-20 | End: 2019-02-04 | Stop reason: SDUPTHER

## 2018-08-06 RX ORDER — ACETAMINOPHEN 500 MG/1
500 CAPSULE, LIQUID FILLED ORAL AS NEEDED
COMMUNITY
Start: 2018-07-05 | End: 2018-08-06 | Stop reason: SDUPTHER

## 2018-08-06 RX ORDER — OXYCODONE AND ACETAMINOPHEN 5; 325 MG/1; MG/1
1 TABLET ORAL
Qty: 60 TAB | Refills: 0 | Status: SHIPPED | OUTPATIENT
Start: 2018-08-15 | End: 2019-02-04 | Stop reason: SDUPTHER

## 2018-08-06 NOTE — PROGRESS NOTES
Name and  verified      Chief Complaint   Patient presents with    Medication Refill         Health Maintenance reviewed-discussed with patient. 1. Have you been to the ER, urgent care clinic since your last visit? Hospitalized since your last visit? No    2. Have you seen or consulted any other health care providers outside of the [address] [place] [address] since your last visit? Include any pap smears or colon screening. No     Patient stated upcoming eye exam appointment  medical release form given patient acknowledged understanding.

## 2018-08-06 NOTE — ACP (ADVANCE CARE PLANNING)
Advance Care Planning  Other Legally Authorized Decision Maker would be daughter as SDM     For My patients who has currently great Decision Making Capacity:     Patient is on no presence of family member stated that she wants to be not DNR at this time,  she likes to be a full code individual,  Pt was given the form, she will sign and bring us a copy on the later date.

## 2018-08-06 NOTE — MR AVS SNAPSHOT
[address] [facility] [address] [address] 
[phone] Patient: Leonid Zepeda MRN: [id number] MYI:[id number] Visit Information Date & Time Provider Department Dept. Phone Encounter #  
 8/6/2018  8:30 AM Tracie Blum MD [facility] [phone] [id number] Follow-up Instructions Return if symptoms worsen or fail to improve. Your Appointments 11/27/2018  2:30 PM  
ESTABLISHED PATIENT with Leora Ball MD  
[facility]) Appt Note: SIX MONTH FOLLOW UP- [facility]  
 [address] [facility]  
[phone] [address] Upcoming Health Maintenance Date Due  
 GLAUCOMA SCREENING Q2Y 12/4/2017 Influenza Age 5 to Adult 11/1/2018* MICROALBUMIN Q1 9/11/2018 EYE EXAM RETINAL OR DILATED Q1 9/11/2018 MEDICARE YEARLY EXAM 9/12/2018 FOOT EXAM Q1 9/29/2018 HEMOGLOBIN A1C Q6M 11/7/2018 LIPID PANEL Q1 5/7/2019 DTaP/Tdap/Td series (2 - Td) 11/30/2025 *Topic was postponed. The date shown is not the original due date. Allergies as of 8/6/2018  Review Complete On: 8/6/2018 By: Tracie Blum MD  
  
 Severity Noted Reaction Type Reactions Diclofenac  02/27/2018    Unknown (comments) Lisinopril  08/17/2011    Cough Lisinopril-hydrochlorothiazide  02/27/2018    Unknown (comments) Current Immunizations  Reviewed on 11/10/2016 Name Date Influenza High Dose Vaccine PF 8/17/2017, 10/31/2016, 11/1/2015 Influenza Vaccine Whole 10/22/2011 Pneumococcal Polysaccharide (PPSV-23) 8/15/2014 Tdap 11/30/2015 ZZZ-RETIRED (DO NOT USE) Pneumococcal Vaccine (Unspecified Type) 1/15/1996 Zoster Vaccine, Live 11/11/2016 Not reviewed this visit You Were Diagnosed With   
  
 Codes Comments Screening for glaucoma    -  Primary ICD-10-CM: Z13.5 ICD-9-CM: V80.1 Arthritis     ICD-10-CM: M19.90 ICD-9-CM: 716.90 Chronic pain of both shoulders     ICD-10-CM: M25.511, G89.29, M25.512 ICD-9-CM: 719.41, 338.29 Chronic left-sided thoracic back pain     ICD-10-CM: M54.6, G89.29 ICD-9-CM: 724.1, 338.29 Chronic midline low back pain without sciatica     ICD-10-CM: M54.5, G89.29 ICD-9-CM: 724.2, 338.29 Chronic pain of both knees     ICD-10-CM: M25.561, M25.562, G89.29 ICD-9-CM: 719.46, 338.29 High cholesterol     ICD-10-CM: E78.00 ICD-9-CM: 272.0 Coronary artery disease due to calcified coronary lesion     ICD-10-CM: I25.10, I25.84 ICD-9-CM: 414.00, 414.4 Bilateral edema of lower extremity     ICD-10-CM: R60.0 ICD-9-CM: 782.3 Essential hypertension     ICD-10-CM: I10 
ICD-9-CM: 401.9 Patient is full code     ICD-10-CM: Z78.9 ICD-9-CM: V49.89 Vitals BP Pulse Temp Resp Height(growth percentile) Weight(growth percentile) 114/47 (BP 1 Location: Left arm, BP Patient Position: At rest) (!) 50 96.2 °F (35.7 °C) (Oral) 16 5' 6\" (1.676 m) 226 lb 12.8 oz (102.9 kg) SpO2 BMI OB Status Smoking Status 96% 36.61 kg/m2 Hysterectomy Never Smoker Vitals History BMI and BSA Data Body Mass Index Body Surface Area  
 36.61 kg/m 2 2.19 m 2 Preferred Pharmacy Pharmacy Name Phone [facility] [address] [place], [address] [facility] [phone] Your Updated Medication List  
  
   
This list is accurate as of 8/6/18  9:39 AM.  Always use your most recent med list.  
  
  
  
  
 acetaminophen 500 mg tablet Commonly known as:  TYLENOL Take  by mouth every six (6) hours as needed for Pain. ASPIR-LOW 81 mg tablet Generic drug:  aspirin delayed-release Take 81 mg by mouth daily. atenolol 50 mg tablet Commonly known as:  TENORMIN Take 1 Tab by mouth daily. bumetanide 1 mg tablet Commonly known as:  Malachy Bile Take 1 Tab by mouth daily. CALCIUM 600 WITH VITAMIN D3 600 mg(1,500mg) -400 unit Chew Generic drug:  Calcium-Cholecalciferol (D3) Take 600 mg by mouth two (2) times a day. COMPRESSION STOCKINGS  
daily. losartan 100 mg tablet Commonly known as:  COZAAR Take 1 Tab by mouth daily. naloxone 4 mg/actuation nasal spray Commonly known as:  [employer] Use 1 spray intranasally into 1 nostril. Use a new Narcan nasal spray for subsequent doses and administer into alternating nostrils. May repeat every 2 to 3 minutes as needed. NIFEdipine ER 30 mg ER tablet Commonly known as:  ADALAT CC  
1 tablet on an empty stomach  
  
 nitroglycerin 0.4 mg SL tablet Commonly known as:  NITROSTAT PLACE 1 TABLET UNDER THE TONGUE EVERY 5 MINS AS NEEDED FOR CHEST PAIN  
  
 * oxyCODONE-acetaminophen 5-325 mg per tablet Commonly known as:  PERCOCET Take 1 Tab by mouth two (2) times daily as needed for Pain. Max Daily Amount: 2 Tabs. Start taking on:  8/15/2018 * oxyCODONE-acetaminophen 5-325 mg per tablet Commonly known as:  PERCOCET Take 1 Tab by mouth two (2) times daily as needed for Pain. Max Daily Amount: 2 Tabs. Start taking on:  9/20/2018 potassium chloride 10 mEq tablet Commonly known as:  KLOR-CON Take 2 Tabs by mouth daily. pravastatin 40 mg tablet Commonly known as:  PRAVACHOL  
take 1 tablet by mouth NIGHTLY * Notice: This list has 2 medication(s) that are the same as other medications prescribed for you. Read the directions carefully, and ask your doctor or other care provider to review them with you. Prescriptions Printed Refills  
 oxyCODONE-acetaminophen (PERCOCET) 5-325 mg per tablet 0 Starting on: 8/15/2018 Sig: Take 1 Tab by mouth two (2) times daily as needed for Pain. Max Daily Amount: 2 Tabs. Class: Print  Route: Oral  
 oxyCODONE-acetaminophen (PERCOCET) 5-325 mg per tablet 0  
 Starting on: 9/20/2018 Sig: Take 1 Tab by mouth two (2) times daily as needed for Pain. Max Daily Amount: 2 Tabs. Class: Print Route: Oral  
  
Prescriptions Sent to Pharmacy Refills  
 losartan (COZAAR) 100 mg tablet 1 Sig: Take 1 Tab by mouth daily. Class: Normal  
 [facility]acy: [facility] [address] [facility] [address] Ph #: [phone] Route: Oral  
  
We Performed the Following PAIN MGMT PANEL W/REFL, UR [[id number] Custom] REFERRAL TO OPTOMETRY [id number] Custom] Comments:  
 Please evaluate patient for glaucoma. Follow-up Instructions Return if symptoms worsen or fail to improve. Referral Information Referral ID Referred By Referred To  
  
 [id number] SHARDA HILL MD   
   [address] [place] [address], [address] [place], [place] [address] Phone: [phone] Fax: [phone] Visits Status Start Date End Date 1 New Request 8/6/18 8/6/19 If your referral has a status of pending review or denied, additional information will be sent to support the outcome of this decision. Patient Instructions Therapeutic Ball: Back Exercises Your Care Instructions Here are some examples of typical exercises for your condition. Start each exercise slowly. Ease off the exercise if you start to have pain. Your doctor or physical therapist will tell you when you can start these exercises and which ones will work best for you. To prepare, make sure that your ball is the right size for you. When inflated and firm, it should allow you to sit with your hips and knees bent at about a 90-degree angle (like the letter L). How to do the exercises Seated position on ball 1. Use this exercise to get used to moving on the ball and to find your best sitting position. 2. Sit comfortably on the ball with your feet about hip-width apart. If you feel unsteady, rest your hands on the ball near your hips. 3. As you do this exercise, try to keep your shoulders and upper body relaxed and still. 4. Using your stomach and back muscles to move your pelvis, roll the ball forward. This will round your back. 5. Still using your stomach and back muscles, roll the ball back. You will arch your back. 6. Repeat this rounding-arching motion a few times. 7. Stop in between the two positions, where your back is not rounded or arched. This is called your neutral position. Pelvic rotation 1. Sit tall on the ball. 2. Slowly rotate your hips in a Samish pattern. Keep the movement focused at your hips. 3. Repeat, but Samish in the other direction. 4. Repeat 8 to 12 times. Postural sitting 1. Use this position to find a stable, relaxed posture on the ball. You can use this position as your starting point for other ball exercises. If you feel unsteady on the ball, start on a chair first. 
2. Sit on a ball or chair, with your feet planted straight in front of you. 3. Imagine that a string at the top of your head is pulling you straight up. Think of yourself as 2 inches taller than you are. 
4. Slightly tuck your chin. 5. Keep your shoulders back and relaxed. Knee extension 1. Sit tall on the ball with your feet planted in front of you, hip-width apart. As you do this exercise, avoid slumping your shoulders and arching your back. 2. Rest your hands on the ball near your hip or a steady object next to you. (If you feel very stable on the ball, rest your hands in your lap or at your side.) 3. Slowly straighten one leg at the knee. Slowly lower it back down. Repeat with the other leg. 4. Repeat this exercise 8 to 12 times. Roll-ups 1. Lie on your back with your knees bent, feet resting on the floor. 2. Lay the ball on your thighs. Rest your hands up high on the ball. 3. Raising your head and shoulder blades, roll the ball up your thighs. Exhale as you roll up. 4. If this is hard on your neck, gently support your lower head and upper neck with one hand. Don't use that hand to pull your head up. 5. Repeat 8 to 12 times. Ball curls 1. Lie on your back with your ankles resting on the ball, knees straight. 2. Use your legs to roll the exercise ball toward you. Allow your knees to bend and move closer to your chest. 
3. Pause briefly, and then roll the ball to the starting position. Try to keep the ball rolling straight. You will feel the muscles in your lower belly working. 4. Repeat 8 to 12 times. Bridge with ball under legs 1. Lie on your back with your legs up, calves resting on the ball. For more challenge, rest your heels on the ball. 2. Look up at the ceiling, and keep your chin relaxed. You can place a small pillow under your head or neck for comfort. 3. With your arms by your side, press your hands onto the floor for stability. 4. Tighten your belly muscles by pulling in your belly button toward your spine. 5. Push your heels down toward the floor, squeeze your buttocks, and lift your hips off the floor until your shoulders, hips, and knees are all in a straight line. 6. Try to keep the ball steady. Hold for about 6 seconds as you continue to breathe normally. 7. Slowly lower your hips back down to the floor. 8. Repeat 8 to 12 times. Ball curls with bridge 1. Start flat on your back with your ankles resting on the ball. 2. Look up at the ceiling, and keep your chin relaxed. You can place a small pillow under your head or neck for comfort. 3. With your arms by your side, press your hands onto the floor for stability. 4. Tighten your belly muscles by pulling in your belly button toward your spine. 5. Push your heels down toward the floor, squeeze your buttocks, and lift your hips off the floor until your shoulders, hips, and knees are all in a straight line.  
6. While holding the bridge position, roll the ball toward you with your heels. Keep your hips as level as you can. 7. Pause briefly, and then roll the ball back out. Try to keep the ball rolling straight. You will feel the muscles in your lower belly working as you straighten your legs. 8. Lower your hips, and return to your starting position. 9. Repeat 8 to 12 times. 10. When you can keep your body and the ball steady throughout this exercise, you're ready for more challenge. Try keeping your hips raised while rolling the ball out, holding the bridge, and rolling back, a few times in a row. Praying paras 1. Kneel upright with the ball in front of you. 2. To start, clasp your hands together. Rest them on the ball in front of you. 3. As you do this exercise, keep your back and hips straight and tighten your belly and buttocks muscles. Keep your knees in place. 4. Press on the ball with your arms. Lean forward from the knees. This rolls the ball forward. You will bear most of your weight on your arms. 5. If your back starts to ache, you've gone too far. Pull back a bit. 6. Roll back to the start position. 7. Repeat 8 to 12 times. Walk-out plank on ball 1. Kneel over the ball. Place your hands on the floor in front of you. 2. Walk your hands forward until your legs are straight on the ball. This is the plank position. 3. When in plank position, hold your body straight and tighten your belly and buttocks muscles. Keep your chin slightly tucked. 4. Roll as far forward as you can without losing your balance or letting your hips drop. You may stop with the ball under your thighs, or even under your knees or shins. 5. Hold a few seconds, then walk your hands back and return to the start position. 6. Repeat 8 to 12 times. Push-up with thighs on ball 1. Kneel over the ball. Place your hands on the floor in front of you. 2. Walk your hands forward until your legs are straight on the ball. This is the plank position. 3. When in plank position, hold your body straight and tighten your belly and buttocks muscles. Keep your chin slightly tucked. 4. Roll as far forward as you can without losing your balance or letting your hips drop. You may stop with the ball under your thighs, or even under your knees or shins. 5. Bend your elbows. Slowly lower your body toward the ground as far as you can without losing your balance. 6. If your wrists hurt, try moving your hands a little farther apart so they're not right under your shoulders. 7. Slowly straighten your arms. 8. Do 8 to 12 of these push-ups. Wall squat with ball 1. Stand facing away from a wall. Place your feet about shoulder-width apart. 2. Place the ball between your middle back and the wall. Move your feet out in front of you so they are about a foot in front of your hips. 3. Keep your arms at your sides, or put your hands on your hips. 4. Slowly squat down as if you are going to sit in a chair, rolling your back over the ball as you squat. The ball should move with you but stay pressed into the wall. 5. Be sure that your knees do not go in front of your toes as you squat. 6. Hold for 6 seconds. 7. Slowly rise to your standing position. 8. Repeat 8 to 12 times. Child's pose with ball 1. Kneeling upright with your back straight, rest your hands on the ball in front of you. 2. Breathe out as you bend at the hips, and roll the ball forward. Lower your chest toward the ground, and drop your hips back toward your heels. 3. To stretch your upper back and shoulders, hold this position for 15 to 30 seconds. 4. Repeat 2 to 4 times. Follow-up care is a key part of your treatment and safety. Be sure to make and go to all appointments, and call your doctor if you are having problems. It's also a good idea to know your test results and keep a list of the medicines you take. Where can you learn more? Go to http://brianne-ger.info/. Enter W395 in the search box to learn more about \"Therapeutic Ball: Back Exercises. \" Current as of: November 29, 2017 Content Version: 11.7 © 7728-6315 [employer], [employer]. Care instructions adapted under license by [employer] (which disclaims liability or warranty for this information). If you have questions about a medical condition or this instruction, always ask your healthcare professional. [address] any warranty or liability for your use of this information. Introducing [facility]! [facility] introduces [employer] patient portal. Now you can access parts of your medical record, email your doctor's office, and request medication refills online. 1. In your internet browser, go to [email or web address] 2. Click on the First Time User? Click Here link in the Sign In box. You will see the New Member Sign Up page. 3. Enter your [employer] Access Code exactly as it appears below. You will not need to use this code after youve completed the sign-up process. If you do not sign up before the expiration date, you must request a new code. · [employer] Access Code: [email or web address] Expires: 11/4/2018  9:39 AM 
 
4. Enter the last four digits of your Social Security Number (xxxx) and Date of Birth (mm/dd/yyyy) as indicated and click Submit. You will be taken to the next sign-up page. 5. Create a [employer] ID. This will be your [employer] login ID and cannot be changed, so think of one that is secure and easy to remember. 6. Create a [employer] password. You can change your password at any time. 7. Enter your Password Reset Question and Answer. This can be used at a later time if you forget your password. 8. Enter your e-mail address. You will receive e-mail notification when new information is available in [address] 9. Click Sign Up. You can now view and download portions of your medical record. 10. Click the Download Summary menu link to download a portable copy of your medical information. If you have questions, please visit the Frequently Asked Questions section of the [employer] website. Remember, [employer] is NOT to be used for urgent needs. For medical emergencies, dial 911. Now available from your iPhone and Android! Please provide this summary of care documentation to your next provider. Your primary care clinician is listed as Yasemin Lopez. If you have any questions after today's visit, please call [phone].

## 2018-08-06 NOTE — PROGRESS NOTES
HISTORY OF PRESENT ILLNESS  Sophy Cosme  is a 80 y.o. female. HPI     Chronic low-mid  back,  feet. Legs pain The patient's pain has been an ongoing struggling concerning problem, present for refills, never abused any prescribed meds, no hx of illicit nor etoh abuse, the pain has been bothering the pt for many yrs, pt aware that there are no other alternative approach for the current pain that exist except for the available opioid based meds with their huge addictive properties, has the pill count of 10 lefts and stating that some days doesnot take it , take other pain meds, last time taken one opioid based pain meds was this AM as she states with her witnessed the granddaughter  they all Started few yrs ago with hx of worsening months after months and not responding to other offered therapy by the specialists. the pt's wt started and the current BMI is not a helpfull contributor to the pt current joints pain,    Patient states that the current dosage of the meds given, not strong enough, but it is helping,      with the current medications,  the pt capable of doing things specially the activity of the daily living, and also they are improving the quality of the pt's life,   this pt has tried all the other Non- Narcotic meds including surgical repairs and procedures, stating that none have been able to ease down the pain,  Based on the [place] PM report, the pt is not DOC shopping, and the pt is aware of random [facility] drug screen,  if foul finding pt would be terminated, for which the pt agreed    The intensity of the pain is at10/10 w/out med,  Pt's pain persist without medication, is a chronic condition,  compliant with medication takes it as prescribed, keeps meds at a safe place, on stool softener, 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 Current Outpatient Prescriptions   Medication Sig Dispense Refill    ASPIR-LOW 81 mg tablet Take 81 mg by mouth daily.       CALCIUM 600 WITH VITAMIN D3 600 mg(1,500mg) -400 unit chew Take 600 mg by mouth two (2) times a day.  NIFEdipine ER (ADALAT CC) 30 mg ER tablet 1 tablet on an empty stomach 90 Tab 2    losartan (COZAAR) 100 mg tablet Take 1 Tab by mouth daily. 90 Tab 1    atenolol (TENORMIN) 50 mg tablet Take 1 Tab by mouth daily. 90 Tab 1    pravastatin (PRAVACHOL) 40 mg tablet take 1 tablet by mouth NIGHTLY 90 Tab 1    naloxone (NARCAN) 4 mg/actuation nasal spray Use 1 spray intranasally into 1 nostril. Use a new Narcan nasal spray for subsequent doses and administer into alternating nostrils. May repeat every 2 to 3 minutes as needed. 2 Each 11    oxyCODONE-acetaminophen (PERCOCET) 5-325 mg per tablet Take 1 Tab by mouth two (2) times daily as needed for Pain. Max Daily Amount: 2 Tabs. 60 Tab 0    nitroglycerin (NITROSTAT) 0.4 mg SL tablet PLACE 1 TABLET UNDER THE TONGUE EVERY 5 MINS AS NEEDED FOR CHEST PAIN 25 Tab 1    acetaminophen (TYLENOL) 500 mg tablet Take  by mouth every six (6) hours as needed for Pain.  potassium chloride (KLOR-CON) 10 mEq tablet Take 2 Tabs by mouth daily. 90 Tab 1    bumetanide (BUMEX) 1 mg tablet Take 1 Tab by mouth daily. 90 Tab 1    MISCELLANEOUS MEDICAL SUPPLY (COMPRESSION STOCKINGS) daily.       HYDROcodone-acetaminophen (NORCO) 5-325 mg per tablet 2 tablets       Allergies   Allergen Reactions    Diclofenac Unknown (comments)    Lisinopril Cough    Lisinopril-Hydrochlorothiazide Unknown (comments)     Past Medical History:   Diagnosis Date    Arthritis 3/25/2010    Back pain 3/26/2010    CAD (coronary artery disease)     Chest pain, unspecified     Chronic kidney failure, stage 3 (moderate) 12/5/2017    Chronic pain     Chronic pain of both knees 2/6/2018    Edema     Essential hypertension     High cholesterol 3/25/2010    HTN (hypertension) 3/25/2010    Kidney failure, acute ([facility] [address] 4/6/2012    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    Onychomycosis 8/19/2010    Other acute and subacute form of ischemic heart disease     Other and unspecified angina pectoris     Other ill-defined conditions(799.89)     excessive watering of right eye    Shortness of breath     Shoulder pain, bilateral 3/26/2010     Past Surgical History:   Procedure Laterality Date    HX HEART CATHETERIZATION      HX HEENT      bilat cateract extraction    HX HYSTERECTOMY      HX KNEE REPLACEMENT      HX LUMBAR FUSION      HX ORTHOPAEDIC      bilateral TKR    HX ORTHOPAEDIC      neck and back surgery    HX ORTHOPAEDIC      bilateral bunionectomy    HX ORTHOPAEDIC      bilateral cataracts removed    HX PTCA       Family History   Problem Relation Age of Onset    Hypertension Mother     Stroke Mother     Other Mother      arthritis    Coronary Artery Disease Father     Heart Disease Father     Hypertension Sister     Diabetes Sister     Coronary Artery Disease Sister     Cancer Sister      lung    Coronary Artery Disease Brother     Cancer Brother      lung    Heart Disease Brother      Social History   Substance Use Topics    Smoking status: Never Smoker    Smokeless tobacco: Never Used    Alcohol use Yes      Comment: occasional       Lab Results  Component Value Date/Time   WBC 5.6 09/11/2017 09:56 AM   HGB 13.6 09/11/2017 09:56 AM   HCT 41.4 09/11/2017 09:56 AM   PLATELET 418 03/69/3898 09:56 AM   MCV 90 09/11/2017 09:56 AM     Lab Results  Component Value Date/Time   TSH 1.630 03/09/2017 08:57 AM         Review of Systems   Constitutional: Negative for chills, fever and malaise/fatigue. HENT: Negative for congestion and nosebleeds. Eyes: Negative for blurred vision and pain. Respiratory: Negative for shortness of breath and wheezing. Cardiovascular: Negative for chest pain, palpitations and leg swelling. Gastrointestinal: Negative for constipation, diarrhea, nausea and vomiting. Genitourinary: Negative for dysuria and frequency.    Musculoskeletal: Positive for back pain, joint pain and myalgias. Skin: Negative for itching and rash. Neurological: Negative for dizziness, loss of consciousness and headaches. Endo/Heme/Allergies: Does not bruise/bleed easily. Psychiatric/Behavioral: Negative for depression. The patient is not nervous/anxious and does not have insomnia. All other systems reviewed and are negative. Physical Exam   Constitutional: She is oriented to person, place, and time. She appears well-developed and well-nourished. HENT:   Head: Normocephalic and atraumatic. Eyes: Conjunctivae and EOM are normal. Pupils are equal, round, and reactive to light. Neck: No JVD present. No thyromegaly present. Cardiovascular: Normal rate, regular rhythm, normal heart sounds and intact distal pulses. Exam reveals no gallop and no friction rub. No murmur heard. Pulmonary/Chest: Effort normal and breath sounds normal. No stridor. No respiratory distress. She has no wheezes. She has no rales. Abdominal: Soft. Bowel sounds are normal. She exhibits no distension and no mass. There is no tenderness. Musculoskeletal: She exhibits tenderness and deformity. She exhibits no edema. Lymphadenopathy:     She has no cervical adenopathy. Neurological: She is alert and oriented to person, place, and time. She has normal reflexes. No cranial nerve deficit. Skin: No rash noted. No erythema. Psychiatric: She has a normal mood and affect. Her behavior is normal.   Nursing note and vitals reviewed. ASSESSMENT and PLAN  Diagnoses and all orders for this visit:    1. Screening for glaucoma  -     REFERRAL TO OPTOMETRY    2. Arthritis  -     oxyCODONE-acetaminophen (PERCOCET) 5-325 mg per tablet; Take 1 Tab by mouth two (2) times daily as needed for Pain. Max Daily Amount: 2 Tabs. 3. Chronic pain of both shoulders  -     oxyCODONE-acetaminophen (PERCOCET) 5-325 mg per tablet; Take 1 Tab by mouth two (2) times daily as needed for Pain. Max Daily Amount: 2 Tabs.   -     PAIN MGMT PANEL W/REFL, UR  -     oxyCODONE-acetaminophen (PERCOCET) 5-325 mg per tablet; Take 1 Tab by mouth two (2) times daily as needed for Pain. Max Daily Amount: 2 Tabs. 4. Chronic left-sided thoracic back pain  -     oxyCODONE-acetaminophen (PERCOCET) 5-325 mg per tablet; Take 1 Tab by mouth two (2) times daily as needed for Pain. Max Daily Amount: 2 Tabs. -     PAIN MGMT PANEL W/REFL, UR  -     oxyCODONE-acetaminophen (PERCOCET) 5-325 mg per tablet; Take 1 Tab by mouth two (2) times daily as needed for Pain. Max Daily Amount: 2 Tabs. 5. Chronic midline low back pain without sciatica  -     oxyCODONE-acetaminophen (PERCOCET) 5-325 mg per tablet; Take 1 Tab by mouth two (2) times daily as needed for Pain. Max Daily Amount: 2 Tabs. -     PAIN MGMT PANEL W/REFL, UR  -     oxyCODONE-acetaminophen (PERCOCET) 5-325 mg per tablet; Take 1 Tab by mouth two (2) times daily as needed for Pain. Max Daily Amount: 2 Tabs. 6. Chronic pain of both knees  -     oxyCODONE-acetaminophen (PERCOCET) 5-325 mg per tablet; Take 1 Tab by mouth two (2) times daily as needed for Pain. Max Daily Amount: 2 Tabs. -     PAIN MGMT PANEL W/REFL, UR  -     oxyCODONE-acetaminophen (PERCOCET) 5-325 mg per tablet; Take 1 Tab by mouth two (2) times daily as needed for Pain. Max Daily Amount: 2 Tabs. 7. High cholesterol  -     losartan (COZAAR) 100 mg tablet; Take 1 Tab by mouth daily. 8. Coronary artery disease due to calcified coronary lesion  -     losartan (COZAAR) 100 mg tablet; Take 1 Tab by mouth daily. 9. Bilateral edema of lower extremity  -     losartan (COZAAR) 100 mg tablet; Take 1 Tab by mouth daily. 10. Essential hypertension  -     losartan (COZAAR) 100 mg tablet; Take 1 Tab by mouth daily.     11. Patient is full code      Patient medications were refilled after signing the contract consent and no harm documentations and again was told to lessen the amount of opioid-based medication continue with weight reduction do some massage therapy chiropractor exercise therapy such as resistance banding avoid heavy lifting heavy pushing at this time include ibuprofen and Tylenol over-the-counter topical cream for  day views of concerns patient was told to take some Tums or over-the-counter PPI if abdominal upset ice therapy he therapy side effect of current opioid-based medication significantly explained in detail patient acknowledged understanding and agreed with recommendation  in addition, pt was told to avoid machinary operation and driving while on any opioid based medications that will cause dizziness, drowsiness, and sleepiness. Dependency and tolerancy were also addressed,  meds side effects and compliancy advised,  Call or rtc if worsens, radiology results and schedule of future radiology studies reviewed with patient. Pt agreed with today's recommendations.

## 2018-08-10 LAB
AMPHETAMINES UR QL SCN: NEGATIVE NG/ML
BARBITURATES UR QL SCN: NEGATIVE NG/ML
BENZODIAZ UR QL SCN: NEGATIVE NG/ML
BZE UR QL SCN: NEGATIVE NG/ML
CANNABINOIDS UR QL SCN: NEGATIVE NG/ML
CREAT UR-MCNC: 122.8 MG/DL (ref 20–300)
FENTANYL+NORFENTANYL UR QL SCN: NEGATIVE PG/ML
MEPERIDINE UR QL: NEGATIVE NG/ML
METHADONE UR QL SCN: NEGATIVE NG/ML
OPIATES UR QL SCN: NEGATIVE NG/ML
OXYCODONE+OXYMORPHONE UR QL SCN: POSITIVE
PCP UR QL: NEGATIVE NG/ML
PH UR: 6.1 [PH] (ref 4.5–8.9)
PLEASE NOTE:, 733157: ABNORMAL
PROPOXYPH UR QL SCN: NEGATIVE NG/ML
SP GR UR: 1.01
TRAMADOL UR QL SCN: NEGATIVE NG/ML

## 2018-11-27 ENCOUNTER — OFFICE VISIT (OUTPATIENT)
Dept: CARDIOLOGY CLINIC | Age: 83
End: 2018-11-27

## 2018-11-27 VITALS
SYSTOLIC BLOOD PRESSURE: 125 MMHG | RESPIRATION RATE: 18 BRPM | BODY MASS INDEX: 35.84 KG/M2 | DIASTOLIC BLOOD PRESSURE: 66 MMHG | OXYGEN SATURATION: 98 % | HEART RATE: 71 BPM | WEIGHT: 223 LBS | HEIGHT: 66 IN

## 2018-11-27 DIAGNOSIS — N18.30 CHRONIC KIDNEY FAILURE, STAGE 3 (MODERATE) (HCC): ICD-10-CM

## 2018-11-27 DIAGNOSIS — E78.2 MIXED HYPERLIPIDEMIA: ICD-10-CM

## 2018-11-27 DIAGNOSIS — N18.30 CKD (CHRONIC KIDNEY DISEASE) STAGE 3, GFR 30-59 ML/MIN (HCC): ICD-10-CM

## 2018-11-27 DIAGNOSIS — I10 ESSENTIAL HYPERTENSION: Primary | ICD-10-CM

## 2018-11-27 DIAGNOSIS — M15.9 PRIMARY OSTEOARTHRITIS INVOLVING MULTIPLE JOINTS: ICD-10-CM

## 2018-11-27 DIAGNOSIS — I10 ESSENTIAL HYPERTENSION, BENIGN: ICD-10-CM

## 2018-11-27 DIAGNOSIS — E11.21 TYPE 2 DIABETES MELLITUS WITH NEPHROPATHY (HCC): ICD-10-CM

## 2018-11-27 DIAGNOSIS — E66.01 SEVERE OBESITY (BMI 35.0-39.9) WITH COMORBIDITY (HCC): ICD-10-CM

## 2018-11-27 DIAGNOSIS — Z95.820 S/P ANGIOPLASTY WITH STENT: ICD-10-CM

## 2018-11-27 DIAGNOSIS — E11.9 TYPE 2 DIABETES MELLITUS WITHOUT COMPLICATION, WITHOUT LONG-TERM CURRENT USE OF INSULIN (HCC): ICD-10-CM

## 2018-11-27 DIAGNOSIS — R06.02 SOB (SHORTNESS OF BREATH) ON EXERTION: ICD-10-CM

## 2018-11-27 NOTE — PROGRESS NOTES
Chief Complaint   Patient presents with    Hypertension     6 mo fu    Coronary Artery Disease     1. Have you been to the ER, urgent care clinic since your last visit? Hospitalized since your last visit? No    2. Have you seen or consulted any other health care providers outside of the [facility] since your last visit? Include any pap smears or colon screening.  No

## 2018-11-29 NOTE — PROGRESS NOTES
[facility]   [address] [facility], [address] [place] [address] Road                                          NEW PATIENT HPI/FOLLOW-UP      NAME:  Brigette Alvarado   :   1933   MRN:   [id number]   PCP:  Paul Sanabria MD           Subjective: The patient is a 80y.o. year old female  who returns for a routine follow-up. Since the last visit, patient reports no new symptoms. Denies change in exercise tolerance, chest pain, edema, medication intolerance, palpitations, shortness of breath, PND/orthopnea wheezing, sputum, syncope, dizziness or light headedness. Doing satisfactorily. Review of Systems  General: Pt denies excessive weight gain or loss. Pt is able to conduct ADL's. Respiratory: Denies shortness of breath, MURILLO, wheezing or stridor.   Cardiovascular: Denies precordial pain, palpitations, edema or PND  Gastrointestinal: Denies poor appetite, indigestion, abdominal pain or blood in stool  Peripheral vascular: Denies claudication, leg cramps  Neuropsychiatric: Denies paresthesias,tingling,numbness,anxiety,depression,fatigue  Musculoskeletal: Denies pain,tenderness, soreness,swelling      Past Medical History:   Diagnosis Date    Arthritis 3/25/2010    Back pain 3/26/2010    CAD (coronary artery disease)     Chest pain, unspecified     Chronic kidney failure, stage 3 (moderate) (HCC) 2017    Chronic pain     Chronic pain of both knees 2018    Edema     Essential hypertension     High cholesterol 3/25/2010    HTN (hypertension) 3/25/2010    Kidney failure, acute ([facility] [address] 2012    Lacrimal passage stenosis 2014    Onychomycosis 2010    Other acute and subacute form of ischemic heart disease     Other and unspecified angina pectoris     Other ill-defined conditions(799.89)     excessive watering of right eye    Patient is full code 2018    Shortness of breath     Shoulder pain, bilateral 3/26/2010     Patient Active Problem List Diagnosis Date Noted    Patient is full code 08/06/2018    Severe obesity (BMI 35.0-39. 9) with comorbidity ([address] 05/07/2018    Chronic pain of both knees 02/06/2018    Type 2 diabetes mellitus with nephropathy ([address] 01/23/2018    CKD (chronic kidney disease) stage 3, GFR 30-59 ml/min ([facility]) 01/23/2018    Chronic kidney failure, stage 3 (moderate) ([address] 12/05/2017    Lacrimal passage stenosis 04/17/2014    Preop cardiovascular exam 04/09/2014    Prediabetes 01/03/2014    Abnormal urinalysis 08/30/2013    Lacrimal duct stenosis 08/14/2013    Conjunctivitis 05/10/2013    Watery eyes 08/29/2012    S/P angioplasty with stent--RCA 6/12 06/22/2012    Multiple bruises 06/22/2012    CAD (coronary artery disease) 06/07/2012    Post PTCA 06/07/2012    Mixed hyperlipidemia 06/01/2012    Essential hypertension, benign 06/01/2012    Abnormal nuclear stress test 06/01/2012    Chest pain, unspecified 04/25/2012    Essential hypertension, benign 04/20/2012    Type 2 diabetes mellitus without complication ([address] 99/14/2192    SOB (shortness of breath) on exertion 04/06/2012    Vitamin D deficiency 04/06/2012    Kidney failure, acute ([address] 04/06/2012    Heme positive stool 11/12/2011    Abdominal pain, other specified site 11/12/2011    Inguinal hernia, left 11/12/2011    Decreased vision 10/12/2011    Edema leg 10/12/2011    Cough 08/17/2011    Tick bite, infected 08/17/2011    Onychomycosis 08/19/2010    Shoulder pain, bilateral 03/26/2010    Back pain 03/26/2010    Primary osteoarthritis involving multiple joints 03/25/2010      Past Surgical History:   Procedure Laterality Date    HX HEART CATHETERIZATION      HX HEENT      bilat cateract extraction    HX HYSTERECTOMY      HX KNEE REPLACEMENT      HX LUMBAR FUSION      HX ORTHOPAEDIC      bilateral TKR    HX ORTHOPAEDIC      neck and back surgery    HX ORTHOPAEDIC      bilateral bunionectomy    HX ORTHOPAEDIC      bilateral cataracts removed    HX PTCA       Allergies   Allergen Reactions    Diclofenac Unknown (comments)    Lisinopril Cough    Lisinopril-Hydrochlorothiazide Unknown (comments)      Family History   Problem Relation Age of Onset   [facility] Hypertension Mother    [facility] Stroke Mother     Other Mother         arthritis    Coronary Artery Disease Father     Heart Disease Father     Hypertension Sister     Diabetes Sister     Coronary Artery Disease Sister     Cancer Sister         lung    Coronary Artery Disease Brother     Cancer Brother         lung    Heart Disease Brother       Social History     Socioeconomic History    Marital status:      Spouse name: Not on file    Number of children: Not on file    Years of education: Not on file    Highest education level: Not on file   Social Needs    Financial resource strain: Not on file    Food insecurity - worry: Not on file    Food insecurity - inability: Not on file   [employer] needs - medical: Not on file   [employer] needs - non-medical: Not on file   Occupational History    Not on file   Tobacco Use    Smoking status: Never Smoker    Smokeless tobacco: Never Used   Substance and Sexual Activity    Alcohol use: Yes     Comment: occasional     Drug use: Yes     Types: Prescription, OTC    Sexual activity: No   Other Topics Concern    Not on file   Social History Narrative    ** Merged History Encounter **           Current Outpatient Medications   Medication Sig    ASPIR-LOW 81 mg tablet Take 81 mg by mouth daily.  oxyCODONE-acetaminophen (PERCOCET) 5-325 mg per tablet Take 1 Tab by mouth two (2) times daily as needed for Pain. Max Daily Amount: 2 Tabs.  losartan (COZAAR) 100 mg tablet Take 1 Tab by mouth daily.  CALCIUM 600 WITH VITAMIN D3 600 mg(1,500mg) -400 unit chew Take 600 mg by mouth two (2) times a day.     NIFEdipine ER (ADALAT CC) 30 mg ER tablet 1 tablet on an empty stomach    atenolol (TENORMIN) 50 mg tablet Take 1 Tab by mouth daily.  pravastatin (PRAVACHOL) 40 mg tablet take 1 tablet by mouth NIGHTLY    nitroglycerin (NITROSTAT) 0.4 mg SL tablet PLACE 1 TABLET UNDER THE TONGUE EVERY 5 MINS AS NEEDED FOR CHEST PAIN    acetaminophen (TYLENOL) 500 mg tablet Take  by mouth every six (6) hours as needed for Pain.  potassium chloride (KLOR-CON) 10 mEq tablet Take 2 Tabs by mouth daily.  bumetanide (BUMEX) 1 mg tablet Take 1 Tab by mouth daily.  MISCELLANEOUS MEDICAL SUPPLY (COMPRESSION STOCKINGS) daily.  oxyCODONE-acetaminophen (PERCOCET) 5-325 mg per tablet Take 1 Tab by mouth two (2) times daily as needed for Pain. Max Daily Amount: 2 Tabs.  naloxone (NARCAN) 4 mg/actuation nasal spray Use 1 spray intranasally into 1 nostril. Use a new Narcan nasal spray for subsequent doses and administer into alternating nostrils. May repeat every 2 to 3 minutes as needed. No current facility-administered medications for this visit. I have reviewed the nurses notes, vitals, problem list, allergy list, medical history, family medical, social history and medications. Objective:     Physical Exam:     Vitals:    11/27/18 1433   BP: 125/66   Pulse: 71   Resp: 18   SpO2: 98%   Weight: 223 lb (101.2 kg)   Height: 5' 6\" (1.676 m)    Body mass index is 35.99 kg/m². General: Well developed, in no acute distress. HEENT: No carotid bruits, no JVD, trach is midline. Heart:  Normal S1/S2 negative S3 or S4. Regular, no murmur, gallop or rub.   Respiratory: Clear bilaterally, no wheezing or rales  Abdomen:   Soft, non-tender, bowel sounds are active.   Extremities:  + leg edema, normal cap refill, no cyanosis. Neuro: A&Ox3, speech clear, gait stable. Skin: Skin color is normal. No rashes or lesions. No diaphoresis.   Vascular: 2+ pulses symmetric in all extremities        Data Review:       Cardiographics:    EKG: NSR,1st degree AV HB, minor intraventricular conduction delay    Cardiology Labs:    Results for orders placed or performed during the hospital encounter of 06/06/12   EKG, 12 LEAD, INITIAL   Result Value Ref Range    Ventricular Rate 66 BPM    Atrial Rate 66 BPM    P-R Interval 188 ms    QRS Duration 84 ms    Q-T Interval 418 ms    QTC Calculation (Bezet) 438 ms    Calculated P Axis 58 degrees    Calculated R Axis 16 degrees    Calculated T Axis 42 degrees    Diagnosis       Sinus rhythm with premature atrial complexes  When compared with ECG of 06-JUN-2012 10:45,  premature atrial complexes are now present  Confirmed by Vee Flores ([id number]) on 6/7/2012 8:37:17 AM       Lab Results   Component Value Date/Time    Cholesterol, total 234 (H) 05/07/2018 09:29 AM    HDL Cholesterol 63 05/07/2018 09:29 AM    LDL, calculated 148 (H) 05/07/2018 09:29 AM    Triglyceride 117 05/07/2018 09:29 AM    CHOL/HDL Ratio 4.5 08/19/2010 12:27 PM       Lab Results   Component Value Date/Time    Sodium 145 (H) 09/11/2017 09:56 AM    Potassium 4.7 09/11/2017 09:56 AM    Chloride 103 09/11/2017 09:56 AM    CO2 26 09/11/2017 09:56 AM    Anion gap 7 06/07/2012 03:00 AM    Glucose 107 (H) 09/11/2017 09:56 AM    BUN 31 (H) 09/11/2017 09:56 AM    Creatinine 1.37 (H) 09/11/2017 09:56 AM    BUN/Creatinine ratio 23 09/11/2017 09:56 AM    GFR est AA 41 (L) 09/11/2017 09:56 AM    GFR est non-AA 35 (L) 09/11/2017 09:56 AM    Calcium 10.2 09/11/2017 09:56 AM    Bilirubin, total 0.4 09/11/2017 09:56 AM    AST (SGOT) 24 09/11/2017 09:56 AM    Alk. phosphatase 61 09/11/2017 09:56 AM    Protein, total 7.2 09/11/2017 09:56 AM    Albumin 4.5 09/11/2017 09:56 AM    Globulin 4.0 11/12/2011 11:03 AM    A-G Ratio 1.7 09/11/2017 09:56 AM    ALT (SGPT) 13 09/11/2017 09:56 AM          Assessment:       ICD-10-CM ICD-9-CM    1. Essential hypertension I10 401.9 AMB POC EKG ROUTINE W/ 12 LEADS, INTER & REP   2. Essential hypertension, benign I10 401.1    3. Mixed hyperlipidemia E78.2 272.2    4. Primary osteoarthritis involving multiple joints M15.0 715.09    5. SOB (shortness of breath) on exertion R06.02 786.05    6. Type 2 diabetes mellitus without complication, without long-term current use of insulin ([facility]) E11.9 250.00    7. S/P angioplasty with stent--RCA 6/12 Z95.9 V45.89    8. Chronic kidney failure, stage 3 (moderate) ([facility]) N18.3 585.3    9. Type 2 diabetes mellitus with nephropathy ([facility]) E11.21 250.40      583.81    10. CKD (chronic kidney disease) stage 3, GFR 30-59 ml/min ([facility]) N18.3 585.3    11. Severe obesity (BMI 35.0-39. 9) with comorbidity ([facility] [address] E66.01 278.01          Discussion: Patient presents at this time stable from a cardiac perspective. No cardiac issues. Pleased with present cardiac status. Plan: 1. Continue same meds. Lipid profile and labs followed by PCP    2. Encouraged to exercise to tolerance, lose weight and follow low fat, low cholesterol, low sodium predominantly Plant-based (consider Mediterranean) diet. Call with questions or concerns. Will follow up any test results by phone and/or f/u here in office if needed. Fernando Warren 3.Follow up: 6 months    I have discussed the diagnosis with the patient and the intended plan as seen in the above orders. The patient has received an after-visit summary and questions were answered concerning future plans. I have discussed any concerning medication side effects and warnings with the patient as well.     Neena Marks MD  11/28/2018

## 2018-12-28 DIAGNOSIS — Z01.818 PREOPERATIVE GENERAL PHYSICAL EXAMINATION: ICD-10-CM

## 2019-01-02 RX ORDER — ATENOLOL 50 MG/1
TABLET ORAL
Qty: 90 TAB | Refills: 1 | Status: SHIPPED | OUTPATIENT
Start: 2019-01-02 | End: 2019-06-25 | Stop reason: SDUPTHER

## 2019-01-14 DIAGNOSIS — I25.83 CORONARY ARTERY DISEASE DUE TO LIPID RICH PLAQUE: ICD-10-CM

## 2019-01-14 DIAGNOSIS — R73.03 PREDIABETES: ICD-10-CM

## 2019-01-14 DIAGNOSIS — E78.00 HIGH CHOLESTEROL: ICD-10-CM

## 2019-01-14 DIAGNOSIS — I25.10 CORONARY ARTERY DISEASE DUE TO LIPID RICH PLAQUE: ICD-10-CM

## 2019-01-14 RX ORDER — PRAVASTATIN SODIUM 40 MG/1
TABLET ORAL
Qty: 90 TAB | Refills: 1 | Status: SHIPPED | OUTPATIENT
Start: 2019-01-14 | End: 2019-05-28 | Stop reason: ALTCHOICE

## 2019-02-04 ENCOUNTER — OFFICE VISIT (OUTPATIENT)
Dept: FAMILY MEDICINE CLINIC | Age: 84
End: 2019-02-04

## 2019-02-04 VITALS
WEIGHT: 227.5 LBS | HEIGHT: 66 IN | SYSTOLIC BLOOD PRESSURE: 134 MMHG | HEART RATE: 56 BPM | RESPIRATION RATE: 20 BRPM | TEMPERATURE: 95.3 F | BODY MASS INDEX: 36.56 KG/M2 | OXYGEN SATURATION: 97 % | DIASTOLIC BLOOD PRESSURE: 68 MMHG

## 2019-02-04 DIAGNOSIS — G89.29 CHRONIC PAIN OF BOTH SHOULDERS: ICD-10-CM

## 2019-02-04 DIAGNOSIS — E11.21 TYPE 2 DIABETES MELLITUS WITH NEPHROPATHY (HCC): ICD-10-CM

## 2019-02-04 DIAGNOSIS — G89.29 CHRONIC MIDLINE LOW BACK PAIN WITHOUT SCIATICA: ICD-10-CM

## 2019-02-04 DIAGNOSIS — Z00.00 MEDICARE ANNUAL WELLNESS VISIT, SUBSEQUENT: ICD-10-CM

## 2019-02-04 DIAGNOSIS — G89.29 CHRONIC LOW BACK PAIN WITH SCIATICA, SCIATICA LATERALITY UNSPECIFIED, UNSPECIFIED BACK PAIN LATERALITY: ICD-10-CM

## 2019-02-04 DIAGNOSIS — M25.562 CHRONIC PAIN OF BOTH KNEES: ICD-10-CM

## 2019-02-04 DIAGNOSIS — M25.511 CHRONIC PAIN OF BOTH SHOULDERS: ICD-10-CM

## 2019-02-04 DIAGNOSIS — M54.40 CHRONIC LOW BACK PAIN WITH SCIATICA, SCIATICA LATERALITY UNSPECIFIED, UNSPECIFIED BACK PAIN LATERALITY: ICD-10-CM

## 2019-02-04 DIAGNOSIS — M25.512 CHRONIC PAIN OF BOTH SHOULDERS: ICD-10-CM

## 2019-02-04 DIAGNOSIS — Z71.89 ADVANCED DIRECTIVES, COUNSELING/DISCUSSION: ICD-10-CM

## 2019-02-04 DIAGNOSIS — E66.01 SEVERE OBESITY (BMI 35.0-39.9) WITH COMORBIDITY (HCC): ICD-10-CM

## 2019-02-04 DIAGNOSIS — M15.9 PRIMARY OSTEOARTHRITIS INVOLVING MULTIPLE JOINTS: Primary | ICD-10-CM

## 2019-02-04 DIAGNOSIS — Z13.31 SCREENING FOR DEPRESSION: ICD-10-CM

## 2019-02-04 DIAGNOSIS — M54.6 CHRONIC LEFT-SIDED THORACIC BACK PAIN: ICD-10-CM

## 2019-02-04 DIAGNOSIS — G89.29 CHRONIC LEFT-SIDED THORACIC BACK PAIN: ICD-10-CM

## 2019-02-04 DIAGNOSIS — M54.50 CHRONIC MIDLINE LOW BACK PAIN WITHOUT SCIATICA: ICD-10-CM

## 2019-02-04 DIAGNOSIS — I10 ESSENTIAL HYPERTENSION: ICD-10-CM

## 2019-02-04 DIAGNOSIS — Z13.39 SCREENING FOR ALCOHOLISM: ICD-10-CM

## 2019-02-04 DIAGNOSIS — N18.30 CHRONIC KIDNEY FAILURE, STAGE 3 (MODERATE) (HCC): ICD-10-CM

## 2019-02-04 DIAGNOSIS — M25.561 CHRONIC PAIN OF BOTH KNEES: ICD-10-CM

## 2019-02-04 DIAGNOSIS — M19.90 ARTHRITIS: ICD-10-CM

## 2019-02-04 DIAGNOSIS — G89.29 CHRONIC PAIN OF BOTH KNEES: ICD-10-CM

## 2019-02-04 RX ORDER — SPIRONOLACTONE 25 MG
600 TABLET ORAL 2 TIMES DAILY
Qty: 60 TAB | Refills: 4 | Status: SHIPPED | OUTPATIENT
Start: 2019-02-04 | End: 2019-09-25 | Stop reason: SDUPTHER

## 2019-02-04 RX ORDER — NIFEDIPINE 30 MG/1
TABLET, FILM COATED, EXTENDED RELEASE ORAL
Qty: 90 TAB | Refills: 2 | Status: SHIPPED | OUTPATIENT
Start: 2019-02-04 | End: 2019-10-24 | Stop reason: SDUPTHER

## 2019-02-04 RX ORDER — OXYCODONE AND ACETAMINOPHEN 5; 325 MG/1; MG/1
1 TABLET ORAL
Qty: 60 TAB | Refills: 0 | Status: SHIPPED | OUTPATIENT
Start: 2019-02-04 | End: 2019-02-20 | Stop reason: SDUPTHER

## 2019-02-04 RX ORDER — ACETAMINOPHEN 500 MG/1
CAPSULE, LIQUID FILLED ORAL
COMMUNITY
Start: 2019-01-28 | End: 2019-04-04 | Stop reason: ALTCHOICE

## 2019-02-04 RX ORDER — POTASSIUM CHLORIDE 750 MG/1
TABLET, FILM COATED, EXTENDED RELEASE ORAL DAILY
Refills: 0 | COMMUNITY
Start: 2019-01-11 | End: 2019-04-04 | Stop reason: ALTCHOICE

## 2019-02-04 RX ORDER — CANDESARTAN 8 MG/1
8 TABLET ORAL DAILY
Qty: 90 TAB | Refills: 0 | Status: SHIPPED | OUTPATIENT
Start: 2019-02-04 | End: 2019-05-03 | Stop reason: SDUPTHER

## 2019-02-04 RX ORDER — LOSARTAN POTASSIUM 100 MG/1
100 TABLET ORAL DAILY
Refills: 0 | COMMUNITY
Start: 2018-11-09 | End: 2019-04-04 | Stop reason: ALTCHOICE

## 2019-02-04 RX ORDER — OXYCODONE AND ACETAMINOPHEN 5; 325 MG/1; MG/1
1 TABLET ORAL
Qty: 60 TAB | Refills: 0 | Status: SHIPPED | OUTPATIENT
Start: 2019-03-04 | End: 2019-02-20 | Stop reason: SDUPTHER

## 2019-02-04 NOTE — PROGRESS NOTES
Name and  verified Chief Complaint Patient presents with [facility] Annual Wellness Visit  Medication Refill Health Maintenance reviewed-discussed with patient. 1. Have you been to the ER, urgent care clinic since your last visit? Hospitalized since your last visit? Yes, Neurotology 2019 office visit. 2. Have you seen or consulted any other health care providers outside of the [address] [place] [address] since your last visit? Include any pap smears or colon screening.  no

## 2019-02-04 NOTE — PATIENT INSTRUCTIONS
Medicare Wellness Visit, Female The best way to live healthy is to have a lifestyle where you eat a well-balanced diet, exercise regularly, limit alcohol use, and quit all forms of tobacco/nicotine, if applicable. Regular preventive services are another way to keep healthy. Preventive services (vaccines, screening tests, monitoring & exams) can help personalize your care plan, which helps you manage your own care. Screening tests can find health problems at the earliest stages, when they are easiest to treat. [facility] follows the current, evidence-based guidelines published by the [facility] ([facility]) when recommending preventive services for our patients. Because we follow these guidelines, sometimes recommendations change over time as research supports it. (For example, mammograms used to be recommended annually. Even though Medicare will still pay for an annual mammogram, the newer guidelines recommend a mammogram every two years for women of average risk.) Of course, you and your doctor may decide to screen more often for some diseases, based on your risk and your health status. Preventive services for you include: - Medicare offers their members a free annual wellness visit, which is time for you and your primary care provider to discuss and plan for your preventive service needs. Take advantage of this benefit every year! 
-All adults over the age of 72 should receive the recommended pneumonia vaccines. Current USPSTF guidelines recommend a series of two vaccines for the best pneumonia protection.  
-All adults should have a flu vaccine yearly and a tetanus vaccine every 10 years. All adults age 61 and older should receive a shingles vaccine once in their lifetime.   
-A bone mass density test is recommended when a woman turns 65 to screen for osteoporosis. This test is only recommended one time, as a screening. Some providers will use this same test as a disease monitoring tool if you already have osteoporosis. -All adults age 38-68 who are overweight should have a diabetes screening test once every three years.  
-Other screening tests and preventive services for persons with diabetes include: an eye exam to screen for diabetic retinopathy, a kidney function test, a foot exam, and stricter control over your cholesterol.  
-Cardiovascular screening for adults with routine risk involves an electrocardiogram (ECG) at intervals determined by your doctor.  
-Colorectal cancer screenings should be done for adults age 54-65 with no increased risk factors for colorectal cancer. There are a number of acceptable methods of screening for this type of cancer. Each test has its own benefits and drawbacks. Discuss with your doctor what is most appropriate for you during your annual wellness visit. The different tests include: colonoscopy (considered the best screening method), a fecal occult blood test, a fecal DNA test, and sigmoidoscopy. -Breast cancer screenings are recommended every other year for women of normal risk, age 54-69. 
-Cervical cancer screenings for women over age 72 are only recommended with certain risk factors.  
-All adults born between [facility] should be screened once for Hepatitis C. Here is a list of your current Health Maintenance items (your personalized list of preventive services) with a due date: 
Health Maintenance Due Topic Date Due  Shingles Vaccine (1 of 2) 01/14/1983  Glaucoma Screening   12/04/2017  Flu Vaccine  08/01/2018  Albumin Urine Test  09/11/2018 [address] [place] [address] Annual Well Visit  09/12/2018  Diabetic Foot Care  09/29/2018  Hemoglobin A1C    11/07/2018 Advance Care Planning: Care Instructions Your Care Instructions It can be hard to live with an illness that cannot be cured.  But if your health is getting worse, you may want to make decisions about end-of-life care. Planning for the end of your life does not mean that you are giving up. It is a way to make sure that your wishes are met. Clearly stating your wishes can make it easier for your loved ones. Making plans while you are still able may also ease your mind and make your final days less stressful and more meaningful. Follow-up care is a key part of your treatment and safety. Be sure to make and go to all appointments, and call your doctor if you are having problems. It's also a good idea to know your test results and keep a list of the medicines you take. What can you do to plan for the end of life? · You can bring these issues up with your doctor. You do not need to wait until your doctor starts the conversation. You might start with \"I would not be willing to live with . Heddie Hardwick Heddie Hardwick Heddie Hardwick \" When you complete this sentence it helps your doctor understand your wishes. · Talk openly and honestly with your doctor. This is the best way to understand the decisions you will need to make as your health changes. Know that you can always change your mind. · Ask your doctor about commonly used life-support measures. These include tube feedings, breathing machines, and fluids given through a vein (IV). Understanding these treatments will help you decide whether you want them. · You may choose to have these life-supporting treatments for a limited time. This allows a trial period to see whether they will help you. You may also decide that you want your doctor to take only certain measures to keep you alive. It is important to spell out these conditions so that your doctor and family understand them. · Talk to your doctor about how long you are likely to live. He or she may be able to give you an idea of what usually happens with your specific illness. · Think about preparing papers that state your wishes. This way there will not be any confusion about what you want. You can change your instructions at any time. Which papers should you prepare? Advance directives are legal papers that tell doctors how you want to be cared for at the end of your life. You do not need a  to write these papers. Ask your doctor or your state health department for information on how to write your advance directives. They may have the forms for each of these types of papers. Make sure your doctor has a copy of these on file, and give a copy to a family member or close friend. · Consider a do-not-resuscitate order (DNR). This order asks that no extra treatments be done if your heart stops or you stop breathing. Extra treatments may include cardiopulmonary resuscitation (CPR), electrical shock to restart your heart, or a machine to breathe for you. If you decide to have a DNR order, ask your doctor to explain and write it. Place the order in your home where everyone can easily see it. · Consider a living will. A living will explains your wishes about life support and other treatments at the end of your life if you become unable to speak for yourself. Living dugan tell doctors to use or not use treatments that would keep you alive. You must have one or two witnesses or a notary present when you sign this form. · Consider a durable power of  for health care. This allows you to name a person to make decisions about your care if you are not able to. Most people ask a close friend or family member. Talk to this person about the kinds of treatments you want and those that you do not want. Make sure this person understands your wishes. These legal papers are simple to change. Tell your doctor what you want to change, and ask him or her to make a note in your medical file. Give your family updated copies of the papers. Where can you learn more? Go to http://brianne-ger.info/. Enter P184 in the search box to learn more about \"Advance Care Planning: Care Instructions. \" Current as of: November 17, 2016 Content Version: 11.3 © 8130-2947 [employer]. Care instructions adapted under license by [employer] (which disclaims liability or warranty for this information). If you have questions about a medical condition or this instruction, always ask your healthcare professional. [address] any warranty or liability for your use of this information. Preventing Falls: Care Instructions Your Care Instructions Getting around your home safely can be a challenge if you have injuries or health problems that make it easy for you to fall. Loose rugs and furniture in walkways are among the dangers for many older people who have problems walking or who have poor eyesight. People who have conditions such as arthritis, osteoporosis, or dementia also have to be careful not to fall. You can make your home safer with a few simple measures. Follow-up care is a key part of your treatment and safety. Be sure to make and go to all appointments, and call your doctor if you are having problems. It's also a good idea to know your test results and keep a list of the medicines you take. How can you care for yourself at home? Taking care of yourself · You may get dizzy if you do not drink enough water. To prevent dehydration, drink plenty of fluids, enough so that your urine is light yellow or clear like water. Choose water and other caffeine-free clear liquids. If you have kidney, heart, or liver disease and have to limit fluids, talk with your doctor before you increase the amount of fluids you drink. · Exercise regularly to improve your strength, muscle tone, and balance. Walk if you can. Swimming may be a good choice if you cannot walk easily. · Have your vision and hearing checked each year or any time you notice a change. If you have trouble seeing and hearing, you might not be able to avoid objects and could lose your balance. · Know the side effects of the medicines you take. Ask your doctor or pharmacist whether the medicines you take can affect your balance. Sleeping pills or sedatives can affect your balance. · Limit the amount of alcohol you drink. Alcohol can impair your balance and other senses. · Ask your doctor whether calluses or corns on your feet need to be removed. If you wear loose-fitting shoes because of calluses or corns, you can lose your balance and fall. · Talk to your doctor if you have numbness in your feet. Preventing falls at home · Remove raised doorway thresholds, throw rugs, and clutter. Repair loose carpet or raised areas in the floor. · Move furniture and electrical cords to keep them out of walking paths. · Use nonskid floor wax, and wipe up spills right away, especially on ceramic tile floors. · If you use a walker or cane, put rubber tips on it. If you use crutches, clean the bottoms of them regularly with an abrasive pad, such as steel wool. · Keep your house well lit, especially [place], and outside walkways. Use night-lights in areas such as hallways and bathrooms. Add extra light switches or use remote switches (such as switches that go on or off when you clap your hands) to make it easier to turn lights on if you have to get up during the night. · Install sturdy handrails on stairways. · Move items in your cabinets so that the things you use a lot are on the lower shelves (about waist level). · Keep a cordless phone and a flashlight with new batteries by your bed. If possible, put a phone in each of the main rooms of your house, or carry a cell phone in case you fall and cannot reach a phone. Or, you can wear a device around your neck or wrist. You push a button that sends a signal for help. · Wear low-heeled shoes that fit well and give your feet good support. Use footwear with nonskid soles.  Check the heels and soles of your shoes for wear. Repair or replace worn heels or soles. · Do not wear socks without shoes on wood floors. · Walk on the grass when the sidewalks are slippery. If you live in an area that gets snow and ice in the winter, sprinkle salt on slippery steps and sidewalks. Preventing falls in the bath · Install grab bars and nonskid mats inside and outside your shower or tub and near the toilet and sinks. · Use shower chairs and bath benches. · Use a hand-held shower head that will allow you to sit while showering. · Get into a tub or shower by putting the weaker leg in first. Get out of a tub or shower with your strong side first. 
· Repair loose toilet seats and consider installing a raised toilet seat to make getting on and off the toilet easier. · Keep your bathroom door unlocked while you are in the shower. Where can you learn more? Go to http://brianne-ger.info/. Enter 0476 [id number] in the search box to learn more about \"Preventing Falls: Care Instructions. \" Current as of: March 16, 2018 Content Version: 11.8 © 6863-7561 [employer]. Care instructions adapted under license by [employer] (which disclaims liability or warranty for this information). If you have questions about a medical condition or this instruction, always ask your healthcare professional. [place] [address] any warranty or liability for your use of this information. Leg and Ankle Edema: Care Instructions Your Care Instructions Swelling in the legs, ankles, and feet is called edema. It is common after you sit or stand for a while. Long plane flights or car rides often cause swelling in the legs and feet. You may also have swelling if you have to stand for long periods of time at your job. Problems with the veins in the legs (varicose veins) and changes in hormones can also cause swelling.  Sometimes the swelling in the ankles and feet is caused by a more serious problem, such as heart failure, infection, blood clots, or liver or kidney disease. Follow-up care is a key part of your treatment and safety. Be sure to make and go to all appointments, and call your doctor if you are having problems. It's also a good idea to know your test results and keep a list of the medicines you take. How can you care for yourself at home? · If your doctor gave you medicine, take it as prescribed. Call your doctor if you think you are having a problem with your medicine. · Whenever you are resting, raise your legs up. Try to keep the swollen area higher than the level of your heart. · Take breaks from standing or sitting in one position. ? Walk around to increase the blood flow in your lower legs. ? Move your feet and ankles often while you stand, or tighten and relax your leg muscles. · Wear support stockings. Put them on in the morning, before swelling gets worse. · Eat a balanced diet. Lose weight if you need to. · Limit the amount of salt (sodium) in your diet. Salt holds fluid in the body and may increase swelling. When should you call for help? Call 911 anytime you think you may need emergency care. For example, call if: 
  · You have symptoms of a blood clot in your lung (called a pulmonary embolism). These may include: 
? Sudden chest pain. ? Trouble breathing. ? Coughing up blood.  
 Call your doctor now or seek immediate medical care if: 
  · You have signs of a blood clot, such as: 
? Pain in your calf, back of the knee, thigh, or groin. ? Redness and swelling in your leg or groin.  
  · You have symptoms of infection, such as: 
? Increased pain, swelling, warmth, or redness. ? Red streaks or pus. ? A fever.  
 Watch closely for changes in your health, and be sure to contact your doctor if: 
  · Your swelling is getting worse.  
  · You have new or worsening pain in your legs.  
  · You do not get better as expected. Where can you learn more? Go to http://brianne-ger.info/. Enter Y727 in the search box to learn more about \"Leg and Ankle Edema: Care Instructions. \" Current as of: September 23, 2018 Content Version: 11.9 © 4360-7572 [employer], [employer]. Care instructions adapted under license by [employer] (which disclaims liability or warranty for this information). If you have questions about a medical condition or this instruction, always ask your healthcare professional. [place] [address] any warranty or liability for your use of this information.

## 2019-02-04 NOTE — ACP (ADVANCE CARE PLANNING)
Advance Care Planning      Other Legally Authorized Decision Maker would be  Blanquita Lassiter Daughter [phone] [facility]) [phone]    as SDM     For My patients who has currently great Decision Making Capacity:     Patient is on ++ presence of family member stated that she wants to be not DNR at this time,  she likes to be a full code individual,    Pt was given the form, she will sign and bring us a copy on the later date.

## 2019-02-04 NOTE — PROGRESS NOTES
HISTORY OF PRESENT ILLNESS Jackie Lund is a 80 y.o. female. HPI Chronic low-mid  back,  feet. Legs pain The patient's pain has been an ongoing struggling concerning problem, present for refills, never abused any prescribed meds, no hx of illicit nor etoh abuse, the pain has been bothering the pt for many yrs, pt aware that there are no other alternative approach for the current pain that exist except for the available opioid based meds with their huge addictive properties, has the pill count of 0 lefts and stating that some days doesnot take it , patient states that the last pill was taken today this a.m. take other pain meds, last time taken one opioid based pain meds was this AM as she states with her witnessed the granddaughter, all such medication side effect detailed to the granddaughter was told that this is not a safe medication and if she is willing to come off she disagreed at this time,Patient granddaughter became very upset then patient was notified about pain medication side effect granddaughter stated that she is not a drug seeker and she takes the medication just for her pain The intensity of the pain is at 10/10 w/out med,  Pt's pain persist without medication, is a chronic condition,  compliant with medication takes it as prescribed, keeps meds at a safe place, on stool softener, 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 Hypercholestremia patient also has had diabetic state diet control last hemoglobin A1c was at 6.1 percentile Patient has no abdominal pain , no daily exercises few times per week, has had few pounds of weight loss currently does not drink alcohol on no hormone therapy no family history of early heart disease with the patient's parents and siblings,  the patient eats out but not more than average,   and currently, there is no muscle nor abdominal pain, And patient  fasting today, the patient is compliant w/ meds, in addition to the intake of daily baby aspirin HTN Today pt present for Bp check and the patient stating that so far there has been a Compliancy w/ the bp meds, having had the low salt diet unfortunately she has not been active secondary to the chronic pain syndrome, today the pt denies Chest Pain, has no legs swelling no lightheadedness, In addition patient had high level low potassium as per nephrologist patient currently on diuretic and potassium supplement last visit she was told if she is not taking her fluid pill she does not need to take her potassium unfortunately she likes to come back again in 6 months patient was told she will not get a refill unless her kidney function test get checked either by nephrologist or primary care physician every 6-8 weeks Vitals 11/27/2018 8/6/2018 5/15/2018 5/15/2018 Weight 223 lb 226 lb 12.8 oz  228 lb Current Outpatient Medications Medication Sig Dispense Refill  pravastatin (PRAVACHOL) 40 mg tablet take 1 tablet by mouth NIGHTLY 90 Tab 1  
 atenolol (TENORMIN) 50 mg tablet take 1 tablet by mouth once daily 90 Tab 1  ASPIR-LOW 81 mg tablet Take 81 mg by mouth daily.  oxyCODONE-acetaminophen (PERCOCET) 5-325 mg per tablet Take 1 Tab by mouth two (2) times daily as needed for Pain. Max Daily Amount: 2 Tabs. 60 Tab 0  
 losartan (COZAAR) 100 mg tablet Take 1 Tab by mouth daily. 90 Tab 1  
 oxyCODONE-acetaminophen (PERCOCET) 5-325 mg per tablet Take 1 Tab by mouth two (2) times daily as needed for Pain. Max Daily Amount: 2 Tabs. 60 Tab 0  
 CALCIUM 600 WITH VITAMIN D3 600 mg(1,500mg) -400 unit chew Take 600 mg by mouth two (2) times a day.  NIFEdipine ER (ADALAT CC) 30 mg ER tablet 1 tablet on an empty stomach 90 Tab 2  
 naloxone (NARCAN) 4 mg/actuation nasal spray Use 1 spray intranasally into 1 nostril. Use a new Narcan nasal spray for subsequent doses and administer into alternating nostrils. May repeat every 2 to 3 minutes as needed.  2 Each 11  
  nitroglycerin (NITROSTAT) 0.4 mg SL tablet PLACE 1 TABLET UNDER THE TONGUE EVERY 5 MINS AS NEEDED FOR CHEST PAIN 25 Tab 1  
 acetaminophen (TYLENOL) 500 mg tablet Take  by mouth every six (6) hours as needed for Pain.  potassium chloride (KLOR-CON) 10 mEq tablet Take 2 Tabs by mouth daily. 90 Tab 1  
 bumetanide (BUMEX) 1 mg tablet Take 1 Tab by mouth daily. 90 Tab 1  MISCELLANEOUS MEDICAL SUPPLY (COMPRESSION STOCKINGS) daily. Allergies Allergen Reactions  Diclofenac Unknown (comments)  Lisinopril Cough  Lisinopril-Hydrochlorothiazide Unknown (comments) Past Medical History:  
Diagnosis Date  Arthritis 3/25/2010  Back pain 3/26/2010  CAD (coronary artery disease)  Chest pain, unspecified  Chronic kidney failure, stage 3 (moderate) ([address] 12/5/2017  Chronic pain  Chronic pain of both knees 2/6/2018  Edema  Essential hypertension  High cholesterol 3/25/2010  
 HTN (hypertension) 3/25/2010  Kidney failure, acute ([address] 4/6/2012  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  Onychomycosis 8/19/2010  Other acute and subacute form of ischemic heart disease  Other and unspecified angina pectoris  Other ill-defined conditions(799.89)   
 excessive watering of right eye  Patient is full code 8/6/2018  Shortness of breath  Shoulder pain, bilateral 3/26/2010 Past Surgical History:  
Procedure Laterality Date  HX HEART CATHETERIZATION    
 HX HEENT    
 bilat cateract extraction  HX HYSTERECTOMY  HX KNEE REPLACEMENT    
 HX LUMBAR FUSION    
 HX ORTHOPAEDIC    
 bilateral TKR  HX ORTHOPAEDIC    
 neck and back surgery  HX ORTHOPAEDIC    
 bilateral bunionectomy  HX ORTHOPAEDIC    
 bilateral cataracts removed  HX PTCA Family History Problem Relation Age of Onset  Hypertension Mother  Stroke Mother  Other Mother   
     arthritis  Coronary Artery Disease Father  Heart Disease Father  Hypertension Sister  Diabetes Sister  Coronary Artery Disease Sister  Cancer Sister   
     lung  Coronary Artery Disease Brother  Cancer Brother   
     lung  Heart Disease Brother Social History Tobacco Use  Smoking status: Never Smoker  Smokeless tobacco: Never Used Substance Use Topics  Alcohol use: Yes Comment: occasional   
  
Lab Results Component Value Date/Time WBC 5.6 09/11/2017 09:56 AM  
 HGB 13.6 09/11/2017 09:56 AM  
 HCT 41.4 09/11/2017 09:56 AM  
 PLATELET 811 76/11/2865 09:56 AM  
 MCV 90 09/11/2017 09:56 AM  
 
Lab Results Component Value Date/Time TSH 1.630 03/09/2017 08:57 AM  
   
Review of Systems Constitutional: Negative for chills and fever. HENT: Negative for ear pain and nosebleeds. Eyes: Negative for blurred vision, pain and discharge. Respiratory: Negative for shortness of breath. Cardiovascular: Negative for chest pain and leg swelling. Gastrointestinal: Negative for constipation, diarrhea, nausea and vomiting. Genitourinary: Negative for frequency. Musculoskeletal: Negative for joint pain. Skin: Negative for itching and rash. Neurological: Negative for headaches. Psychiatric/Behavioral: Negative for depression. The patient is not nervous/anxious. Physical Exam  
Constitutional: She is oriented to person, place, and time. She appears well-developed and well-nourished. HENT:  
Head: Normocephalic and atraumatic. Eyes: Conjunctivae and EOM are normal.  
Neck: Normal range of motion. Neck supple. Cardiovascular: Normal rate, regular rhythm and normal heart sounds. No murmur heard. Pulmonary/Chest: Effort normal and breath sounds normal.  
Abdominal: Soft. Bowel sounds are normal. She exhibits no distension. Musculoskeletal: Normal range of motion. She exhibits no edema. Lymphadenopathy:  
  She has no cervical adenopathy. Neurological: She is alert and oriented to person, place, and time. Skin: No erythema. Psychiatric: Her behavior is normal.  
Nursing note and vitals reviewed. ASSESSMENT and PLAN Diagnoses and all orders for this visit: 1. Severe obesity (BMI 35.0-39. 9) with comorbidity ([facility] 2. Type 2 diabetes mellitus with nephropathy ([facility] This is the Subsequent Medicare Annual Wellness Exam, performed 12 months or more after the Initial AWV or the last Subsequent AWV I have reviewed the patient's medical history in detail and updated the computerized patient record. History 80 y.o. female for annual physical exam.  
 Last wellness exam was ,  Not Up todate w/ all vaccination, last tetanus vaccine was in <5yrs ago, physically and mentally functional not hx of fall not feeling depressed, no blood in the stool no tarry stool, still driving at this time, Medications causing fall and the risk for future fall screened,  
 the depression at this age addressed pt with improvig interests and enjoy to do things, not anxious not depressed,  pt at this visit, is physically functional with abnormal gait and +dependent and walks w/ mobility device and, but mentaly is functional,  very Alert and oriented, unfortunately,  BMI for her age is high  the  abnl BMI r/w'd and information given, bp was screened for abnormality,   last mammog was nl and ,  last colonoscopy was normal and was few yrs  Ago,  
  
last bone dexa scan was few yrs ago very abnl but refusing to get orally medicated because of stomach upset but willing to get injections if it covered No family hx of breast cancer   
 
no family hx of colon cancer, parent  of old age, no sexaully active,  
compliant w/ meds, ++Rf needed for today for her meds. No recent fall Needs medication refills Past Medical History:  
Diagnosis Date  Arthritis 3/25/2010  Back pain 3/26/2010  CAD (coronary artery disease)  Chest pain, unspecified  Chronic kidney failure, stage 3 (moderate) ([facility] 12/5/2017  Chronic pain  Chronic pain of both knees 2/6/2018  Edema  Essential hypertension  High cholesterol 3/25/2010  
 HTN (hypertension) 3/25/2010  Kidney failure, acute ([facility] 4/6/2012  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  Onychomycosis 8/19/2010  Other acute and subacute form of ischemic heart disease  Other and unspecified angina pectoris  Other ill-defined conditions(799.89)   
 excessive watering of right eye  Patient is full code 8/6/2018  Shortness of breath  Shoulder pain, bilateral 3/26/2010 Past Surgical History:  
Procedure Laterality Date  HX HEART CATHETERIZATION    
 HX HEENT    
 bilat cateract extraction  HX HYSTERECTOMY  HX KNEE REPLACEMENT    
 HX LUMBAR FUSION    
 HX ORTHOPAEDIC    
 bilateral TKR  HX ORTHOPAEDIC    
 neck and back surgery  HX ORTHOPAEDIC    
 bilateral bunionectomy  HX ORTHOPAEDIC    
 bilateral cataracts removed  HX PTCA Current Outpatient Medications Medication Sig Dispense Refill  pravastatin (PRAVACHOL) 40 mg tablet take 1 tablet by mouth NIGHTLY 90 Tab 1  
 atenolol (TENORMIN) 50 mg tablet take 1 tablet by mouth once daily 90 Tab 1  ASPIR-LOW 81 mg tablet Take 81 mg by mouth daily.  oxyCODONE-acetaminophen (PERCOCET) 5-325 mg per tablet Take 1 Tab by mouth two (2) times daily as needed for Pain. Max Daily Amount: 2 Tabs. 60 Tab 0  
 losartan (COZAAR) 100 mg tablet Take 1 Tab by mouth daily. 90 Tab 1  
 oxyCODONE-acetaminophen (PERCOCET) 5-325 mg per tablet Take 1 Tab by mouth two (2) times daily as needed for Pain. Max Daily Amount: 2 Tabs. 60 Tab 0  
 CALCIUM 600 WITH VITAMIN D3 600 mg(1,500mg) -400 unit chew Take 600 mg by mouth two (2) times a day.     
 NIFEdipine ER (ADALAT CC) 30 mg ER tablet 1 tablet on an empty stomach 90 Tab 2  
  naloxone (NARCAN) 4 mg/actuation nasal spray Use 1 spray intranasally into 1 nostril. Use a new Narcan nasal spray for subsequent doses and administer into alternating nostrils. May repeat every 2 to 3 minutes as needed. 2 Each 11  
 nitroglycerin (NITROSTAT) 0.4 mg SL tablet PLACE 1 TABLET UNDER THE TONGUE EVERY 5 MINS AS NEEDED FOR CHEST PAIN 25 Tab 1  
 acetaminophen (TYLENOL) 500 mg tablet Take  by mouth every six (6) hours as needed for Pain.  potassium chloride (KLOR-CON) 10 mEq tablet Take 2 Tabs by mouth daily. 90 Tab 1  
 bumetanide (BUMEX) 1 mg tablet Take 1 Tab by mouth daily. 90 Tab 1  MISCELLANEOUS MEDICAL SUPPLY (COMPRESSION STOCKINGS) daily. Allergies Allergen Reactions  Diclofenac Unknown (comments)  Lisinopril Cough  Lisinopril-Hydrochlorothiazide Unknown (comments) Family History Problem Relation Age of Onset  Hypertension Mother  Stroke Mother  Other Mother   
     arthritis  Coronary Artery Disease Father  Heart Disease Father  Hypertension Sister  Diabetes Sister  Coronary Artery Disease Sister  Cancer Sister   
     lung  Coronary Artery Disease Brother  Cancer Brother   
     lung  Heart Disease Brother Social History Tobacco Use  Smoking status: Never Smoker  Smokeless tobacco: Never Used Substance Use Topics  Alcohol use: Yes Comment: occasional   
 
Patient Active Problem List  
Diagnosis Code  Primary osteoarthritis involving multiple joints M15.0  Shoulder pain, bilateral M25.511, M25.512  Back pain M54.9  Onychomycosis B35.1  Cough R05  Tick bite, infected W57. Marzella Mins  Decreased vision H54.7  Edema leg R60.0  Heme positive stool R19.5  Abdominal pain, other specified site R10.9  Inguinal hernia, left K40.90  
 SOB (shortness of breath) on exertion R06.02  
 Vitamin D deficiency E55.9  Kidney failure, acute (HCC) N17.9  Chest pain, unspecified R07.9  Mixed hyperlipidemia E78.2  
 Essential hypertension, benign I10  Abnormal nuclear stress test R94.39  
 CAD (coronary artery disease) I25.10  Post PTCA Z98.61  
 Essential hypertension, benign I10  Type 2 diabetes mellitus without complication ([facility]) G10.2  
 S/P angioplasty with stent--RCA 6/12 Z95.9  Multiple bruises T07. Minnette Erich  Watery eyes H04.203  Conjunctivitis H10.9  Lacrimal duct stenosis H04.559  Abnormal urinalysis R82.90  Prediabetes R73.03  
 Preop cardiovascular exam Z01.810  Lacrimal passage stenosis H04.549  Chronic kidney failure, stage 3 (moderate) ([facility]) N18.3  Type 2 diabetes mellitus with nephropathy ([facility]) E11.21  
 CKD (chronic kidney disease) stage 3, GFR 30-59 ml/min ([facility]) N18.3  Chronic pain of both knees M25.561, M25.562, G89.29  Severe obesity (BMI 35.0-39. 9) with comorbidity ([facility] E66.01  
 Patient is full code Z78.9 Depression Risk Factor Screening: PHQ over the last two weeks 8/6/2018 Little interest or pleasure in doing things Not at all Feeling down, depressed, irritable, or hopeless Not at all Total Score PHQ 2 0 Alcohol Risk Factor Screening: You do not drink alcohol or very rarely. Functional Ability and Level of Safety:  
Hearing Loss Hearing is good. Activities of Daily Living The home contains: handrails, grab bars and rugs Patient does total self care Fall Risk Fall Risk Assessment, last 12 mths 8/6/2018 Able to walk? Yes Fall in past 12 months? Yes Fall with injury? No  
Number of falls in past 12 months 1 Fall Risk Score 1 Abuse Screen Patient is not abused Cognitive Screening Evaluation of Cognitive Function: 
Has your family/caregiver stated any concerns about your memory: no 
Normal 
 
Patient Care Team  
Patient Care Team: 
Devin Burgos MD as PCP - General Araseli Cota RN as Ambulatory Care Navigator Birdie Rider MD as Physician (Cardiology) Assessment/Plan Education and counseling provided: 
Are appropriate based on today's review and evaluation End-of-Life planning (with patient's consent) Diagnoses and all orders for this visit: 1. Primary osteoarthritis involving multiple joints 
-     CBC W/O DIFF 
-     METABOLIC PANEL, COMPREHENSIVE 
-     TSH 3RD GENERATION 
-     HEMOGLOBIN A1C WITH EAG 
-     LIPID PANEL 
-     REFERRAL TO PAIN CLINIC 
-     PAIN MGMT PANEL W/REFL, UR 
 
2. Medicare annual wellness visit, subsequent 3. Severe obesity (BMI 35.0-39. 9) with comorbidity (HCC) 
-     CBC W/O DIFF 
-     METABOLIC PANEL, COMPREHENSIVE 
-     TSH 3RD GENERATION 
-     HEMOGLOBIN A1C WITH EAG 
-     LIPID PANEL 
-     REFERRAL TO PAIN CLINIC 
-     PAIN MGMT PANEL W/REFL, UR 
 
4. Type 2 diabetes mellitus with nephropathy (HCC) 
-     CBC W/O DIFF 
-     METABOLIC PANEL, COMPREHENSIVE 
-     TSH 3RD GENERATION 
-     HEMOGLOBIN A1C WITH EAG 
-     LIPID PANEL 
-     REFERRAL TO PAIN CLINIC 
-     PAIN MGMT PANEL W/REFL, UR 
 
5. Chronic low back pain with sciatica, sciatica laterality unspecified, unspecified back pain laterality 
-     CBC W/O DIFF 
-     METABOLIC PANEL, COMPREHENSIVE 
-     TSH 3RD GENERATION 
-     HEMOGLOBIN A1C WITH EAG 
-     LIPID PANEL 
-     REFERRAL TO PAIN CLINIC 
-     PAIN MGMT PANEL W/REFL, UR 
 
6. Advanced directives, counseling/discussion -     ADVANCE CARE PLANNING FIRST 30 MINS 
-     PAIN MGMT PANEL W/REFL, UR 
 
7. Screening for alcoholism -     CO ANNUAL ALCOHOL SCREEN 15 MIN 
-     PAIN MGMT PANEL W/REFL, UR 
 
8. Screening for depression 
-     DEPRESSION SCREEN ANNUAL 
-     PAIN MGMT PANEL W/REFL, UR 
 
9. Arthritis 
-     oxyCODONE-acetaminophen (PERCOCET) 5-325 mg per tablet; Take 1 Tab by mouth two (2) times daily as needed for Pain. Max Daily Amount: 2 Tabs. -     PAIN MGMT PANEL W/REFL, UR 
 
10. Chronic pain of both shoulders -     oxyCODONE-acetaminophen (PERCOCET) 5-325 mg per tablet; Take 1 Tab by mouth two (2) times daily as needed for Pain. Max Daily Amount: 2 Tabs. -     oxyCODONE-acetaminophen (PERCOCET) 5-325 mg per tablet; Take 1 Tab by mouth two (2) times daily as needed for Pain. Max Daily Amount: 2 Tabs. -     PAIN MGMT PANEL W/REFL, UR 
 
11. Chronic left-sided thoracic back pain 
-     oxyCODONE-acetaminophen (PERCOCET) 5-325 mg per tablet; Take 1 Tab by mouth two (2) times daily as needed for Pain. Max Daily Amount: 2 Tabs. -     oxyCODONE-acetaminophen (PERCOCET) 5-325 mg per tablet; Take 1 Tab by mouth two (2) times daily as needed for Pain. Max Daily Amount: 2 Tabs. -     PAIN MGMT PANEL W/REFL, UR 
 
12. Chronic midline low back pain without sciatica 
-     oxyCODONE-acetaminophen (PERCOCET) 5-325 mg per tablet; Take 1 Tab by mouth two (2) times daily as needed for Pain. Max Daily Amount: 2 Tabs. -     oxyCODONE-acetaminophen (PERCOCET) 5-325 mg per tablet; Take 1 Tab by mouth two (2) times daily as needed for Pain. Max Daily Amount: 2 Tabs. -     PAIN MGMT PANEL W/REFL, UR 
 
13. Chronic pain of both knees 
-     oxyCODONE-acetaminophen (PERCOCET) 5-325 mg per tablet; Take 1 Tab by mouth two (2) times daily as needed for Pain. Max Daily Amount: 2 Tabs. -     oxyCODONE-acetaminophen (PERCOCET) 5-325 mg per tablet; Take 1 Tab by mouth two (2) times daily as needed for Pain. Max Daily Amount: 2 Tabs. -     PAIN MGMT PANEL W/REFL, UR 
 
14. Chronic kidney failure, stage 3 (moderate) (HCC) 15. Essential hypertension Other orders 
-     candesartan (ATACAND) 8 mg tablet; Take 1 Tab by mouth daily. 
-     NIFEdipine ER (ADALAT CC) 30 mg ER tablet; 1 tablet on an empty stomach 
-     CALCIUM 600 WITH VITAMIN D3 600 mg(1,500mg) -400 unit chew; Take 600 mg by mouth two (2) times a day.  
 
Complex but stable patient patient was told to follow-up with a kidney doctor return to clinic in 6-8 weeks for kidney function test 
 
[facility] education and counseling provided: 
Age appropriate evidence-based preventive care recommendations based on today's review and evaluation; including relevant cancer screening guidelines, and vaccination recommendations. An After Visit Summary was printed and given to the patient with information about these guidelines, and a personalized schedule for health maintenance items. When appropriate and with patient agreement, orders noted below were placed to complete missing health maintenance items. Patient pain medication was detailed again granddaughter was witnessed all dependency and addiction discontinuation of the current medication all was discussed unfortunately patient and the patient granddaughter disagreed Health Maintenance Due Topic Date Due  Shingrix Vaccine Age 50> (1 of 2) 01/14/1983  GLAUCOMA SCREENING Q2Y  12/04/2017  Influenza Age 5 to Adult  08/01/2018  MICROALBUMIN Q1  09/11/2018  MEDICARE YEARLY EXAM  09/12/2018  
 FOOT EXAM Q1  09/29/2018  HEMOGLOBIN A1C Q6M  11/07/2018

## 2019-02-05 LAB
ALBUMIN SERPL-MCNC: 4.2 G/DL (ref 3.5–4.7)
ALBUMIN/GLOB SERPL: 1.4 {RATIO} (ref 1.2–2.2)
ALP SERPL-CCNC: 74 IU/L (ref 39–117)
ALT SERPL-CCNC: 14 IU/L (ref 0–32)
AMPHETAMINES UR QL SCN: NEGATIVE NG/ML
AST SERPL-CCNC: 26 IU/L (ref 0–40)
BARBITURATES UR QL SCN: NEGATIVE NG/ML
BENZODIAZ UR QL SCN: NEGATIVE NG/ML
BILIRUB SERPL-MCNC: 0.5 MG/DL (ref 0–1.2)
BUN SERPL-MCNC: 32 MG/DL (ref 8–27)
BUN/CREAT SERPL: 22 (ref 12–28)
BZE UR QL SCN: NEGATIVE NG/ML
CALCIUM SERPL-MCNC: 10.2 MG/DL (ref 8.7–10.3)
CANNABINOIDS UR QL SCN: NEGATIVE NG/ML
CHLORIDE SERPL-SCNC: 103 MMOL/L (ref 96–106)
CHOLEST SERPL-MCNC: 242 MG/DL (ref 100–199)
CO2 SERPL-SCNC: 21 MMOL/L (ref 20–29)
CREAT SERPL-MCNC: 1.46 MG/DL (ref 0.57–1)
CREAT UR-MCNC: 112 MG/DL (ref 20–300)
ERYTHROCYTE [DISTWIDTH] IN BLOOD BY AUTOMATED COUNT: 14.3 % (ref 12.3–15.4)
EST. AVERAGE GLUCOSE BLD GHB EST-MCNC: 128 MG/DL
FENTANYL+NORFENTANYL UR QL SCN: NEGATIVE PG/ML
GLOBULIN SER CALC-MCNC: 3 G/DL (ref 1.5–4.5)
GLUCOSE SERPL-MCNC: 113 MG/DL (ref 65–99)
HBA1C MFR BLD: 6.1 % (ref 4.8–5.6)
HCT VFR BLD AUTO: 40.6 % (ref 34–46.6)
HDLC SERPL-MCNC: 73 MG/DL
HGB BLD-MCNC: 13.9 G/DL (ref 11.1–15.9)
INTERPRETATION: NORMAL
LDLC SERPL CALC-MCNC: 144 MG/DL (ref 0–99)
Lab: NORMAL
MCH RBC QN AUTO: 30.6 PG (ref 26.6–33)
MCHC RBC AUTO-ENTMCNC: 34.2 G/DL (ref 31.5–35.7)
MCV RBC AUTO: 89 FL (ref 79–97)
MEPERIDINE UR QL: NEGATIVE NG/ML
METHADONE UR QL SCN: NEGATIVE NG/ML
OPIATES UR QL SCN: NEGATIVE NG/ML
OXYCODONE+OXYMORPHONE UR QL SCN: NEGATIVE NG/ML
PCP UR QL: NEGATIVE NG/ML
PH UR: 5.7 [PH] (ref 4.5–8.9)
PLATELET # BLD AUTO: 185 X10E3/UL (ref 150–379)
PLEASE NOTE:, 733157: NORMAL
POTASSIUM SERPL-SCNC: 4.5 MMOL/L (ref 3.5–5.2)
PROPOXYPH UR QL SCN: NEGATIVE NG/ML
PROT SERPL-MCNC: 7.2 G/DL (ref 6–8.5)
RBC # BLD AUTO: 4.54 X10E6/UL (ref 3.77–5.28)
SODIUM SERPL-SCNC: 144 MMOL/L (ref 134–144)
SP GR UR: 1.01
TRAMADOL UR QL SCN: NEGATIVE NG/ML
TRIGL SERPL-MCNC: 125 MG/DL (ref 0–149)
TSH SERPL DL<=0.005 MIU/L-ACNC: 1.19 UIU/ML (ref 0.45–4.5)
VLDLC SERPL CALC-MCNC: 25 MG/DL (ref 5–40)
WBC # BLD AUTO: 5.4 X10E3/UL (ref 3.4–10.8)

## 2019-02-20 DIAGNOSIS — M25.512 CHRONIC PAIN OF BOTH SHOULDERS: ICD-10-CM

## 2019-02-20 DIAGNOSIS — M25.511 CHRONIC PAIN OF BOTH SHOULDERS: ICD-10-CM

## 2019-02-20 DIAGNOSIS — M19.90 ARTHRITIS: ICD-10-CM

## 2019-02-20 DIAGNOSIS — G89.29 CHRONIC LEFT-SIDED THORACIC BACK PAIN: ICD-10-CM

## 2019-02-20 DIAGNOSIS — M25.562 CHRONIC PAIN OF BOTH KNEES: ICD-10-CM

## 2019-02-20 DIAGNOSIS — M25.561 CHRONIC PAIN OF BOTH KNEES: ICD-10-CM

## 2019-02-20 DIAGNOSIS — G89.29 CHRONIC PAIN OF BOTH KNEES: ICD-10-CM

## 2019-02-20 DIAGNOSIS — G89.29 CHRONIC PAIN OF BOTH SHOULDERS: ICD-10-CM

## 2019-02-20 DIAGNOSIS — G89.29 CHRONIC MIDLINE LOW BACK PAIN WITHOUT SCIATICA: ICD-10-CM

## 2019-02-20 DIAGNOSIS — M54.6 CHRONIC LEFT-SIDED THORACIC BACK PAIN: ICD-10-CM

## 2019-02-20 DIAGNOSIS — M54.50 CHRONIC MIDLINE LOW BACK PAIN WITHOUT SCIATICA: ICD-10-CM

## 2019-02-20 RX ORDER — OXYCODONE AND ACETAMINOPHEN 5; 325 MG/1; MG/1
1 TABLET ORAL
Qty: 24 TAB | Refills: 0 | Status: SHIPPED | OUTPATIENT
Start: 2019-02-20 | End: 2019-04-04 | Stop reason: ALTCHOICE

## 2019-02-20 RX ORDER — OXYCODONE AND ACETAMINOPHEN 5; 325 MG/1; MG/1
1 TABLET ORAL
Qty: 60 TAB | Refills: 0 | Status: SHIPPED | OUTPATIENT
Start: 2019-03-04 | End: 2019-04-04 | Stop reason: ALTCHOICE

## 2019-02-20 NOTE — TELEPHONE ENCOUNTER
----- Message from [employer] sent at 2/20/2019 10:15 AM EST -----  Regarding: Dr. Pamela Bella  Pt's daughter, Leslie Steve, returned call.  Best contact number(work) is [phone] ext [phone]

## 2019-04-04 ENCOUNTER — OFFICE VISIT (OUTPATIENT)
Dept: FAMILY MEDICINE CLINIC | Age: 84
End: 2019-04-04

## 2019-04-04 VITALS
HEART RATE: 62 BPM | BODY MASS INDEX: 36.48 KG/M2 | TEMPERATURE: 96.2 F | SYSTOLIC BLOOD PRESSURE: 126 MMHG | DIASTOLIC BLOOD PRESSURE: 60 MMHG | OXYGEN SATURATION: 99 % | WEIGHT: 227 LBS | HEIGHT: 66 IN | RESPIRATION RATE: 20 BRPM

## 2019-04-04 DIAGNOSIS — I25.10 CORONARY ARTERY DISEASE DUE TO LIPID RICH PLAQUE: ICD-10-CM

## 2019-04-04 DIAGNOSIS — I25.83 CORONARY ARTERY DISEASE DUE TO LIPID RICH PLAQUE: ICD-10-CM

## 2019-04-04 DIAGNOSIS — F11.288 OPIOID DEPENDENCE WITH OTHER OPIOID-INDUCED DISORDER (HCC): ICD-10-CM

## 2019-04-04 DIAGNOSIS — G89.29 CHRONIC LOW BACK PAIN WITH SCIATICA, SCIATICA LATERALITY UNSPECIFIED, UNSPECIFIED BACK PAIN LATERALITY: ICD-10-CM

## 2019-04-04 DIAGNOSIS — M54.40 CHRONIC LOW BACK PAIN WITH SCIATICA, SCIATICA LATERALITY UNSPECIFIED, UNSPECIFIED BACK PAIN LATERALITY: ICD-10-CM

## 2019-04-04 DIAGNOSIS — R60.0 BILATERAL EDEMA OF LOWER EXTREMITY: ICD-10-CM

## 2019-04-04 DIAGNOSIS — M25.512 CHRONIC PAIN OF BOTH SHOULDERS: Primary | ICD-10-CM

## 2019-04-04 DIAGNOSIS — M25.511 CHRONIC PAIN OF BOTH SHOULDERS: Primary | ICD-10-CM

## 2019-04-04 DIAGNOSIS — G89.29 CHRONIC PAIN OF BOTH KNEES: ICD-10-CM

## 2019-04-04 DIAGNOSIS — M54.50 CHRONIC MIDLINE LOW BACK PAIN WITHOUT SCIATICA: ICD-10-CM

## 2019-04-04 DIAGNOSIS — M25.561 CHRONIC PAIN OF BOTH KNEES: ICD-10-CM

## 2019-04-04 DIAGNOSIS — M15.9 PRIMARY OSTEOARTHRITIS INVOLVING MULTIPLE JOINTS: ICD-10-CM

## 2019-04-04 DIAGNOSIS — Z95.820 S/P ANGIOPLASTY WITH STENT: ICD-10-CM

## 2019-04-04 DIAGNOSIS — I10 ESSENTIAL HYPERTENSION: ICD-10-CM

## 2019-04-04 DIAGNOSIS — G89.29 CHRONIC PAIN OF BOTH SHOULDERS: Primary | ICD-10-CM

## 2019-04-04 DIAGNOSIS — R06.02 SOB (SHORTNESS OF BREATH) ON EXERTION: ICD-10-CM

## 2019-04-04 DIAGNOSIS — M25.562 CHRONIC PAIN OF BOTH KNEES: ICD-10-CM

## 2019-04-04 DIAGNOSIS — G89.29 CHRONIC MIDLINE LOW BACK PAIN WITHOUT SCIATICA: ICD-10-CM

## 2019-04-04 RX ORDER — TRAMADOL HYDROCHLORIDE AND ACETAMINOPHEN 37.5; 325 MG/1; MG/1
1 TABLET ORAL
Qty: 90 TAB | Refills: 0 | Status: SHIPPED | OUTPATIENT
Start: 2019-04-04 | End: 2019-05-04

## 2019-04-04 RX ORDER — TORSEMIDE 20 MG/1
20 TABLET ORAL DAILY
Qty: 30 TAB | Refills: 3 | Status: SHIPPED | OUTPATIENT
Start: 2019-04-04 | End: 2019-08-13 | Stop reason: SDUPTHER

## 2019-04-04 RX ORDER — DOCUSATE SODIUM 100 MG/1
100 CAPSULE, LIQUID FILLED ORAL
Qty: 90 CAP | Refills: 3 | Status: SHIPPED | OUTPATIENT
Start: 2019-04-04 | End: 2019-07-03

## 2019-04-04 RX ORDER — NITROGLYCERIN 0.4 MG/1
TABLET SUBLINGUAL
Qty: 25 TAB | Refills: 11 | Status: SHIPPED | OUTPATIENT
Start: 2019-04-04 | End: 2020-09-04 | Stop reason: SDUPTHER

## 2019-04-04 RX ORDER — TRAMADOL HYDROCHLORIDE AND ACETAMINOPHEN 37.5; 325 MG/1; MG/1
1 TABLET ORAL
Qty: 90 TAB | Refills: 0 | Status: SHIPPED | OUTPATIENT
Start: 2019-05-20 | End: 2019-06-19

## 2019-04-04 RX ORDER — POTASSIUM CHLORIDE 750 MG/1
10 TABLET, EXTENDED RELEASE ORAL DAILY
Qty: 30 TAB | Refills: 3 | Status: SHIPPED | OUTPATIENT
Start: 2019-04-04 | End: 2019-08-13 | Stop reason: SDUPTHER

## 2019-04-04 NOTE — PROGRESS NOTES
Name and  verified Chief Complaint Patient presents with  Medication Refill  Hypertension Health Maintenance reviewed-discussed with patient. 1. Have you been to the ER, urgent care clinic since your last visit? Hospitalized since your last visit? no 
 
2. Have you seen or consulted any other health care providers outside of the [address] [place] [address] since your last visit? Include any pap smears or colon screening.  no

## 2019-04-04 NOTE — PROGRESS NOTES
HISTORY OF PRESENT ILLNESS Alicia Scott is a 80 y.o. female. HPI Chronic upper back, knees shoulders, rt hands pain The patient's pain has been an ongoing struggling concerning problem, present for refills, never abused any prescribed meds, no hx of illicit nor etoh abuse, the pain has been bothering the pt for many yrs, pt aware that there are no other alternative approach for the current pain that exist except for the available opioid based meds with their huge addictive properties, Patient states that the current dosage of the meds given, not strong enough, but it is helping,   Zero count, last visit patient stated that her last pain medication was taken 1 month ago unfortunately her urine analysis did not indicate that, at this time we will repeat drug screen if negative will advise patient to seek alternative medical doctor for her pain medication refill, 
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 With ckd Has seen the nephrologist Q6months was told to have high k but most recent result was normal 
HTN Today pt present for Bp check and++ Compliancy w/ the bp meds, having had the low salt diet ,  has been active, patien does not obtain the bp at home ,, today the pt denies Chest Pain, has ++ legs swelling no lightheadedness, 
 
 
Current Outpatient Medications Medication Sig Dispense Refill  oxyCODONE-acetaminophen (PERCOCET) 5-325 mg per tablet Take 1 Tab by mouth two (2) times daily as needed for Pain. Max Daily Amount: 2 Tabs. 60 Tab 0  
 oxyCODONE-acetaminophen (PERCOCET) 5-325 mg per tablet Take 1 Tab by mouth two (2) times daily as needed for Pain. Max Daily Amount: 2 Tabs.  24 Tab 0  
  candesartan (ATACAND) 8 mg tablet Take 1 Tab by mouth daily. 90 Tab 0  
 NIFEdipine ER (ADALAT CC) 30 mg ER tablet 1 tablet on an empty stomach 90 Tab 2  
 CALCIUM 600 WITH VITAMIN D3 600 mg(1,500mg) -400 unit chew Take 600 mg by mouth two (2) times a day. 60 Tab 4  
 acetaminophen (TYLENOL) 500 mg capsule  losartan (COZAAR) 100 mg tablet Take 100 mg by mouth daily. 0  
 potassium chloride SR (KLOR-CON 10) 10 mEq tablet   0  
 pravastatin (PRAVACHOL) 40 mg tablet take 1 tablet by mouth NIGHTLY 90 Tab 1  
 atenolol (TENORMIN) 50 mg tablet take 1 tablet by mouth once daily 90 Tab 1  ASPIR-LOW 81 mg tablet Take 81 mg by mouth daily.  naloxone (NARCAN) 4 mg/actuation nasal spray Use 1 spray intranasally into 1 nostril. Use a new Narcan nasal spray for subsequent doses and administer into alternating nostrils. May repeat every 2 to 3 minutes as needed. 2 Each 11  
 nitroglycerin (NITROSTAT) 0.4 mg SL tablet PLACE 1 TABLET UNDER THE TONGUE EVERY 5 MINS AS NEEDED FOR CHEST PAIN 25 Tab 1  
 acetaminophen (TYLENOL) 500 mg tablet Take  by mouth every six (6) hours as needed for Pain.  potassium chloride (KLOR-CON) 10 mEq tablet Take 2 Tabs by mouth daily. (Patient taking differently: Take 20 mEq by mouth every other day.) 90 Tab 1  
 bumetanide (BUMEX) 1 mg tablet Take 1 Tab by mouth daily. 90 Tab 1  MISCELLANEOUS MEDICAL SUPPLY (COMPRESSION STOCKINGS) daily. Allergies Allergen Reactions  Diclofenac Unknown (comments)  Lisinopril Cough  Lisinopril-Hydrochlorothiazide Unknown (comments) Past Medical History:  
Diagnosis Date  Arthritis 3/25/2010  Back pain 3/26/2010  CAD (coronary artery disease)  Chest pain, unspecified  Chronic kidney failure, stage 3 (moderate) ([facility] [address] 12/5/2017  Chronic pain  Chronic pain of both knees 2/6/2018  Edema  Essential hypertension  High cholesterol 3/25/2010  
 HTN (hypertension) 3/25/2010  Kidney failure, acute ([facility] [address] 4/6/2012  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  Onychomycosis 8/19/2010  Other acute and subacute form of ischemic heart disease  Other and unspecified angina pectoris  Other ill-defined conditions(799.89)   
 excessive watering of right eye  Patient is full code 8/6/2018  Shortness of breath  Shoulder pain, bilateral 3/26/2010 Past Surgical History:  
Procedure Laterality Date  HX HEART CATHETERIZATION    
 HX HEENT    
 bilat cateract extraction  HX HYSTERECTOMY  HX KNEE REPLACEMENT    
 HX LUMBAR FUSION    
 HX ORTHOPAEDIC    
 bilateral TKR  HX ORTHOPAEDIC    
 neck and back surgery  HX ORTHOPAEDIC    
 bilateral bunionectomy  HX ORTHOPAEDIC    
 bilateral cataracts removed  HX PTCA Family History Problem Relation Age of Onset  Hypertension Mother  Stroke Mother  Other Mother   
     arthritis  Coronary Artery Disease Father  Heart Disease Father  Hypertension Sister  Diabetes Sister  Coronary Artery Disease Sister  Cancer Sister   
     lung  Coronary Artery Disease Brother  Cancer Brother   
     lung  Heart Disease Brother Social History Tobacco Use  Smoking status: Never Smoker  Smokeless tobacco: Never Used Substance Use Topics  Alcohol use: Yes Comment: occasional   
  
Lab Results Component Value Date/Time WBC 5.4 02/04/2019 09:22 AM  
 HGB 13.9 02/04/2019 09:22 AM  
 HCT 40.6 02/04/2019 09:22 AM  
 PLATELET 950 31/01/3441 09:22 AM  
 MCV 89 02/04/2019 09:22 AM  
 
Lab Results Component Value Date/Time  Hemoglobin A1c 6.1 (H) 02/04/2019 09:22 AM  
 Hemoglobin A1c 6.2 (H) 03/09/2017 08:57 AM  
 Hemoglobin A1c 6.1 (H) 11/30/2015 10:00 AM  
 Glucose 113 (H) 02/04/2019 09:22 AM  
 Microalb/Creat ratio (ug/mg creat.) <2.9 09/11/2017 10:04 AM  
 LDL, calculated 144 (H) 02/04/2019 09:22 AM  
 Creatinine (POC) 1.3 11/30/2009 01:34 PM  
 Creatinine 1.46 (H) 02/04/2019 09:22 AM  
  
Lab Results Component Value Date/Time Cholesterol, total 242 (H) 02/04/2019 09:22 AM  
 HDL Cholesterol 73 02/04/2019 09:22 AM  
 LDL, calculated 144 (H) 02/04/2019 09:22 AM  
 Triglyceride 125 02/04/2019 09:22 AM  
 CHOL/HDL Ratio 4.5 08/19/2010 12:27 PM  
 
Lab Results Component Value Date/Time TSH 1.190 02/04/2019 09:22 AM  
   
Review of Systems Constitutional: Negative for chills and fever. HENT: Negative for ear pain and nosebleeds. Eyes: Negative for blurred vision, pain and discharge. Respiratory: Negative for shortness of breath. Cardiovascular: Negative for chest pain and leg swelling. Gastrointestinal: Negative for constipation, diarrhea, nausea and vomiting. Genitourinary: Negative for frequency. Musculoskeletal: Positive for back pain, joint pain, myalgias and neck pain. Skin: Negative for itching and rash. Neurological: Negative for headaches. Psychiatric/Behavioral: Negative for depression. The patient is not nervous/anxious. Physical Exam  
Constitutional: She is oriented to person, place, and time. She appears well-developed and well-nourished. HENT:  
Head: Normocephalic and atraumatic. Eyes: Conjunctivae and EOM are normal.  
Neck: Normal range of motion. Neck supple. Cardiovascular: Normal rate, regular rhythm and normal heart sounds. No murmur heard. Pulmonary/Chest: Effort normal and breath sounds normal.  
Abdominal: Soft. Bowel sounds are normal. She exhibits no distension. Musculoskeletal: She exhibits tenderness and deformity. She exhibits no edema. Right shoulder: She exhibits pain and spasm. Left shoulder: She exhibits decreased range of motion and pain. Right knee: She exhibits decreased range of motion. Tenderness found. Left knee: She exhibits decreased range of motion. Tenderness found. Lumbar back: She exhibits decreased range of motion, tenderness, pain and spasm. Lymphadenopathy:  
  She has no cervical adenopathy. Neurological: She is alert and oriented to person, place, and time. Skin: No erythema. Psychiatric: Her behavior is normal.  
Nursing note and vitals reviewed. ASSESSMENT and PLAN Diagnoses and all orders for this visit: 
 
1. Chronic pain of both shoulders 
-     traMADol-acetaminophen (ULTRACET) 37.5-325 mg per tablet; Take 1 Tab by mouth every eight (8) hours as needed for Pain for up to 30 days. Max Daily Amount: 3 Tabs. -     traMADol-acetaminophen (ULTRACET) 37.5-325 mg per tablet; Take 1 Tab by mouth every eight (8) hours as needed for Pain for up to 30 days. Max Daily Amount: 3 Tabs. 2. Primary osteoarthritis involving multiple joints 
-     traMADol-acetaminophen (ULTRACET) 37.5-325 mg per tablet; Take 1 Tab by mouth every eight (8) hours as needed for Pain for up to 30 days. Max Daily Amount: 3 Tabs. -     traMADol-acetaminophen (ULTRACET) 37.5-325 mg per tablet; Take 1 Tab by mouth every eight (8) hours as needed for Pain for up to 30 days. Max Daily Amount: 3 Tabs. 3. Chronic low back pain with sciatica, sciatica laterality unspecified, unspecified back pain laterality 
-     traMADol-acetaminophen (ULTRACET) 37.5-325 mg per tablet; Take 1 Tab by mouth every eight (8) hours as needed for Pain for up to 30 days. Max Daily Amount: 3 Tabs. -     traMADol-acetaminophen (ULTRACET) 37.5-325 mg per tablet; Take 1 Tab by mouth every eight (8) hours as needed for Pain for up to 30 days. Max Daily Amount: 3 Tabs. 4. Chronic midline low back pain without sciatica 
-     traMADol-acetaminophen (ULTRACET) 37.5-325 mg per tablet; Take 1 Tab by mouth every eight (8) hours as needed for Pain for up to 30 days. Max Daily Amount: 3 Tabs. -     traMADol-acetaminophen (ULTRACET) 37.5-325 mg per tablet;  Take 1 Tab by mouth every eight (8) hours as needed for Pain for up to 30 days. Max Daily Amount: 3 Tabs. 5. Chronic pain of both knees 
-     traMADol-acetaminophen (ULTRACET) 37.5-325 mg per tablet; Take 1 Tab by mouth every eight (8) hours as needed for Pain for up to 30 days. Max Daily Amount: 3 Tabs. -     traMADol-acetaminophen (ULTRACET) 37.5-325 mg per tablet; Take 1 Tab by mouth every eight (8) hours as needed for Pain for up to 30 days. Max Daily Amount: 3 Tabs. 6. Opioid dependence with other opioid-induced disorder ([facility] [address] 
-     traMADol-acetaminophen (ULTRACET) 37.5-325 mg per tablet; Take 1 Tab by mouth every eight (8) hours as needed for Pain for up to 30 days. Max Daily Amount: 3 Tabs. -     traMADol-acetaminophen (ULTRACET) 37.5-325 mg per tablet; Take 1 Tab by mouth every eight (8) hours as needed for Pain for up to 30 days. Max Daily Amount: 3 Tabs. 7. SOB (shortness of breath) on exertion 
-     nitroglycerin (NITROSTAT) 0.4 mg SL tablet; PLACE 1 TABLET UNDER THE TONGUE EVERY 5 MINS AS NEEDED FOR CHEST PAIN 
-     traMADol-acetaminophen (ULTRACET) 37.5-325 mg per tablet; Take 1 Tab by mouth every eight (8) hours as needed for Pain for up to 30 days. Max Daily Amount: 3 Tabs. -     traMADol-acetaminophen (ULTRACET) 37.5-325 mg per tablet; Take 1 Tab by mouth every eight (8) hours as needed for Pain for up to 30 days. Max Daily Amount: 3 Tabs. 8. Coronary artery disease due to lipid rich plaque 
-     nitroglycerin (NITROSTAT) 0.4 mg SL tablet; PLACE 1 TABLET UNDER THE TONGUE EVERY 5 MINS AS NEEDED FOR CHEST PAIN 
-     traMADol-acetaminophen (ULTRACET) 37.5-325 mg per tablet; Take 1 Tab by mouth every eight (8) hours as needed for Pain for up to 30 days. Max Daily Amount: 3 Tabs. -     traMADol-acetaminophen (ULTRACET) 37.5-325 mg per tablet; Take 1 Tab by mouth every eight (8) hours as needed for Pain for up to 30 days. Max Daily Amount: 3 Tabs. 9. S/P angioplasty with stent--RCA 6/12 -     nitroglycerin (NITROSTAT) 0.4 mg SL tablet; PLACE 1 TABLET UNDER THE TONGUE EVERY 5 MINS AS NEEDED FOR CHEST PAIN 
-     traMADol-acetaminophen (ULTRACET) 37.5-325 mg per tablet; Take 1 Tab by mouth every eight (8) hours as needed for Pain for up to 30 days. Max Daily Amount: 3 Tabs. -     traMADol-acetaminophen (ULTRACET) 37.5-325 mg per tablet; Take 1 Tab by mouth every eight (8) hours as needed for Pain for up to 30 days. Max Daily Amount: 3 Tabs. 10. Bilateral edema of lower extremity -     potassium chloride (KLOR-CON) 10 mEq tablet; Take 1 Tab by mouth daily. -     traMADol-acetaminophen (ULTRACET) 37.5-325 mg per tablet; Take 1 Tab by mouth every eight (8) hours as needed for Pain for up to 30 days. Max Daily Amount: 3 Tabs. -     traMADol-acetaminophen (ULTRACET) 37.5-325 mg per tablet; Take 1 Tab by mouth every eight (8) hours as needed for Pain for up to 30 days. Max Daily Amount: 3 Tabs. 11. Essential hypertension 
-     REFERRAL TO NEPHROLOGY 
-     potassium chloride (KLOR-CON) 10 mEq tablet; Take 1 Tab by mouth daily. Other orders -     torsemide (DEMADEX) 20 mg tablet; Take 1 Tab by mouth daily. -     docusate sodium (COLACE) 100 mg capsule; Take 1 Cap by mouth three (3) times daily as needed for Constipation for up to 90 days. Patient medications were refilled after signing the contract consent and no harm documentations and again was told to lessen the amount of opioid-based medication continue with weight reduction do some massage therapy chiropractor exercise therapy such as resistance banding avoid heavy lifting heavy pushing at this time include ibuprofen and Tylenol over-the-counter topical cream for  day views of concerns patient was told to take some Tums or over-the-counter PPI if abdominal upset ice therapy he therapy side effect of current opioid-based medication significantly explained in detail patient acknowledged understanding and agreed with recommendation in addition, pt was told to avoid machinary operation and driving while on any opioid based medications that will cause dizziness, drowsiness, and sleepiness. Dependency and tolerancy were also addressed,  meds side effects and compliancy advised,  Call or rtc if worsens, radiology results and schedule of future radiology studies reviewed with patient. Pt agreed with today's recommendations. Discussed the patient's BMI with her. The BMI follow up plan is as follows:  
 
dietary management education, guidance, and counseling 
encourage exercise 
monitor weight 
prescribed dietary intake An After Visit Summary was printed and given to the patient.

## 2019-04-04 NOTE — PATIENT INSTRUCTIONS
Learning About Managing Chronic Pain What is a plan for pain management? A pain management plan helps you find ways to control pain with side effects you can live with. Some diseases and injuries can cause pain that lasts a long time. Constant pain can make you depressed. It can cause stress and make it hard for you to eat and sleep. But you don't need to live with uncontrolled pain. How can you plan for managing your pain? You and your doctor will work to make your plan. Your plan can include more than one type of pain control. You may take prescription or over-the-counter drugs. You can also try physical treatments, like massage and acupuncture. Other things can help too, such as meditation or a type of therapy to change how you think about your pain. It's important to let your doctor know how you prefer to control your pain. Sometimes the goal of a pain management plan isn't to totally get rid of pain. Instead, it might be to reduce the pain enough that daily activities are easier. If your pain isn't controlled well enough, talk with your doctor. You may need to make a new plan. Or your doctor may refer you to a specialist. 
What medicines are used? Your doctor may prescribe medicine to help with your pain. If you aren't taking a prescription medicine, you may be able to take an over-the-counter one. Here are the main types of medicine for chronic pain. · Non-opioids. These are things like acetaminophen, such as Tylenol, and non-steroidal anti-inflammatory drugs (NSAIDs), such as Advil. · Opioids. Morphine, codeine, and oxycodone are some examples. · Other medicines. Antidepressants and anti-seizure medicines may be used. These medicines seem to change the way your brain senses pain. Another option may be a nerve block injection. Medicines are the most common treatment for pain. But to feel better, you'll need to do more than take medicine.  You can also do things like reducing your stress level and changing how you think. How can you take medicine safely? Medicines can help you get better. But they can also be dangerous, especially if you don't take them the right way. Be safe with medicines. Read and follow all instructions on the label. If the medicine you take causes side effects such as constipation or nausea, you may need to take other medicines for those problems. Talk to your doctor about any side effects you have. If you were prescribed an opioid pain reliever, your care team will give you information on how to use it safely. You will also get directions for how to safely store the medicine and how to get rid of any that's left over. Follow these instructions carefully. What physical treatments can help? Physical treatments can be an important part of managing chronic pain. You may find that combining more than one treatment helps the most. 
These treatments can include: 
· Heat or cold. This can help arthritis, sore muscles, and other aches. · Hydrotherapy. It uses flowing water to relax muscles. · Massage. Massage involves rubbing the soft tissues of the body. It eases tension and pain. · Transcutaneous electrical nerve stimulation (TENS). This treatment uses a gentle electric current applied to the skin for pain relief. · Acupuncture. This is a form of traditional [facility] medicine. It uses very thin needles inserted into certain points of the body. · Physical therapy. This treatment uses stretches and exercises to reduce pain and help you move better. If you get physical therapy, make sure to do any home exercises or stretching your therapist has prescribed. Stay as active as you can. Try to get some physical activity every day. What other things can help? You can manage chronic pain by using things other than medicines or physical treatments.  For example, you can keep track of your pain in a pain diary. It can help you understand how the things you do affect your pain. Reducing stress and tension can reduce pain. And being more aware of your thought patterns can be helpful. In some cases, shifting how you think about pain can affect how you feel. Here are some options to think about: · Breathing exercises and meditation. These techniques can help you focus your attention, relax, and get rid of tension. · Guided imagery. This is a series of thoughts and images that can focus your attention away from your pain. · Hypnosis. It's a state of focused concentration that makes you less aware of your surroundings. · Cognitive behavioral therapy. This type of counseling helps you change your thought patterns. · Yoga. Stretching and exercises can reduce stress and improve flexibility. If what you're doing to control your pain isn't working, or if you're feeling depressed, talk to your doctor. He or she can help you change your pain management plan and find resources for emotional support. Where can you learn more? Go to http://brianne-ger.info/. Enter P119 in the search box to learn more about \"Learning About Managing Chronic Pain. \" Current as of: Lizeth 3, 2018 Content Version: 11.9 © 3023-1514 [employer]. Care instructions adapted under license by [employer] (which disclaims liability or warranty for this information). If you have questions about a medical condition or this instruction, always ask your healthcare professional. [address] any warranty or liability for your use of this information. Body Mass Index: Care Instructions Your Care Instructions Body mass index (BMI) can help you see if your weight is raising your risk for health problems. It uses a formula to compare how much you weigh with how tall you are. · A BMI lower than 18.5 is considered underweight. · A BMI between 18.5 and 24.9 is considered healthy. · A BMI between 25 and 29.9 is considered overweight. A BMI of 30 or higher is considered obese. If your BMI is in the normal range, it means that you have a lower risk for weight-related health problems. If your BMI is in the overweight or obese range, you may be at increased risk for weight-related health problems, such as high blood pressure, heart disease, stroke, arthritis or joint pain, and diabetes. If your BMI is in the underweight range, you may be at increased risk for health problems such as fatigue, lower protection (immunity) against illness, muscle loss, bone loss, hair loss, and hormone problems. BMI is just one measure of your risk for weight-related health problems. You may be at higher risk for health problems if you are not active, you eat an unhealthy diet, or you drink too much alcohol or use tobacco products. Follow-up care is a key part of your treatment and safety. Be sure to make and go to all appointments, and call your doctor if you are having problems. It's also a good idea to know your test results and keep a list of the medicines you take. How can you care for yourself at home? · Practice healthy eating habits. This includes eating plenty of fruits, vegetables, whole grains, lean protein, and low-fat dairy. · If your doctor recommends it, get more exercise. Walking is a good choice. Bit by bit, increase the amount you walk every day. Try for at least 30 minutes on most days of the week. · Do not smoke. Smoking can increase your risk for health problems. If you need help quitting, talk to your doctor about stop-smoking programs and medicines. These can increase your chances of quitting for good. · Limit alcohol to 2 drinks a day for men and 1 drink a day for women. Too much alcohol can cause health problems. If you have a BMI higher than 25 · Your doctor may do other tests to check your risk for weight-related health problems. This may include measuring the distance around your waist. A waist measurement of more than 40 inches in men or 35 inches in women can increase the risk of weight-related health problems. · Talk with your doctor about steps you can take to stay healthy or improve your health. You may need to make lifestyle changes to lose weight and stay healthy, such as changing your diet and getting regular exercise. If you have a BMI lower than 18.5 · Your doctor may do other tests to check your risk for health problems. · Talk with your doctor about steps you can take to stay healthy or improve your health. You may need to make lifestyle changes to gain or maintain weight and stay healthy, such as getting more healthy foods in your diet and doing exercises to build muscle. Where can you learn more? Go to http://brianne-ger.info/. Enter S176 in the search box to learn more about \"Body Mass Index: Care Instructions. \" Current as of: October 13, 2016 Content Version: 11.4 © 2545-9281 [employer]. Care instructions adapted under license by [employer] (which disclaims liability or warranty for this information). If you have questions about a medical condition or this instruction, always ask your healthcare professional. [address] any warranty or liability for your use of this information.

## 2019-05-03 RX ORDER — CANDESARTAN 8 MG/1
8 TABLET ORAL DAILY
Qty: 90 TAB | Refills: 0 | Status: SHIPPED | OUTPATIENT
Start: 2019-05-03 | End: 2019-05-07 | Stop reason: SDUPTHER

## 2019-05-03 NOTE — TELEPHONE ENCOUNTER
[facility] sent over refill request for Candesartan cilexetil 8 mg tablet.      Last fill: 2/4/19 (90 day supply + 0 refills)  Last visit:4/4/19  Next appt: not scheduled as of now    Thank you,   Flattr, CPhT

## 2019-05-07 NOTE — TELEPHONE ENCOUNTER
Patient's daughter called and stated that the prescription for Candesartan 8mg was never received by the pharmacy.

## 2019-05-08 RX ORDER — CANDESARTAN 8 MG/1
8 TABLET ORAL DAILY
Qty: 90 TAB | Refills: 0 | Status: SHIPPED | OUTPATIENT
Start: 2019-05-08 | End: 2019-07-24 | Stop reason: SDUPTHER

## 2019-05-28 ENCOUNTER — OFFICE VISIT (OUTPATIENT)
Dept: CARDIOLOGY CLINIC | Age: 84
End: 2019-05-28

## 2019-05-28 VITALS
RESPIRATION RATE: 18 BRPM | HEIGHT: 66 IN | DIASTOLIC BLOOD PRESSURE: 65 MMHG | WEIGHT: 222 LBS | HEART RATE: 62 BPM | BODY MASS INDEX: 35.68 KG/M2 | OXYGEN SATURATION: 95 % | SYSTOLIC BLOOD PRESSURE: 121 MMHG

## 2019-05-28 DIAGNOSIS — E78.2 MIXED HYPERLIPIDEMIA: ICD-10-CM

## 2019-05-28 DIAGNOSIS — Z95.820 S/P ANGIOPLASTY WITH STENT: ICD-10-CM

## 2019-05-28 DIAGNOSIS — I10 ESSENTIAL HYPERTENSION, BENIGN: Primary | ICD-10-CM

## 2019-05-28 DIAGNOSIS — E66.01 SEVERE OBESITY (BMI 35.0-39.9) WITH COMORBIDITY (HCC): ICD-10-CM

## 2019-05-28 DIAGNOSIS — N18.30 CKD (CHRONIC KIDNEY DISEASE) STAGE 3, GFR 30-59 ML/MIN (HCC): ICD-10-CM

## 2019-05-28 DIAGNOSIS — E11.21 TYPE 2 DIABETES MELLITUS WITH NEPHROPATHY (HCC): ICD-10-CM

## 2019-05-28 RX ORDER — ROSUVASTATIN CALCIUM 20 MG/1
20 TABLET, COATED ORAL
Qty: 30 TAB | Refills: 6 | Status: SHIPPED | OUTPATIENT
Start: 2019-05-28 | End: 2019-08-13 | Stop reason: SDUPTHER

## 2019-05-28 NOTE — PROGRESS NOTES
Chief Complaint   Patient presents with    Follow-up     6 mo    Irregular Heart Beat    Hypertension    Cardiomyopathy     1. Have you been to the ER, urgent care clinic since your last visit? Hospitalized since your last visit? No    2. Have you seen or consulted any other health care providers outside of the [address] [place] [address] since your last visit? Include any pap smears or colon screening.  No

## 2019-05-28 NOTE — PROGRESS NOTES
[facility]   [address] [facility], [address] [place] [address] Road                                          NEW PATIENT HPI/FOLLOW-UP      NAME:  Allyn Keenan   :   1933   MRN:   [id number]   PCP:  Lazarus Never, MD           Subjective: The patient is a 80y.o. year old female  who returns for a routine follow-up. Since the last visit, patient reports no new symptoms. Denies change in exercise tolerance, chest pain, edema, medication intolerance, palpitations, shortness of breath, PND/orthopnea wheezing, sputum, syncope, dizziness or light headedness. Doing satisfactorily. Review of Systems  General: Pt denies excessive weight gain or loss. Pt is able to conduct ADL's. Respiratory: Denies shortness of breath, MURILLO, wheezing or stridor.   Cardiovascular: Denies precordial pain, palpitations, edema or PND  Gastrointestinal: Denies poor appetite, indigestion, abdominal pain or blood in stool  Peripheral vascular: Denies claudication, leg cramps  Neuropsychiatric: Denies paresthesias,tingling,numbness,anxiety,depression,fatigue  Musculoskeletal: Denies pain,tenderness, soreness,swelling      Past Medical History:   Diagnosis Date    Arthritis 3/25/2010    Back pain 3/26/2010    CAD (coronary artery disease)     Chest pain, unspecified     Chronic kidney failure, stage 3 (moderate) (HCC) 2017    Chronic pain     Chronic pain of both knees 2018    Edema     Essential hypertension     High cholesterol 3/25/2010    HTN (hypertension) 3/25/2010    Kidney failure, acute ([facility] [address] 2012    Lacrimal passage stenosis 2014    Onychomycosis 2010    Other acute and subacute form of ischemic heart disease     Other and unspecified angina pectoris     Other ill-defined conditions(799.89)     excessive watering of right eye    Patient is full code 2018    Shortness of breath     Shoulder pain, bilateral 3/26/2010     Patient Active Problem List Diagnosis Date Noted    Patient is full code 08/06/2018    Severe obesity (BMI 35.0-39. 9) with comorbidity ([address] 05/07/2018    Chronic pain of both knees 02/06/2018    Type 2 diabetes mellitus with nephropathy ([address] 01/23/2018    CKD (chronic kidney disease) stage 3, GFR 30-59 ml/min ([facility]) 01/23/2018    Chronic kidney failure, stage 3 (moderate) ([address] 12/05/2017    Lacrimal passage stenosis 04/17/2014    Preop cardiovascular exam 04/09/2014    Prediabetes 01/03/2014    Abnormal urinalysis 08/30/2013    Lacrimal duct stenosis 08/14/2013    Conjunctivitis 05/10/2013    Watery eyes 08/29/2012    S/P angioplasty with stent--RCA 6/12 06/22/2012    Multiple bruises 06/22/2012    CAD (coronary artery disease) 06/07/2012    Post PTCA 06/07/2012    Mixed hyperlipidemia 06/01/2012    Essential hypertension, benign 06/01/2012    Abnormal nuclear stress test 06/01/2012    Chest pain, unspecified 04/25/2012    Essential hypertension, benign 04/20/2012    Type 2 diabetes mellitus without complication ([address] 45/69/3579    SOB (shortness of breath) on exertion 04/06/2012    Vitamin D deficiency 04/06/2012    Kidney failure, acute ([address] 04/06/2012    Heme positive stool 11/12/2011    Abdominal pain, other specified site 11/12/2011    Inguinal hernia, left 11/12/2011    Decreased vision 10/12/2011    Edema leg 10/12/2011    Cough 08/17/2011    Tick bite, infected 08/17/2011    Onychomycosis 08/19/2010    Shoulder pain, bilateral 03/26/2010    Back pain 03/26/2010    Primary osteoarthritis involving multiple joints 03/25/2010      Past Surgical History:   Procedure Laterality Date    HX HEART CATHETERIZATION      HX HEENT      bilat cateract extraction    HX HYSTERECTOMY      HX KNEE REPLACEMENT      HX LUMBAR FUSION      HX ORTHOPAEDIC      bilateral TKR    HX ORTHOPAEDIC      neck and back surgery    HX ORTHOPAEDIC      bilateral bunionectomy    HX ORTHOPAEDIC      bilateral cataracts removed    HX PTCA       Allergies   Allergen Reactions    Diclofenac Unknown (comments)    Lisinopril Cough    Lisinopril-Hydrochlorothiazide Unknown (comments)      Family History   Problem Relation Age of Onset   [facility] Hypertension Mother    [facility] Stroke Mother     Other Mother         arthritis    Coronary Artery Disease Father     Heart Disease Father     Hypertension Sister     Diabetes Sister     Coronary Artery Disease Sister     Cancer Sister         lung    Coronary Artery Disease Brother     Cancer Brother         lung    Heart Disease Brother       Social History     Socioeconomic History    Marital status:      Spouse name: Not on file    Number of children: Not on file    Years of education: Not on file    Highest education level: Not on file   Occupational History    Not on file   Social Needs    Financial resource strain: Not on file    Food insecurity:     Worry: Not on file     Inability: Not on file    Transportation needs:     Medical: Not on file     Non-medical: Not on file   Tobacco Use    Smoking status: Never Smoker    Smokeless tobacco: Never Used   Substance and Sexual Activity    Alcohol use: Yes     Comment: occasional     Drug use: Yes     Types: Prescription, OTC    Sexual activity: Never   Lifestyle    Physical activity:     Days per week: Not on file     Minutes per session: Not on file    Stress: Not on file   Relationships    Social connections:     Talks on phone: Not on file     Gets together: Not on file     Attends Sikh service: Not on file     Active member of club or organization: Not on file     Attends meetings of clubs or organizations: Not on file     Relationship status: Not on file    Intimate partner violence:     Fear of current or ex partner: Not on file     Emotionally abused: Not on file     Physically abused: Not on file     Forced sexual activity: Not on file   Other Topics Concern    Not on file   Social History Narrative ** Merged History Encounter **           Current Outpatient Medications   Medication Sig    candesartan (ATACAND) 8 mg tablet Take 1 Tab by mouth daily.  nitroglycerin (NITROSTAT) 0.4 mg SL tablet PLACE 1 TABLET UNDER THE TONGUE EVERY 5 MINS AS NEEDED FOR CHEST PAIN    torsemide (DEMADEX) 20 mg tablet Take 1 Tab by mouth daily.  potassium chloride (KLOR-CON) 10 mEq tablet Take 1 Tab by mouth daily.  docusate sodium (COLACE) 100 mg capsule Take 1 Cap by mouth three (3) times daily as needed for Constipation for up to 90 days.  traMADol-acetaminophen (ULTRACET) 37.5-325 mg per tablet Take 1 Tab by mouth every eight (8) hours as needed for Pain for up to 30 days. Max Daily Amount: 3 Tabs.  NIFEdipine ER (ADALAT CC) 30 mg ER tablet 1 tablet on an empty stomach    CALCIUM 600 WITH VITAMIN D3 600 mg(1,500mg) -400 unit chew Take 600 mg by mouth two (2) times a day.  pravastatin (PRAVACHOL) 40 mg tablet take 1 tablet by mouth NIGHTLY    atenolol (TENORMIN) 50 mg tablet take 1 tablet by mouth once daily    ASPIR-LOW 81 mg tablet Take 81 mg by mouth daily.  naloxone (NARCAN) 4 mg/actuation nasal spray Use 1 spray intranasally into 1 nostril. Use a new Narcan nasal spray for subsequent doses and administer into alternating nostrils. May repeat every 2 to 3 minutes as needed.  MISCELLANEOUS MEDICAL SUPPLY (COMPRESSION STOCKINGS) daily. No current facility-administered medications for this visit. I have reviewed the nurses notes, vitals, problem list, allergy list, medical history, family medical, social history and medications. Objective:     Physical Exam:     Vitals:    05/28/19 1502   BP: 121/65   Pulse: 62   Resp: 18   SpO2: 95%   Weight: 222 lb (100.7 kg)   Height: 5' 6\" (1.676 m)    Body mass index is 35.83 kg/m². General: Well developed, in no acute distress. HEENT: No carotid bruits, no JVD, trach is midline. Heart:  Normal S1/S2 negative S3 or S4.  Regular, no murmur, gallop or rub.   Respiratory: Clear bilaterally, no wheezing or rales  Abdomen:   Soft, non-tender, bowel sounds are active.   Extremities:  No edema, normal cap refill, no cyanosis. Neuro: A&Ox3, speech clear, gait stable. Skin: Skin color is normal. No rashes or lesions. No diaphoresis. Vascular: 2+ pulses symmetric in all extremities        Data Review:       Cardiographics:    EKG: NSR,LAE,incomplete RBBB    Cardiology Labs:    Results for orders placed or performed during the hospital encounter of 06/06/12   EKG, 12 LEAD, INITIAL   Result Value Ref Range    Ventricular Rate 66 BPM    Atrial Rate 66 BPM    P-R Interval 188 ms    QRS Duration 84 ms    Q-T Interval 418 ms    QTC Calculation (Bezet) 438 ms    Calculated P Axis 58 degrees    Calculated R Axis 16 degrees    Calculated T Axis 42 degrees    Diagnosis       Sinus rhythm with premature atrial complexes  When compared with ECG of 06-JUN-2012 10:45,  premature atrial complexes are now present  Confirmed by Boni Altman ([id number]) on 6/7/2012 8:37:17 AM       Lab Results   Component Value Date/Time    Cholesterol, total 242 (H) 02/04/2019 09:22 AM    HDL Cholesterol 73 02/04/2019 09:22 AM    LDL, calculated 144 (H) 02/04/2019 09:22 AM    Triglyceride 125 02/04/2019 09:22 AM    CHOL/HDL Ratio 4.5 08/19/2010 12:27 PM       Lab Results   Component Value Date/Time    Sodium 144 02/04/2019 09:22 AM    Potassium 4.5 02/04/2019 09:22 AM    Chloride 103 02/04/2019 09:22 AM    CO2 21 02/04/2019 09:22 AM    Anion gap 7 06/07/2012 03:00 AM    Glucose 113 (H) 02/04/2019 09:22 AM    BUN 32 (H) 02/04/2019 09:22 AM    Creatinine 1.46 (H) 02/04/2019 09:22 AM    BUN/Creatinine ratio 22 02/04/2019 09:22 AM    GFR est AA 37 (L) 02/04/2019 09:22 AM    GFR est non-AA 32 (L) 02/04/2019 09:22 AM    Calcium 10.2 02/04/2019 09:22 AM    Bilirubin, total 0.5 02/04/2019 09:22 AM    AST (SGOT) 26 02/04/2019 09:22 AM    Alk.  phosphatase 74 02/04/2019 09:22 AM    Protein, total 7.2 02/04/2019 09:22 AM    Albumin 4.2 02/04/2019 09:22 AM    Globulin 4.0 11/12/2011 11:03 AM    A-G Ratio 1.4 02/04/2019 09:22 AM    ALT (SGPT) 14 02/04/2019 09:22 AM          Assessment:       ICD-10-CM ICD-9-CM    1. Essential hypertension, benign I10 401.1 AMB POC EKG ROUTINE W/ 12 LEADS, INTER & REP   2. Severe obesity (BMI 35.0-39. 9) with comorbidity ([facility] [address] E66.01 278.01 AMB POC EKG ROUTINE W/ 12 LEADS, INTER & REP   3. S/P angioplasty with stent--RCA 6/12 Z95.820 V45.89 AMB POC EKG ROUTINE W/ 12 LEADS, INTER & REP      LIPID PANEL      CK      HEPATIC FUNCTION PANEL   4. Mixed hyperlipidemia E78.2 272.2 AMB POC EKG ROUTINE W/ 12 LEADS, INTER & REP      LIPID PANEL      CK      HEPATIC FUNCTION PANEL   5. CKD (chronic kidney disease) stage 3, GFR 30-59 ml/min ([facility]) N18.3 585.3 AMB POC EKG ROUTINE W/ 12 LEADS, INTER & REP   6. Type 2 diabetes mellitus with nephropathy (HCC) E11.21 250.40 AMB POC EKG ROUTINE W/ 12 LEADS, INTER & REP     583.81 LIPID PANEL      CK      HEPATIC FUNCTION PANEL         Discussion: Patient presents at this time stable from a cardiac perspective. Stable after s/p PCI/stent RCA 2012. Continue ASA 81 mg every day. Pleased with present status. Plan: 1. Continue same meds. Lipid profile and labs followed by PCP. 2.Encouraged to exercise to tolerance, lose weight and follow low fat, low cholesterol, low sodium predominantly Plant-based (consider Mediterranean) diet. Call with questions or concerns. Will follow up any test results by phone and/or f/u here in office if needed. jE Keenan 3.Follow up: 6 months    I have discussed the diagnosis with the patient and the intended plan as seen in the above orders. The patient has received an after-visit summary and questions were answered concerning future plans. I have discussed any concerning medication side effects and warnings with the patient as well.     Leora Ball MD  5/28/2019

## 2019-06-25 DIAGNOSIS — Z01.818 PREOPERATIVE GENERAL PHYSICAL EXAMINATION: ICD-10-CM

## 2019-06-25 NOTE — TELEPHONE ENCOUNTER
Last visit: 4/4/19  Next visit:not scheduled at this time  Previous refill 1/02/19(90+1R)    Requested Prescriptions     Pending Prescriptions Disp Refills    atenolol (TENORMIN) 50 mg tablet 90 Tab 1     Sig: take 1 tablet by mouth once daily

## 2019-06-26 RX ORDER — ATENOLOL 50 MG/1
TABLET ORAL
Qty: 90 TAB | Refills: 1 | Status: SHIPPED | OUTPATIENT
Start: 2019-06-26 | End: 2019-10-24 | Stop reason: SDUPTHER

## 2019-07-24 RX ORDER — CANDESARTAN 8 MG/1
TABLET ORAL
Qty: 90 TAB | Refills: 0 | Status: SHIPPED | OUTPATIENT
Start: 2019-07-24 | End: 2019-10-08 | Stop reason: SDUPTHER

## 2019-08-13 ENCOUNTER — OFFICE VISIT (OUTPATIENT)
Dept: FAMILY MEDICINE CLINIC | Age: 84
End: 2019-08-13

## 2019-08-13 VITALS
RESPIRATION RATE: 20 BRPM | OXYGEN SATURATION: 96 % | WEIGHT: 222.6 LBS | HEIGHT: 66 IN | HEART RATE: 60 BPM | BODY MASS INDEX: 35.77 KG/M2 | DIASTOLIC BLOOD PRESSURE: 61 MMHG | TEMPERATURE: 96 F | SYSTOLIC BLOOD PRESSURE: 120 MMHG

## 2019-08-13 DIAGNOSIS — I10 ESSENTIAL HYPERTENSION: ICD-10-CM

## 2019-08-13 DIAGNOSIS — G89.29 CHRONIC PAIN OF BOTH KNEES: ICD-10-CM

## 2019-08-13 DIAGNOSIS — M25.512 CHRONIC PAIN OF BOTH SHOULDERS: ICD-10-CM

## 2019-08-13 DIAGNOSIS — M25.561 CHRONIC PAIN OF BOTH KNEES: ICD-10-CM

## 2019-08-13 DIAGNOSIS — R60.0 BILATERAL EDEMA OF LOWER EXTREMITY: Primary | ICD-10-CM

## 2019-08-13 DIAGNOSIS — M15.9 PRIMARY OSTEOARTHRITIS INVOLVING MULTIPLE JOINTS: ICD-10-CM

## 2019-08-13 DIAGNOSIS — M25.562 CHRONIC PAIN OF BOTH KNEES: ICD-10-CM

## 2019-08-13 DIAGNOSIS — M25.511 CHRONIC PAIN OF BOTH SHOULDERS: ICD-10-CM

## 2019-08-13 DIAGNOSIS — G89.29 CHRONIC PAIN OF BOTH SHOULDERS: ICD-10-CM

## 2019-08-13 RX ORDER — ROSUVASTATIN CALCIUM 20 MG/1
TABLET, COATED ORAL
Qty: 30 TAB | Refills: 6
Start: 2019-08-13 | End: 2019-10-28 | Stop reason: SDUPTHER

## 2019-08-13 RX ORDER — POTASSIUM CHLORIDE 750 MG/1
10 TABLET, EXTENDED RELEASE ORAL DAILY
Qty: 30 TAB | Refills: 3 | Status: SHIPPED | OUTPATIENT
Start: 2019-08-13 | End: 2019-10-24 | Stop reason: SDUPTHER

## 2019-08-13 RX ORDER — TRAMADOL HYDROCHLORIDE AND ACETAMINOPHEN 37.5; 325 MG/1; MG/1
1 TABLET ORAL
Qty: 90 TAB | Refills: 0 | Status: SHIPPED | OUTPATIENT
Start: 2019-08-13 | End: 2019-09-12

## 2019-08-13 RX ORDER — POTASSIUM CHLORIDE 750 MG/1
TABLET, FILM COATED, EXTENDED RELEASE ORAL
Refills: 2 | COMMUNITY
Start: 2019-07-24 | End: 2019-11-15 | Stop reason: SDUPTHER

## 2019-08-13 RX ORDER — TRAMADOL HYDROCHLORIDE AND ACETAMINOPHEN 37.5; 325 MG/1; MG/1
1 TABLET ORAL
Qty: 90 TAB | Refills: 0 | Status: SHIPPED | OUTPATIENT
Start: 2019-09-13 | End: 2019-10-13

## 2019-08-13 RX ORDER — TORSEMIDE 20 MG/1
20 TABLET ORAL DAILY
Qty: 30 TAB | Refills: 3 | Status: SHIPPED | OUTPATIENT
Start: 2019-08-13 | End: 2019-10-24 | Stop reason: SDUPTHER

## 2019-08-13 NOTE — PATIENT INSTRUCTIONS
Medicines to Avoid With Kidney Disease: Care Instructions Your Care Instructions Kidney disease means that your kidneys are not able to get rid of waste from the blood. So they can't keep your body's fluids and chemicals in balance. Usually, the kidneys get rid of waste from the blood through the urine. And they balance the fluids in the body. When your kidneys don't work as they should, you have to be careful about some medicines. They may harm your kidneys. Your doctor may tell you not to take them. Or he or she may change the dose. Medicines for pain and swelling, such as ibuprofen (Advil or Motrin) or naproxen (Aleve), can cause harm. So can some antibiotics and antacids. And you need to be careful about some drugs that treat cancer, lower blood pressure, or get rid of water from the body. Some herbal products could cause harm too. Follow-up care is a key part of your treatment and safety. Be sure to make and go to all appointments, and call your doctor if you are having problems. It's also a good idea to know your test results and keep a list of the medicines you take. How can you care for yourself at home? · Tell your doctor all the prescription, herbal, or over-the-counter medicines you take. Do not take any new ones unless you talk to your doctor first. 
· Do not take anti-inflammatory medicines. These include ibuprofen (Advil, Motrin) and naproxen (Aleve). You can use acetaminophen (Tylenol) for pain. · Do not take two or more pain medicines at the same time unless the doctor told you to. Many pain medicines have acetaminophen, which is Tylenol. Too much acetaminophen (Tylenol) can be harmful. · Tell all doctors and others who work with your health care that you have kidney disease. · Wear medical alert jewelry that lists your health problem. You can buy this at most drugstores. Where can you learn more? Go to http://brianne-ger.info/. Enter L821 in the search box to learn more about \"Medicines to Avoid With Kidney Disease: Care Instructions. \" Current as of: October 31, 2018 Content Version: 12.1 © 3775-6475 [employer] 1%. Care instructions adapted under license by [facility] (which disclaims liability or warranty for this information). If you have questions about a medical condition or this instruction, always ask your healthcare professional. [place] [address] any warranty or liability for your use of this information. Learning About Managing Chronic Pain What is a plan for pain management? A pain management plan helps you find ways to control pain with side effects you can live with. Some diseases and injuries can cause pain that lasts a long time. Constant pain can make you depressed. It can cause stress and make it hard for you to eat and sleep. But you don't need to live with uncontrolled pain. How can you plan for managing your pain? You and your doctor will work to make your plan. Your plan can include more than one type of pain control. You may take prescription or over-the-counter drugs. You can also try physical treatments, like massage and acupuncture. Other things can help too, such as meditation or a type of therapy to change how you think about your pain. It's important to let your doctor know how you prefer to control your pain. Sometimes the goal of a pain management plan isn't to totally get rid of pain. Instead, it might be to reduce the pain enough that daily activities are easier. If your pain isn't controlled well enough, talk with your doctor. You may need to make a new plan. Or your doctor may refer you to a specialist. 
What medicines are used? Your doctor may prescribe medicine to help with your pain. If you aren't taking a prescription medicine, you may be able to take an over-the-counter one. Here are the main types of medicine for chronic pain. · Non-opioids. These are things like acetaminophen, such as Tylenol, and non-steroidal anti-inflammatory drugs (NSAIDs), such as Advil. · Opioids. Morphine, codeine, and oxycodone are some examples. · Other medicines. Antidepressants and anti-seizure medicines may be used. These medicines seem to change the way your brain senses pain. Another option may be a nerve block injection. Medicines are the most common treatment for pain. But to feel better, you'll need to do more than take medicine. You can also do things like reducing your stress level and changing how you think. How can you take medicine safely? Medicines can help you get better. But they can also be dangerous, especially if you don't take them the right way. Be safe with medicines. Read and follow all instructions on the label. If the medicine you take causes side effects such as constipation or nausea, you may need to take other medicines for those problems. Talk to your doctor about any side effects you have. If you were prescribed an opioid pain reliever, your care team will give you information on how to use it safely. You will also get directions for how to safely store the medicine and how to get rid of any that's left over. Follow these instructions carefully. What physical treatments can help? Physical treatments can be an important part of managing chronic pain. You may find that combining more than one treatment helps the most. 
These treatments can include: 
· Heat or cold. This can help arthritis, sore muscles, and other aches. · Hydrotherapy. It uses flowing water to relax muscles. · Massage. Massage involves rubbing the soft tissues of the body. It eases tension and pain. · Transcutaneous electrical nerve stimulation (TENS). This treatment uses a gentle electric current applied to the skin for pain relief. · Acupuncture. This is a form of traditional [facility] medicine.  It uses very thin needles inserted into certain points of the body. · Physical therapy. This treatment uses stretches and exercises to reduce pain and help you move better. If you get physical therapy, make sure to do any home exercises or stretching your therapist has prescribed. Stay as active as you can. Try to get some physical activity every day. What other things can help? You can manage chronic pain by using things other than medicines or physical treatments. For example, you can keep track of your pain in a pain diary. It can help you understand how the things you do affect your pain. Reducing stress and tension can reduce pain. And being more aware of your thought patterns can be helpful. In some cases, shifting how you think about pain can affect how you feel. Here are some options to think about: · Breathing exercises and meditation. These techniques can help you focus your attention, relax, and get rid of tension. · Guided imagery. This is a series of thoughts and images that can focus your attention away from your pain. · Hypnosis. It's a state of focused concentration that makes you less aware of your surroundings. · Cognitive behavioral therapy. This type of counseling helps you change your thought patterns. · Yoga. Stretching and exercises can reduce stress and improve flexibility. If what you're doing to control your pain isn't working, or if you're feeling depressed, talk to your doctor. He or she can help you change your pain management plan and find resources for emotional support. Where can you learn more? Go to http://brianne-ger.info/. Enter P119 in the search box to learn more about \"Learning About Managing Chronic Pain. \" Current as of: March 28, 2019 Content Version: 12.1 © 2881-4636 Healthwise, [employer]orporated.  Care instructions adapted under license by [employer] (which disclaims liability or warranty for this information). If you have questions about a medical condition or this instruction, always ask your healthcare professional. [address] any warranty or liability for your use of this information. Learning About Meal Planning for Diabetes Why plan your meals? Meal planning can be a key part of managing diabetes. Planning meals and snacks with the right balance of carbohydrate, protein, and fat can help you keep your blood sugar at the target level you set with your doctor. You don't have to eat special foods. You can eat what your family eats, including sweets once in a while. But you do have to pay attention to how often you eat and how much you eat of certain foods. You may want to work with a dietitian or a certified diabetes educator. He or she can give you tips and meal ideas and can answer your questions about meal planning. This health professional can also help you reach a healthy weight if that is one of your goals. What plan is right for you? Your dietitian or diabetes educator may suggest that you start with the plate format or carbohydrate counting. The plate format The plate format is a simple way to help you manage how you eat. You plan meals by learning how much space each food should take on a plate. Using the plate format helps you spread carbohydrate throughout the day. It can make it easier to keep your blood sugar level within your target range. It also helps you see if you're eating healthy portion sizes. To use the plate format, you put non-starchy vegetables on half your plate. Add meat or meat substitutes on one-quarter of the plate. Put a grain or starchy vegetable (such as brown rice or a potato) on the final quarter of the plate. You can add a small piece of fruit and some low-fat or fat-free milk or yogurt, depending on your carbohydrate goal for each meal. 
Here are some tips for using the plate format: · Make sure that you are not using an oversized plate. A 9-inch plate is best. Many restaurants use larger plates. · Get used to using the plate format at home. Then you can use it when you eat out. · Write down your questions about using the plate format. Talk to your doctor, a dietitian, or a diabetes educator about your concerns. Carbohydrate counting With carbohydrate counting, you plan meals based on the amount of carbohydrate in each food. Carbohydrate raises blood sugar higher and more quickly than any other nutrient. It is found in desserts, breads and cereals, and fruit. It's also found in starchy vegetables such as potatoes and corn, grains such as rice and pasta, and milk and yogurt. Spreading carbohydrate throughout the day helps keep your blood sugar levels within your target range. Your daily amount depends on several things, including your weight, how active you are, which diabetes medicines you take, and what your goals are for your blood sugar levels. A registered dietitian or diabetes educator can help you plan how much carbohydrate to include in each meal and snack. A guideline for your daily amount of carbohydrate is: · 45 to 60 grams at each meal. That's about the same as 3 to 4 carbohydrate servings. · 15 to 20 grams at each snack. That's about the same as 1 carbohydrate serving. The Nutrition Facts label on packaged foods tells you how much carbohydrate is in a serving of the food. First, look at the serving size on the food label. Is that the amount you eat in a serving? All of the nutrition information on a food label is based on that serving size. So if you eat more or less than that, you'll need to adjust the other numbers. Total carbohydrate is the next thing you need to look for on the label. If you count carbohydrate servings, one serving of carbohydrate is 15 grams.  
For foods that don't come with labels, such as fresh fruits and vegetables, you'll need a guide that lists carbohydrate in these foods. Ask your doctor, dietitian, or diabetes educator about books or other nutrition guides you can use. If you take insulin, you need to know how many grams of carbohydrate are in a meal. This lets you know how much rapid-acting insulin to take before you eat. If you use an insulin pump, you get a constant rate of insulin during the day. So the pump must be programmed at meals to give you extra insulin to cover the rise in blood sugar after meals. When you know how much carbohydrate you will eat, you can take the right amount of insulin. Or, if you always use the same amount of insulin, you need to make sure that you eat the same amount of carbohydrate at meals. If you need more help to understand carbohydrate counting and food labels, ask your doctor, dietitian, or diabetes educator. How do you get started with meal planning? Here are some tips to get started: 
· Plan your meals a week at a time. Don't forget to include snacks too. · Use cookbooks or online recipes to plan several main meals. Plan some quick meals for busy nights. You also can double some recipes that freeze well. Then you can save half for other busy nights when you don't have time to cook. · Make sure you have the ingredients you need for your recipes. If you're running low on basic items, put these items on your shopping list too. · List foods that you use to make breakfasts, lunches, and snacks. List plenty of fruits and vegetables. · Post this list on the refrigerator. Add to it as you think of more things you need. · Take the list to the store to do your weekly shopping. Follow-up care is a key part of your treatment and safety. Be sure to make and go to all appointments, and call your doctor if you are having problems. It's also a good idea to know your test results and keep a list of the medicines you take. Where can you learn more? Go to http://brianne-ger.info/. Mordechai Romberg in the search box to learn more about \"Learning About Meal Planning for Diabetes. \" Current as of: July 25, 2018 Content Version: 12.1 © 7846-9176 Healthwise, Incorporated. Care instructions adapted under license by [employer] (which disclaims liability or warranty for this information). If you have questions about a medical condition or this instruction, always ask your healthcare professional. [place] [address] any warranty or liability for your use of this information.

## 2019-08-13 NOTE — PROGRESS NOTES
Name and  verified      Chief Complaint   Patient presents with    Hypertension    Medication Refill         Health Maintenance reviewed-discussed with patient. 1. Have you been to the ER, urgent care clinic since your last visit? Hospitalized since your last visit? no    2. Have you seen or consulted any other health care providers outside of the [address] [place] [address] since your last visit? Include any pap smears or colon screening.  no

## 2019-08-13 NOTE — PROGRESS NOTES
HISTORY OF PRESENT ILLNESS  Sophy Argueta is a 80 y.o. female. HPI     Chronic upper back, knees shoulders, rt hands pain The patient's pain has been an ongoing struggling concerning problem, present for refills, never abused any prescribed meds, no hx of illicit nor etoh abuse, the pain has been bothering the pt for many yrs, pt aware that there are no other alternative approach for the current pain that exist except for the available opioid based meds with their huge addictive properties, last time taken it was this am,   Patient states that the current dosage of the meds given, not strong enough, but it is helping,   Zero count,   with the current medications,  the pt capable of doing things specially the activity of the daily living,   Pt's pain persist without medication, is a chronic condition,  compliant with medication takes it as prescribed, keeps meds at a safe place, on stool softener, 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 Patient complains of edema. The location of the edema is lower leg(s) bilateral, ankle(s) bilateral, feet bilateral.  The edema has been moderate. Onset of symptoms was a few weeks ago, gradually worsening since that time. The edema is present all day. The patient states the problem is long-standing. on the pt last visits, the patient did not have much of the legs swelling, the weight was also better, Today the patient denies leg pain, denies rash, and legs lesion,and the denial of dizziness,   the wt wentdown    Night morgan nightly recently couple times per wk, awakens her at night,       Current Outpatient Medications   Medication Sig Dispense Refill    candesartan (ATACAND) 8 mg tablet TAKE 1 TABLET BY MOUTH ONCE DAILY 90 Tab 0    atenolol (TENORMIN) 50 mg tablet take 1 tablet by mouth once daily 90 Tab 1    rosuvastatin (CRESTOR) 20 mg tablet Take 1 Tab by mouth nightly.  30 Tab 6    nitroglycerin (NITROSTAT) 0.4 mg SL tablet PLACE 1 TABLET UNDER THE TONGUE EVERY 5 MINS AS NEEDED FOR CHEST PAIN 25 Tab 11    torsemide (DEMADEX) 20 mg tablet Take 1 Tab by mouth daily. 30 Tab 3    potassium chloride (KLOR-CON) 10 mEq tablet Take 1 Tab by mouth daily. 30 Tab 3    NIFEdipine ER (ADALAT CC) 30 mg ER tablet 1 tablet on an empty stomach 90 Tab 2    CALCIUM 600 WITH VITAMIN D3 600 mg(1,500mg) -400 unit chew Take 600 mg by mouth two (2) times a day. 60 Tab 4    ASPIR-LOW 81 mg tablet Take 81 mg by mouth daily.  naloxone (NARCAN) 4 mg/actuation nasal spray Use 1 spray intranasally into 1 nostril. Use a new Narcan nasal spray for subsequent doses and administer into alternating nostrils. May repeat every 2 to 3 minutes as needed. 2 Each 11    MISCELLANEOUS MEDICAL SUPPLY (COMPRESSION STOCKINGS) daily.        Allergies   Allergen Reactions    Diclofenac Unknown (comments)    Lisinopril Cough    Lisinopril-Hydrochlorothiazide Unknown (comments)     Past Medical History:   Diagnosis Date    Arthritis 3/25/2010    Back pain 3/26/2010    CAD (coronary artery disease)     Chest pain, unspecified     Chronic kidney failure, stage 3 (moderate) (HCC) 12/5/2017    Chronic pain     Chronic pain of both knees 2/6/2018    Edema     Essential hypertension     High cholesterol 3/25/2010    HTN (hypertension) 3/25/2010    Kidney failure, acute ([facility] [address] 4/6/2012    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    Onychomycosis 8/19/2010    Other acute and subacute form of ischemic heart disease     Other and unspecified angina pectoris     Other ill-defined conditions(799.89)     excessive watering of right eye    Patient is full code 8/6/2018    Shortness of breath     Shoulder pain, bilateral 3/26/2010     Past Surgical History:   Procedure Laterality Date    HX HEART CATHETERIZATION      HX HEENT      bilat cateract extraction    HX HYSTERECTOMY      HX KNEE REPLACEMENT      HX LUMBAR FUSION      HX ORTHOPAEDIC      bilateral TKR    HX ORTHOPAEDIC      neck and back surgery    HX ORTHOPAEDIC      bilateral bunionectomy    HX ORTHOPAEDIC      bilateral cataracts removed    HX PTCA       Family History   Problem Relation Age of Onset    Hypertension Mother     Stroke Mother     Other Mother         arthritis    Coronary Artery Disease Father     Heart Disease Father     Hypertension Sister     Diabetes Sister     Coronary Artery Disease Sister     Cancer Sister         lung    Coronary Artery Disease Brother     Cancer Brother         lung    Heart Disease Brother      Social History     Tobacco Use    Smoking status: Never Smoker    Smokeless tobacco: Never Used   Substance Use Topics    Alcohol use: Yes     Comment: occasional       Lab Results   Component Value Date/Time    WBC 5.4 02/04/2019 09:22 AM    HGB 13.9 02/04/2019 09:22 AM    HCT 40.6 02/04/2019 09:22 AM    PLATELET 792 85/44/6894 09:22 AM    MCV 89 02/04/2019 09:22 AM     Lab Results   Component Value Date/Time    Hemoglobin A1c 6.1 (H) 02/04/2019 09:22 AM    Hemoglobin A1c 6.2 (H) 03/09/2017 08:57 AM    Hemoglobin A1c 6.1 (H) 11/30/2015 10:00 AM    Glucose 113 (H) 02/04/2019 09:22 AM    Microalb/Creat ratio (ug/mg creat.) <2.9 09/11/2017 10:04 AM    LDL, calculated 144 (H) 02/04/2019 09:22 AM    Creatinine (POC) 1.3 11/30/2009 01:34 PM    Creatinine 1.46 (H) 02/04/2019 09:22 AM      Lab Results   Component Value Date/Time    Cholesterol, total 242 (H) 02/04/2019 09:22 AM    HDL Cholesterol 73 02/04/2019 09:22 AM    LDL, calculated 144 (H) 02/04/2019 09:22 AM    Triglyceride 125 02/04/2019 09:22 AM    CHOL/HDL Ratio 4.5 08/19/2010 12:27 PM     Lab Results   Component Value Date/Time    TSH 1.190 02/04/2019 09:22 AM         Review of Systems   Constitutional: Negative for chills and fever. HENT: Negative for ear pain and nosebleeds. Eyes: Negative for blurred vision, pain and discharge. Respiratory: Negative for shortness of breath. Cardiovascular: Positive for leg swelling.  Negative for chest pain. Gastrointestinal: Negative for constipation, diarrhea, nausea and vomiting. Genitourinary: Negative for frequency. Musculoskeletal: Positive for back pain, joint pain and myalgias. Skin: Negative for itching and rash. Neurological: Negative for headaches. Psychiatric/Behavioral: Negative for depression. The patient is not nervous/anxious. Physical Exam   Constitutional: She is oriented to person, place, and time. She appears well-developed and well-nourished. HENT:   Head: Normocephalic and atraumatic. Eyes: Conjunctivae and EOM are normal.   Neck: Normal range of motion. Neck supple. Cardiovascular: Normal rate, regular rhythm and normal heart sounds. No murmur heard. Pulmonary/Chest: Effort normal and breath sounds normal.   Abdominal: Soft. Bowel sounds are normal. She exhibits no distension. Musculoskeletal: Normal range of motion. She exhibits no edema. Lymphadenopathy:     She has no cervical adenopathy. Neurological: She is alert and oriented to person, place, and time. Skin: No erythema. Psychiatric: Her behavior is normal.   Nursing note and vitals reviewed. ASSESSMENT and PLAN  Diagnoses and all orders for this visit:    1. Bilateral edema of lower extremity  -     rosuvastatin (CRESTOR) 20 mg tablet; Take one tab every other night  -     traMADol-acetaminophen (ULTRACET) 37.5-325 mg per tablet; Take 1 Tab by mouth every eight (8) hours as needed for Pain for up to 30 days. Max Daily Amount: 3 Tabs. -     traMADol-acetaminophen (ULTRACET) 37.5-325 mg per tablet; Take 1 Tab by mouth every eight (8) hours as needed for Pain for up to 30 days. Max Daily Amount: 3 Tabs. -     potassium chloride (KLOR-CON) 10 mEq tablet; Take 1 Tab by mouth daily. -     torsemide (DEMADEX) 20 mg tablet; Take 1 Tab by mouth daily.  -     CBC W/O DIFF  -     METABOLIC PANEL, BASIC  -     PAIN MGMT PANEL W/REFL, UR  -     COLLECTION VENOUS BLOOD,VENIPUNCTURE    2. Essential hypertension  -     rosuvastatin (CRESTOR) 20 mg tablet; Take one tab every other night  -     traMADol-acetaminophen (ULTRACET) 37.5-325 mg per tablet; Take 1 Tab by mouth every eight (8) hours as needed for Pain for up to 30 days. Max Daily Amount: 3 Tabs. -     traMADol-acetaminophen (ULTRACET) 37.5-325 mg per tablet; Take 1 Tab by mouth every eight (8) hours as needed for Pain for up to 30 days. Max Daily Amount: 3 Tabs. -     potassium chloride (KLOR-CON) 10 mEq tablet; Take 1 Tab by mouth daily. -     torsemide (DEMADEX) 20 mg tablet; Take 1 Tab by mouth daily.  -     CBC W/O DIFF  -     METABOLIC PANEL, BASIC  -     PAIN MGMT PANEL W/REFL, UR  -     COLLECTION VENOUS BLOOD,VENIPUNCTURE    3. Chronic pain of both knees  -     rosuvastatin (CRESTOR) 20 mg tablet; Take one tab every other night  -     traMADol-acetaminophen (ULTRACET) 37.5-325 mg per tablet; Take 1 Tab by mouth every eight (8) hours as needed for Pain for up to 30 days. Max Daily Amount: 3 Tabs. -     traMADol-acetaminophen (ULTRACET) 37.5-325 mg per tablet; Take 1 Tab by mouth every eight (8) hours as needed for Pain for up to 30 days. Max Daily Amount: 3 Tabs. -     potassium chloride (KLOR-CON) 10 mEq tablet; Take 1 Tab by mouth daily. -     torsemide (DEMADEX) 20 mg tablet; Take 1 Tab by mouth daily.  -     CBC W/O DIFF  -     METABOLIC PANEL, BASIC  -     PAIN MGMT PANEL W/REFL, UR    4. Chronic pain of both shoulders  -     rosuvastatin (CRESTOR) 20 mg tablet; Take one tab every other night  -     traMADol-acetaminophen (ULTRACET) 37.5-325 mg per tablet; Take 1 Tab by mouth every eight (8) hours as needed for Pain for up to 30 days. Max Daily Amount: 3 Tabs. -     traMADol-acetaminophen (ULTRACET) 37.5-325 mg per tablet; Take 1 Tab by mouth every eight (8) hours as needed for Pain for up to 30 days. Max Daily Amount: 3 Tabs. -     potassium chloride (KLOR-CON) 10 mEq tablet; Take 1 Tab by mouth daily.   -     torsemide (DEMADEX) 20 mg tablet; Take 1 Tab by mouth daily.  -     CBC W/O DIFF  -     METABOLIC PANEL, BASIC  -     PAIN MGMT PANEL W/REFL, UR    5. Primary osteoarthritis involving multiple joints  -     rosuvastatin (CRESTOR) 20 mg tablet; Take one tab every other night  -     traMADol-acetaminophen (ULTRACET) 37.5-325 mg per tablet; Take 1 Tab by mouth every eight (8) hours as needed for Pain for up to 30 days. Max Daily Amount: 3 Tabs. -     traMADol-acetaminophen (ULTRACET) 37.5-325 mg per tablet; Take 1 Tab by mouth every eight (8) hours as needed for Pain for up to 30 days. Max Daily Amount: 3 Tabs. -     potassium chloride (KLOR-CON) 10 mEq tablet; Take 1 Tab by mouth daily. -     torsemide (DEMADEX) 20 mg tablet;  Take 1 Tab by mouth daily.  -     CBC W/O DIFF  -     METABOLIC PANEL, BASIC  -     PAIN MGMT PANEL W/REFL, UR    Other orders  -     CKD REPORT

## 2019-08-14 LAB
BUN SERPL-MCNC: 38 MG/DL (ref 8–27)
BUN/CREAT SERPL: 27 (ref 12–28)
CALCIUM SERPL-MCNC: 10.5 MG/DL (ref 8.7–10.3)
CHLORIDE SERPL-SCNC: 102 MMOL/L (ref 96–106)
CO2 SERPL-SCNC: 25 MMOL/L (ref 20–29)
CREAT SERPL-MCNC: 1.39 MG/DL (ref 0.57–1)
ERYTHROCYTE [DISTWIDTH] IN BLOOD BY AUTOMATED COUNT: 12.2 % (ref 12.3–15.4)
GLUCOSE SERPL-MCNC: 117 MG/DL (ref 65–99)
HCT VFR BLD AUTO: 38.6 % (ref 34–46.6)
HGB BLD-MCNC: 13.2 G/DL (ref 11.1–15.9)
INTERPRETATION: NORMAL
MCH RBC QN AUTO: 29.3 PG (ref 26.6–33)
MCHC RBC AUTO-ENTMCNC: 34.2 G/DL (ref 31.5–35.7)
MCV RBC AUTO: 86 FL (ref 79–97)
PLATELET # BLD AUTO: 227 X10E3/UL (ref 150–450)
POTASSIUM SERPL-SCNC: 4.1 MMOL/L (ref 3.5–5.2)
RBC # BLD AUTO: 4.5 X10E6/UL (ref 3.77–5.28)
SODIUM SERPL-SCNC: 141 MMOL/L (ref 134–144)
WBC # BLD AUTO: 6.9 X10E3/UL (ref 3.4–10.8)

## 2019-08-16 LAB
AMPHETAMINES UR QL SCN: NEGATIVE NG/ML
BARBITURATES UR QL SCN: NEGATIVE NG/ML
BENZODIAZ UR QL SCN: NEGATIVE NG/ML
BZE UR QL SCN: NEGATIVE NG/ML
CANNABINOIDS UR QL SCN: NEGATIVE NG/ML
CREAT UR-MCNC: 148.2 MG/DL (ref 20–300)
FENTANYL+NORFENTANYL UR QL SCN: NEGATIVE PG/ML
MEPERIDINE UR QL: NEGATIVE NG/ML
METHADONE UR QL SCN: NEGATIVE NG/ML
OPIATES UR QL SCN: NEGATIVE NG/ML
OXYCODONE+OXYMORPHONE UR QL SCN: NEGATIVE NG/ML
PCP UR QL: NEGATIVE NG/ML
PH UR: 5.1 [PH] (ref 4.5–8.9)
PLEASE NOTE:, 733157: ABNORMAL
PROPOXYPH UR QL SCN: NEGATIVE NG/ML
SP GR UR: 1.01
TRAMADOL UR-MCNC: POSITIVE UG/ML

## 2019-08-28 DIAGNOSIS — E78.2 MIXED HYPERLIPIDEMIA: ICD-10-CM

## 2019-08-28 DIAGNOSIS — E11.21 TYPE 2 DIABETES MELLITUS WITH NEPHROPATHY (HCC): ICD-10-CM

## 2019-08-28 DIAGNOSIS — Z95.820 S/P ANGIOPLASTY WITH STENT: ICD-10-CM

## 2019-09-10 LAB
ALBUMIN SERPL-MCNC: 4.1 G/DL (ref 3.5–4.7)
ALP SERPL-CCNC: 67 IU/L (ref 39–117)
ALT SERPL-CCNC: 13 IU/L (ref 0–32)
AST SERPL-CCNC: 23 IU/L (ref 0–40)
BILIRUB DIRECT SERPL-MCNC: 0.14 MG/DL (ref 0–0.4)
BILIRUB SERPL-MCNC: 0.5 MG/DL (ref 0–1.2)
CHOLEST SERPL-MCNC: 179 MG/DL (ref 100–199)
CK SERPL-CCNC: 92 U/L (ref 24–173)
HDLC SERPL-MCNC: 62 MG/DL
LDLC SERPL CALC-MCNC: 93 MG/DL (ref 0–99)
PROT SERPL-MCNC: 6.7 G/DL (ref 6–8.5)
TRIGL SERPL-MCNC: 118 MG/DL (ref 0–149)
VLDLC SERPL CALC-MCNC: 24 MG/DL (ref 5–40)

## 2019-09-12 ENCOUNTER — TELEPHONE (OUTPATIENT)
Dept: CARDIOLOGY CLINIC | Age: 84
End: 2019-09-12

## 2019-09-12 NOTE — TELEPHONE ENCOUNTER
Late entry: I called the patient yesterday and left a message requesting a return call.      From: Kimmy Pichardo MD  Sent: 9/10/2019  10:28 PM EDT  To: Anahi Velazquez LPN  Subject: lipids                                            Good    b

## 2019-10-01 ENCOUNTER — TELEPHONE (OUTPATIENT)
Dept: FAMILY MEDICINE CLINIC | Age: 84
End: 2019-10-01

## 2019-10-01 NOTE — TELEPHONE ENCOUNTER
I called the patient. I informed her Dr. Simin Bethea wanted you to know your lipids look good. She acknowledged understanding and thanked me for the call.

## 2019-10-08 RX ORDER — CANDESARTAN 8 MG/1
TABLET ORAL
Qty: 90 TAB | Refills: 0 | Status: SHIPPED | OUTPATIENT
Start: 2019-10-08 | End: 2019-10-15 | Stop reason: SDUPTHER

## 2019-10-08 NOTE — TELEPHONE ENCOUNTER
----- Message from Chen Arroyo sent at 10/8/2019  2:53 PM EDT -----  Regarding: Dr. Vi Freeman request a call back to check the status of the pt's medication refill request for \"Candesartan\" and \"Calcuim D3\". She states that the pt's pharmacy has been trying to refill it but has not received an answer. Best contact number is [phone].

## 2019-10-16 RX ORDER — CANDESARTAN 8 MG/1
TABLET ORAL
Qty: 90 TAB | Refills: 2 | Status: SHIPPED | OUTPATIENT
Start: 2019-10-16 | End: 2019-11-15 | Stop reason: SDUPTHER

## 2019-10-24 DIAGNOSIS — M25.512 CHRONIC PAIN OF BOTH SHOULDERS: ICD-10-CM

## 2019-10-24 DIAGNOSIS — M15.9 PRIMARY OSTEOARTHRITIS INVOLVING MULTIPLE JOINTS: ICD-10-CM

## 2019-10-24 DIAGNOSIS — Z01.818 PREOPERATIVE GENERAL PHYSICAL EXAMINATION: ICD-10-CM

## 2019-10-24 DIAGNOSIS — M25.561 CHRONIC PAIN OF BOTH KNEES: ICD-10-CM

## 2019-10-24 DIAGNOSIS — R60.0 BILATERAL EDEMA OF LOWER EXTREMITY: ICD-10-CM

## 2019-10-24 DIAGNOSIS — I10 ESSENTIAL HYPERTENSION: ICD-10-CM

## 2019-10-24 DIAGNOSIS — M25.511 CHRONIC PAIN OF BOTH SHOULDERS: ICD-10-CM

## 2019-10-24 DIAGNOSIS — G89.29 CHRONIC PAIN OF BOTH KNEES: ICD-10-CM

## 2019-10-24 DIAGNOSIS — M25.562 CHRONIC PAIN OF BOTH KNEES: ICD-10-CM

## 2019-10-24 DIAGNOSIS — G89.29 CHRONIC PAIN OF BOTH SHOULDERS: ICD-10-CM

## 2019-10-24 RX ORDER — POTASSIUM CHLORIDE 750 MG/1
TABLET, EXTENDED RELEASE ORAL
Qty: 90 TAB | Refills: 2 | Status: SHIPPED | OUTPATIENT
Start: 2019-10-24 | End: 2019-12-31

## 2019-10-24 RX ORDER — TORSEMIDE 20 MG/1
TABLET ORAL
Qty: 90 TAB | Refills: 2 | Status: SHIPPED | OUTPATIENT
Start: 2019-10-24 | End: 2020-05-15

## 2019-10-24 RX ORDER — ATENOLOL 50 MG/1
TABLET ORAL
Qty: 90 TAB | Refills: 0 | Status: SHIPPED | OUTPATIENT
Start: 2019-10-24 | End: 2019-12-31

## 2019-10-24 RX ORDER — NIFEDIPINE 30 MG/1
TABLET, FILM COATED, EXTENDED RELEASE ORAL
Qty: 90 TAB | Refills: 0 | Status: SHIPPED | OUTPATIENT
Start: 2019-10-24 | End: 2019-12-31

## 2019-10-28 RX ORDER — ROSUVASTATIN CALCIUM 20 MG/1
TABLET, COATED ORAL
Qty: 90 TAB | Refills: 0 | Status: SHIPPED | OUTPATIENT
Start: 2019-10-28 | End: 2020-01-30 | Stop reason: ALTCHOICE

## 2019-11-15 ENCOUNTER — OFFICE VISIT (OUTPATIENT)
Dept: FAMILY MEDICINE CLINIC | Age: 84
End: 2019-11-15

## 2019-11-15 VITALS
HEIGHT: 66 IN | BODY MASS INDEX: 35.18 KG/M2 | TEMPERATURE: 97 F | RESPIRATION RATE: 19 BRPM | OXYGEN SATURATION: 96 % | DIASTOLIC BLOOD PRESSURE: 53 MMHG | WEIGHT: 218.9 LBS | HEART RATE: 63 BPM | SYSTOLIC BLOOD PRESSURE: 123 MMHG

## 2019-11-15 DIAGNOSIS — K92.1 HEMATOCHEZIA: ICD-10-CM

## 2019-11-15 DIAGNOSIS — R82.90 ABNORMAL URINALYSIS: ICD-10-CM

## 2019-11-15 DIAGNOSIS — Z79.899 MEDICATION MANAGEMENT: ICD-10-CM

## 2019-11-15 DIAGNOSIS — K59.01 SLOW TRANSIT CONSTIPATION: ICD-10-CM

## 2019-11-15 DIAGNOSIS — Z23 ENCOUNTER FOR IMMUNIZATION: ICD-10-CM

## 2019-11-15 LAB
BILIRUB UR QL STRIP: NEGATIVE
GLUCOSE UR-MCNC: NEGATIVE MG/DL
HBA1C MFR BLD HPLC: 5.8 %
KETONES P FAST UR STRIP-MCNC: NEGATIVE MG/DL
PH UR STRIP: 5.5 [PH] (ref 4.6–8)
PROT UR QL STRIP: NORMAL
SP GR UR STRIP: 1.01 (ref 1–1.03)
UA UROBILINOGEN AMB POC: NORMAL (ref 0.2–1)
URINALYSIS CLARITY POC: NORMAL
URINALYSIS COLOR POC: NORMAL
URINE BLOOD POC: NORMAL
URINE LEUKOCYTES POC: NORMAL
URINE NITRITES POC: NEGATIVE

## 2019-11-15 RX ORDER — TRAMADOL HYDROCHLORIDE AND ACETAMINOPHEN 37.5; 325 MG/1; MG/1
1 TABLET ORAL
Qty: 60 TAB | Refills: 2 | Status: SHIPPED | OUTPATIENT
Start: 2019-11-15 | End: 2019-12-15

## 2019-11-15 RX ORDER — TRAMADOL HYDROCHLORIDE AND ACETAMINOPHEN 37.5; 325 MG/1; MG/1
TABLET ORAL
COMMUNITY
End: 2019-11-15 | Stop reason: SDUPTHER

## 2019-11-15 RX ORDER — SIMETHICONE 80 MG
80 TABLET,CHEWABLE ORAL
Qty: 40 TAB | Refills: 1 | Status: ON HOLD | OUTPATIENT
Start: 2019-11-15 | End: 2022-07-27

## 2019-11-15 RX ORDER — HYDROCORTISONE 25 MG/G
CREAM TOPICAL 4 TIMES DAILY
Qty: 30 G | Refills: 0 | Status: SHIPPED | OUTPATIENT
Start: 2019-11-15 | End: 2021-03-12

## 2019-11-15 RX ORDER — CANDESARTAN 8 MG/1
TABLET ORAL
Qty: 90 TAB | Refills: 2 | Status: SHIPPED | OUTPATIENT
Start: 2019-11-15 | End: 2020-06-29

## 2019-11-15 NOTE — PROGRESS NOTES
Order placed for urine culture   per Verbal Order from Dr. Lydia Arellano on 11/15/2019 due to abnormal urine.

## 2019-11-15 NOTE — PROGRESS NOTES
HISTORY OF PRESENT ILLNESS  Sophy Brower is a 80 y.o. female. HPI   Prediabetic  Patient also present for diabetic check,  the patient has been compliancy w/ a diabetic diet, and eat less of carbohydrates, since last visit patient has been more active with daily walking,   This time patient denies  tingling sensation, has no polyurea and polydipsia, last a1c was at target of 6.1 % %     Last urine microalbumin 2019 and was abnormal, patient currently on ACE inhibitor. Chronic upper back, knees shoulders, rt hands pain The patient's pain has been an ongoing struggling concerning problem, present for refills, never abused any prescribed meds, no hx of illicit nor etoh abuse, the pain has been bothering the pt for many yrs, pt aware that there are no other alternative approach for the current pain that exist except for the available opioid based meds with their huge addictive properties,  Patient states that the current dosage of the meds given, not strong enough, but it is helping,   Zero count,   with the current medications,  the pt capable of doing things specially the activity of the daily living,   Pt's pain persist without medication, is a chronic condition,  compliant with medication takes it as prescribed, keeps meds at a safe place, on stool softener, 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   Constipation with abdominal pain and seen small amount of blood on wiping in the rectal area this happened once last week it has gone away has no rectal bleeding at this time  Hard stool, every 3-4 days and has not noticed any rectal bleeding no fhx of colon cancer, no tarry stool, no abdominal pain, eats varieties of foods, no hc of UC, nor of Crohn's Dz, compliant with stool softner but is not helping patient also had normal colonoscopy in the past denies any nausea vomiting        Current Outpatient Medications   Medication Sig Dispense Refill    varicella-zoster recombinant, PF, ([address]) 50 mcg/0.5 mL susr injection 0.5 mL by IntraMUSCular route once for 1 dose. 0.5 mL 0    traMADol-acetaminophen (ULTRACET) 37.5-325 mg per tablet Take 1 Tab by mouth every eight (8) hours as needed for Pain for up to 30 days. Max Daily Amount: 3 Tabs. Indications: pain 60 Tab 2    candesartan (ATACAND) 8 mg tablet TAKE 1 TABLET BY MOUTH ONCE DAILY 90 Tab 2    hydrocortisone (ANUSOL-HC) 2.5 % rectal cream Insert  into rectum four (4) times daily. 30 g 0    rosuvastatin (CRESTOR) 20 mg tablet TAKE 1 TABLET EVERY NIGHT 90 Tab 0    potassium chloride (KLOR-CON) 10 mEq tablet TAKE 1 TABLET EVERY DAY 90 Tab 2    torsemide (DEMADEX) 20 mg tablet TAKE 1 TABLET EVERY DAY 90 Tab 2    atenolol (TENORMIN) 50 mg tablet TAKE 1 TABLET EVERY DAY 90 Tab 0    NIFEdipine ER (ADALAT CC) 30 mg ER tablet TAKE 1 TABLET EVERY DAY  ON AN EMPTY STOMACH 90 Tab 0    Calcium-Cholecalciferol, D3, (CALCIUM 600 WITH VITAMIN D3) 600 mg(1,500mg) -400 unit chew Take 600 mg by mouth two (2) times a day. 180 Tab 3    nitroglycerin (NITROSTAT) 0.4 mg SL tablet PLACE 1 TABLET UNDER THE TONGUE EVERY 5 MINS AS NEEDED FOR CHEST PAIN 25 Tab 11    ASPIR-LOW 81 mg tablet Take 81 mg by mouth daily.  naloxone (NARCAN) 4 mg/actuation nasal spray Use 1 spray intranasally into 1 nostril. Use a new Narcan nasal spray for subsequent doses and administer into alternating nostrils. May repeat every 2 to 3 minutes as needed. 2 Each 11    MISCELLANEOUS MEDICAL SUPPLY (COMPRESSION STOCKINGS) daily.  Remove at bedtime       Allergies   Allergen Reactions    Diclofenac Unknown (comments)    Lisinopril Cough    Lisinopril-Hydrochlorothiazide Unknown (comments)     Past Medical History:   Diagnosis Date    Arthritis 3/25/2010    Back pain 3/26/2010    CAD (coronary artery disease)     Chest pain, unspecified     Chronic kidney failure, stage 3 (moderate) (HCC) 12/5/2017    Chronic pain     Chronic pain of both knees 2/6/2018    Edema     Essential hypertension     High cholesterol 3/25/2010    HTN (hypertension) 3/25/2010    Kidney failure, acute ([facility] [address] 4/6/2012    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    Onychomycosis 8/19/2010    Other acute and subacute form of ischemic heart disease     Other and unspecified angina pectoris     Other ill-defined conditions(799.89)     excessive watering of right eye    Patient is full code 8/6/2018    Shortness of breath     Shoulder pain, bilateral 3/26/2010     Past Surgical History:   Procedure Laterality Date    HX HEART CATHETERIZATION      HX HEENT      bilat cateract extraction    HX HYSTERECTOMY      HX KNEE REPLACEMENT      HX LUMBAR FUSION      HX ORTHOPAEDIC      bilateral TKR    HX ORTHOPAEDIC      neck and back surgery    HX ORTHOPAEDIC      bilateral bunionectomy    HX ORTHOPAEDIC      bilateral cataracts removed    HX PTCA       Family History   Problem Relation Age of Onset    Hypertension Mother     Stroke Mother     Other Mother         arthritis    Coronary Artery Disease Father     Heart Disease Father     Hypertension Sister     Diabetes Sister     Coronary Artery Disease Sister     Cancer Sister         lung    Coronary Artery Disease Brother     Cancer Brother         lung    Heart Disease Brother      Social History     Tobacco Use    Smoking status: Never Smoker    Smokeless tobacco: Never Used   Substance Use Topics    Alcohol use: Yes     Comment: occasional       Lab Results   Component Value Date/Time    WBC 6.9 08/13/2019 08:56 AM    HGB 13.2 08/13/2019 08:56 AM    HCT 38.6 08/13/2019 08:56 AM    PLATELET 615 72/94/9933 08:56 AM    MCV 86 08/13/2019 08:56 AM     Lab Results   Component Value Date/Time    Hemoglobin A1c 6.1 (H) 02/04/2019 09:22 AM    Hemoglobin A1c 6.2 (H) 03/09/2017 08:57 AM    Hemoglobin A1c 6.1 (H) 11/30/2015 10:00 AM    Glucose 117 (H) 08/13/2019 08:56 AM    Microalb/Creat ratio (ug/mg creat.) <2.9 09/11/2017 10:04 AM    LDL, calculated 93 09/09/2019 11:32 AM    Creatinine (POC) 1.3 11/30/2009 01:34 PM    Creatinine 1.39 (H) 08/13/2019 08:56 AM         Review of Systems   Constitutional: Negative for chills and fever. HENT: Negative for ear pain and nosebleeds. Eyes: Negative for blurred vision, pain and discharge. Respiratory: Negative for shortness of breath. Cardiovascular: Negative for chest pain and leg swelling. Gastrointestinal: Positive for abdominal pain, constipation and nausea. Negative for diarrhea and vomiting. Genitourinary: Negative for frequency. Musculoskeletal: Positive for back pain, joint pain, myalgias and neck pain. Skin: Negative for itching and rash. Neurological: Negative for headaches. Psychiatric/Behavioral: Negative for depression. The patient is not nervous/anxious. Physical Exam   Constitutional: She is oriented to person, place, and time. She appears well-developed and well-nourished. HENT:   Head: Normocephalic and atraumatic. Eyes: Conjunctivae and EOM are normal.   Neck: Normal range of motion. Neck supple. Cardiovascular: Normal rate, regular rhythm and normal heart sounds. No murmur heard. Pulmonary/Chest: Effort normal and breath sounds normal.   Abdominal: Soft. Bowel sounds are normal. She exhibits no distension. Musculoskeletal: Normal range of motion. She exhibits edema, tenderness and deformity. Neurological: She is alert and oriented to person, place, and time. Skin: No erythema. Psychiatric: Her behavior is normal.   Nursing note and vitals reviewed. ASSESSMENT and PLAN  Diagnoses and all orders for this visit:    1.  Type II diabetes mellitus with complication, uncontrolled (HCC)  -     REFERRAL TO OPTOMETRY  -     MICROALBUMIN, UR, RAND W/ MICROALB/CREAT RATIO  -     REFERRAL TO PODIATRY  -     AMB POC HEMOGLOBIN A1C  -     XR ABD ACUTE W 1 V CHEST; Future  -     AMB POC URINALYSIS DIP STICK AUTO W/O MICRO  -     traMADol-acetaminophen (ULTRACET) 37.5-325 mg per tablet; Take 1 Tab by mouth every eight (8) hours as needed for Pain for up to 30 days. Max Daily Amount: 3 Tabs. Indications: pain    2. Encounter for immunization  -     varicella-zoster recombinant, PF, ([facility]) 50 mcg/0.5 mL susr injection; 0.5 mL by IntraMUSCular route once for 1 dose. 3. Medication management  -     PAIN MGMT PANEL W/REFL, UR  -     XR ABD ACUTE W 1 V CHEST; Future  -     AMB POC URINALYSIS DIP STICK AUTO W/O MICRO  -     traMADol-acetaminophen (ULTRACET) 37.5-325 mg per tablet; Take 1 Tab by mouth every eight (8) hours as needed for Pain for up to 30 days. Max Daily Amount: 3 Tabs. Indications: pain    4. Slow transit constipation  -     XR ABD ACUTE W 1 V CHEST; Future  -     AMB POC URINALYSIS DIP STICK AUTO W/O MICRO  -     traMADol-acetaminophen (ULTRACET) 37.5-325 mg per tablet; Take 1 Tab by mouth every eight (8) hours as needed for Pain for up to 30 days. Max Daily Amount: 3 Tabs. Indications: pain    5. Abnormal urinalysis  -     CULTURE, URINE    6. Hematochezia  -     REFERRAL TO GASTROENTEROLOGY    Other orders  -     candesartan (ATACAND) 8 mg tablet; TAKE 1 TABLET BY MOUTH ONCE DAILY  -     hydrocortisone (ANUSOL-HC) 2.5 % rectal cream; Insert  into rectum four (4) times daily. Patient was told to lessen the amount of opioid-based medication continue with weight reduction do some massage therapy chiropractor exercise therapy such as resistance banding avoid heavy lifting heavy pushing at this time include ibuprofen and Tylenol over-the-counter topical cream , patient was told to take some Tums or over-the-counter PPI if abdominal upset do ice therapy, side effect of current opioid-based medication significantly including its devastating addiction, explained in detail   in addition, pt was told to avoid machinary operation and driving while on any opioid based medications that will cause dizziness, drowsiness, and sleepiness.   Dependency and tolerancy were also addressed,  meds side effects and compliancy advised,  Call or rtc if worsens, radiology results and schedule of future radiology studies reviewed with patient, patient was also told to avoid any alcoholic beverages or any new medication which could potentiate its effect possibility of overdose abuse of other illicit drug keeping the medication is safe place, patient acknowledged understanding and agreed with recommendation

## 2019-11-15 NOTE — PATIENT INSTRUCTIONS
Constipation: Care Instructions  Your Care Ins     Learning About Opioids  Introduction    Opioids are medicines used to relieve moderate to severe pain. They may be used for a short time for pain, such as after surgery. Or in some cases a doctor might prescribe them for long-term pain. They don't cure a health problem. But they help you manage the pain. Opioids relieve pain by changing the way your body feels pain and the way you feel about pain. Sometimes opioids are used for people who can't take other pain medicines. They may be prescribed if you have heart, kidney, or liver problems. For instance, you may take an opioid instead of nonsteroidal anti-inflammatory drugs (NSAIDs). NSAIDs include ibuprofen (Advil, Motrin) and naproxen (Aleve). Opioids are strong medicines. They can help you manage pain when you use them the right way. But if you misuse them, they can cause serious harm and even death. If you decide to take opioids, here are some things to remember. · Keep your doctor informed. You can develop opioid use disorder. Moderate to severe opioid use disorder is sometimes called addiction. The risk is higher if you have a history of substance use. Your doctor will monitor you closely for signs of opioid use disorder and to figure out when you no longer need to take opioids. · Make a treatment plan. The goal of your plan is to be able to function and do the things you need to do, even if you still have some pain. You might be able to manage your pain with other non-opioid options like physical therapy, relaxation, or over-the-counter pain medicines. · Be aware of the side effects. Opioids can cause serious side effects, such as constipation, dry mouth, and nausea. And over time, you may need a higher dose to get pain relief. This is called tolerance. Your body also gets used to opioids. This is called physical dependence.  If you suddenly stop taking them, you may have withdrawal symptoms. Examples  Opioids or other medicines that contain them include:  · Codeine (Tylenol 3). · Hydrocodone (Norco). · Oxycodone (OxyContin, Percocet). Safety tips  If you need to take opioids to manage your pain, remember these safety tips. · Follow directions carefully. It's easy to misuse opioids if you take a dose other than what's prescribed by your doctor. This can lead to overdose and even death. Even sharing them with someone they weren't meant for is misuse. · Be cautious. Opioids may affect your judgment and decision making. Do not drive or operate machinery until you can think clearly. Talk with your doctor about when it is safe to drive. · Reduce the risk of drug interactions. Opioids can be dangerous if you take them with alcohol or with certain drugs like sleeping pills and muscle relaxers. Make sure your doctor knows about all the other medicines you take, including over-the-counter medicines. Don't start any new medicines before you talk to your doctor or pharmacist.  · Safely store and dispose of opioids. Store opioids in a safe and secure place. Make sure that pets, children, friends, and family can't get to them. When you're done using opioids, make sure to dispose of them safely and as quickly as possible. The U.S. Food and Drug Administration (FDA) recommends these disposal options. ? The best option is to take your medicine to a drop-off box or take-back program that is authorized by the [address] ([facility]). ? If these programs aren't available in your area and your medicine doesn't have specific disposal instructions (such as flushing), you can throw them into your household trash if you follow the FDA's instructions. Visit fda.gov and search for \"unused medicine disposal.\"  ? If you have opioid patches (used or unused), your options are to take them to a [facility]-authorized site or flush them down the toilet. Do not throw them in the trash.   ? Only flush your medicine down the toilet if you can't get to a [facility]-approved site or your medicine instructions state clearly to flush them. · Reduce the risk of overdose. Misuse of opioids can be very dangerous. Protect yourself by asking your doctor about a naloxone rescue kit. It can help you--and even save your life--if you take too much of an opioid. Side effects  Common side effects include:  · Constipation. · Feeling dizzy or lightheaded. You may feel like you might faint. · Feeling sleepy. · Nausea or vomiting. You may have other side effects or reactions. Check the information that comes with your medicine. When should you call for help? Call 911 anytime you think you may need emergency care. For example, call if:  · You have symptoms of a severe allergic reaction. These may include:  ? Sudden raised, red areas (hives) all over your body. ? Swelling of the throat, mouth, lips, or tongue. ? Trouble breathing. ? Passing out (losing consciousness). Or you may feel very lightheaded or suddenly feel weak, confused, or restless. · You have signs of an overdose. These include:  ? Cold, clammy skin. ? Confusion. ? Severe nervousness or restlessness. ? Severe dizziness, drowsiness, or weakness. ? Slow breathing. ? Seizures. Call your doctor now or seek immediate medical care if:  · You have symptoms of an allergic reaction, such as:  ? A rash or hives (raised, red areas on the skin). ? Itching. ? Swelling. ? Belly pain, nausea, or vomiting. Watch closely for changes in your health, and be sure to contact your doctor if:  · Your medicine is not helping with the pain. · You are having side effects, such as constipation. Where can you learn more? Go to http://brianne-ger.info/. Enter K282 in the search box to learn more about \"Learning About Opioids. \"  Current as of: March 28, 2019  Content Version: 12.2  © 2218-4762 [employer].  Care instructions adapted under license by [employer] (which disclaims liability or warranty for this information). If you have questions about a medical condition or this instruction, always ask your healthcare professional. [address] any warranty or liability for your use of this information. tructions    Constipation means that you have a hard time passing stools (bowel movements). People pass stools from 3 times a day to once every 3 days. What is normal for you may be different. Constipation may occur with pain in the rectum and cramping. The pain may get worse when you try to pass stools. Sometimes there are small amounts of bright red blood on toilet paper or the surface of stools. This is because of enlarged veins near the rectum (hemorrhoids). A few changes in your diet and lifestyle may help you avoid ongoing constipation. Your doctor may also prescribe medicine to help loosen your stool. Some medicines can cause constipation. These include pain medicines and antidepressants. Tell your doctor about all the medicines you take. Your doctor may want to make a medicine change to ease your symptoms. Follow-up care is a key part of your treatment and safety. Be sure to make and go to all appointments, and call your doctor if you are having problems. It's also a good idea to know your test results and keep a list of the medicines you take. How can you care for yourself at home? · Drink plenty of fluids, enough so that your urine is light yellow or clear like water. If you have kidney, heart, or liver disease and have to limit fluids, talk with your doctor before you increase the amount of fluids you drink. · Include high-fiber foods in your diet each day. These include fruits, vegetables, beans, and whole grains. · Get at least 30 minutes of exercise on most days of the week. Walking is a good choice.  You also may want to do other activities, such as running, swimming, cycling, or playing tennis or team sports. · Take a fiber supplement, such as Citrucel or Metamucil, every day. Read and follow all instructions on the label. · Schedule time each day for a bowel movement. A daily routine may help. Take your time having your bowel movement. · Support your feet with a small step stool when you sit on the toilet. This helps flex your hips and places your pelvis in a squatting position. · Your doctor may recommend an over-the-counter laxative to relieve your constipation. Examples are Milk of Magnesia and MiraLax. Read and follow all instructions on the label. Do not use laxatives on a long-term basis. When should you call for help? Call your doctor now or seek immediate medical care if:    · You have new or worse belly pain.     · You have new or worse nausea or vomiting.     · You have blood in your stools.    Watch closely for changes in your health, and be sure to contact your doctor if:    · Your constipation is getting worse.     · You do not get better as expected. Where can you learn more? Go to http://brianne-ger.info/. Enter 21 [id number] in the search box to learn more about \"Constipation: Care Instructions. \"  Current as of: June 26, 2019  Content Version: 12.2  © 9658-3322 [employer]. Care instructions adapted under license by [employer] (which disclaims liability or warranty for this information). If you have questions about a medical condition or this instruction, always ask your healthcare professional. [place] [address] any warranty or liability for your use of this information.

## 2019-11-15 NOTE — PROGRESS NOTES
Name and  verified        Health Maintenance reviewed-discussed with patient. Chief Complaint   Patient presents with    Diabetes    Abdominal Pain     For Two Months   '    1. Have you been to the ER, urgent care clinic since your last visit? Hospitalized since your last visit? Yes, Cardiology office visit three months ago patient reported. 2. Have you seen or consulted any other health care providers outside of the [address] [place] [address] since your last visit? Include any pap smears or colon screening.   no

## 2019-11-17 LAB — BACTERIA UR CULT: NO GROWTH

## 2019-11-19 ENCOUNTER — OFFICE VISIT (OUTPATIENT)
Dept: CARDIOLOGY CLINIC | Age: 84
End: 2019-11-19

## 2019-11-19 VITALS
HEIGHT: 66 IN | SYSTOLIC BLOOD PRESSURE: 130 MMHG | BODY MASS INDEX: 35.74 KG/M2 | WEIGHT: 222.4 LBS | DIASTOLIC BLOOD PRESSURE: 68 MMHG | OXYGEN SATURATION: 98 % | RESPIRATION RATE: 16 BRPM | HEART RATE: 61 BPM

## 2019-11-19 DIAGNOSIS — E78.2 MIXED HYPERLIPIDEMIA: ICD-10-CM

## 2019-11-19 DIAGNOSIS — N18.30 CKD (CHRONIC KIDNEY DISEASE) STAGE 3, GFR 30-59 ML/MIN (HCC): ICD-10-CM

## 2019-11-19 DIAGNOSIS — E66.01 SEVERE OBESITY (BMI 35.0-39.9) WITH COMORBIDITY (HCC): ICD-10-CM

## 2019-11-19 DIAGNOSIS — Z95.820 S/P ANGIOPLASTY WITH STENT: ICD-10-CM

## 2019-11-19 DIAGNOSIS — I10 ESSENTIAL HYPERTENSION, BENIGN: Primary | ICD-10-CM

## 2019-11-19 NOTE — PROGRESS NOTES
[facility]   [address] [facility], [address]                                          NEW PATIENT HPI/FOLLOW-UP      NAME:  Allyn Nolan   :   1933   MRN:   [id number]   PCP:  Paco Russo MD           Subjective: The patient is a 80y.o. year old female  who returns for a routine follow-up. Since the last visit, patient reports no new cardiac symptoms. Concerned about lower abdominal, \"cramping\" pain. Referred to GI. Denies change in exercise tolerance, chest pain, edema, medication intolerance, palpitations, shortness of breath, PND/orthopnea wheezing, sputum, syncope, dizziness or light headedness. Doing satisfactorily from cardiac perspective. Review of Systems  General: Pt denies excessive weight gain or loss. Pt is able to conduct ADL's. Respiratory: Denies shortness of breath, MURILLO, wheezing or stridor.   Cardiovascular: Denies precordial pain, palpitations, edema or PND  Gastrointestinal: Denies poor appetite, indigestion, + lower abdominal pain, - blood in stool  Peripheral vascular: Denies claudication, leg cramps  Neuropsychiatric: Denies paresthesias,tingling,numbness,anxiety,depression,fatigue  Musculoskeletal: Denies pain,tenderness, soreness,swelling      Past Medical History:   Diagnosis Date    Arthritis 3/25/2010    Back pain 3/26/2010    CAD (coronary artery disease)     Chest pain, unspecified     Chronic kidney failure, stage 3 (moderate) (HCC) 2017    Chronic pain     Chronic pain of both knees 2018    Edema     Essential hypertension     High cholesterol 3/25/2010    HTN (hypertension) 3/25/2010    Kidney failure, acute ([facility] 2012    Lacrimal passage stenosis 2014    Onychomycosis 2010    Other acute and subacute form of ischemic heart disease     Other and unspecified angina pectoris     Other ill-defined conditions(799.89)     excessive watering of right eye    Patient is full code 2018  Shortness of breath     Shoulder pain, bilateral 3/26/2010     Patient Active Problem List    Diagnosis Date Noted    Patient is full code 08/06/2018    Severe obesity (BMI 35.0-39. 9) with comorbidity ([address] 05/07/2018    Chronic pain of both knees 02/06/2018    Type 2 diabetes mellitus with nephropathy ([address] 01/23/2018    CKD (chronic kidney disease) stage 3, GFR 30-59 ml/min ([facility]) 01/23/2018    Chronic kidney failure, stage 3 (moderate) ([address] 12/05/2017    Lacrimal passage stenosis 04/17/2014    Prediabetes 01/03/2014    Abnormal urinalysis 08/30/2013    Lacrimal duct stenosis 08/14/2013    Conjunctivitis 05/10/2013    Watery eyes 08/29/2012    S/P angioplasty with stent--RCA 6/12 06/22/2012    Multiple bruises 06/22/2012    CAD (coronary artery disease) 06/07/2012    Post PTCA 06/07/2012    Mixed hyperlipidemia 06/01/2012    Essential hypertension, benign 06/01/2012    Abnormal nuclear stress test 06/01/2012    Chest pain, unspecified 04/25/2012    Essential hypertension, benign 04/20/2012    Type 2 diabetes mellitus without complication ([address] 18/94/4839    SOB (shortness of breath) on exertion 04/06/2012    Vitamin D deficiency 04/06/2012    Kidney failure, acute ([address] 04/06/2012    Heme positive stool 11/12/2011    Abdominal pain, other specified site 11/12/2011    Inguinal hernia, left 11/12/2011    Decreased vision 10/12/2011    Edema leg 10/12/2011    Cough 08/17/2011    Tick bite, infected 08/17/2011    Onychomycosis 08/19/2010    Shoulder pain, bilateral 03/26/2010    Back pain 03/26/2010    Primary osteoarthritis involving multiple joints 03/25/2010      Past Surgical History:   Procedure Laterality Date    HX HEART CATHETERIZATION      HX HEENT      bilat cateract extraction    HX HYSTERECTOMY      HX KNEE REPLACEMENT      HX LUMBAR FUSION      HX ORTHOPAEDIC      bilateral TKR    HX ORTHOPAEDIC      neck and back surgery    HX ORTHOPAEDIC      bilateral bunionectomy    HX ORTHOPAEDIC      bilateral cataracts removed    HX PTCA       Allergies   Allergen Reactions    Diclofenac Unknown (comments)    Lisinopril Cough    Lisinopril-Hydrochlorothiazide Unknown (comments)      Family History   Problem Relation Age of Onset   [facility] Hypertension Mother    [facility] Stroke Mother     Other Mother         arthritis    Coronary Artery Disease Father     Heart Disease Father     Hypertension Sister     Diabetes Sister     Coronary Artery Disease Sister     Cancer Sister         lung    Coronary Artery Disease Brother     Cancer Brother         lung    Heart Disease Brother       Social History     Socioeconomic History    Marital status:      Spouse name: Not on file    Number of children: Not on file    Years of education: Not on file    Highest education level: Not on file   Occupational History    Not on file   Social Needs    Financial resource strain: Not on file    Food insecurity:     Worry: Not on file     Inability: Not on file    Transportation needs:     Medical: Not on file     Non-medical: Not on file   Tobacco Use    Smoking status: Never Smoker    Smokeless tobacco: Never Used   Substance and Sexual Activity    Alcohol use: Yes     Comment: occasional     Drug use: Yes     Types: Prescription, OTC    Sexual activity: Never   Lifestyle    Physical activity:     Days per week: Not on file     Minutes per session: Not on file    Stress: Not on file   Relationships    Social connections:     Talks on phone: Not on file     Gets together: Not on file     Attends Sikh service: Not on file     Active member of club or organization: Not on file     Attends meetings of clubs or organizations: Not on file     Relationship status: Not on file    Intimate partner violence:     Fear of current or ex partner: Not on file     Emotionally abused: Not on file     Physically abused: Not on file     Forced sexual activity: Not on file   Other Topics Concern    Not on file   Social History Narrative    ** Merged History Encounter **           Current Outpatient Medications   Medication Sig    traMADol-acetaminophen (ULTRACET) 37.5-325 mg per tablet Take 1 Tab by mouth every eight (8) hours as needed for Pain for up to 30 days. Max Daily Amount: 3 Tabs. Indications: pain    candesartan (ATACAND) 8 mg tablet TAKE 1 TABLET BY MOUTH ONCE DAILY    hydrocortisone (ANUSOL-HC) 2.5 % rectal cream Insert  into rectum four (4) times daily.  simethicone (MYLICON) 80 mg chewable tablet Take 1 Tab by mouth every six (6) hours as needed for Flatulence.  rosuvastatin (CRESTOR) 20 mg tablet TAKE 1 TABLET EVERY NIGHT    potassium chloride (KLOR-CON) 10 mEq tablet TAKE 1 TABLET EVERY DAY    torsemide (DEMADEX) 20 mg tablet TAKE 1 TABLET EVERY DAY    atenolol (TENORMIN) 50 mg tablet TAKE 1 TABLET EVERY DAY    NIFEdipine ER (ADALAT CC) 30 mg ER tablet TAKE 1 TABLET EVERY DAY  ON AN EMPTY STOMACH    Calcium-Cholecalciferol, D3, (CALCIUM 600 WITH VITAMIN D3) 600 mg(1,500mg) -400 unit chew Take 600 mg by mouth two (2) times a day.  nitroglycerin (NITROSTAT) 0.4 mg SL tablet PLACE 1 TABLET UNDER THE TONGUE EVERY 5 MINS AS NEEDED FOR CHEST PAIN    ASPIR-LOW 81 mg tablet Take 81 mg by mouth daily.  naloxone (NARCAN) 4 mg/actuation nasal spray Use 1 spray intranasally into 1 nostril. Use a new Narcan nasal spray for subsequent doses and administer into alternating nostrils. May repeat every 2 to 3 minutes as needed.  MISCELLANEOUS MEDICAL SUPPLY (COMPRESSION STOCKINGS) daily. Remove at bedtime     No current facility-administered medications for this visit. I have reviewed the nurses notes, vitals, problem list, allergy list, medical history, family medical, social history and medications.         Objective:     Physical Exam:     Vitals:    11/19/19 0946 11/19/19 0956   BP: 120/72 130/68   Pulse: 61    Resp: 16    SpO2: 98%    Weight: 222 lb 6.4 oz (100.9 kg)    Height: 5' 6\" (1.676 m)     Body mass index is 35.9 kg/m². General: Well developed, obese in no acute distress. HEENT: No carotid bruits, no JVD, trach is midline. Heart:  Normal S1/S2 negative S3 or S4. Regular, no murmur, gallop or rub.   Respiratory: Clear bilaterally, no wheezing or rales  Abdomen:   Soft, non-tender, bowel sounds are active.   Extremities:  No edema, normal cap refill, no cyanosis. Neuro: A&Ox3, speech clear, gait stable. Skin: Skin color is normal. No rashes or lesions. No diaphoresis.   Vascular: 2+ pulses symmetric in all extremities        Data Review:       Cardiographics:    EKG: NSR, 1st degree AV HB    Cardiology Labs:    Results for orders placed or performed during the hospital encounter of 06/06/12   EKG, 12 LEAD, INITIAL   Result Value Ref Range    Ventricular Rate 66 BPM    Atrial Rate 66 BPM    P-R Interval 188 ms    QRS Duration 84 ms    Q-T Interval 418 ms    QTC Calculation (Bezet) 438 ms    Calculated P Axis 58 degrees    Calculated R Axis 16 degrees    Calculated T Axis 42 degrees    Diagnosis       Sinus rhythm with premature atrial complexes  When compared with ECG of 06-JUN-2012 10:45,  premature atrial complexes are now present  Confirmed by Josep Santa ([id number]) on 6/7/2012 8:37:17 AM       Lab Results   Component Value Date/Time    Cholesterol, total 179 09/09/2019 11:32 AM    HDL Cholesterol 62 09/09/2019 11:32 AM    LDL, calculated 93 09/09/2019 11:32 AM    Triglyceride 118 09/09/2019 11:32 AM    CHOL/HDL Ratio 4.5 08/19/2010 12:27 PM       Lab Results   Component Value Date/Time    Sodium 141 08/13/2019 08:56 AM    Potassium 4.1 08/13/2019 08:56 AM    Chloride 102 08/13/2019 08:56 AM    CO2 25 08/13/2019 08:56 AM    Anion gap 7 06/07/2012 03:00 AM    Glucose 117 (H) 08/13/2019 08:56 AM    BUN 38 (H) 08/13/2019 08:56 AM    Creatinine 1.39 (H) 08/13/2019 08:56 AM    BUN/Creatinine ratio 27 08/13/2019 08:56 AM    GFR est AA 40 (L) 08/13/2019 08:56 AM    GFR est non-AA 34 (L) 08/13/2019 08:56 AM    Calcium 10.5 (H) 08/13/2019 08:56 AM    Bilirubin, total 0.5 09/09/2019 11:32 AM    AST (SGOT) 23 09/09/2019 11:32 AM    Alk. phosphatase 67 09/09/2019 11:32 AM    Protein, total 6.7 09/09/2019 11:32 AM    Albumin 4.1 09/09/2019 11:32 AM    Globulin 4.0 11/12/2011 11:03 AM    A-G Ratio 1.4 02/04/2019 09:22 AM    ALT (SGPT) 13 09/09/2019 11:32 AM          Assessment:       ICD-10-CM ICD-9-CM    1. Essential hypertension, benign I10 401.1 AMB POC EKG ROUTINE W/ 12 LEADS, INTER & REP   2. Severe obesity (BMI 35.0-39. 9) with comorbidity ([address] E66.01 278.01 AMB POC EKG ROUTINE W/ 12 LEADS, INTER & REP   3. S/P angioplasty with stent--RCA 6/12 Z95.820 V45.89 AMB POC EKG ROUTINE W/ 12 LEADS, INTER & REP   4. CKD (chronic kidney disease) stage 3, GFR 30-59 ml/min (HCC) N18.3 585.3 AMB POC EKG ROUTINE W/ 12 LEADS, INTER & REP   5. Mixed hyperlipidemia E78.2 272.2 AMB POC EKG ROUTINE W/ 12 LEADS, INTER & REP         Discussion: Patient presents at this time stable from a cardiac perspective. No cardiac issues. Pleased with present cardiac status. Plan: 1. Continue same meds. Lipid profile and labs followed by PCP--satisfactory. 2.Encouraged to exercise to tolerance, lose weight and follow low fat, low carb, low cholesterol, low sodium predominantly Plant-based (consider Mediterranean) diet. Call with questions or concerns. Will follow up any test results by phone and/or f/u here in office if needed. Charles Tavarez 3.Follow up: 6 MONTHS    I have discussed the diagnosis with the patient and the intended plan as seen in the above orders. The patient has received an after-visit summary and questions were answered concerning future plans. I have discussed any concerning medication side effects and warnings with the patient as well.     Elizabeth Mccarty MD,FACC,[facility]  11/19/2019

## 2019-11-19 NOTE — PROGRESS NOTES
Chief Complaint   Patient presents with    Hypertension     6 month follow up, pt says having stomach pain and thinks due to pravastatin      1. Have you been to the ER, urgent care clinic since your last visit? Hospitalized since your last visit? No    2. Have you seen or consulted any other health care providers outside of the [address] [place] [address] since your last visit? Include any pap smears or colon screening.  No

## 2019-11-20 LAB
ALBUMIN/CREAT UR: 19.1 MG/G CREAT (ref 0–30)
AMPHETAMINES UR QL SCN: NEGATIVE NG/ML
BARBITURATES UR QL SCN: NEGATIVE NG/ML
BENZODIAZ UR QL SCN: NEGATIVE NG/ML
BZE UR QL SCN: NEGATIVE NG/ML
CANNABINOIDS UR QL SCN: NEGATIVE NG/ML
CREAT UR-MCNC: 119.4 MG/DL
CREAT UR-MCNC: 131.1 MG/DL (ref 20–300)
FENTANYL+NORFENTANYL UR QL SCN: NEGATIVE PG/ML
MEPERIDINE UR QL: NEGATIVE NG/ML
METHADONE UR QL SCN: NEGATIVE NG/ML
MICROALBUMIN UR-MCNC: 22.8 UG/ML
OPIATES UR QL SCN: NEGATIVE NG/ML
OXYCODONE+OXYMORPHONE UR QL SCN: NEGATIVE NG/ML
PCP UR QL: NEGATIVE NG/ML
PH UR: 5.8 [PH] (ref 4.5–8.9)
PLEASE NOTE:, 733157: ABNORMAL
PROPOXYPH UR QL SCN: NEGATIVE NG/ML
SP GR UR: 1.01
TRAMADOL UR-MCNC: POSITIVE UG/ML

## 2019-12-30 DIAGNOSIS — G89.29 CHRONIC PAIN OF BOTH KNEES: ICD-10-CM

## 2019-12-30 DIAGNOSIS — G89.29 CHRONIC PAIN OF BOTH SHOULDERS: ICD-10-CM

## 2019-12-30 DIAGNOSIS — M25.561 CHRONIC PAIN OF BOTH KNEES: ICD-10-CM

## 2019-12-30 DIAGNOSIS — M25.562 CHRONIC PAIN OF BOTH KNEES: ICD-10-CM

## 2019-12-30 DIAGNOSIS — M25.512 CHRONIC PAIN OF BOTH SHOULDERS: ICD-10-CM

## 2019-12-30 DIAGNOSIS — M25.511 CHRONIC PAIN OF BOTH SHOULDERS: ICD-10-CM

## 2019-12-30 DIAGNOSIS — I10 ESSENTIAL HYPERTENSION: ICD-10-CM

## 2019-12-30 DIAGNOSIS — R60.0 BILATERAL EDEMA OF LOWER EXTREMITY: ICD-10-CM

## 2019-12-30 DIAGNOSIS — M15.9 PRIMARY OSTEOARTHRITIS INVOLVING MULTIPLE JOINTS: ICD-10-CM

## 2019-12-30 DIAGNOSIS — Z01.818 PREOPERATIVE GENERAL PHYSICAL EXAMINATION: ICD-10-CM

## 2019-12-31 RX ORDER — NIFEDIPINE 30 MG/1
TABLET, FILM COATED, EXTENDED RELEASE ORAL
Qty: 90 TAB | Refills: 0 | Status: SHIPPED | OUTPATIENT
Start: 2019-12-31 | End: 2020-03-06 | Stop reason: SDUPTHER

## 2019-12-31 RX ORDER — POTASSIUM CHLORIDE 750 MG/1
TABLET, EXTENDED RELEASE ORAL
Qty: 90 TAB | Refills: 2 | Status: SHIPPED | OUTPATIENT
Start: 2019-12-31 | End: 2020-07-30

## 2019-12-31 RX ORDER — ATENOLOL 50 MG/1
TABLET ORAL
Qty: 90 TAB | Refills: 0 | Status: SHIPPED | OUTPATIENT
Start: 2019-12-31 | End: 2020-03-16

## 2020-03-06 ENCOUNTER — OFFICE VISIT (OUTPATIENT)
Dept: FAMILY MEDICINE CLINIC | Age: 85
End: 2020-03-06

## 2020-03-06 VITALS
SYSTOLIC BLOOD PRESSURE: 137 MMHG | RESPIRATION RATE: 16 BRPM | HEIGHT: 66 IN | DIASTOLIC BLOOD PRESSURE: 66 MMHG | WEIGHT: 221.2 LBS | TEMPERATURE: 96.2 F | HEART RATE: 64 BPM | BODY MASS INDEX: 35.55 KG/M2 | OXYGEN SATURATION: 97 %

## 2020-03-06 DIAGNOSIS — M25.561 CHRONIC PAIN OF BOTH KNEES: ICD-10-CM

## 2020-03-06 DIAGNOSIS — G89.29 CHRONIC PAIN OF BOTH KNEES: ICD-10-CM

## 2020-03-06 DIAGNOSIS — M54.6 CHRONIC LEFT-SIDED THORACIC BACK PAIN: ICD-10-CM

## 2020-03-06 DIAGNOSIS — M25.562 CHRONIC PAIN OF BOTH KNEES: ICD-10-CM

## 2020-03-06 DIAGNOSIS — M15.9 PRIMARY OSTEOARTHRITIS INVOLVING MULTIPLE JOINTS: Primary | ICD-10-CM

## 2020-03-06 DIAGNOSIS — G89.29 CHRONIC LEFT-SIDED THORACIC BACK PAIN: ICD-10-CM

## 2020-03-06 DIAGNOSIS — N18.30 CHRONIC KIDNEY FAILURE, STAGE 3 (MODERATE) (HCC): ICD-10-CM

## 2020-03-06 RX ORDER — TRAMADOL HYDROCHLORIDE AND ACETAMINOPHEN 37.5; 325 MG/1; MG/1
1 TABLET ORAL
Qty: 90 TAB | Refills: 0 | Status: SHIPPED | OUTPATIENT
Start: 2020-04-06 | End: 2020-05-06

## 2020-03-06 RX ORDER — TRAMADOL HYDROCHLORIDE AND ACETAMINOPHEN 37.5; 325 MG/1; MG/1
1 TABLET ORAL
Qty: 90 TAB | Refills: 0 | Status: SHIPPED | OUTPATIENT
Start: 2020-03-06 | End: 2020-09-04 | Stop reason: SDUPTHER

## 2020-03-06 RX ORDER — TRAMADOL HYDROCHLORIDE AND ACETAMINOPHEN 37.5; 325 MG/1; MG/1
TABLET ORAL
COMMUNITY
End: 2020-03-06 | Stop reason: SDUPTHER

## 2020-03-06 RX ORDER — NIFEDIPINE 30 MG/1
TABLET, FILM COATED, EXTENDED RELEASE ORAL
Qty: 90 TAB | Refills: 2 | Status: SHIPPED | OUTPATIENT
Start: 2020-03-06 | End: 2020-09-04 | Stop reason: SDUPTHER

## 2020-03-06 RX ORDER — TRAMADOL HYDROCHLORIDE AND ACETAMINOPHEN 37.5; 325 MG/1; MG/1
1 TABLET ORAL
Qty: 90 TAB | Refills: 0 | Status: SHIPPED | OUTPATIENT
Start: 2020-05-06 | End: 2020-03-06 | Stop reason: SDUPTHER

## 2020-03-06 NOTE — PATIENT INSTRUCTIONS

## 2020-03-06 NOTE — PROGRESS NOTES
Chief Complaint   Patient presents with    Diabetes     follow up     1. Have you been to the ER, urgent care clinic since your last visit? Hospitalized since your last visit? No    2. Have you seen or consulted any other health care providers outside of the [address] [place] [address] since your last visit? Include any pap smears or colon screening.  Yes When: 02/2020 Where: Dr Milly Dior Reason for visit: follow up

## 2020-03-09 NOTE — PROGRESS NOTES
HISTORY OF PRESENT ILLNESS  Sophy Cade is a 80 y.o. female. HPI    Chronic upper back, knees shoulders, rt hands pain The patient's pain has been an ongoing struggling concerning problem, present for refills, never abused any prescribed meds, currently on WC, unable to stands secondary to severe knee pain,  no hx of illicit nor etoh abuse, the pain has been bothering the pt for many yrs, pt aware that there are no other alternative approach for the current pain that exist except for the available opioid based meds with their huge addictive properties,   Patient states that the current dosage of the meds given, not strong enough, but it is helping,   Zero count,  Pt is compliant with the current medications, and take it as directed,  the pt capable of doing things specially the activity of the daily living,   Pt's pain persist without medication, is a chronic condition,   keeps meds at a safe place, on stool softener, 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 Current Outpatient Medications   Medication Sig Dispense Refill    NIFEdipine ER (ADALAT CC) 30 mg ER tablet TAKE 1 TABLET EVERY DAY  ON AN EMPTY STOMACH 90 Tab 2    traMADol-acetaminophen (ULTRACET) 37.5-325 mg per tablet Take 1 Tab by mouth every eight (8) hours as needed for Pain for up to 30 days. Max Daily Amount: 3 Tabs. 90 Tab 0    [START ON 4/6/2020] traMADol-acetaminophen (ULTRACET) 37.5-325 mg per tablet Take 1 Tab by mouth every eight (8) hours as needed for Pain for up to 30 days. Max Daily Amount: 3 Tabs. 90 Tab 0    pravastatin (PRAVACHOL) 10 mg tablet Take 1 Tab by mouth nightly.  D/C Crestor, GI intolerance 30 Tab 5    potassium chloride (KLOR-CON) 10 mEq tablet TAKE 1 TABLET EVERY DAY 90 Tab 2    OTHER Indications: RA Col-rite for constipation      candesartan (ATACAND) 8 mg tablet TAKE 1 TABLET BY MOUTH ONCE DAILY 90 Tab 2    simethicone (MYLICON) 80 mg chewable tablet Take 1 Tab by mouth every six (6) hours as needed for Flatulence. 40 Tab 1    torsemide (DEMADEX) 20 mg tablet TAKE 1 TABLET EVERY DAY 90 Tab 2    Calcium-Cholecalciferol, D3, (CALCIUM 600 WITH VITAMIN D3) 600 mg(1,500mg) -400 unit chew Take 600 mg by mouth two (2) times a day. 180 Tab 3    nitroglycerin (NITROSTAT) 0.4 mg SL tablet PLACE 1 TABLET UNDER THE TONGUE EVERY 5 MINS AS NEEDED FOR CHEST PAIN 25 Tab 11    ASPIR-LOW 81 mg tablet Take 81 mg by mouth daily.  naloxone (NARCAN) 4 mg/actuation nasal spray Use 1 spray intranasally into 1 nostril. Use a new Narcan nasal spray for subsequent doses and administer into alternating nostrils. May repeat every 2 to 3 minutes as needed. 2 Each 11    MISCELLANEOUS MEDICAL SUPPLY (COMPRESSION STOCKINGS) daily. Remove at bedtime      atenolol (TENORMIN) 50 mg tablet TAKE 1 TABLET EVERY DAY 90 Tab 0    hydrocortisone (ANUSOL-HC) 2.5 % rectal cream Insert  into rectum four (4) times daily.  (Patient not taking: Reported on 11/19/2019) 30 g 0     Allergies   Allergen Reactions    Diclofenac Unknown (comments)    Lisinopril Cough    Lisinopril-Hydrochlorothiazide Unknown (comments)    Rosuvastatin Diarrhea     Past Medical History:   Diagnosis Date    Arthritis 3/25/2010    Back pain 3/26/2010    CAD (coronary artery disease)     Chest pain, unspecified     Chronic kidney failure, stage 3 (moderate) ([facility]) 12/5/2017    Chronic pain     Chronic pain of both knees 2/6/2018    Edema     Essential hypertension     High cholesterol 3/25/2010    HTN (hypertension) 3/25/2010    Kid[facility] failure, acute ([facility] [address] 4/6/2012    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    Onychomycosis 8/19/2010    Other acute and subacute form of ischemic heart disease     Other and unspecified angina pectoris     Other ill-defined conditions(799.89)     excessive watering of right eye    Patient is full code 8/6/2018    Shortness of breath     Shoulder pain, bilateral 3/26/2010     Past Surgical History:   Procedure Laterality Date    HX HEART CATHETERIZATION      HX HEENT      bilat cateract extraction    HX HYSTERECTOMY      HX KNEE REPLACEMENT      HX LUMBAR FUSION      HX ORTHOPAEDIC      bilateral TKR    HX ORTHOPAEDIC      neck and back surgery    HX ORTHOPAEDIC      bilateral bunionectomy    HX ORTHOPAEDIC      bilateral cataracts removed    HX PTCA       Family History   Problem Relation Age of Onset   [place] Hypertension Mother     Stroke Mother     Other Mother         arthritis    Coronary Artery Disease Father     Heart Disease Father     Hypertension Sister     Diabetes Sister     Coronary Artery Disease Sister     Cancer Sister         lung    Coronary Artery Disease Brother     Cancer Brother         lung    Heart Disease Brother      Social History     Tobacco Use    Smoking status: Never Smoker    Smokeless tobacco: Never Used   Substance Use Topics    Alcohol use: Yes     Comment: occasional       Lab Results   Component Value Date/Time    WBC 6.9 08/13/2019 08:56 AM    HGB 13.2 08/13/2019 08:56 AM    HCT 38.6 08/13/2019 08:56 AM    PLATELET 920 21/98/0041 08:56 AM    MCV 86 08/13/2019 08:56 AM     Lab Results   Component Value Date/Time    ALT (SGPT) 13 09/09/2019 11:32 AM    AST (SGOT) 23 09/09/2019 11:32 AM    Alk. phosphatase 67 09/09/2019 11:32 AM    Bilirubin, direct 0.14 09/09/2019 11:32 AM    Bilirubin, total 0.5 09/09/2019 11:32 AM    Albumin 4.1 09/09/2019 11:32 AM    Protein, total 6.7 09/09/2019 11:32 AM    INR 1.0 06/02/2012 12:00 AM    Prothrombin time 11.2 06/02/2012 12:00 AM    PLATELET 554 54/30/9533 08:56 AM        Review of Systems   Constitutional: Negative for chills and fever. HENT: Negative for ear pain and nosebleeds. Eyes: Negative for blurred vision, pain and discharge. Respiratory: Negative for shortness of breath. Cardiovascular: Negative for chest pain and leg swelling. Gastrointestinal: Negative for constipation, diarrhea, nausea and vomiting. Genitourinary: Negative for frequency. Musculoskeletal: Positive for back pain, joint pain and myalgias. Skin: Negative for itching and rash. Neurological: Positive for weakness and headaches. Psychiatric/Behavioral: Negative for depression. The patient has insomnia. The patient is not nervous/anxious. Physical Exam  Vitals signs and nursing note reviewed. Constitutional:       Appearance: She is well-developed. HENT:      Head: Normocephalic and atraumatic. Eyes:      Conjunctiva/sclera: Conjunctivae normal.      Pupils: Pupils are equal, round, and reactive to light. Neck:      Musculoskeletal: Normal range of motion and neck supple. Thyroid: No thyromegaly. Vascular: No JVD. Cardiovascular:      Rate and Rhythm: Normal rate and regular rhythm. Heart sounds: Normal heart sounds. No murmur. No friction rub. No gallop. Pulmonary:      Effort: Pulmonary effort is normal. No respiratory distress. Breath sounds: Normal breath sounds. No stridor. No wheezing or rales. Abdominal:      General: Bowel sounds are normal. There is no distension. Palpations: Abdomen is soft. There is no mass. Tenderness: There is no abdominal tenderness. Musculoskeletal:         General: Tenderness present. Right hip: She exhibits decreased range of motion, decreased strength, tenderness and crepitus. Right knee: She exhibits decreased range of motion, swelling and deformity. Tenderness found. Left knee: She exhibits decreased range of motion, swelling and bony tenderness. Tenderness found. Thoracic back: She exhibits decreased range of motion, tenderness, pain and spasm. Lumbar back: She exhibits decreased range of motion, tenderness, pain and spasm. Lymphadenopathy:      Cervical: No cervical adenopathy. Skin:     Findings: No erythema or rash. Neurological:      Mental Status: She is alert and oriented to person, place, and time.       Cranial Nerves: No cranial nerve deficit. Deep Tendon Reflexes: Reflexes are normal and symmetric. Psychiatric:         Behavior: Behavior normal.         ASSESSMENT and PLAN  Diagnoses and all orders for this visit:    1. Primary osteoarthritis involving multiple joints  -     NIFEdipine ER (ADALAT CC) 30 mg ER tablet; TAKE 1 TABLET EVERY DAY  ON AN EMPTY STOMACH  -     traMADol-acetaminophen (ULTRACET) 37.5-325 mg per tablet; Take 1 Tab by mouth every eight (8) hours as needed for Pain for up to 30 days. Max Daily Amount: 3 Tabs. -     traMADol-acetaminophen (ULTRACET) 37.5-325 mg per tablet; Take 1 Tab by mouth every eight (8) hours as needed for Pain for up to 30 days. Max Daily Amount: 3 Tabs. 2. Chronic pain of both knees  -     NIFEdipine ER (ADALAT CC) 30 mg ER tablet; TAKE 1 TABLET EVERY DAY  ON AN EMPTY STOMACH  -     traMADol-acetaminophen (ULTRACET) 37.5-325 mg per tablet; Take 1 Tab by mouth every eight (8) hours as needed for Pain for up to 30 days. Max Daily Amount: 3 Tabs. -     traMADol-acetaminophen (ULTRACET) 37.5-325 mg per tablet; Take 1 Tab by mouth every eight (8) hours as needed for Pain for up to 30 days. Max Daily Amount: 3 Tabs. 3. Chronic kidney failure, stage 3 (moderate) ([facility])  -     NIFEdipine ER (ADALAT CC) 30 mg ER tablet; TAKE 1 TABLET EVERY DAY  ON AN EMPTY STOMACH  -     traMADol-acetaminophen (ULTRACET) 37.5-325 mg per tablet; Take 1 Tab by mouth every eight (8) hours as needed for Pain for up to 30 days. Max Daily Amount: 3 Tabs. -     traMADol-acetaminophen (ULTRACET) 37.5-325 mg per tablet; Take 1 Tab by mouth every eight (8) hours as needed for Pain for up to 30 days. Max Daily Amount: 3 Tabs. 4. Chronic left-sided thoracic back pain  -     NIFEdipine ER (ADALAT CC) 30 mg ER tablet; TAKE 1 TABLET EVERY DAY  ON AN EMPTY STOMACH  -     traMADol-acetaminophen (ULTRACET) 37.5-325 mg per tablet;  Take 1 Tab by mouth every eight (8) hours as needed for Pain for up to 30 days. Max Daily Amount: 3 Tabs. -     traMADol-acetaminophen (ULTRACET) 37.5-325 mg per tablet; Take 1 Tab by mouth every eight (8) hours as needed for Pain for up to 30 days. Max Daily Amount: 3 Tabs.

## 2020-03-16 DIAGNOSIS — Z01.818 PREOPERATIVE GENERAL PHYSICAL EXAMINATION: ICD-10-CM

## 2020-03-16 RX ORDER — ATENOLOL 50 MG/1
TABLET ORAL
Qty: 90 TAB | Refills: 0 | Status: SHIPPED | OUTPATIENT
Start: 2020-03-16 | End: 2020-05-14

## 2020-05-13 DIAGNOSIS — Z01.818 PREOPERATIVE GENERAL PHYSICAL EXAMINATION: ICD-10-CM

## 2020-05-14 RX ORDER — ATENOLOL 50 MG/1
TABLET ORAL
Qty: 90 TAB | Refills: 0 | Status: SHIPPED | OUTPATIENT
Start: 2020-05-14 | End: 2020-07-23

## 2020-05-15 DIAGNOSIS — R60.0 BILATERAL EDEMA OF LOWER EXTREMITY: ICD-10-CM

## 2020-05-15 DIAGNOSIS — M25.511 CHRONIC PAIN OF BOTH SHOULDERS: ICD-10-CM

## 2020-05-15 DIAGNOSIS — M25.561 CHRONIC PAIN OF BOTH KNEES: ICD-10-CM

## 2020-05-15 DIAGNOSIS — G89.29 CHRONIC PAIN OF BOTH KNEES: ICD-10-CM

## 2020-05-15 DIAGNOSIS — M15.9 PRIMARY OSTEOARTHRITIS INVOLVING MULTIPLE JOINTS: ICD-10-CM

## 2020-05-15 DIAGNOSIS — M25.512 CHRONIC PAIN OF BOTH SHOULDERS: ICD-10-CM

## 2020-05-15 DIAGNOSIS — I10 ESSENTIAL HYPERTENSION: ICD-10-CM

## 2020-05-15 DIAGNOSIS — M25.562 CHRONIC PAIN OF BOTH KNEES: ICD-10-CM

## 2020-05-15 DIAGNOSIS — G89.29 CHRONIC PAIN OF BOTH SHOULDERS: ICD-10-CM

## 2020-05-15 RX ORDER — TORSEMIDE 20 MG/1
TABLET ORAL
Qty: 90 TAB | Refills: 2 | Status: SHIPPED | OUTPATIENT
Start: 2020-05-15 | End: 2020-12-18

## 2020-06-29 RX ORDER — CANDESARTAN 8 MG/1
TABLET ORAL
Qty: 90 TAB | Refills: 2 | Status: SHIPPED | OUTPATIENT
Start: 2020-06-29 | End: 2021-01-08 | Stop reason: SDUPTHER

## 2020-07-22 DIAGNOSIS — Z01.818 PREOPERATIVE GENERAL PHYSICAL EXAMINATION: ICD-10-CM

## 2020-07-23 RX ORDER — ATENOLOL 50 MG/1
TABLET ORAL
Qty: 90 TAB | Refills: 0 | Status: SHIPPED | OUTPATIENT
Start: 2020-07-23 | End: 2020-09-04 | Stop reason: SDUPTHER

## 2020-07-29 DIAGNOSIS — R60.0 BILATERAL EDEMA OF LOWER EXTREMITY: ICD-10-CM

## 2020-07-29 DIAGNOSIS — G89.29 CHRONIC PAIN OF BOTH KNEES: ICD-10-CM

## 2020-07-29 DIAGNOSIS — G89.29 CHRONIC PAIN OF BOTH SHOULDERS: ICD-10-CM

## 2020-07-29 DIAGNOSIS — M15.9 PRIMARY OSTEOARTHRITIS INVOLVING MULTIPLE JOINTS: ICD-10-CM

## 2020-07-29 DIAGNOSIS — M25.561 CHRONIC PAIN OF BOTH KNEES: ICD-10-CM

## 2020-07-29 DIAGNOSIS — I10 ESSENTIAL HYPERTENSION: ICD-10-CM

## 2020-07-29 DIAGNOSIS — M25.562 CHRONIC PAIN OF BOTH KNEES: ICD-10-CM

## 2020-07-29 DIAGNOSIS — M25.512 CHRONIC PAIN OF BOTH SHOULDERS: ICD-10-CM

## 2020-07-29 DIAGNOSIS — M25.511 CHRONIC PAIN OF BOTH SHOULDERS: ICD-10-CM

## 2020-07-30 RX ORDER — POTASSIUM CHLORIDE 750 MG/1
TABLET, EXTENDED RELEASE ORAL
Qty: 90 TAB | Refills: 2 | Status: SHIPPED | OUTPATIENT
Start: 2020-07-30 | End: 2021-02-25

## 2020-08-10 ENCOUNTER — TELEPHONE (OUTPATIENT)
Dept: FAMILY MEDICINE CLINIC | Age: 85
End: 2020-08-10

## 2020-08-10 NOTE — TELEPHONE ENCOUNTER
----- Message from Jason Mailman sent at 8/10/2020  3:18 PM EDT -----  Regarding: Dr. Garry Rogers: [phone]  Caller's first and last name:  Justice Turner (Pt's daughter)  Reason for call: Needs 3 mo f/up on a Friday  Callback required yes/no and why: Yes  Best contact number(s): [phone]  Details to clarify the request: Needs meds refill.

## 2020-09-04 ENCOUNTER — OFFICE VISIT (OUTPATIENT)
Dept: FAMILY MEDICINE CLINIC | Age: 85
End: 2020-09-04
Payer: MEDICARE

## 2020-09-04 VITALS
RESPIRATION RATE: 18 BRPM | OXYGEN SATURATION: 96 % | TEMPERATURE: 96.2 F | HEART RATE: 67 BPM | BODY MASS INDEX: 34.52 KG/M2 | HEIGHT: 66 IN | DIASTOLIC BLOOD PRESSURE: 66 MMHG | WEIGHT: 214.8 LBS | SYSTOLIC BLOOD PRESSURE: 133 MMHG

## 2020-09-04 DIAGNOSIS — M25.561 CHRONIC PAIN OF BOTH KNEES: ICD-10-CM

## 2020-09-04 DIAGNOSIS — E66.01 SEVERE OBESITY (BMI 35.0-39.9) WITH COMORBIDITY (HCC): ICD-10-CM

## 2020-09-04 DIAGNOSIS — I25.83 CORONARY ARTERY DISEASE DUE TO LIPID RICH PLAQUE: ICD-10-CM

## 2020-09-04 DIAGNOSIS — N18.30 CHRONIC KIDNEY FAILURE, STAGE 3 (MODERATE) (HCC): ICD-10-CM

## 2020-09-04 DIAGNOSIS — M54.6 CHRONIC LEFT-SIDED THORACIC BACK PAIN: ICD-10-CM

## 2020-09-04 DIAGNOSIS — B35.1 ONYCHOMYCOSIS: ICD-10-CM

## 2020-09-04 DIAGNOSIS — Z71.89 ADVANCED DIRECTIVES, COUNSELING/DISCUSSION: ICD-10-CM

## 2020-09-04 DIAGNOSIS — M25.562 CHRONIC PAIN OF BOTH KNEES: ICD-10-CM

## 2020-09-04 DIAGNOSIS — M15.9 PRIMARY OSTEOARTHRITIS INVOLVING MULTIPLE JOINTS: ICD-10-CM

## 2020-09-04 DIAGNOSIS — G89.29 CHRONIC LEFT-SIDED THORACIC BACK PAIN: ICD-10-CM

## 2020-09-04 DIAGNOSIS — L60.0 INGROWN TOENAIL: ICD-10-CM

## 2020-09-04 DIAGNOSIS — I25.10 CORONARY ARTERY DISEASE DUE TO LIPID RICH PLAQUE: ICD-10-CM

## 2020-09-04 DIAGNOSIS — Z00.00 MEDICARE ANNUAL WELLNESS VISIT, SUBSEQUENT: Primary | ICD-10-CM

## 2020-09-04 DIAGNOSIS — G89.29 CHRONIC PAIN OF BOTH KNEES: ICD-10-CM

## 2020-09-04 DIAGNOSIS — Z95.820 S/P ANGIOPLASTY WITH STENT: ICD-10-CM

## 2020-09-04 DIAGNOSIS — F11.288 OPIOID DEPENDENCE WITH OTHER OPIOID-INDUCED DISORDER (HCC): ICD-10-CM

## 2020-09-04 DIAGNOSIS — R06.02 SOB (SHORTNESS OF BREATH) ON EXERTION: ICD-10-CM

## 2020-09-04 PROCEDURE — G0439 PPPS, SUBSEQ VISIT: HCPCS | Performed by: FAMILY MEDICINE

## 2020-09-04 PROCEDURE — 99213 OFFICE O/P EST LOW 20 MIN: CPT | Performed by: FAMILY MEDICINE

## 2020-09-04 PROCEDURE — 90000 NO LOS: CPT | Performed by: FAMILY MEDICINE

## 2020-09-04 PROCEDURE — 1101F PT FALLS ASSESS-DOCD LE1/YR: CPT | Performed by: FAMILY MEDICINE

## 2020-09-04 PROCEDURE — 1090F PRES/ABSN URINE INCON ASSESS: CPT | Performed by: FAMILY MEDICINE

## 2020-09-04 PROCEDURE — G8510 SCR DEP NEG, NO PLAN REQD: HCPCS | Performed by: FAMILY MEDICINE

## 2020-09-04 PROCEDURE — G0008 ADMIN INFLUENZA VIRUS VAC: HCPCS | Performed by: FAMILY MEDICINE

## 2020-09-04 PROCEDURE — G8427 DOCREV CUR MEDS BY ELIG CLIN: HCPCS | Performed by: FAMILY MEDICINE

## 2020-09-04 PROCEDURE — G8536 NO DOC ELDER MAL SCRN: HCPCS | Performed by: FAMILY MEDICINE

## 2020-09-04 PROCEDURE — 90653 IIV ADJUVANT VACCINE IM: CPT | Performed by: FAMILY MEDICINE

## 2020-09-04 PROCEDURE — G8417 CALC BMI ABV UP PARAM F/U: HCPCS | Performed by: FAMILY MEDICINE

## 2020-09-04 RX ORDER — PRAVASTATIN SODIUM 10 MG/1
10 TABLET ORAL
Qty: 90 TAB | Refills: 1 | Status: SHIPPED | OUTPATIENT
Start: 2020-09-04 | End: 2020-11-04

## 2020-09-04 RX ORDER — ATENOLOL 50 MG/1
TABLET ORAL
Qty: 90 TAB | Refills: 0 | Status: SHIPPED | OUTPATIENT
Start: 2020-09-04 | End: 2020-12-18

## 2020-09-04 RX ORDER — NIFEDIPINE 30 MG/1
TABLET, FILM COATED, EXTENDED RELEASE ORAL
Qty: 90 TAB | Refills: 2 | Status: SHIPPED | OUTPATIENT
Start: 2020-09-04 | End: 2021-03-12 | Stop reason: SINTOL

## 2020-09-04 RX ORDER — NITROGLYCERIN 0.4 MG/1
TABLET SUBLINGUAL
Qty: 25 TAB | Refills: 11 | Status: SHIPPED | OUTPATIENT
Start: 2020-09-04 | End: 2021-05-07 | Stop reason: SDUPTHER

## 2020-09-04 RX ORDER — TRAMADOL HYDROCHLORIDE AND ACETAMINOPHEN 37.5; 325 MG/1; MG/1
1 TABLET ORAL
Qty: 90 TAB | Refills: 2 | Status: SHIPPED | OUTPATIENT
Start: 2020-09-04 | End: 2020-10-04

## 2020-09-04 RX ORDER — SPIRONOLACTONE 25 MG
600 TABLET ORAL 2 TIMES DAILY
Qty: 180 TAB | Refills: 3 | Status: SHIPPED | OUTPATIENT
Start: 2020-09-04 | End: 2021-09-17

## 2020-09-04 NOTE — PROGRESS NOTES
This is the Subsequent Medicare Annual Wellness Exam, performed 12 months or more after the Initial AWV or the last Subsequent AWV    I have reviewed the patient's medical history in detail and updated the computerized patient record. History       Present for CPE, last Complete Physical exam was , pt is Up todate w/ all vaccination, last tetanus vaccine is uptodate     last mammog,   last pap smear normal, last bone dexa scan  last colonoscopy was nl and were many yrs ago,  Was told not to do these test any more     Parents  of old age  no family hx of colon cancer,  Not sexaully active and but tryin to be physically active,  compliant w/ meds, +++Rf needed for today for her meds.    Patient has good supportive care at home, and feels safe no physical mental abuse,    Medications causing fall and the risk for future fall screened specially if the continuation of some of the current meds continues is addressed,  the depression at this age addressed pt with improvig interests and enjoy to do things, not anxious not depressed, not wanted to heart self or others  pt at this visit, is physically functional with gait nl and nicely independent and walks w/out mobility device and, in addition mentaly is functional,  very Alert and oriented ,    BMI for the pt's age the nl BMI r/w'd and information given, bp was screened for abnormality,    Patient presented with elongated curved toenails bilaterally x10 hyperkeratotic painful foot 7 out of 10 with limps elongated nails are approximately 1 inches long curved under need the toe inferiorly [place] elongated superiorly poking out of the shoes and some inserted into the skin patient's daughter has been wondering about the mother conditions,   Under sterile condition procedures tolerated patient felt much better toenail nail folds elongated nails hyperkeratotic were removed and debridement was performed no bleeding no complication patient happy with the progress    Chronic knees shoulders pain The patient's pain has been an ongoing struggling concerning problem, present for refills, never abused any prescribed meds, currently on WC, unable to stands secondary to severe knee pain,  no hx of illicit nor etoh abuse, the pain has been bothering the pt for many yrs, pt aware that there are no other alternative approach for the current pain that exist except for the available opioid based meds with their huge addictive properties,   Patient states that the current dosage of the meds given, not strong enough, but it is helping,   Zero count,  Pt is compliant with the current medications, and take it as directed,  the pt capable of doing things specially the activity of the daily living,   Pt's pain persist without medication, is a chronic condition,   keeps meds at a safe place, on stool softener, 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 Patient Active Problem List   Diagnosis Code    Primary osteoarthritis involving multiple joints M89.49    Shoulder pain, bilateral M25.511, M25.512    Back pain M54.9    Onychomycosis B35.1    Cough R05    Tick bite, infected W57. Stacey Cranker    Decreased vision H54.7    Edema leg R60.0    Heme positive stool R19.5    Abdominal pain, other specified site R10.9    Inguinal hernia, left K40.90    SOB (shortness of breath) on exertion R06.02    Vitamin D deficiency E55.9    Kidney failure, acute (HCC) N17.9    Chest pain, unspecified R07.9    Mixed hyperlipidemia E78.2    Essential hypertension, benign I10    Abnormal nuclear stress test R94.39    CAD (coronary artery disease) I25.10    Post PTCA Z98.61    Essential hypertension, benign I10    Type 2 diabetes mellitus without complication (HCC) A77.3    S/P angioplasty with stent--RCA 6/12 Z95.820    Multiple bruises T07. XXXA    Watery eyes H04.203    Conjunctivitis H10.9    Lacrimal duct stenosis H04.559    Abnormal urinalysis R82.90    Prediabetes R73.03    Lacrimal passage stenosis H04.549    Chronic kidney failure, stage 3 (moderate) ([facility]) N18.3    Type 2 diabetes mellitus with nephropathy ([facility]) E11.21    CKD (chronic kidney disease) stage 3, GFR 30-59 ml/min ([facility]) N18.3    Chronic pain of both knees M25.561, M25.562, G89.29    Severe obesity (BMI 35.0-39. 9) with comorbidity ([facility] [address] E66.01    Patient is full code Z78.9     Past Medical History:   Diagnosis Date    Arthritis 3/25/2010    Back pain 3/26/2010    CAD (coronary artery disease)     Chest pain, unspecified     Chronic kidney failure, stage 3 (moderate) ([facility]) 12/5/2017    Chronic pain     Chronic pain of both knees 2/6/2018    Edema     Essential hypertension     High cholesterol 3/25/2010    HTN (hypertension) 3/25/2010    Kidney failure, acute ([facility] [address] 4/6/2012    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    Onychomycosis 8/19/2010    Other acute and subacute form of ischemic heart disease     Other and unspecified angina pectoris     Other ill-defined conditions(799.89)     excessive watering of right eye    Patient is full code 8/6/2018    Shortness of breath     Shoulder pain, bilateral 3/26/2010      Past Surgical History:   Procedure Laterality Date    HX HEART CATHETERIZATION      HX HEENT      bilat cateract extraction    HX HYSTERECTOMY      HX KNEE REPLACEMENT      HX LUMBAR FUSION      HX ORTHOPAEDIC      bilateral TKR    HX ORTHOPAEDIC      neck and back surgery    HX ORTHOPAEDIC      bilateral bunionectomy    HX ORTHOPAEDIC      bilateral cataracts removed    HX PTCA       Current Outpatient Medications   Medication Sig Dispense Refill    potassium chloride (KLOR-CON) 10 mEq tablet TAKE 1 TABLET EVERY DAY 90 Tab 2    atenoloL (TENORMIN) 50 mg tablet TAKE 1 TABLET EVERY DAY 90 Tab 0    candesartan (ATACAND) 8 mg tablet TAKE 1 TABLET EVERY DAY 90 Tab 2    torsemide (DEMADEX) 20 mg tablet TAKE 1 TABLET EVERY DAY 90 Tab 2    NIFEdipine ER (ADALAT CC) 30 mg ER tablet TAKE 1 TABLET EVERY DAY  ON AN EMPTY STOMACH 90 Tab 2    pravastatin (PRAVACHOL) 10 mg tablet Take 1 Tab by mouth nightly. D/C Crestor, GI intolerance 30 Tab 5    OTHER Indications: RA Col-rite for constipation      simethicone (MYLICON) 80 mg chewable tablet Take 1 Tab by mouth every six (6) hours as needed for Flatulence. 40 Tab 1    Calcium-Cholecalciferol, D3, (CALCIUM 600 WITH VITAMIN D3) 600 mg(1,500mg) -400 unit chew Take 600 mg by mouth two (2) times a day. 180 Tab 3    nitroglycerin (NITROSTAT) 0.4 mg SL tablet PLACE 1 TABLET UNDER THE TONGUE EVERY 5 MINS AS NEEDED FOR CHEST PAIN 25 Tab 11    ASPIR-LOW 81 mg tablet Take 81 mg by mouth daily.  naloxone (NARCAN) 4 mg/actuation nasal spray Use 1 spray intranasally into 1 nostril. Use a new Narcan nasal spray for subsequent doses and administer into alternating nostrils. May repeat every 2 to 3 minutes as needed. 2 Each 11    MISCELLANEOUS MEDICAL SUPPLY (COMPRESSION STOCKINGS) daily. Remove at bedtime      hydrocortisone (ANUSOL-HC) 2.5 % rectal cream Insert  into rectum four (4) times daily.  (Patient not taking: Reported on 11/19/2019) 30 g 0     Allergies   Allergen Reactions    Diclofenac Unknown (comments)    Lisinopril Cough    Lisinopril-Hydrochlorothiazide Unknown (comments)    Rosuvastatin Diarrhea       Family History   Problem Relation Age of Onset   [address] [facility] Hypertension Mother     Stroke Mother     Other Mother         arthritis    Coronary Artery Disease Father     Heart Disease Father     Hypertension Sister     Diabetes Sister     Coronary Artery Disease Sister     Cancer Sister         lung    Coronary Artery Disease Brother     Cancer Brother         lung    Heart Disease Brother      Social History     Tobacco Use    Smoking status: Never Smoker    Smokeless tobacco: Never Used   Substance Use Topics    Alcohol use: Yes     Comment: occasional        Depression Risk Factor Screening:     3 most recent PHQ Screens 9/4/2020   Little interest or pleasure in doing things Not at all   Feeling down, depressed, irritable, or hopeless Not at all   Total Score PHQ 2 0       Alcohol Risk Factor Screening:   Do you average 1 drink per night or more than 7 drinks a week:  No    On any one occasion in the past three months have you have had more than 3 drinks containing alcohol:  No      Functional Ability and Level of Safety:   Hearing: Hearing is good. Activities of Daily Living: The home contains: handrails, grab bars and rugs  Patient needs help with:  transportation, shopping, preparing meals, laundry, housework and walking     Ambulation: with difficulty, uses a walker     Fall Risk:  Fall Risk Assessment, last 12 mths 9/4/2020   Able to walk? Yes   Fall in past 12 months? No   Fall with injury? -   Number of falls in past 12 months -   Fall Risk Score -     Abuse Screen:  Patient is not abused       Cognitive Screening   Has your family/caregiver stated any concerns about your memory: no     Cognitive Screening: Normal - MMSE (Mini Mental Status Exam)    Patient Care Team   Patient Care Team:  Brianna Tanner MD as PCP - General  Brianna Tanner MD as PCP - [facility]    Assessment/Plan   Education and counseling provided:  Are appropriate based on today's review and evaluation  End-of-Life planning (with patient's consent)  Influenza Vaccine  Diabetes outpatient self-management training services    Diagnoses and all orders for this visit:    1. Medicare annual wellness visit, subsequent  -     ADMIN INFLUENZA VIRUS VAC  -     INFLUENZA VIRUS VACCINE, HIGH DOSE SEASONAL, PRESERVATIVE FREE    2. Primary osteoarthritis involving multiple joints  -     traMADol-acetaminophen (ULTRACET) 37.5-325 mg per tablet; Take 1 Tab by mouth every eight (8) hours as needed for Pain for up to 30 days. Max Daily Amount: 3 Tabs.   -     NIFEdipine ER (ADALAT CC) 30 mg ER tablet; TAKE 1 TABLET EVERY DAY  ON AN EMPTY STOMACH    3. Chronic pain of both knees  -     traMADol-acetaminophen (ULTRACET) 37.5-325 mg per tablet; Take 1 Tab by mouth every eight (8) hours as needed for Pain for up to 30 days. Max Daily Amount: 3 Tabs. -     NIFEdipine ER (ADALAT CC) 30 mg ER tablet; TAKE 1 TABLET EVERY DAY  ON AN EMPTY STOMACH    4. Chronic kidney failure, stage 3 (moderate) (HCC)  -     traMADol-acetaminophen (ULTRACET) 37.5-325 mg per tablet; Take 1 Tab by mouth every eight (8) hours as needed for Pain for up to 30 days. Max Daily Amount: 3 Tabs. -     NIFEdipine ER (ADALAT CC) 30 mg ER tablet; TAKE 1 TABLET EVERY DAY  ON AN EMPTY STOMACH    5. Chronic left-sided thoracic back pain  -     traMADol-acetaminophen (ULTRACET) 37.5-325 mg per tablet; Take 1 Tab by mouth every eight (8) hours as needed for Pain for up to 30 days. Max Daily Amount: 3 Tabs. -     NIFEdipine ER (ADALAT CC) 30 mg ER tablet; TAKE 1 TABLET EVERY DAY  ON AN EMPTY STOMACH    6. SOB (shortness of breath) on exertion  -     nitroglycerin (NITROSTAT) 0.4 mg SL tablet; PLACE 1 TABLET UNDER THE TONGUE EVERY 5 MINS AS NEEDED FOR CHEST PAIN    7. Coronary artery disease due to lipid rich plaque  -     nitroglycerin (NITROSTAT) 0.4 mg SL tablet; PLACE 1 TABLET UNDER THE TONGUE EVERY 5 MINS AS NEEDED FOR CHEST PAIN  -     atenoloL (TENORMIN) 50 mg tablet; TAKE 1 TABLET EVERY DAY    8. S/P angioplasty with stent--RCA 6/12  -     nitroglycerin (NITROSTAT) 0.4 mg SL tablet; PLACE 1 TABLET UNDER THE TONGUE EVERY 5 MINS AS NEEDED FOR CHEST PAIN  -     atenoloL (TENORMIN) 50 mg tablet; TAKE 1 TABLET EVERY DAY    9. Advanced directives, counseling/discussion  -     ADVANCE CARE PLANNING FIRST 30 MINS  -     FULL CODE    10. Opioid dependence with other opioid-induced disorder ([facility] [address]    11. Type II diabetes mellitus with complication, uncontrolled ([facility] [address]    12. Severe obesity (BMI 35.0-39. 9) with comorbidity ([facility] [address]    13.  Onychomycosis  -     REMOVAL OF NAIL PLATE  -     REMOVE ADDITIONAL NAIL PLATE    14. Ingrown toenail  -     REMOVAL OF NAIL PLATE  -     REMOVE ADDITIONAL NAIL PLATE    Other orders  -     pravastatin (PRAVACHOL) 10 mg tablet; Take 1 Tab by mouth nightly. -     Calcium-Cholecalciferol, D3, (Calcium 600 with Vitamin D3) 600 mg(1,500mg) -400 unit chew; Take 600 mg by mouth two (2) times a day. AWV education and counseling provided:    Age appropriate evidence-based preventive care recommendations based on today's review and evaluation; including relevant cancer screening guidelines, and vaccination recommendations. Primary secondary and tertiary preventive measures were discussed with the patient with information about these guidelines, and a personalized schedule for health maintenance items. When appropriate and with patient agreement, orders noted were placed to complete missing health maintenance items. Health Maintenance Due   Topic Date Due    Shingrix Vaccine Age 49> (1 of 2) [birth year]/1983    GLAUCOMA SCREENING Q2Y  12/04/2017    Foot Exam Q1  09/29/2018    Eye Exam Retinal or Dilated  09/11/2019    Medicare Yearly Exam  02/05/2020    Flu Vaccine (1) 09/01/2020    Lipid Screen  09/09/2020       Subjective:    Carie Arroyo is a 80 y.o. female who presents for lesion removal. We have discussed this procedure, including option of not performing surgery, technique of surgery and potential for scarring at an earlier visit. Oubjective:   Patient appears well. Visit Vitals  /66 (BP 1 Location: Right arm, BP Patient Position: Sitting)   Pulse 67   Temp (!) 96.2 °F (35.7 °C) (Oral)   Resp 18   Ht 5' 6\" (1.676 m)   Wt 214 lb 12.8 oz (97.4 kg)   SpO2 96%   BMI 34.67 kg/m²     Skin: Elongated toenails x10 with foot pain  Assessment:   ingrown toenail    Procedure Note:   After informed consent was obtained, using alcohol for cleansing and None for anesthetic, with sterile technique, wedge resection of ingrown nail was performed.  Antibiotic dressing is applied, and wound care instructions provided. Be alert for any signs of cutaneous infection. The procedure was well tolerated without complications. Follow up: the patient may return prn.

## 2020-09-04 NOTE — ACP (ADVANCE CARE PLANNING)
Advance Care Planning (ACP) Physician       Date of ACP Conversation: 4/24/2020    Conversation Conducted with:   Patient with Decision Making Capacity     Other Legally Authorized Decision Maker would be Suman Munguia as SDM     For My patients who has currently great Decision Making Capacity:     Patient is on presence of no family member,, stated that the pt wants to be not DNR at this time,  The pt likes to be a full code individual,  The patient states that there is a lot of will to live,  Pt was given the form,   will sign and bring us a copy on the later date.       Conversation Outcomes / Follow-Up Plan:   Completed new Advance Directive      Length of ACP Conversation in minutes:  16 minutes

## 2020-09-04 NOTE — PATIENT INSTRUCTIONS
Well Visit, Over 72: Care Instructions Your Care Instructions Physical exams can help you stay healthy. Your doctor has checked your overall health and may have suggested ways to take good care of yourself. He or she also may have recommended tests. At home, you can help prevent illness with healthy eating, regular exercise, and other steps. Follow-up care is a key part of your treatment and safety. Be sure to make and go to all appointments, and call your doctor if you are having problems. It's also a good idea to know your test results and keep a list of the medicines you take. How can you care for yourself at home? · Reach and stay at a healthy weight. This will lower your risk for many problems, such as obesity, diabetes, heart disease, and high blood pressure. · Get at least 30 minutes of exercise on most days of the week. Walking is a good choice. You also may want to do other activities, such as running, swimming, cycling, or playing tennis or team sports. · Do not smoke. Smoking can make health problems worse. If you need help quitting, talk to your doctor about stop-smoking programs and medicines. These can increase your chances of quitting for good. · Protect your skin from too much sun. When you're outdoors from 10 a.m. to 4 p.m., stay in the shade or cover up with clothing and a hat with a wide brim. Wear sunglasses that block UV rays. Even when it's cloudy, put broad-spectrum sunscreen (SPF 30 or higher) on any exposed skin. · See a dentist one or two times a year for checkups and to have your teeth cleaned. · Wear a seat belt in the car. Follow your doctor's advice about when to have certain tests. These tests can spot problems early. For men and women · Cholesterol. Your doctor will tell you how often to have this done based on your overall health and other things that can increase your risk for heart attack and stroke. · Blood pressure. Have your blood pressure checked during a routine doctor visit. Your doctor will tell you how often to check your blood pressure based on your age, your blood pressure results, and other factors. · Diabetes. Ask your doctor whether you should have tests for diabetes. · Vision. Experts recommend that you have yearly exams for glaucoma and other age-related eye problems. · Hearing. Tell your doctor if you notice any change in your hearing. You can have tests to find out how well you hear. · Colon cancer tests. Keep having colon cancer tests as your doctor recommends. You can have one of several types of tests. · Heart attack and stroke risk. At least every 4 to 6 years, you should have your risk for heart attack and stroke assessed. Your doctor uses factors such as your age, blood pressure, cholesterol, and whether you smoke or have diabetes to show what your risk for a heart attack or stroke is over the next 10 years. · Osteoporosis. Talk to your doctor about whether you should have a bone density test to find out whether you have thinning bones. Ask your doctor if you need to take a calcium plus vitamin D supplement. You may be able to get enough calcium and vitamin D through your diet. For women · Pap test and pelvic exam. You may no longer need a Pap test. Talk with your doctor about whether to stop or continue to have Pap tests. · Breast exam and mammogram. Ask how often you should have a mammogram, which is an X-ray of your breasts. A mammogram can spot breast cancer before it can be felt and when it is easiest to treat. · Thyroid disease. Talk to your doctor about whether to have your thyroid checked as part of a regular physical exam. Women have an increased chance of a thyroid problem. For men · Prostate exam. Talk to your doctor about whether you should have a blood test (called a PSA test) for prostate cancer.  Experts recommend that you discuss the benefits and risks of the test with your doctor before you decide whether to have this test. Some experts say that men ages 79 and older no longer need testing. · Abdominal aortic aneurysm. Ask your doctor whether you should have a test to check for an aneurysm. You may need a test if you ever smoked or if your parent, brother, sister, or child has had an aneurysm. When should you call for help? Watch closely for changes in your health, and be sure to contact your doctor if you have any problems or symptoms that concern you. Where can you learn more? Go to http://www.gray.com/ Enter C578 in the search box to learn more about \"Well Visit, Over 65: Care Instructions. \" Current as of: May 27, 2020               Content Version: 12.6 © 1913-0984 [employer]. Care instructions adapted under license by [employer] (which disclaims liability or warranty for this information). If you have questions about a medical condition or this instruction, always ask your healthcare professional. [place] [address] any warranty or liability for your use of this information. Influenza Virus Vaccine (By injection) Influenza Virus Vaccine (in-floo-EN-za VYE-reanna VAX-een) Helps prevent infection with influenza (flu) virus. Brand Name(s): Afluria [id number] Formula, Kadi Dk [id number] Formula, Afluria Quadrivalent [id number] Formula, Afluria Quadrivalent [id number] Formula, FluLaval Quadrivalent [id number] Formula, FluLaval Quadrivalent [id number] Formula, Fluad 7412-2521 Formula, Fluad 6080-1196 Formula, Fluarix Quadrivalent 5628-2253 Formula, Fluarix Quadrivalent 9257-1274 Formula, Flublok 9673-3470 Formula, Flublok 1977-8700 Formula, Flublok Quadrivalent [id number] Formula, Flucelvax Quadrivalent 6289-3776 Formula, Flucelvax Quadrivalent 1617-9831 Formula There may be other brand names for this medicine. When This Medicine Should Not Be Used: This vaccine is not right for everyone. You should not receive it if you had an allergic reaction to flu vaccine. If you are allergic to eggs, tell the caregiver who is going to give you the injection. Some brands of this vaccine contain egg proteins and could cause an allergic reaction. How to Use This Medicine:  
Injectable · The vaccine is given as a shot into a muscle or into your skin, usually in the shoulder area. The shot could be given in the thigh for babies and young children. · A nurse or other health provider will give you this medicine. · Read and follow the patient instructions that come with this medicine. Talk to your doctor or pharmacist if you have any questions. · A child who is younger than 5years old and who has not had a flu shot before may need 2 shots. The second shot should be given about 1 month after the first. 
· Missed dose: Most people need only 1 dose of the vaccine. If your child needs a second dose, it is important for the vaccine to be given on schedule. If you must cancel an appointment, make a new one right away. Drugs and Foods to Avoid: Ask your doctor or pharmacist before using any other medicine, including over-the-counter medicines, vitamins, and herbal products. · Tell your doctor if you are using a medicine or treatment that weakens your immune system, such as a steroid, radiation, or cancer treatment. This vaccine may not work as well if you are also using these medicines. However, your doctor may still want you to get the vaccine because it can give you some protection. Warnings While Using This Medicine: · Tell your doctor if you are pregnant or breastfeeding or if you have a weak immune system. · Tell your doctor if you ever had an unusual reaction to a flu shot, such as Guillain-Barré syndrome, or if you are allergic to latex. · The flu vaccine may not protect everyone who receives it.  This vaccine will not treat flu symptoms if you have already been infected with the virus. Possible Side Effects While Using This Medicine:  
Call your doctor right away if you notice any of these side effects: · Allergic reaction: Itching or hives, swelling in your face or hands, swelling or tingling in your mouth or throat, chest tightness, trouble breathing · Fainting, dizziness, or lightheadedness · Fever over 103 degrees F 
· Seizures · Severe muscle weakness If you notice these less serious side effects, talk with your doctor:  
· Headache, muscle pain, tiredness · Irritability or crying (in a child) · Redness, pain, swelling, soreness, or a lump where the shot was given If you notice other side effects that you think are caused by this medicine, tell your doctor. Call your doctor for medical advice about side effects. You may report side effects to FDA at 7-763-FDA-3206 © 2017 [facility]– [place][note 2] Information is for End User's use only and may not be sold, redistributed or otherwise used for commercial purposes. The above information is an  only. It is not intended as medical advice for individual conditions or treatments. Talk to your doctor, nurse or pharmacist before following any medical regimen to see if it is safe and effective for you. Medicare Wellness Visit, Female The best way to live healthy is to have a lifestyle where you eat a well-balanced diet, exercise regularly, limit alcohol use, and quit all forms of tobacco/nicotine, if applicable. Regular preventive services are another way to keep healthy. Preventive services (vaccines, screening tests, monitoring & exams) can help personalize your care plan, which helps you manage your own care. Screening tests can find health problems at the earliest stages, when they are easiest to treat.   
[facility] follows the current, evidence-based guidelines published by the [facility] (USPSTF) when recommending preventive services for our patients. Because we follow these guidelines, sometimes recommendations change over time as research supports it. (For example, mammograms used to be recommended annually. Even though Medicare will still pay for an annual mammogram, the newer guidelines recommend a mammogram every two years for women of average risk). Of course, you and your doctor may decide to screen more often for some diseases, based on your risk and your co-morbidities (chronic disease you are already diagnosed with). Preventive services for you include: - Medicare offers their members a free annual wellness visit, which is time for you and your primary care provider to discuss and plan for your preventive service needs. Take advantage of this benefit every year! 
-All adults over the age of 72 should receive the recommended pneumonia vaccines. Current USPSTF guidelines recommend a series of two vaccines for the best pneumonia protection.  
-All adults should have a flu vaccine yearly and a tetanus vaccine every 10 years.  
-All adults age 48 and older should receive the shingles vaccines (series of two vaccines). -All adults age 38-68 who are overweight should have a diabetes screening test once every three years.  
-All adults born between [birth year] and 1965 should be screened once for Hepatitis C. 
-Other screening tests and preventive services for persons with diabetes include: an eye exam to screen for diabetic retinopathy, a kidney function test, a foot exam, and stricter control over your cholesterol.  
-Cardiovascular screening for adults with routine risk involves an electrocardiogram (ECG) at intervals determined by your doctor.  
-Colorectal cancer screenings should be done for adults age 54-65 with no increased risk factors for colorectal cancer.   There are a number of acceptable methods of screening for this type of cancer. Each test has its own benefits and drawbacks. Discuss with your doctor what is most appropriate for you during your annual wellness visit. The different tests include: colonoscopy (considered the best screening method), a fecal occult blood test, a fecal DNA test, and sigmoidoscopy. 
 
-A bone mass density test is recommended when a woman turns 65 to screen for osteoporosis. This test is only recommended one time, as a screening. Some providers will use this same test as a disease monitoring tool if you already have osteoporosis. -Breast cancer screenings are recommended every other year for women of normal risk, age 54-69. 
-Cervical cancer screenings for women over age 72 are only recommended with certain risk factors. Here is a list of your current Health Maintenance items (your personalized list of preventive services) with a due date: 
Health Maintenance Due Topic Date Due  Shingles Vaccine (1 of 2) 01/14/1983  Glaucoma Screening   12/04/2017  Diabetic Foot Care  09/29/2018 [facility] Eye Exam  09/11/2019 [facility] Annual Well Visit  02/05/2020  Yearly Flu Vaccine (1) 09/01/2020  Cholesterol Test   09/09/2020 Amanda Barkley 1721 What is a living will? A living will is a legal form you use to write down the kind of care you want at the end of your life. It is used by the health professionals who will treat you if you aren't able to decide for yourself. If you put your wishes in writing, your loved ones and others will know what kind of care you want. They won't need to guess. This can ease your mind and be helpful to others. A living will is not the same as an estate or property will. An estate will explains what you want to happen with your money and property after you die. Is a living will a legal document? A living will is a legal document.  Each state has its own laws about living dugan. If you move to another state, make sure that your living will is legal in the state where you now live. Or you might use a universal form that has been approved by many states. This kind of form can sometimes be completed and stored online. Your electronic copy will then be available wherever you have a connection to the Internet. In most cases, doctors will respect your wishes even if you have a form from a different state. · You don't need an  to complete a living will. But legal advice can be helpful if your state's laws are unclear, your health history is complicated, or your family can't agree on what should be in your living will. · You can change your living will at any time. Some people find that their wishes about end-of-life care change as their health changes. · In addition to making a living will, think about completing a medical power of  form. This form lets you name the person you want to make end-of-life treatment decisions for you (your \"health care agent\") if you're not able to. Many hospitals and nursing homes will give you the forms you need to complete a living will and a medical power of . · Your living will is used only if you can't make or communicate decisions for yourself anymore. If you become able to make decisions again, you can accept or refuse any treatment, no matter what you wrote in your living will. · Your state may offer an online registry. This is a place where you can store your living will online so the doctors and nurses who need to treat you can find it right away. What should you think about when creating a living will? Talk about your end-of-life wishes with your family members and your doctor. Let them know what you want. That way the people making decisions for you won't be surprised by your choices. Think about these questions as you make your living will: · Do you know enough about life support methods that might be used? If not, talk to your doctor so you know what might be done if you can't breathe on your own, your heart stops, or you're unable to swallow. · What things would you still want to be able to do after you receive life-support methods? Would you want to be able to walk? To speak? To eat on your own? To live without the help of machines? · If you have a choice, where do you want to be cared for? In your home? At a hospital or nursing home? · Do you want certain Orthodox practices performed if you become very ill? · If you have a choice at the end of your life, where would you prefer to die? At home? In a hospital or nursing home? Somewhere else? · Would you prefer to be buried or cremated? · Do you want your organs to be donated after you die? What should you do with your living will? · Make sure that your family members and your health care agent have copies of your living will. · Give your doctor a copy of your living will to keep in your medical record. If you have more than one doctor, make sure that each one has a copy. · You may want to put a copy of your living will where it can be easily found. Where can you learn more? Go to http://brianne-ger.info/. Enter U152 in the search box to learn more about \"Learning About Living Yazmin Blades. \" Current as of: August 8, 2016 Content Version: 11.3 © 9403-4933 [employer]. Care instructions adapted under license by [employer] (which disclaims liability or warranty for this information). If you have questions about a medical condition or this instruction, always ask your healthcare professional. [address] any warranty or liability for your use of this information. Preventing Falls: Care Instructions Your Care Instructions Getting around your home safely can be a challenge if you have injuries or health problems that make it easy for you to fall. Loose rugs and furniture in walkways are among the dangers for many older people who have problems walking or who have poor eyesight. People who have conditions such as arthritis, osteoporosis, or dementia also have to be careful not to fall. You can make your home safer with a few simple measures. Follow-up care is a key part of your treatment and safety. Be sure to make and go to all appointments, and call your doctor if you are having problems. It's also a good idea to know your test results and keep a list of the medicines you take. How can you care for yourself at home? Taking care of yourself · You may get dizzy if you do not drink enough water. To prevent dehydration, drink plenty of fluids, enough so that your urine is light yellow or clear like water. Choose water and other caffeine-free clear liquids. If you have kidney, heart, or liver disease and have to limit fluids, talk with your doctor before you increase the amount of fluids you drink. · Exercise regularly to improve your strength, muscle tone, and balance. Walk if you can. Swimming may be a good choice if you cannot walk easily. · Have your vision and hearing checked each year or any time you notice a change. If you have trouble seeing and hearing, you might not be able to avoid objects and could lose your balance. · Know the side effects of the medicines you take. Ask your doctor or pharmacist whether the medicines you take can affect your balance. Sleeping pills or sedatives can affect your balance. · Limit the amount of alcohol you drink. Alcohol can impair your balance and other senses. · Ask your doctor whether calluses or corns on your feet need to be removed. If you wear loose-fitting shoes because of calluses or corns, you can lose your balance and fall. · Talk to your doctor if you have numbness in your feet. Preventing falls at home · Remove raised doorway thresholds, throw rugs, and clutter. Repair loose carpet or raised areas in the floor. · Move furniture and electrical cords to keep them out of walking paths. · Use nonskid floor wax, and wipe up spills right away, especially on ceramic tile floors. · If you use a walker or cane, put rubber tips on it. If you use crutches, clean the bottoms of them regularly with an abrasive pad, such as steel wool. · Keep your house well lit, especially [place], and outside walkways. Use night-lights in areas such as hallways and bathrooms. Add extra light switches or use remote switches (such as switches that go on or off when you clap your hands) to make it easier to turn lights on if you have to get up during the night. · Install sturdy handrails on stairways. · Move items in your cabinets so that the things you use a lot are on the lower shelves (about waist level). · Keep a cordless phone and a flashlight with new batteries by your bed. If possible, put a phone in each of the main rooms of your house, or carry a cell phone in case you fall and cannot reach a phone. Or, you can wear a device around your neck or wrist. You push a button that sends a signal for help. · Wear low-heeled shoes that fit well and give your feet good support. Use footwear with nonskid soles. Check the heels and soles of your shoes for wear. Repair or replace worn heels or soles. · Do not wear socks without shoes on wood floors. · Walk on the grass when the sidewalks are slippery. If you live in an area that gets snow and ice in the winter, sprinkle salt on slippery steps and sidewalks. Preventing falls in the bath · Install grab bars and nonskid mats inside and outside your shower or tub and near the toilet and sinks. · Use shower chairs and bath benches. · Use a hand-held shower head that will allow you to sit while showering.  
· Get into a tub or shower by putting the weaker leg in first. Get out of a tub or shower with your strong side first. 
· Repair loose toilet seats and consider installing a raised toilet seat to make getting on and off the toilet easier. · Keep your bathroom door unlocked while you are in the shower. Where can you learn more? Go to http://www.au.com/. Enter [id number] [id number] in the search box to learn more about \"Preventing Falls: Care Instructions. \" Current as of: March 16, 2018 Content Version: 11.8 © 0077-0145 [employer]. Care instructions adapted under license by [employer] (which disclaims liability or warranty for this information). If you have questions about a medical condition or this instruction, always ask your healthcare professional. [address] any warranty or liability for your use of this information. Ingrown Toenail: Care Instructions Your Care Instructions An ingrown toenail often occurs because a nail is not trimmed correctly or because shoes are too tight. An ingrown nail can cause an infection. If your toe is infected, your doctor may prescribe antibiotics. Most ingrown toenails can be treated at home. You should trim toenails straight across, so the ends of the nail grow over the skin and not into it. Good nail care can prevent ingrown toenails. Follow-up care is a key part of your treatment and safety. Be sure to make and go to all appointments, and call your doctor if you are having problems. It's also a good idea to know your test results and keep a list of the medicines you take. How can you care for yourself at home? · Trim the nails straight across. Leave the corners a little longer so they do not cut into the skin. To do this when you have an ingrown nail: 
? Soak your foot in warm water for about 15 minutes to soften the nail. ? Wedge a small piece of wet cotton under the corner of the nail to cushion the nail and lift it slightly. This keeps it from cutting the skin. ? Repeat daily until the nail has grown out and can be trimmed. · Do not use manicure scissors to dig under the ingrown nail. You might stab your toe, which could get infected. · Do not trim your toenails too short. · Check with your doctor before trimming your own toenails if you have been diagnosed with diabetes or peripheral arterial disease. These conditions increase the risk of an infection, because you may have decreased sensation in your toes and cut yourself without knowing it. · Wear roomy, comfortable shoes. · If your doctor prescribed antibiotics, take them as directed. Do not stop taking them just because you feel better. You need to take the full course of antibiotics. When should you call for help? Call your doctor now or seek immediate medical care if: 
  · You have signs of infection, such as: 
? Increased pain, swelling, warmth, or redness. ? Red streaks leading from the toe. ? Pus draining from the toe. ? A fever. Watch closely for changes in your health, and be sure to contact your doctor if: 
  · You do not get better as expected. Where can you learn more? Go to http://brianne-ger.info/ Enter R135 in the search box to learn more about \"Ingrown Toenail: Care Instructions. \" Current as of: July 2, 2020               Content Version: 12.6 © 1474-3149 [employer]. Care instructions adapted under license by [employer] (which disclaims liability or warranty for this information). If you have questions about a medical condition or this instruction, always ask your healthcare professional. [place] [address] any warranty or liability for your use of this information.

## 2020-09-04 NOTE — PROGRESS NOTES
Chief Complaint   Patient presents with    Annual Exam     1. Have you been to the ER, urgent care clinic since your last visit? Hospitalized since your last visit? Yes When: 06/2020 [facility]  Reason for visit: hand swelling     2. Have you seen or consulted any other health care providers outside of the [address] [place] [address] since your last visit? Include any pap smears or colon screening. No     Verified patient with two type of identifiers. c/o abundio hands swelling and pain, mostly around knuckles, [facility] for hand [facility], denies injury    C/o black spot to left foot big toe,      After obtaining consent, and per orders of , flu vaccine given to  Right deltoid IM . Patient instructed to remain in clinic for 15 minutes afterwards, and to report any adverse reaction to me immediately.  Patient did not have any adverse reactions during this office visit

## 2020-09-10 NOTE — PROGRESS NOTES
[facility] @ [address], [place]  Subjective/HPI:     Yen Carpenter is a 80 y.o. female previously followed by Dr. Donato Horne for history of atherosclerotic heart disease. 2012 PTCA stenting of the mid RCA, hypertension, hyperlipidemia, chronic kidney disease, is here for routine f/u. Patient reports she has been experiencing her \"baseline\" dyspnea on exertion, denies limiting factors. She comes to the appointment today with her daughter who feels that she is underestimating her symptoms as she feels her dyspnea has been progressive over the years. There is been no reports of exertional chest pain, patient does report intermittent fatigue generalized weakness. Cardiac Cath 6/2012  SUMMARY:     --  CARDIAC STRUCTURES:   --  Global left ventricular function was normal.     --  CORONARY CIRCULATION:   --  Mid RCA: There was a tubular 95 % stenosis. There was BAN grade 2   flow through the vessel (partial perfusion). --  1ST LESION INTERVENTIONS:   --  A successful drug-eluting stent was performed on the 95 % lesion in   the mid RCA. Following intervention there was an excellent angiographic   appearance with a 0 % residual stenosis. --  Balloon angioplasty was   performed for post dilatation, using a NC Trek RX 3.5x8mm balloon, with 2   inflations and a maximum inflation pressure of 12 brenda. --  A 3.5 x 18   Resolute drug-eluting stent was placed across the lesion and successfully   deployed at a maximum inflation pressure of 12 brenda. 2018 ECHO  SUMMARY:  Left ventricle: Size was normal. Ejection fraction was estimated in the  range of 70 % to 75 %. There were no regional wall motion abnormalities. Wall thickness was at the upper limits of normal. Doppler parameters were  consistent with abnormal left ventricular relaxation (grade 1 diastolic  dysfunction).      Left atrium: The atrium was moderately dilated.     Atrial septum: There was redundancy of the septum, with borderline  criteria for aneurysm. The atrial septum appeared intact with borderline  ASA and respirophasic motion.     Mitral valve: There was trivial regurgitation.     Tricuspid valve:  There was trivial regurgitation  PCP Provider  Cm Pierson MD  Past Medical History:   Diagnosis Date    Arthritis 3/25/2010    Back pain 3/26/2010    CAD (coronary artery disease)     Chest pain, unspecified     Chronic kidney failure, stage 3 (moderate) (HCC) 12/5/2017    Chronic pain     Chronic pain of both knees 2/6/2018    Edema     Essential hypertension     High cholesterol 3/25/2010    HTN (hypertension) 3/25/2010    Kidney failure, acute ([facility] [address] 4/6/2012    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    Onychomycosis 8/19/2010    Other acute and subacute form of ischemic heart disease     Other and unspecified angina pectoris     Other ill-defined conditions(799.89)     excessive watering of right eye    Patient is full code 8/6/2018    Shortness of breath     Shoulder pain, bilateral 3/26/2010      Past Surgical History:   Procedure Laterality Date    HX HEART CATHETERIZATION      HX HEENT      bilat cateract extraction    HX HYSTERECTOMY      HX KNEE REPLACEMENT      HX LUMBAR FUSION      HX ORTHOPAEDIC      bilateral TKR    HX ORTHOPAEDIC      neck and back surgery    HX ORTHOPAEDIC      bilateral bunionectomy    HX ORTHOPAEDIC      bilateral cataracts removed    HX PTCA       Allergies   Allergen Reactions    Diclofenac Unknown (comments)    Lisinopril Cough    Lisinopril-Hydrochlorothiazide Unknown (comments)    Rosuvastatin Diarrhea      Family History   Problem Relation Age of Onset   Nolen Hypertension Mother     Stroke Mother     Other Mother         arthritis    Coronary Artery Disease Father     Heart Disease Father     Hypertension Sister     Diabetes Sister     Coronary Artery Disease Sister     Cancer Sister         lung    Coronary Artery Disease Brother  Cancer Brother         lung    Heart Disease Brother       Current Outpatient Medications   Medication Sig    traMADol-acetaminophen (ULTRACET) 37.5-325 mg per tablet Take 1 Tab by mouth every eight (8) hours as needed for Pain for up to 30 days. Max Daily Amount: 3 Tabs.  nitroglycerin (NITROSTAT) 0.4 mg SL tablet PLACE 1 TABLET UNDER THE TONGUE EVERY 5 MINS AS NEEDED FOR CHEST PAIN    atenoloL (TENORMIN) 50 mg tablet TAKE 1 TABLET EVERY DAY    NIFEdipine ER (ADALAT CC) 30 mg ER tablet TAKE 1 TABLET EVERY DAY  ON AN EMPTY STOMACH    pravastatin (PRAVACHOL) 10 mg tablet Take 1 Tab by mouth nightly.  Calcium-Cholecalciferol, D3, (Calcium 600 with Vitamin D3) 600 mg(1,500mg) -400 unit chew Take 600 mg by mouth two (2) times a day.  potassium chloride (KLOR-CON) 10 mEq tablet TAKE 1 TABLET EVERY DAY    candesartan (ATACAND) 8 mg tablet TAKE 1 TABLET EVERY DAY    torsemide (DEMADEX) 20 mg tablet TAKE 1 TABLET EVERY DAY    OTHER Indications: RA Col-rite for constipation    hydrocortisone (ANUSOL-HC) 2.5 % rectal cream Insert  into rectum four (4) times daily. (Patient not taking: Reported on 11/19/2019)    simethicone (MYLICON) 80 mg chewable tablet Take 1 Tab by mouth every six (6) hours as needed for Flatulence.  ASPIR-LOW 81 mg tablet Take 81 mg by mouth daily.  naloxone (NARCAN) 4 mg/actuation nasal spray Use 1 spray intranasally into 1 nostril. Use a new Narcan nasal spray for subsequent doses and administer into alternating nostrils. May repeat every 2 to 3 minutes as needed.  MISCELLANEOUS MEDICAL SUPPLY (COMPRESSION STOCKINGS) daily. Remove at bedtime     No current facility-administered medications for this visit. There were no vitals filed for this visit.   Social History     Socioeconomic History    Marital status:      Spouse name: Not on file    Number of children: Not on file    Years of education: Not on file    Highest education level: Not on file Occupational History    Not on file   Social Needs    Financial resource strain: Not on file    Food insecurity     Worry: Not on file     Inability: Not on file    Transportation needs     Medical: Not on file     Non-medical: Not on file   Tobacco Use    Smoking status: Never Smoker    Smokeless tobacco: Never Used   Substance and Sexual Activity    Alcohol use: Yes     Comment: occasional     Drug use: Yes     Types: Prescription, OTC    Sexual activity: Never   Lifestyle    Physical activity     Days per week: Not on file     Minutes per session: Not on file    Stress: Not on file   Relationships    Social connections     Talks on phone: Not on file     Gets together: Not on file     Attends Mu-ism service: Not on file     Active member of club or organization: Not on file     Attends meetings of clubs or organizations: Not on file     Relationship status: Not on file    Intimate partner violence     Fear of current or ex partner: Not on file     Emotionally abused: Not on file     Physically abused: Not on file     Forced sexual activity: Not on file   Other Topics Concern    Not on file   Social History Narrative    ** Merged History Encounter **            I have reviewed the nurses notes, vitals, problem list, allergy list, medical history, family, social history and medications. Review of Symptoms  11 systems reviewed, negative other than as stated in the HPI      Physical Exam:      General: Well developed, in no acute distress, cooperative and alert  HEENT: No carotid bruits, no JVD, trach is midline. Neck Supple, PERRL, EOM intact. Heart:  Normal S1/S2 negative S3 or S4. Regular, no murmur, gallop or rub. Respiratory: Clear bilaterally x 4, no wheezing or rales  Abdomen:   Soft, non-tender, no masses, bowel sounds are active. Extremities: Less 1 pitting bilateral ankle edema, normal cap refill, no cyanosis, atraumatic. Neuro: A&Ox3, speech clear, gait stable.    Skin: Skin color is normal. No rashes or lesions. Non diaphoretic  Vascular: 2+ pulses symmetric in all extremities    Cardiographics    ECG: Normal sinus rhythm        Cardiology Labs:  Lab Results   Component Value Date/Time    Cholesterol, total 179 09/09/2019 11:32 AM    HDL Cholesterol 62 09/09/2019 11:32 AM    LDL, calculated 93 09/09/2019 11:32 AM    Triglyceride 118 09/09/2019 11:32 AM    CHOL/HDL Ratio 4.5 08/19/2010 12:27 PM       Lab Results   Component Value Date/Time    Sodium 141 08/13/2019 08:56 AM    Potassium 4.1 08/13/2019 08:56 AM    Chloride 102 08/13/2019 08:56 AM    CO2 25 08/13/2019 08:56 AM    Anion gap 7 06/07/2012 03:00 AM    Glucose 117 (H) 08/13/2019 08:56 AM    BUN 38 (H) 08/13/2019 08:56 AM    Creatinine 1.39 (H) 08/13/2019 08:56 AM    BUN/Creatinine ratio 27 08/13/2019 08:56 AM    GFR est AA 40 (L) 08/13/2019 08:56 AM    GFR est non-AA 34 (L) 08/13/2019 08:56 AM    Calcium 10.5 (H) 08/13/2019 08:56 AM    Bilirubin, total 0.5 09/09/2019 11:32 AM    Alk. phosphatase 67 09/09/2019 11:32 AM    Protein, total 6.7 09/09/2019 11:32 AM    Albumin 4.1 09/09/2019 11:32 AM    Globulin 4.0 11/12/2011 11:03 AM    A-G Ratio 1.4 02/04/2019 09:22 AM    ALT (SGPT) 13 09/09/2019 11:32 AM           Assessment:     Assessment:     Diagnoses and all orders for this visit:    1. Essential hypertension, benign    2. Coronary artery disease involving native coronary artery of native heart without angina pectoris    3. Type 2 diabetes mellitus without complication, without long-term current use of insulin ([facility] [address]    4. Chronic kidney failure, stage 3 (moderate) (HCC)    5. S/P angioplasty with stent--RCA 6/12        ICD-10-CM ICD-9-CM    1. Essential hypertension, benign  I10 401.1    2. Coronary artery disease involving native coronary artery of native heart without angina pectoris  I25.10 414.01    3. Type 2 diabetes mellitus without complication, without long-term current use of insulin (HCC)  E11.9 250.00    4.  Chronic kidney failure, stage 3 (moderate) (HCC)  N18.3 585.3    5. S/P angioplasty with stent--RCA 6/12  Z95.820 V45.89      No orders of the defined types were placed in this encounter. Plan:     1. Atherosclerotic heart disease: History of PTCA stenting 2012 mid RCA reporting MURILLO will evaluate with ERVIN NST and 2D ECHO  2. Hypertension: Controlled 121/71  3. Hyperlipidemia: 5/19 LDL 93 on pravastatin 10 mg repeat labs possible up titration for goal LDL of 70  4. CKD: 2019 GFR 40  5. Type II DM: Will check A1c  6. Chronic bilateral lower extremity edema response to diuresis: We will check proBNP. Continue torsemide  Follow-up in 6 months unless abnormal nuclear stress test or echocardiogram.  45-year-old female presents for routine follow-up visit reporting dyspnea on exertion with known history of atherosclerotic heart disease no ischemic evaluation in greater than 5 years. Continue current medications proceed with stress test and echocardiogram.  We will have blood work drawn including for diabetes, ready for primary care review. Herson Arnold NP      Please note that this dictation was completed with [employer] the [employer] voice recognition software. Quite often unanticipated grammatical, syntax, homophones, and other interpretive errors are inadvertently transcribed by the computer software. Please disregard these errors. Please excuse any errors that have escaped final proofreading. Thank you.

## 2020-09-11 ENCOUNTER — OFFICE VISIT (OUTPATIENT)
Dept: CARDIOLOGY CLINIC | Age: 85
End: 2020-09-11
Payer: MEDICARE

## 2020-09-11 VITALS
RESPIRATION RATE: 16 BRPM | SYSTOLIC BLOOD PRESSURE: 121 MMHG | TEMPERATURE: 96.5 F | DIASTOLIC BLOOD PRESSURE: 71 MMHG | HEART RATE: 61 BPM | BODY MASS INDEX: 34.97 KG/M2 | OXYGEN SATURATION: 98 % | HEIGHT: 66 IN | WEIGHT: 217.6 LBS

## 2020-09-11 DIAGNOSIS — I25.10 CORONARY ARTERY DISEASE INVOLVING NATIVE CORONARY ARTERY OF NATIVE HEART WITHOUT ANGINA PECTORIS: ICD-10-CM

## 2020-09-11 DIAGNOSIS — Z95.820 S/P ANGIOPLASTY WITH STENT: ICD-10-CM

## 2020-09-11 DIAGNOSIS — N18.30 CHRONIC KIDNEY FAILURE, STAGE 3 (MODERATE) (HCC): ICD-10-CM

## 2020-09-11 DIAGNOSIS — E11.9 TYPE 2 DIABETES MELLITUS WITHOUT COMPLICATION, WITHOUT LONG-TERM CURRENT USE OF INSULIN (HCC): ICD-10-CM

## 2020-09-11 DIAGNOSIS — I10 ESSENTIAL HYPERTENSION, BENIGN: Primary | ICD-10-CM

## 2020-09-11 DIAGNOSIS — R53.82 CHRONIC FATIGUE: ICD-10-CM

## 2020-09-11 PROCEDURE — G8417 CALC BMI ABV UP PARAM F/U: HCPCS | Performed by: NURSE PRACTITIONER

## 2020-09-11 PROCEDURE — G8536 NO DOC ELDER MAL SCRN: HCPCS | Performed by: NURSE PRACTITIONER

## 2020-09-11 PROCEDURE — G8432 DEP SCR NOT DOC, RNG: HCPCS | Performed by: NURSE PRACTITIONER

## 2020-09-11 PROCEDURE — 93000 ELECTROCARDIOGRAM COMPLETE: CPT | Performed by: NURSE PRACTITIONER

## 2020-09-11 PROCEDURE — 1101F PT FALLS ASSESS-DOCD LE1/YR: CPT | Performed by: NURSE PRACTITIONER

## 2020-09-11 PROCEDURE — G8427 DOCREV CUR MEDS BY ELIG CLIN: HCPCS | Performed by: NURSE PRACTITIONER

## 2020-09-11 PROCEDURE — 99214 OFFICE O/P EST MOD 30 MIN: CPT | Performed by: NURSE PRACTITIONER

## 2020-09-11 PROCEDURE — 1090F PRES/ABSN URINE INCON ASSESS: CPT | Performed by: NURSE PRACTITIONER

## 2020-09-11 NOTE — PROGRESS NOTES
Identified pt with two pt identifiers(name and ). Reviewed record in preparation for visit and have obtained necessary documentation. Chief Complaint   Patient presents with    Follow-up        Health Maintenance Due   Topic    Shingrix Vaccine Age 50> (1 of 2)    GLAUCOMA SCREENING Q2Y     Foot Exam Q1     Eye Exam Retinal or Dilated     Lipid Screen         Visit Vitals  /71 (BP 1 Location: Right arm, BP Patient Position: Sitting)   Pulse 61   Temp (!) 96.5 °F (35.8 °C) (Temporal)   Resp 16   Ht 5' 6\" (1.676 m)   Wt 217 lb 9.6 oz (98.7 kg)   SpO2 98%   BMI 35.12 kg/m²     Pain Scale: 7/10    Coordination of Care Questionnaire:  :   1. Have you been to the ER, urgent care clinic since your last visit? Hospitalized since your last visit? Urgent care for hand swelling in 2020    2. Have you seen or consulted any other health care providers outside of the [address] [place] [address] since your last visit? Include any pap smears or colon screening.  No

## 2020-09-12 LAB
ALBUMIN SERPL-MCNC: 4.5 G/DL (ref 3.6–4.6)
ALBUMIN/GLOB SERPL: 1.7 {RATIO} (ref 1.2–2.2)
ALP SERPL-CCNC: 75 IU/L (ref 39–117)
ALT SERPL-CCNC: 10 IU/L (ref 0–32)
AST SERPL-CCNC: 21 IU/L (ref 0–40)
BASOPHILS # BLD AUTO: 0.1 X10E3/UL (ref 0–0.2)
BASOPHILS NFR BLD AUTO: 1 %
BILIRUB SERPL-MCNC: 0.4 MG/DL (ref 0–1.2)
BUN SERPL-MCNC: 33 MG/DL (ref 8–27)
BUN/CREAT SERPL: 23 (ref 12–28)
CALCIUM SERPL-MCNC: 10 MG/DL (ref 8.7–10.3)
CHLORIDE SERPL-SCNC: 105 MMOL/L (ref 96–106)
CO2 SERPL-SCNC: 24 MMOL/L (ref 20–29)
CREAT SERPL-MCNC: 1.46 MG/DL (ref 0.57–1)
EOSINOPHIL # BLD AUTO: 0.2 X10E3/UL (ref 0–0.4)
EOSINOPHIL NFR BLD AUTO: 4 %
ERYTHROCYTE [DISTWIDTH] IN BLOOD BY AUTOMATED COUNT: 12.7 % (ref 11.7–15.4)
GLOBULIN SER CALC-MCNC: 2.6 G/DL (ref 1.5–4.5)
GLUCOSE SERPL-MCNC: 102 MG/DL (ref 65–99)
HBA1C MFR BLD: 5.8 % (ref 4.8–5.6)
HCT VFR BLD AUTO: 41.5 % (ref 34–46.6)
HGB BLD-MCNC: 13.9 G/DL (ref 11.1–15.9)
IMM GRANULOCYTES # BLD AUTO: 0 X10E3/UL (ref 0–0.1)
IMM GRANULOCYTES NFR BLD AUTO: 0 %
LYMPHOCYTES # BLD AUTO: 1.6 X10E3/UL (ref 0.7–3.1)
LYMPHOCYTES NFR BLD AUTO: 26 %
MCH RBC QN AUTO: 30.1 PG (ref 26.6–33)
MCHC RBC AUTO-ENTMCNC: 33.5 G/DL (ref 31.5–35.7)
MCV RBC AUTO: 90 FL (ref 79–97)
MONOCYTES # BLD AUTO: 0.6 X10E3/UL (ref 0.1–0.9)
MONOCYTES NFR BLD AUTO: 10 %
NEUTROPHILS # BLD AUTO: 3.7 X10E3/UL (ref 1.4–7)
NEUTROPHILS NFR BLD AUTO: 59 %
NT-PROBNP SERPL-MCNC: 371 PG/ML (ref 0–738)
PLATELET # BLD AUTO: 253 X10E3/UL (ref 150–450)
POTASSIUM SERPL-SCNC: 4.2 MMOL/L (ref 3.5–5.2)
PROT SERPL-MCNC: 7.1 G/DL (ref 6–8.5)
RBC # BLD AUTO: 4.62 X10E6/UL (ref 3.77–5.28)
SODIUM SERPL-SCNC: 143 MMOL/L (ref 134–144)
TSH SERPL DL<=0.005 MIU/L-ACNC: 1.98 UIU/ML (ref 0.45–4.5)
WBC # BLD AUTO: 6.3 X10E3/UL (ref 3.4–10.8)

## 2020-09-14 NOTE — PROGRESS NOTES
Please call patient, lab work looks good, diabetes number hemoglobin A1c 5.8% improved from last year at 6.1%, CBC normal slight improvement in kidney function from last labs. Overall continue current therapy.

## 2020-09-15 NOTE — PROGRESS NOTES
Placed call to pt. Two pt identifiers confirmed informed pt per NP Latia Gong work looks good, diabetes number hemoglobin A1c 5.8% improved from last year at 6.1%, CBC normal slight improvement in kidney function from last labs. Overall continue current therapy. \" Pt verbalized understanding of information discussed w/ no further questions at this time.

## 2020-09-17 ENCOUNTER — HOSPITAL ENCOUNTER (OUTPATIENT)
Dept: NUCLEAR MEDICINE | Age: 85
Discharge: HOME | End: 2020-09-17
Attending: FAMILY MEDICINE
Payer: MEDICARE

## 2020-09-17 ENCOUNTER — TELEPHONE (OUTPATIENT)
Dept: FAMILY MEDICINE CLINIC | Age: 85
End: 2020-09-17

## 2020-09-17 ENCOUNTER — HOSPITAL ENCOUNTER (OUTPATIENT)
Dept: NON INVASIVE DIAGNOSTICS | Age: 85
Discharge: HOME | End: 2020-09-17
Attending: FAMILY MEDICINE
Payer: MEDICARE

## 2020-09-17 VITALS
SYSTOLIC BLOOD PRESSURE: 160 MMHG | WEIGHT: 222 LBS | DIASTOLIC BLOOD PRESSURE: 71 MMHG | HEIGHT: 64 IN | BODY MASS INDEX: 37.9 KG/M2

## 2020-09-17 DIAGNOSIS — E11.9 TYPE 2 DIABETES MELLITUS WITHOUT COMPLICATION, WITHOUT LONG-TERM CURRENT USE OF INSULIN (HCC): ICD-10-CM

## 2020-09-17 DIAGNOSIS — R53.82 CHRONIC FATIGUE: ICD-10-CM

## 2020-09-17 DIAGNOSIS — I25.10 CORONARY ARTERY DISEASE INVOLVING NATIVE CORONARY ARTERY OF NATIVE HEART WITHOUT ANGINA PECTORIS: ICD-10-CM

## 2020-09-17 DIAGNOSIS — Z95.820 S/P ANGIOPLASTY WITH STENT: ICD-10-CM

## 2020-09-17 DIAGNOSIS — N18.30 CHRONIC KIDNEY FAILURE, STAGE 3 (MODERATE) (HCC): ICD-10-CM

## 2020-09-17 DIAGNOSIS — I10 ESSENTIAL HYPERTENSION, BENIGN: ICD-10-CM

## 2020-09-17 LAB
STRESS BASELINE DIAS BP: 71 MMHG
STRESS BASELINE HR: 64 BPM
STRESS BASELINE SYS BP: 160 MMHG
STRESS ESTIMATED WORKLOAD: 1 METS
STRESS EXERCISE DUR MIN: NORMAL
STRESS O2 SAT REST: 98 %
STRESS PEAK DIAS BP: 76 MMHG
STRESS PEAK SYS BP: 161 MMHG
STRESS PERCENT HR ACHIEVED: 62 %
STRESS POST PEAK HR: 82 BPM
STRESS RATE PRESSURE PRODUCT: NORMAL BPM*MMHG
STRESS ST DEPRESSION: 0 MM
STRESS ST ELEVATION: 0 MM
STRESS STAGE 1 BP: NORMAL MMHG
STRESS STAGE 1 COMMENTS: NORMAL
STRESS STAGE 1 DURATION: NORMAL MIN:SEC
STRESS STAGE 1 HR: 65 BPM
STRESS STAGE 2 BP: NORMAL MMHG
STRESS STAGE 2 COMMENTS: NORMAL
STRESS STAGE 2 DURATION: NORMAL MIN:SEC
STRESS STAGE 2 HR: 67 BPM
STRESS STAGE 3 BP: NORMAL MMHG
STRESS STAGE 3 COMMENTS: NORMAL
STRESS STAGE 3 DURATION: NORMAL MIN:SEC
STRESS STAGE 3 HR: 74 BPM
STRESS STAGE 4 BP: NORMAL MMHG
STRESS STAGE 4 COMMENTS: NORMAL
STRESS STAGE 4 DURATION: NORMAL MIN:SEC
STRESS STAGE 4 HR: 70 BPM
STRESS TARGET HR: 133 BPM

## 2020-09-17 PROCEDURE — 93017 CV STRESS TEST TRACING ONLY: CPT

## 2020-09-17 PROCEDURE — 74011250636 HC RX REV CODE- 250/636: Performed by: NURSE PRACTITIONER

## 2020-09-17 RX ORDER — SODIUM CHLORIDE 0.9 % (FLUSH) 0.9 %
10 SYRINGE (ML) INJECTION AS NEEDED
Status: DISCONTINUED | OUTPATIENT
Start: 2020-09-17 | End: 2020-09-21 | Stop reason: HOSPADM

## 2020-09-17 RX ADMIN — REGADENOSON 0.4 MG: 0.08 INJECTION, SOLUTION INTRAVENOUS at 11:36

## 2020-09-17 RX ADMIN — Medication 10 ML: at 11:37

## 2020-09-17 NOTE — TELEPHONE ENCOUNTER
Marycarmenmya Ruelas, grand daughter     Checking on tramadol     She said they got at local pharmacy and only got 7 pills and a prior Nonda Holstein is needed   It looks as though it was sent to [facility] though     Pls call to advise     Call dropped

## 2020-09-18 NOTE — PROGRESS NOTES
Placed call to pt. Two pt identifiers confirmed. Pt informed per NP Coral Lesches test is normal.\" Pt verbalized understanding of information discussed w/ no further questions at this time.

## 2020-09-22 ENCOUNTER — HOSPITAL ENCOUNTER (OUTPATIENT)
Dept: NON INVASIVE DIAGNOSTICS | Age: 85
Discharge: HOME | End: 2020-09-22
Payer: MEDICARE

## 2020-09-22 DIAGNOSIS — Z95.820 S/P ANGIOPLASTY WITH STENT: ICD-10-CM

## 2020-09-22 DIAGNOSIS — R53.82 CHRONIC FATIGUE: ICD-10-CM

## 2020-09-22 DIAGNOSIS — E11.9 TYPE 2 DIABETES MELLITUS WITHOUT COMPLICATION, WITHOUT LONG-TERM CURRENT USE OF INSULIN (HCC): ICD-10-CM

## 2020-09-22 DIAGNOSIS — I10 ESSENTIAL HYPERTENSION, BENIGN: ICD-10-CM

## 2020-09-22 DIAGNOSIS — I25.10 CORONARY ARTERY DISEASE INVOLVING NATIVE CORONARY ARTERY OF NATIVE HEART WITHOUT ANGINA PECTORIS: ICD-10-CM

## 2020-09-22 DIAGNOSIS — N18.30 CHRONIC KIDNEY FAILURE, STAGE 3 (MODERATE) (HCC): ICD-10-CM

## 2020-09-22 LAB
ECHO AO ROOT DIAM: 1.87 CM
ECHO AV AREA PLAN: 2.2 CM2
ECHO LA AREA 4C: 16.2 CM2
ECHO LA MAJOR AXIS: 2.87 CM
ECHO LA VOL 4C: 41.35 ML (ref 22–52)
ECHO LV EDV A4C: 95.91 ML
ECHO LV EJECTION FRACTION A4C: 80 PERCENT
ECHO LV ESV A4C: 19.23 ML
ECHO LV INTERNAL DIMENSION DIASTOLIC: 3.83 CM (ref 3.9–5.3)
ECHO LV INTERNAL DIMENSION SYSTOLIC: 2.78 CM
ECHO LV IVSD: 1 CM (ref 0.6–0.9)
ECHO LV MASS 2D: 137.2 G (ref 67–162)
ECHO LV POSTERIOR WALL DIASTOLIC: 1.21 CM (ref 0.6–0.9)
ECHO LVOT DIAM: 1.5 CM
ECHO LVOT PEAK GRADIENT: 3.62 MMHG
ECHO LVOT PEAK VELOCITY: 94.33 CM/S
ECHO MV A VELOCITY: 52.27 CM/S
ECHO MV AREA PHT: 6.46 CM2
ECHO MV AREA PLAN: 4.44 CM2
ECHO MV E DECELERATION TIME (DT): 0.1 S
ECHO MV E VELOCITY: 30.12 CM/S
ECHO MV E/A RATIO: 0.58
ECHO MV MAX VELOCITY: 65.73 CM/S
ECHO MV MEAN GRADIENT: 0.51 MMHG
ECHO MV PEAK GRADIENT: 1.73 MMHG
ECHO MV PRESSURE HALF TIME (PHT): 0.03 S
ECHO MV VTI: 13.45 CM
ECHO PV PEAK INSTANTANEOUS GRADIENT SYSTOLIC: 1.11 MMHG
ECHO PV REGURGITANT MAX VELOCITY: 52.73 CM/S
ECHO RA AREA 4C: 9.1 CM2

## 2020-09-22 PROCEDURE — 93306 TTE W/DOPPLER COMPLETE: CPT

## 2020-09-25 DIAGNOSIS — E11.9 TYPE 2 DIABETES MELLITUS WITHOUT COMPLICATION, WITHOUT LONG-TERM CURRENT USE OF INSULIN (HCC): ICD-10-CM

## 2020-09-25 DIAGNOSIS — Z95.820 S/P ANGIOPLASTY WITH STENT: ICD-10-CM

## 2020-09-25 DIAGNOSIS — I10 ESSENTIAL HYPERTENSION, BENIGN: ICD-10-CM

## 2020-09-25 DIAGNOSIS — N18.30 CHRONIC KIDNEY FAILURE, STAGE 3 (MODERATE) (HCC): ICD-10-CM

## 2020-09-25 DIAGNOSIS — I25.10 CORONARY ARTERY DISEASE INVOLVING NATIVE CORONARY ARTERY OF NATIVE HEART WITHOUT ANGINA PECTORIS: ICD-10-CM

## 2020-09-25 DIAGNOSIS — R53.82 CHRONIC FATIGUE: ICD-10-CM

## 2020-09-29 LAB
CHOLEST SERPL-MCNC: 280 MG/DL (ref 100–199)
HDLC SERPL-MCNC: 66 MG/DL
LDLC SERPL CALC-MCNC: 189 MG/DL (ref 0–99)
SPECIMEN STATUS REPORT, ROLRST: NORMAL
TRIGL SERPL-MCNC: 138 MG/DL (ref 0–149)
VLDLC SERPL CALC-MCNC: 25 MG/DL (ref 5–40)

## 2020-09-29 NOTE — PROGRESS NOTES
Please call patient her cholesterol is very elevated.  Is she taking pravastatin as reported on med list?

## 2020-09-29 NOTE — PROGRESS NOTES
Placed call to pt. Two pt identifiers confirmed. Pt informed per NP Jayy Long that Cholesterol is very elevated. Pt states she is still taking Pravastatin as reported on her med list.   Pt verbalized understanding of information discussed w/ no further questions at this time.

## 2020-10-01 DIAGNOSIS — E78.00 HIGH CHOLESTEROL: Primary | ICD-10-CM

## 2020-10-01 RX ORDER — ROSUVASTATIN CALCIUM 20 MG/1
20 TABLET, COATED ORAL
Qty: 90 TAB | Refills: 1 | Status: SHIPPED | OUTPATIENT
Start: 2020-10-01 | End: 2020-11-04 | Stop reason: ALTCHOICE

## 2020-10-01 NOTE — TELEPHONE ENCOUNTER
Roddy Gaucher, NP Theotis Leriche               D/C pravastatin start Crestor 20mg daily, recheck CMP Lipids CK in 3 months

## 2020-10-01 NOTE — PROGRESS NOTES
Placed call to pt. Two pt identifiers confirmed. Pt in formed per NP Stephanie Ortega to discontinue Pravastatin start Crestor 20mg daily, recheck CMP Lipids CK in 3 months. Pt verbalized understanding of information discussed w/ no further questions at this time.

## 2020-10-07 ENCOUNTER — TELEPHONE (OUTPATIENT)
Dept: CARDIOLOGY CLINIC | Age: 85
End: 2020-10-07

## 2020-10-08 NOTE — TELEPHONE ENCOUNTER
Placed call to pt. Two pt identifiers confirmed. Pt states she is not currently having abdominal pains from medication, she just remembered that it caused it in the past so she will not be taking Crestor. Pt states she will continue to take Pravastatin 10mg once nightly instead.

## 2020-10-08 NOTE — TELEPHONE ENCOUNTER
Stop it until she sees arely at next visit. Place under allergies with side effect she reported.  thanks

## 2020-10-09 NOTE — TELEPHONE ENCOUNTER
Placed call to pt. Two pt identifiers confirmed. Informed pt per NP Edith Hylton, please have her increase pravachol from 10mg to 20mg and see how she tolerates this. Her cholesterol is very high. \" Pt verbalized understanding of information discussed w/ no further questions at this time.

## 2020-10-09 NOTE — TELEPHONE ENCOUNTER
Keily Ortega, NP  You 6 hours ago (9:36 AM)       Ok, please have her increase pravachol from 10mg to 20mg and see how she tolerates this. Her cholesterol is very high.     Routing comment

## 2020-11-04 ENCOUNTER — TELEPHONE (OUTPATIENT)
Dept: CARDIOLOGY CLINIC | Age: 85
End: 2020-11-04

## 2020-11-04 RX ORDER — PRAVASTATIN SODIUM 20 MG/1
20 TABLET ORAL
Qty: 90 TAB | Refills: 3 | Status: SHIPPED | OUTPATIENT
Start: 2020-11-04 | End: 2021-08-12

## 2020-11-04 NOTE — TELEPHONE ENCOUNTER
Pts daughter Larwence Cushing) stated the Crestor was supposed to be stopped and pravastatin was to be increased to 20. Pt does not tolerate crestor due to stomach upset. Please send to [facility].

## 2020-11-10 NOTE — TELEPHONE ENCOUNTER
Spoke with Cleo Fischer at [place] [address]. Informed Blanca rosuvastatin was discontinued by provider. Spoke with Panchito,the Pharmacist at [place] [address]. Panchito discontinued rosuvastatin.     Prior authorization call [phone]

## 2020-11-13 ENCOUNTER — TELEPHONE (OUTPATIENT)
Dept: CARDIOLOGY CLINIC | Age: 85
End: 2020-11-13

## 2020-11-13 NOTE — TELEPHONE ENCOUNTER
Placed call to Olinda Pollard (HIPAA). Two pt identifiers confirmed. Informed Olinda Pollard prior authorization was approved by [email or web address] and had Crestor discontinued in pt's pharmacy's system. July Dewey to give pt's pharmacy a call to ensure that pt will receive medication.

## 2020-11-13 NOTE — TELEPHONE ENCOUNTER
Daughter called says has a missed call from our number, advised no one called on yesterday, however, still see call open for pravastatin rx , daughter says to call her on her cell, [phone], however don't leave a message , but call her mom on the home # listed on chart.   Thanks

## 2020-12-16 DIAGNOSIS — G89.29 CHRONIC PAIN OF BOTH KNEES: ICD-10-CM

## 2020-12-16 DIAGNOSIS — G89.29 CHRONIC PAIN OF BOTH SHOULDERS: ICD-10-CM

## 2020-12-16 DIAGNOSIS — R60.0 BILATERAL EDEMA OF LOWER EXTREMITY: ICD-10-CM

## 2020-12-16 DIAGNOSIS — I25.10 CORONARY ARTERY DISEASE DUE TO LIPID RICH PLAQUE: ICD-10-CM

## 2020-12-16 DIAGNOSIS — M25.511 CHRONIC PAIN OF BOTH SHOULDERS: ICD-10-CM

## 2020-12-16 DIAGNOSIS — M25.512 CHRONIC PAIN OF BOTH SHOULDERS: ICD-10-CM

## 2020-12-16 DIAGNOSIS — M15.9 PRIMARY OSTEOARTHRITIS INVOLVING MULTIPLE JOINTS: ICD-10-CM

## 2020-12-16 DIAGNOSIS — M25.561 CHRONIC PAIN OF BOTH KNEES: ICD-10-CM

## 2020-12-16 DIAGNOSIS — I25.83 CORONARY ARTERY DISEASE DUE TO LIPID RICH PLAQUE: ICD-10-CM

## 2020-12-16 DIAGNOSIS — M25.562 CHRONIC PAIN OF BOTH KNEES: ICD-10-CM

## 2020-12-16 DIAGNOSIS — I10 ESSENTIAL HYPERTENSION: ICD-10-CM

## 2020-12-16 DIAGNOSIS — Z95.820 S/P ANGIOPLASTY WITH STENT: ICD-10-CM

## 2020-12-17 ENCOUNTER — APPOINTMENT (OUTPATIENT)
Dept: GENERAL RADIOLOGY | Age: 85
End: 2020-12-17
Attending: EMERGENCY MEDICINE
Payer: MEDICARE

## 2020-12-17 ENCOUNTER — HOSPITAL ENCOUNTER (EMERGENCY)
Age: 85
Discharge: HOME | End: 2020-12-18
Attending: EMERGENCY MEDICINE
Payer: MEDICARE

## 2020-12-17 DIAGNOSIS — M15.9 PRIMARY OSTEOARTHRITIS INVOLVING MULTIPLE JOINTS: ICD-10-CM

## 2020-12-17 DIAGNOSIS — M25.561 ACUTE PAIN OF RIGHT KNEE: ICD-10-CM

## 2020-12-17 DIAGNOSIS — M25.559 HIP PAIN: Primary | ICD-10-CM

## 2020-12-17 PROCEDURE — 73502 X-RAY EXAM HIP UNI 2-3 VIEWS: CPT

## 2020-12-17 PROCEDURE — 73562 X-RAY EXAM OF KNEE 3: CPT

## 2020-12-17 PROCEDURE — 74011250637 HC RX REV CODE- 250/637: Performed by: EMERGENCY MEDICINE

## 2020-12-17 PROCEDURE — 99285 EMERGENCY DEPT VISIT HI MDM: CPT

## 2020-12-17 PROCEDURE — 74011636637 HC RX REV CODE- 636/637: Performed by: EMERGENCY MEDICINE

## 2020-12-17 PROCEDURE — 72100 X-RAY EXAM L-S SPINE 2/3 VWS: CPT

## 2020-12-17 RX ORDER — TRAMADOL HYDROCHLORIDE AND ACETAMINOPHEN 37.5; 325 MG/1; MG/1
1 TABLET ORAL
COMMUNITY
End: 2021-01-12 | Stop reason: ALTCHOICE

## 2020-12-17 RX ORDER — OXYCODONE AND ACETAMINOPHEN 5; 325 MG/1; MG/1
2 TABLET ORAL
Status: COMPLETED | OUTPATIENT
Start: 2020-12-17 | End: 2020-12-17

## 2020-12-17 RX ORDER — DICLOFENAC SODIUM 75 MG/1
75 TABLET, DELAYED RELEASE ORAL
COMMUNITY
End: 2021-03-12

## 2020-12-17 RX ORDER — PREDNISONE 20 MG/1
60 TABLET ORAL
Status: COMPLETED | OUTPATIENT
Start: 2020-12-17 | End: 2020-12-17

## 2020-12-17 RX ADMIN — OXYCODONE AND ACETAMINOPHEN 2 TABLET: 5; 325 TABLET ORAL at 20:26

## 2020-12-17 RX ADMIN — PREDNISONE 60 MG: 20 TABLET ORAL at 22:37

## 2020-12-18 ENCOUNTER — APPOINTMENT (OUTPATIENT)
Dept: CT IMAGING | Age: 85
End: 2020-12-18
Attending: EMERGENCY MEDICINE
Payer: MEDICARE

## 2020-12-18 VITALS
SYSTOLIC BLOOD PRESSURE: 132 MMHG | HEART RATE: 69 BPM | HEIGHT: 65 IN | RESPIRATION RATE: 18 BRPM | TEMPERATURE: 97.9 F | WEIGHT: 209 LBS | OXYGEN SATURATION: 96 % | BODY MASS INDEX: 34.82 KG/M2 | DIASTOLIC BLOOD PRESSURE: 62 MMHG

## 2020-12-18 PROCEDURE — 73700 CT LOWER EXTREMITY W/O DYE: CPT

## 2020-12-18 PROCEDURE — 74011250637 HC RX REV CODE- 250/637: Performed by: EMERGENCY MEDICINE

## 2020-12-18 RX ORDER — ATENOLOL 50 MG/1
TABLET ORAL
Qty: 90 TAB | Refills: 0 | Status: SHIPPED | OUTPATIENT
Start: 2020-12-18 | End: 2021-01-08 | Stop reason: SDUPTHER

## 2020-12-18 RX ORDER — OXYCODONE AND ACETAMINOPHEN 5; 325 MG/1; MG/1
1 TABLET ORAL
Qty: 10 TAB | Refills: 0 | Status: SHIPPED | OUTPATIENT
Start: 2020-12-18 | End: 2020-12-21

## 2020-12-18 RX ORDER — OXYCODONE AND ACETAMINOPHEN 5; 325 MG/1; MG/1
2 TABLET ORAL
Status: COMPLETED | OUTPATIENT
Start: 2020-12-18 | End: 2020-12-18

## 2020-12-18 RX ORDER — METHYLPREDNISOLONE 4 MG/1
TABLET ORAL
Qty: 1 DOSE PACK | Refills: 0 | Status: SHIPPED | OUTPATIENT
Start: 2020-12-18 | End: 2021-01-08 | Stop reason: ALTCHOICE

## 2020-12-18 RX ORDER — TORSEMIDE 20 MG/1
TABLET ORAL
Qty: 90 TAB | Refills: 2 | Status: SHIPPED | OUTPATIENT
Start: 2020-12-18 | End: 2021-07-08

## 2020-12-18 RX ADMIN — OXYCODONE HYDROCHLORIDE AND ACETAMINOPHEN 2 TABLET: 5; 325 TABLET ORAL at 02:59

## 2020-12-18 NOTE — ED NOTES
Patient has been instructed that they have been given percocet* which contains opioids, benzodiazepines, or other sedating drugs. Patient is aware that they  will need to refrain from driving or operating heavy machinery after taking this medication. Patient also instructed that they need to avoid drinking alcohol and using other products containing opioids, benzodiazepines, or other sedating drugs. Patient verbalized understanding. Pt is getting a ride home.

## 2020-12-18 NOTE — ED NOTES
Discharge instructions were given to the patient by [address], RN. The patient left the Emergency Department ambulatory, alert and oriented and in no acute distress with 2 prescriptions. The patient was encouraged to call or return to the ED for worsening issues or problems and was encouraged to schedule a follow up appointment for continuing care. The patient verbalized understanding of discharge instructions and prescriptions, all questions were answered. The patient has no further concerns at this time.

## 2020-12-18 NOTE — ED NOTES
Pt resting in bed w/ daughter at pt's bedside. Pt appears to be in no distress, will continue to monitor pt.

## 2020-12-18 NOTE — ED TRIAGE NOTES
Patient presents to the ED by [facility] with c/o  Chronic right hip and knee pain that has gotten worse within the last month. Daughter stated the pain has gotten worse this week but so bad today she couldn't move at home. Denies trauma. Reports arthritis. Reports having a knee replacement. Reports taking tramadol today. Denies fever. Reports tingling in her right foot into her ankle since last Friday; pt did a telehealth with her PCP for this same issue; stated she was given flexeril. Pt went to an ortho doctor a couple weeks ago and they xrayed her feet. Stated they gave her a ankle brace to help with her pain; family member at bedside stated they recently took it off thinking that that might be causing her pain. Family member at bedside stated today the patient was pale, clammy and screaming in pain. Pt has some memory issues and is not sure about her medications or health history. Pt is alert to person, place and time. Pt is cooperative. Pt is placed in a gown and provided with a warm blanket. Pt has bilateral swelling in bilateral legs; pt stated the right ankle is more swollen than normal. pts right leg is tender to touch. Emergency Department Nursing Plan of Care       The Nursing Plan of Care is developed from the Nursing assessment and Emergency Department Attending provider initial evaluation. The plan of care may be reviewed in the ED Provider note.     The Plan of Care was developed with the following considerations:   Patient / Family readiness to learn indicated by:verbalized understanding  Persons(s) to be included in education: patient  Barriers to Learning/Limitations:No    Signed     Venkata Urena    12/17/2020   8:31 PM

## 2020-12-18 NOTE — DISCHARGE INSTRUCTIONS
Patient Education        Patient Education        Joint Pain: Care Instructions  Your Care Instructions     Many people have small aches and pains from overuse or injury to muscles and joints. Joint injuries often happen during sports or recreation, work tasks, or projects around the home. An overuse injury can happen when you put too much stress on a joint or when you do an activity that stresses the joint over and over, such as using the computer or rowing a boat. You can take action at home to help your muscles and joints get better. You should feel better in 1 to 2 weeks, but it can take 3 months or more to heal completely. Follow-up care is a key part of your treatment and safety. Be sure to make and go to all appointments, and call your doctor if you are having problems. It's also a good idea to know your test results and keep a list of the medicines you take. How can you care for yourself at home? · Do not put weight on the injured joint for at least a day or two. · For the first day or two after an injury, do not take hot showers or baths, and do not use hot packs. The heat could make swelling worse. · Put ice or a cold pack on the sore joint for 10 to 20 minutes at a time. Try to do this every 1 to 2 hours for the next 3 days (when you are awake) or until the swelling goes down. Put a thin cloth between the ice and your skin. · Wrap the injury in an elastic bandage. Do not wrap it too tightly because this can cause more swelling. · Prop up the sore joint on a pillow when you ice it or anytime you sit or lie down during the next 3 days. Try to keep it above the level of your heart. This will help reduce swelling. · Take an over-the-counter pain medicine, such as acetaminophen (Tylenol), ibuprofen (Advil, Motrin), or naproxen (Aleve). Read and follow all instructions on the label. · After 1 or 2 days of rest, begin moving the joint gently.  While the joint is still healing, you can begin to exercise using activities that do not strain or hurt the painful joint. When should you call for help? Call your doctor now or seek immediate medical care if:    · You have signs of infection, such as:  ? Increased pain, swelling, warmth, and redness. ? Red streaks leading from the joint. ? A fever. Watch closely for changes in your health, and be sure to contact your doctor if:    · Your movement or symptoms are not getting better after 1 to 2 weeks of home treatment. Where can you learn more? Go to http://www.gray.com/  Enter P205 in the search box to learn more about \"Joint Pain: Care Instructions. \"  Current as of: March 2, 2020               Content Version: 12.6  © 4698-5946 [employer]. Care instructions adapted under license by [employer] (which disclaims liability or warranty for this information). If you have questions about a medical condition or this instruction, always ask your healthcare professional. [place] [address] any warranty or liability for your use of this information. Hip Pain: Care Instructions  Your Care Instructions     Hip pain may be caused by many things, including overuse, a fall, or a twisting movement. Another cause of hip pain is arthritis. Your pain may increase when you stand up, walk, or squat. The pain may come and go or may be constant. Home treatment can help relieve hip pain, swelling, and stiffness. If your pain is ongoing, you may need more tests and treatment. Follow-up care is a key part of your treatment and safety. Be sure to make and go to all appointments, and call your doctor if you are having problems. It's also a good idea to know your test results and keep a list of the medicines you take. How can you care for yourself at home? · Take pain medicines exactly as directed. ? If the doctor gave you a prescription medicine for pain, take it as prescribed.   ? If you are not taking a prescription pain medicine, ask your doctor if you can take an over-the-counter medicine. · Rest and protect your hip. Take a break from any activity, including standing or walking, that may cause pain. · Put ice or a cold pack against your hip for 10 to 20 minutes at a time. Try to do this every 1 to 2 hours for the next 3 days (when you are awake) or until the swelling goes down. Put a thin cloth between the ice and your skin. · Sleep on your healthy side with a pillow between your knees, or sleep on your back with pillows under your knees. · If there is no swelling, you can put moist heat, a heating pad, or a warm cloth on your hip. Do gentle stretching exercises to help keep your hip flexible. · Learn how to prevent falls. Have your vision and hearing checked regularly. Wear slippers or shoes with a nonskid sole. · Stay at a healthy weight. · Wear comfortable shoes. When should you call for help? Call 911 anytime you think you may need emergency care. For example, call if:    · You have sudden chest pain and shortness of breath, or you cough up blood.     · You are not able to stand or walk or bear weight.     · Your buttocks, legs, or feet feel numb or tingly.     · Your leg or foot is cool or pale or changes color.     · You have severe pain. Call your doctor now or seek immediate medical care if:    · You have signs of infection, such as:  ? Increased pain, swelling, warmth, or redness in the hip area. ? Red streaks leading from the hip area. ? Pus draining from the hip area. ? A fever.     · You have signs of a blood clot, such as:  ? Pain in your calf, back of the knee, thigh, or groin. ? Redness and swelling in your leg or groin.     · You are not able to bend, straighten, or move your leg normally.     · You have trouble urinating or having bowel movements. Watch closely for changes in your health, and be sure to contact your doctor if:    · You do not get better as expected.    Where can you learn more? Go to http://www.gray.com/  Enter Z720 in the search box to learn more about \"Hip Pain: Care Instructions. \"  Current as of: June 26, 2019               Content Version: 12.6  © 0490-6626 [employer]. Care instructions adapted under license by [employer] (which disclaims liability or warranty for this information). If you have questions about a medical condition or this instruction, always ask your healthcare professional. [address] any warranty or liability for your use of this information. Patient Education        Knee Pain or Injury: Care Instructions  Your Care Instructions     Injuries are a common cause of knee problems. Sudden (acute) injuries may be caused by a direct blow to the knee. They can also be caused by abnormal twisting, bending, or falling on the knee. Pain, bruising, or swelling may be severe, and may start within minutes of the injury. Overuse is another cause of knee pain. Other causes are climbing stairs, kneeling, and other activities that use the knee. Everyday wear and tear, especially as you get older, also can cause knee pain. Rest, along with home treatment, often relieves pain and allows your knee to heal. If you have a serious knee injury, you may need tests and treatment. Follow-up care is a key part of your treatment and safety. Be sure to make and go to all appointments, and call your doctor if you are having problems. It's also a good idea to know your test results and keep a list of the medicines you take. How can you care for yourself at home? · Be safe with medicines. Read and follow all instructions on the label. ? If the doctor gave you a prescription medicine for pain, take it as prescribed. ? If you are not taking a prescription pain medicine, ask your doctor if you can take an over-the-counter medicine. · Rest and protect your knee.  Take a break from any activity that may cause pain. · Put ice or a cold pack on your knee for 10 to 20 minutes at a time. Put a thin cloth between the ice and your skin. · Prop up a sore knee on a pillow when you ice it or anytime you sit or lie down for the next 3 days. Try to keep it above the level of your heart. This will help reduce swelling. · If your knee is not swollen, you can put moist heat, a heating pad, or a warm cloth on your knee. · If your doctor recommends an elastic bandage, sleeve, or other type of support for your knee, wear it as directed. · Follow your doctor's instructions about how much weight you can put on your leg. Use a cane, crutches, or a walker as instructed. · Follow your doctor's instructions about activity during your healing process. If you can do mild exercise, slowly increase your activity. · Reach and stay at a healthy weight. Extra weight can strain the joints, especially the knees and hips, and make the pain worse. Losing even a few pounds may help. When should you call for help? Call 911 anytime you think you may need emergency care. For example, call if:    · You have symptoms of a blood clot in your lung (called a pulmonary embolism). These may include:  ? Sudden chest pain. ? Trouble breathing. ? Coughing up blood. Call your doctor now or seek immediate medical care if:    · You have severe or increasing pain.     · Your leg or foot turns cold or changes color.     · You cannot stand or put weight on your knee.     · Your knee looks twisted or bent out of shape.     · You cannot move your knee.     · You have signs of infection, such as:  ? Increased pain, swelling, warmth, or redness. ? Red streaks leading from the knee. ? Pus draining from a place on your knee. ? A fever.     · You have signs of a blood clot in your leg (called a deep vein thrombosis), such as:  ? Pain in your calf, back of the knee, thigh, or groin. ? Redness and swelling in your leg or groin.    Watch closely for changes in your health, and be sure to contact your doctor if:    · You have tingling, weakness, or numbness in your knee.     · You have any new symptoms, such as swelling.     · You have bruises from a knee injury that last longer than 2 weeks.     · You do not get better as expected. Where can you learn more? Go to http://www.gray.com/  Enter K195 in the search box to learn more about \"Knee Pain or Injury: Care Instructions. \"  Current as of: June 26, 2019               Content Version: 12.6  © 1185-9704 [employer]. Care instructions adapted under license by [employer] (which disclaims liability or warranty for this information). If you have questions about a medical condition or this instruction, always ask your healthcare professional. [address] any warranty or liability for your use of this information.

## 2020-12-18 NOTE — ED PROVIDER NOTES
61-year-old female with history of coronary artery disease, hypertension, high cholesterol,, chronic kidney disease stage III, arthritis, chronic pain presents via EMS with several weeks of right hip and knee pain which worsened today and this evening to the point that she could not walk. Pain is 8 out of 10. She has been previously prescribed Ultram for pain and did take it today but  still is very uncomfortable. Patient reports she has had bilateral knee replacements, and surgery on her cervical and lumbar spine. There are multiple notes in [facility] for outpatient visits, but patient does not usually come to the ED for her pain. Per granddaughter, who is a nurse, the patient called her to come get her mail b/c the pain prevented her from getting to the mailbox, and when the granddaughter came over she found her sitting in her chair looking very uncomfortable, wincing with any movement, and unable to get up, so fam called 911. The family took her to see an orthopedist on 12/4/2020 for bilateral foot pain. She was diagnosed with bilateral posterior tibialis tendinitis. There are notes in the chart from March of this year from her primary care doctor that document that she was unable to walk  secondary to severe knee pain at that time as well. Both patient and family deny any trauma or change in activity.            Past Medical History:   Diagnosis Date    Arthritis 3/25/2010    Back pain 3/26/2010    CAD (coronary artery disease)     Chest pain, unspecified     Chronic kidney failure, stage 3 (moderate) ([facility]) 12/5/2017    Chronic pain     Chronic pain of both knees 2/6/2018    Edema     Essential hypertension     High cholesterol 3/25/2010    HTN (hypertension) 3/25/2010    Kidney failure, acute ([facility] [address] 4/6/2012    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    Onychomycosis 8/19/2010    Other acute and subacute form of ischemic heart disease     Other and unspecified angina pectoris     Other ill-defined conditions(799.89)     excessive watering of right eye    Patient is full code 8/6/2018    Shortness of breath     Shoulder pain, bilateral 3/26/2010       Past Surgical History:   Procedure Laterality Date    HX HEART CATHETERIZATION      HX HEENT      bilat cateract extraction    HX HYSTERECTOMY      HX KNEE REPLACEMENT      HX LUMBAR FUSION      HX ORTHOPAEDIC      bilateral TKR    HX ORTHOPAEDIC      neck and back surgery    HX ORTHOPAEDIC      bilateral bunionectomy    HX ORTHOPAEDIC      bilateral cataracts removed    HX PTCA           Family History:   Problem Relation Age of Onset   Nolen Hypertension Mother     Stroke Mother     Other Mother         arthritis    Coronary Artery Disease Father     Heart Disease Father     Hypertension Sister     Diabetes Sister     Coronary Artery Disease Sister     Cancer Sister         lung    Coronary Artery Disease Brother     Cancer Brother         lung    Heart Disease Brother        Social History     Socioeconomic History    Marital status:      Spouse name: Not on file    Number of children: Not on file    Years of education: Not on file    Highest education level: Not on file   Occupational History    Not on file   Social Needs    Financial resource strain: Not on file    Food insecurity     Worry: Not on file     Inability: Not on file    Transportation needs     Medical: Not on file     Non-medical: Not on file   Tobacco Use    Smoking status: Never Smoker    Smokeless tobacco: Never Used   Substance and Sexual Activity    Alcohol use: Yes     Comment: occasional     Drug use: Yes     Types: Prescription, OTC    Sexual activity: Never   Lifestyle    Physical activity     Days per week: Not on file     Minutes per session: Not on file    Stress: Not on file   Relationships    Social connections     Talks on phone: Not on file     Gets together: Not on file     Attends Taoist service: Not on file     Active member of club or organization: Not on file     Attends meetings of clubs or organizations: Not on file     Relationship status: Not on file    Intimate partner violence     Fear of current or ex partner: Not on file     Emotionally abused: Not on file     Physically abused: Not on file     Forced sexual activity: Not on file   Other Topics Concern    Not on file   Social History Narrative    ** Merged History Encounter **              ALLERGIES: Diclofenac, Lisinopril, Lisinopril-hydrochlorothiazide, and Rosuvastatin    Review of Systems   Constitutional: Negative. Negative for chills, fever and unexpected weight change. HENT: Negative. Negative for congestion and trouble swallowing. Eyes: Negative for discharge. Respiratory: Negative. Negative for cough, chest tightness and shortness of breath. Cardiovascular: Negative. Negative for chest pain. Gastrointestinal: Negative. Negative for abdominal distention, abdominal pain, constipation, diarrhea and nausea. Endocrine: Negative. Genitourinary: Negative. Negative for difficulty urinating, dysuria, frequency and urgency. Musculoskeletal: Positive for arthralgias, back pain, gait problem and myalgias. Skin: Negative. Negative for color change. Allergic/Immunologic: Negative. Neurological: Negative for dizziness, speech difficulty and headaches. Hematological: Negative. Psychiatric/Behavioral: Negative. Negative for agitation and confusion. All other systems reviewed and are negative. There were no vitals filed for this visit. Physical Exam  Vitals signs and nursing note reviewed. Constitutional:       Appearance: She is well-developed. HENT:      Head: Normocephalic and atraumatic. Eyes:      Conjunctiva/sclera: Conjunctivae normal.   Neck:      Musculoskeletal: Neck supple. Cardiovascular:      Rate and Rhythm: Normal rate and regular rhythm.    Pulmonary:      Effort: Pulmonary effort is normal. No respiratory distress. Abdominal:      Palpations: Abdomen is soft. Tenderness: There is no abdominal tenderness. Musculoskeletal: Normal range of motion. General: No deformity. Comments: Tenderness to palpation of right paraspinal lumbar area, right hip, right knee. No visible deformity. No visible ecchymosis or erythema skin. Bilateral lower extremity edema. No calf tenderness. Skin:     General: Skin is warm and dry. Neurological:      Mental Status: She is alert and oriented to person, place, and time. Psychiatric:         Behavior: Behavior normal.         Thought Content: Thought content normal.          MDM  Number of Diagnoses or Management Options  Diagnosis management comments: DDx: Degenerative joint disease, arthritis, occult trauma, tendinitis, right-sided sciatica. Since patient is unable to ambulate, if her hip x-ray is negative we will need to do hip CT. ED Course as of Dec 18 0512   Thu Dec 17, 2020   2224 Patient feeling slightly better if she still, but still has significant pain with movement. Hesitant to give NSAID given kidney disease. Will dose steroid as anti-inflammatory. [SS]   Fri Dec 18, 2020   8772 Patient feeling significantly better. Results reviewed with patient and daughter. It seems that the oral prednisone in the ED helped her. Will DC home with prednisone Dosepak as well as analgesic. To follow-up with PCP and her spine specialist/orthopedist    [SS]      ED Course User Index  [SS] Marylen Maffucci, MD       Procedures    LABORATORY TESTS:  No results found for this or any previous visit (from the past 12 hour(s)). IMAGING RESULTS:  CT LOW EXT RT WO CONT   Final Result      XR KNEE RT 3 V   Final Result   IMPRESSION: No acute abnormality. XR SPINE LUMB 2 OR 3 V   Final Result   IMPRESSION:   No acute fracture or dislocation. XR HIP RT W OR WO PELV 2-3 VWS   Final Result   IMPRESSION:    No acute abnormality. MEDICATIONS GIVEN:  Medications   oxyCODONE-acetaminophen (PERCOCET) 5-325 mg per tablet 2 Tab (2 Tabs Oral Given 12/17/20 2026)   predniSONE (DELTASONE) tablet 60 mg (60 mg Oral Given 12/17/20 2237)   oxyCODONE-acetaminophen (PERCOCET) 5-325 mg per tablet 2 Tab (2 Tabs Oral Given 12/18/20 0259)       IMPRESSION:  1. Hip pain    2. Acute pain of right knee    3. Primary osteoarthritis involving multiple joints        PLAN:  1. Discharge Medication List as of 12/18/2020  2:56 AM      START taking these medications    Details   methylPREDNISolone (Medrol, Osman,) 4 mg tablet Take as instructed, Normal, Disp-1 Dose Pack, R-0      oxyCODONE-acetaminophen (Percocet) 5-325 mg per tablet Take 1 Tab by mouth every eight (8) hours as needed for Pain for up to 3 days. Max Daily Amount: 3 Tabs. Indications: pain, Normal, Disp-10 Tab, R-0         CONTINUE these medications which have NOT CHANGED    Details   traMADol-acetaminophen (ULTRACET) 37.5-325 mg per tablet Take 1 Tab by mouth every four (4) hours as needed for Pain., Historical Med      diclofenac EC (VOLTAREN) 75 mg EC tablet Take 75 mg by mouth., Historical Med      pravastatin (PRAVACHOL) 20 mg tablet Take 1 Tab by mouth nightly., Normal, Disp-90 Tab, R-3      atenoloL (TENORMIN) 50 mg tablet TAKE 1 TABLET EVERY DAY, Normal, Disp-90 Tab,R-0      NIFEdipine ER (ADALAT CC) 30 mg ER tablet TAKE 1 TABLET EVERY DAY  ON AN EMPTY STOMACH, Normal, Disp-90 Tab,R-2      Calcium-Cholecalciferol, D3, (Calcium 600 with Vitamin D3) 600 mg(1,500mg) -400 unit chew Take 600 mg by mouth two (2) times a day., Normal, Disp-180 Tab,R-3      potassium chloride (KLOR-CON) 10 mEq tablet TAKE 1 TABLET EVERY DAY, Normal, Disp-90 Tab,R-2      candesartan (ATACAND) 8 mg tablet TAKE 1 TABLET EVERY DAY, Normal, Disp-90 Tab,R-2      torsemide (DEMADEX) 20 mg tablet TAKE 1 TABLET EVERY DAY, Normal, Disp-90 Tab, R-2      ASPIR-LOW 81 mg tablet Take 81 mg by mouth daily. , Historical Med, NENO nitroglycerin (NITROSTAT) 0.4 mg SL tablet PLACE 1 TABLET UNDER THE TONGUE EVERY 5 MINS AS NEEDED FOR CHEST PAIN, Normal, Disp-25 Tab,R-11      OTHER Indications: RA Col-rite for constipation, Historical Med      hydrocortisone (ANUSOL-HC) 2.5 % rectal cream Insert  into rectum four (4) times daily. , Normal, Disp-30 g, R-0      simethicone (MYLICON) 80 mg chewable tablet Take 1 Tab by mouth every six (6) hours as needed for Flatulence., Normal, Disp-40 Tab, R-1      naloxone (NARCAN) 4 mg/actuation nasal spray Use 1 spray intranasally into 1 nostril. Use a new Narcan nasal spray for subsequent doses and administer into alternating nostrils. May repeat every 2 to 3 minutes as needed. , Print, Disp-2 Each, R-11      MISCELLANEOUS MEDICAL SUPPLY (COMPRESSION STOCKINGS) daily. Remove at bedtime, Historical Med           2.    Follow-up Information     Follow up With Specialties Details Why Contact Info    Crystal Santoyo MD Family Medicine Schedule an appointment as soon as possible for a visit  [address] [place] [address]       [facility] EMERGENCY DEPT Emergency Medicine  As needed, If symptoms worsen Andre Swift  [phone]        Return to ED if worse

## 2020-12-21 ENCOUNTER — PATIENT OUTREACH (OUTPATIENT)
Dept: CASE MANAGEMENT | Age: 85
End: 2020-12-21

## 2020-12-21 NOTE — PROGRESS NOTES
Patient contacted regarding recent discharge and COVID-19 risk. Discussed COVID-19 related testing which was not done at this time. Test results were not done. Patient informed of results, if available? yes    Outreach made within 2 business days of discharge: Yes    Care Transition Nurse/ Ambulatory Care Manager/ LPN Care Coordinator contacted the patient by telephone to perform post discharge assessment. Verified name and  with patient as identifiers. Patient has following risk factors of: chronic kidney disease and CAD, HTN, [id number]. CTN/ACM/LPN reviewed discharge instructions, medical action plan and red flags related to discharge diagnosis. Reviewed and educated them on any new and changed medications related to discharge diagnosis. Advised obtaining a 90-day supply of all daily and as-needed medications. Patient reports she has obtained her medications and is taking as prescribed. Advance Care Planning:   Does patient have an Advance Directive: currently not on file; education provided --Dick Hanna- daughter, is medical agent. Education provided regarding infection prevention, and signs and symptoms of COVID-19 and when to seek medical attention with patient who verbalized understanding. Discussed [facility] protocols [facility] for severe illness  and given an opportunity for questions and concerns. The patient agrees to contact the COVID-19 hotline [phone] or PCP office for questions related to their healthcare. CTN/ACM/LPN provided contact information for future reference. Patient declined 800#. Patient stated she will contact her PCP for follow up appointment and any questions or concerns. From CDC: Are you at higher risk for severe illness?  Wash your hands often.  Avoid close contact (6 feet, which is about two arm lengths) with people who are sick.    Put distance between yourself and other people if COVID-19 is spreading in your community.  Clean and disinfect frequently touched surfaces.  Avoid all cruise travel and non-essential air travel.  Call your healthcare professional if you have concerns about COVID-19 and your underlying condition or if you are sick. For more information on steps you can take to protect yourself, see CDC's How to Protect Yourself      Patient/family/caregiver given information for Rolo Delgado and agrees to enroll no  Patient's preferred e-mail:  n/a  Patient's preferred phone number: n/a    Plan to follow up in 14 days based on severity of symptoms and risk factors.  DMB

## 2021-01-01 DIAGNOSIS — E78.00 HIGH CHOLESTEROL: ICD-10-CM

## 2021-01-08 ENCOUNTER — OFFICE VISIT (OUTPATIENT)
Dept: FAMILY MEDICINE CLINIC | Age: 86
End: 2021-01-08
Payer: MEDICARE

## 2021-01-08 VITALS
RESPIRATION RATE: 16 BRPM | DIASTOLIC BLOOD PRESSURE: 62 MMHG | BODY MASS INDEX: 34.2 KG/M2 | TEMPERATURE: 96.8 F | HEART RATE: 67 BPM | HEIGHT: 65 IN | OXYGEN SATURATION: 96 % | WEIGHT: 205.3 LBS | SYSTOLIC BLOOD PRESSURE: 124 MMHG

## 2021-01-08 DIAGNOSIS — F11.288 OPIOID DEPENDENCE WITH OTHER OPIOID-INDUCED DISORDER (HCC): ICD-10-CM

## 2021-01-08 DIAGNOSIS — Z95.820 S/P ANGIOPLASTY WITH STENT: ICD-10-CM

## 2021-01-08 DIAGNOSIS — E66.01 SEVERE OBESITY (BMI 35.0-39.9) WITH COMORBIDITY (HCC): ICD-10-CM

## 2021-01-08 DIAGNOSIS — I25.10 CORONARY ARTERY DISEASE DUE TO LIPID RICH PLAQUE: ICD-10-CM

## 2021-01-08 DIAGNOSIS — I25.83 CORONARY ARTERY DISEASE DUE TO LIPID RICH PLAQUE: ICD-10-CM

## 2021-01-08 PROCEDURE — 1101F PT FALLS ASSESS-DOCD LE1/YR: CPT | Performed by: FAMILY MEDICINE

## 2021-01-08 PROCEDURE — 1090F PRES/ABSN URINE INCON ASSESS: CPT | Performed by: FAMILY MEDICINE

## 2021-01-08 PROCEDURE — G8427 DOCREV CUR MEDS BY ELIG CLIN: HCPCS | Performed by: FAMILY MEDICINE

## 2021-01-08 PROCEDURE — G8536 NO DOC ELDER MAL SCRN: HCPCS | Performed by: FAMILY MEDICINE

## 2021-01-08 PROCEDURE — 99214 OFFICE O/P EST MOD 30 MIN: CPT | Performed by: FAMILY MEDICINE

## 2021-01-08 PROCEDURE — G8510 SCR DEP NEG, NO PLAN REQD: HCPCS | Performed by: FAMILY MEDICINE

## 2021-01-08 PROCEDURE — G8417 CALC BMI ABV UP PARAM F/U: HCPCS | Performed by: FAMILY MEDICINE

## 2021-01-08 NOTE — PROGRESS NOTES
HISTORY OF PRESENT ILLNESS  Sophy Groves is a 80 y.o. female. Chronic upper back, knees shoulders, r  pain The patient's pain has been an ongoing struggling concerning problem, present for refills, never abused any prescribed meds, currently on mobility device unable to stands secondary to severe knee pain,  no hx of illicit nor etoh abuse, the pain has been bothering the pt for many yrs, pt aware that there are no other alternative approach for the current pain that exist except for the available opioid based meds with their huge addictive properties,   Patient states that the current dosage of the meds given, not strong enough, but it is helping,   Zero count,  Pt is compliant with the current medications, and take it as directed,  the pt capable of doing things specially the activity of the daily living,   Pt's pain persist without medication, is a chronic condition,   keeps meds at a safe place, on stool softener, 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 Moderate degenerative changes present in the pubic symphysis. There   is mild right hip osteoarthritis. Prominent enthesophytes are seen adjacent to   the greater tuberosity no acute fracture or dislocation. No evidence for   reversible osseous lesion. Seen the ortho and doing PT,   HTN  Today pt present for Bp check and++= Compliancy w/ the bp meds, having had the low salt diet ,  has been active, patien does not obtain the bp at home ,, today the pt denies Chest Pain, has no legs swelling no lightheadedness,      Current Outpatient Medications   Medication Sig Dispense Refill    atenoloL (TENORMIN) 50 mg tablet TAKE 1 TABLET EVERY DAY 90 Tab 2    candesartan (ATACAND) 8 mg tablet TAKE 1 TABLET EVERY DAY 90 Tab 2    traMADoL (ULTRAM) 50 mg tablet Take 1 Tab by mouth every eight (8) hours as needed for Pain for up to 30 days.  Max Daily Amount: 150 mg. 90 Tab 1    torsemide (DEMADEX) 20 mg tablet TAKE 1 TABLET EVERY DAY 90 Tab 2    pravastatin (PRAVACHOL) 20 mg tablet Take 1 Tab by mouth nightly. 90 Tab 3    nitroglycerin (NITROSTAT) 0.4 mg SL tablet PLACE 1 TABLET UNDER THE TONGUE EVERY 5 MINS AS NEEDED FOR CHEST PAIN 25 Tab 11    NIFEdipine ER (ADALAT CC) 30 mg ER tablet TAKE 1 TABLET EVERY DAY  ON AN EMPTY STOMACH 90 Tab 2    Calcium-Cholecalciferol, D3, (Calcium 600 with Vitamin D3) 600 mg(1,500mg) -400 unit chew Take 600 mg by mouth two (2) times a day. 180 Tab 3    potassium chloride (KLOR-CON) 10 mEq tablet TAKE 1 TABLET EVERY DAY 90 Tab 2    ASPIR-LOW 81 mg tablet Take 81 mg by mouth daily.  MISCELLANEOUS MEDICAL SUPPLY (COMPRESSION STOCKINGS) daily. Remove at bedtime      diclofenac EC (VOLTAREN) 75 mg EC tablet Take 75 mg by mouth.  OTHER Indications: RA Col-rite for constipation      hydrocortisone (ANUSOL-HC) 2.5 % rectal cream Insert  into rectum four (4) times daily. (Patient not taking: Reported on 11/19/2019) 30 g 0    simethicone (MYLICON) 80 mg chewable tablet Take 1 Tab by mouth every six (6) hours as needed for Flatulence. 40 Tab 1    naloxone (NARCAN) 4 mg/actuation nasal spray Use 1 spray intranasally into 1 nostril. Use a new Narcan nasal spray for subsequent doses and administer into alternating nostrils. May repeat every 2 to 3 minutes as needed.  2 Each 11     Allergies   Allergen Reactions    Diclofenac Unknown (comments)    Lisinopril Cough    Lisinopril-Hydrochlorothiazide Unknown (comments)    Rosuvastatin Diarrhea and Other (comments)     Abdominal pain      Past Medical History:   Diagnosis Date    Arthritis 3/25/2010    Back pain 3/26/2010    CAD (coronary artery disease)     Chest pain, unspecified     Chronic kidney failure, stage 3 (moderate) 12/5/2017    Chronic pain     Chronic pain of both knees 2/6/2018    Edema     Essential hypertension     High cholesterol 3/25/2010    HTN (hypertension) 3/25/2010   • Kidney failure, acute (HCC) 4/6/2012   • 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   • Onychomycosis 8/19/2010   • Other acute and subacute form of ischemic heart disease    • Other and unspecified angina pectoris    • Other ill-defined conditions(799.89)     excessive watering of right eye   • Patient is full code 8/6/2018   • Shortness of breath    • Shoulder pain, bilateral 3/26/2010     Past Surgical History:   Procedure Laterality Date   • HX HEART CATHETERIZATION     • HX HEENT      bilat cateract extraction   • HX HYSTERECTOMY     • HX KNEE REPLACEMENT     • HX LUMBAR FUSION     • HX ORTHOPAEDIC      bilateral TKR   • HX ORTHOPAEDIC      neck and back surgery   • HX ORTHOPAEDIC      bilateral bunionectomy   • HX ORTHOPAEDIC      bilateral cataracts removed   • HX PTCA       Family History   Problem Relation Age of Onset   • Hypertension Mother    • Stroke Mother    • Other Mother         arthritis   • Coronary Artery Disease Father    • Heart Disease Father    • Hypertension Sister    • Diabetes Sister    • Coronary Artery Disease Sister    • Cancer Sister         lung   • Coronary Artery Disease Brother    • Cancer Brother         lung   • Heart Disease Brother      Social History     Tobacco Use   • Smoking status: Never Smoker   • Smokeless tobacco: Never Used   Substance Use Topics   • Alcohol use: Yes     Comment: occasional       Lab Results   Component Value Date/Time    WBC 6.3 09/11/2020 10:48 AM    HGB 13.9 09/11/2020 10:48 AM    HCT 41.5 09/11/2020 10:48 AM    PLATELET 253 09/11/2020 10:48 AM    MCV 90 09/11/2020 10:48 AM     Lab Results   Component Value Date/Time    TSH 1.980 09/11/2020 10:48 AM    TSH 1.190 02/04/2019 09:22 AM         Review of Systems   Constitutional: Negative for chills and fever.   HENT: Negative for congestion and nosebleeds.    Eyes: Negative for blurred vision, pain and discharge.   Respiratory: Negative for cough, shortness of breath and  wheezing. Cardiovascular: Negative for chest pain and leg swelling. Gastrointestinal: Negative for constipation, diarrhea, nausea and vomiting. Genitourinary: Negative for dysuria and frequency. Musculoskeletal: Positive for back pain. Negative for joint pain and myalgias. Skin: Negative for itching and rash. Neurological: Positive for weakness. Negative for dizziness, loss of consciousness and headaches. Psychiatric/Behavioral: Negative for depression. The patient is not nervous/anxious and does not have insomnia. Physical Exam  Vitals signs and nursing note reviewed. Constitutional:       Appearance: She is well-developed. HENT:      Head: Normocephalic and atraumatic. Eyes:      Conjunctiva/sclera: Conjunctivae normal.      Pupils: Pupils are equal, round, and reactive to light. Neck:      Musculoskeletal: Normal range of motion and neck supple. Thyroid: No thyromegaly. Vascular: No JVD. Cardiovascular:      Rate and Rhythm: Normal rate and regular rhythm. Heart sounds: Normal heart sounds. No murmur. No friction rub. No gallop. Pulmonary:      Effort: Pulmonary effort is normal. No respiratory distress. Breath sounds: Normal breath sounds. No stridor. No wheezing or rales. Abdominal:      General: Bowel sounds are normal. There is no distension. Palpations: Abdomen is soft. There is no mass. Tenderness: There is no abdominal tenderness. Musculoskeletal: Normal range of motion. General: Tenderness present. Right knee: She exhibits swelling and deformity. Tenderness found. Left knee: She exhibits swelling and bony tenderness. Tenderness found. Thoracic back: She exhibits tenderness, pain and spasm. Lumbar back: She exhibits tenderness, pain and spasm. Lymphadenopathy:      Cervical: No cervical adenopathy. Skin:     Findings: No erythema or rash.    Neurological:      Mental Status: She is alert and oriented to person, place, and time. Cranial Nerves: No cranial nerve deficit. Deep Tendon Reflexes: Reflexes are normal and symmetric. Psychiatric:         Behavior: Behavior normal.         ASSESSMENT and PLAN  Diagnoses and all orders for this visit:    1. Coronary artery disease due to lipid rich plaque  -     atenoloL (TENORMIN) 50 mg tablet; TAKE 1 TABLET EVERY DAY  -     traMADoL (ULTRAM) 50 mg tablet; Take 1 Tab by mouth every eight (8) hours as needed for Pain for up to 30 days. Max Daily Amount: 150 mg.    2. S/P angioplasty with stent--RCA 6/12  -     atenoloL (TENORMIN) 50 mg tablet; TAKE 1 TABLET EVERY DAY  -     traMADoL (ULTRAM) 50 mg tablet; Take 1 Tab by mouth every eight (8) hours as needed for Pain for up to 30 days. Max Daily Amount: 150 mg.    3. Opioid dependence with other opioid-induced disorder (HCC)  -     traMADoL (ULTRAM) 50 mg tablet; Take 1 Tab by mouth every eight (8) hours as needed for Pain for up to 30 days. Max Daily Amount: 150 mg.    4. Type II diabetes mellitus with complication, uncontrolled (HCC)  -     traMADoL (ULTRAM) 50 mg tablet; Take 1 Tab by mouth every eight (8) hours as needed for Pain for up to 30 days. Max Daily Amount: 150 mg.    5. Severe obesity (BMI 35.0-39. 9) with comorbidity (HCC)  -     traMADoL (ULTRAM) 50 mg tablet; Take 1 Tab by mouth every eight (8) hours as needed for Pain for up to 30 days. Max Daily Amount: 150 mg.     Other orders  -     candesartan (ATACAND) 8 mg tablet; TAKE 1 TABLET EVERY DAY

## 2021-01-08 NOTE — PROGRESS NOTES
Chief Complaint   Patient presents with   [facility] ED Follow-up     joint pain      1. Have you been to the ER, urgent care clinic since your last visit? Hospitalized since your last visit? Yes When: 12/17/20 Where: [facility] Reason for visit: joint pain    2. Have you seen or consulted any other health care providers outside of the [address] [place] [address] since your last visit? Include any pap smears or colon screening. Yes When: 12/24/20 Where: [facility] Reason for visit: foot pain     Verified patient with two type of identifiers.   seen at on 12/17/20 for multiple joint pain , unable to bear weight to right leg,  Denies injury , some swelling to right knee , advised to follow up with PCP

## 2021-01-11 ENCOUNTER — PATIENT OUTREACH (OUTPATIENT)
Dept: CASE MANAGEMENT | Age: 86
End: 2021-01-11

## 2021-01-11 NOTE — PROGRESS NOTES
Patient resolved from [address] Transitions episode on 21. Discussed COVID-19 related testing which was not done at this time. Test results were not done. Patient informed of results, if available? yes   ACM spoke with daughter Lorie Tran patient name  verified. Patient/family has been provided the following resources and education related to COVID-19:                         Signs, symptoms and red flags related to COVID-19            CDC exposure and quarantine guidelines            [facility] exposure contact - [phone]            Contact for their local Department of Health                 Patient currently reports that the following symptoms have improved:  pain or aching joints and hip pain. No further outreach scheduled with this CTN/ACM/LPN/HC/ MA. Episode of Care resolved. Patient has this CTN/ACM/LPN/HC/MA contact information if future needs arise. SHARON

## 2021-01-12 RX ORDER — ATENOLOL 50 MG/1
TABLET ORAL
Qty: 90 TAB | Refills: 2 | Status: SHIPPED | OUTPATIENT
Start: 2021-01-12 | End: 2021-10-28

## 2021-01-12 RX ORDER — CANDESARTAN 8 MG/1
TABLET ORAL
Qty: 90 TAB | Refills: 2 | Status: SHIPPED | OUTPATIENT
Start: 2021-01-12 | End: 2021-08-27

## 2021-01-12 RX ORDER — TRAMADOL HYDROCHLORIDE 50 MG/1
50 TABLET ORAL
Qty: 90 TAB | Refills: 1 | Status: SHIPPED | OUTPATIENT
Start: 2021-01-12 | End: 2021-05-07 | Stop reason: SDUPTHER

## 2021-02-24 DIAGNOSIS — M15.9 PRIMARY OSTEOARTHRITIS INVOLVING MULTIPLE JOINTS: ICD-10-CM

## 2021-02-24 DIAGNOSIS — I10 ESSENTIAL HYPERTENSION: ICD-10-CM

## 2021-02-24 DIAGNOSIS — M25.562 CHRONIC PAIN OF BOTH KNEES: ICD-10-CM

## 2021-02-24 DIAGNOSIS — M25.512 CHRONIC PAIN OF BOTH SHOULDERS: ICD-10-CM

## 2021-02-24 DIAGNOSIS — R60.0 BILATERAL EDEMA OF LOWER EXTREMITY: ICD-10-CM

## 2021-02-24 DIAGNOSIS — M25.561 CHRONIC PAIN OF BOTH KNEES: ICD-10-CM

## 2021-02-24 DIAGNOSIS — M25.511 CHRONIC PAIN OF BOTH SHOULDERS: ICD-10-CM

## 2021-02-24 DIAGNOSIS — G89.29 CHRONIC PAIN OF BOTH SHOULDERS: ICD-10-CM

## 2021-02-24 DIAGNOSIS — G89.29 CHRONIC PAIN OF BOTH KNEES: ICD-10-CM

## 2021-02-25 RX ORDER — POTASSIUM CHLORIDE 750 MG/1
TABLET, EXTENDED RELEASE ORAL
Qty: 90 TAB | Refills: 2 | Status: SHIPPED | OUTPATIENT
Start: 2021-02-25 | End: 2021-10-28

## 2021-03-11 NOTE — PROGRESS NOTES
[facility] @ [address], [place]  Subjective/HPI:     Karine Alvarez is a 80 y.o. female history of atherosclerotic heart disease. 2012 PTCA stenting of the mid RCA, hypertension, hyperlipidemia, chronic kidney disease is here for routine f/u. The patient denies chest pain/ shortness of breath, orthopnea, PND,palpitations, syncope, presyncope or fatigue. Patient reports feeling well in her usual state of health denies any limiting symptoms. We had discussed her soft blood pressure this morning and she does admit to an occasional dizziness with positional change, she has lost a few pounds. He has chronic lower extremity nonpitting edema does well with low-dose torsemide. 9/2020 Visit  1. Atherosclerotic heart disease: History of PTCA stenting 2012 mid RCA reporting MURILLO will evaluate with ERVIN NST and 2D ECHO  2. Hypertension: Controlled 121/71  3. Hyperlipidemia: 5/19 LDL 93 on pravastatin 10 mg repeat labs possible up titration for goal LDL of 70  4. CKD: 2019 GFR 40  5. Type II DM: Will check A1c  6. Chronic bilateral lower extremity edema response to diuresis: We will check proBNP. Continue torsemide  Follow-up in 6 months unless abnormal nuclear stress test or echocardiogram.  51-year-old female presents for routine follow-up visit reporting dyspnea on exertion with known history of atherosclerotic heart disease no ischemic evaluation in greater than 5 years. Continue current medications proceed with stress test and echocardiogram.  We will have blood work drawn including for diabetes, ready for primary care review. ECHO 9/2020  Left Ventricle Normal cavity size and systolic function (ejection fraction normal). Mild concentric hypertrophy. Wall motion: normal. The estimated EF is 55 - 60%. Visually measured ejection fraction. Left Atrium Normal cavity size. Right Ventricle Normal cavity size and global systolic function.    Right Atrium Normal cavity size. Aortic Valve Normal valve structure, no stenosis and no regurgitation. Mitral Valve Normal valve structure, no stenosis and no regurgitation. Tricuspid Valve Normal valve structure, no stenosis and no regurgitation. Pulmonic Valve Pulmonic valve not well visualized. Aorta Normal aortic root. Pericardium No evidence of pericardial effusion. Nuclear Stress Test 9/2020  IMPRESSION: No ischemia demonstrated. No infarct visible. LVEF 70%.       Cardiac Cath 6/2012  SUMMARY:     --  CARDIAC STRUCTURES:   --  Global left ventricular function was normal.     --  CORONARY CIRCULATION:   --  Mid RCA: There was a tubular 95 % stenosis. There was BAN grade 2   flow through the vessel (partial perfusion). --  1ST LESION INTERVENTIONS:   --  A successful drug-eluting stent was performed on the 95 % lesion in   the mid RCA. Following intervention there was an excellent angiographic   appearance with a 0 % residual stenosis. --  Balloon angioplasty was   performed for post dilatation, using a NC Trek RX 3.5x8mm balloon, with 2   inflations and a maximum inflation pressure of 12 brenda.  --  A 3.5 x 18   Resolute drug-eluting stent was placed across the lesion and successfully   deployed at a maximum inflation pressure of 12 brenda.        PCP Provider  Doug Landa MD  Past Medical History:   Diagnosis Date    Arthritis 3/25/2010    Back pain 3/26/2010    CAD (coronary artery disease)     Chest pain, unspecified     Chronic kidney failure, stage 3 (moderate) 12/5/2017    Chronic pain     Chronic pain of both knees 2/6/2018    Edema     Essential hypertension     High cholesterol 3/25/2010    HTN (hypertension) 3/25/2010    Kidney failure, acute ([facility] [address] 4/6/2012    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    Onychomycosis 8/19/2010    Other acute and subacute form of ischemic heart disease     Other and unspecified angina pectoris     Other ill-defined conditions(799.89) excessive watering of right eye    Patient is full code 8/6/2018    Shortness of breath     Shoulder pain, bilateral 3/26/2010      Past Surgical History:   Procedure Laterality Date    HX HEART CATHETERIZATION      HX HEENT      bilat cateract extraction    HX HYSTERECTOMY      HX KNEE REPLACEMENT      HX LUMBAR FUSION      HX ORTHOPAEDIC      bilateral TKR    HX ORTHOPAEDIC      neck and back surgery    HX ORTHOPAEDIC      bilateral bunionectomy    HX ORTHOPAEDIC      bilateral cataracts removed    HX PTCA       Allergies   Allergen Reactions    Diclofenac Unknown (comments)    Lisinopril Cough    Lisinopril-Hydrochlorothiazide Unknown (comments)    Rosuvastatin Diarrhea and Other (comments)     Abdominal pain       Family History   Problem Relation Age of Onset    Hypertension Mother     Stroke Mother     Other Mother         arthritis    Coronary Artery Disease Father     Heart Disease Father     Hypertension Sister     Diabetes Sister     Coronary Artery Disease Sister     Cancer Sister         lung    Coronary Artery Disease Brother     Cancer Brother         lung    Heart Disease Brother       Current Outpatient Medications   Medication Sig    potassium chloride (KLOR-CON) 10 mEq tablet TAKE 1 TABLET EVERY DAY    atenoloL (TENORMIN) 50 mg tablet TAKE 1 TABLET EVERY DAY    candesartan (ATACAND) 8 mg tablet TAKE 1 TABLET EVERY DAY    torsemide (DEMADEX) 20 mg tablet TAKE 1 TABLET EVERY DAY    diclofenac EC (VOLTAREN) 75 mg EC tablet Take 75 mg by mouth.  pravastatin (PRAVACHOL) 20 mg tablet Take 1 Tab by mouth nightly.  nitroglycerin (NITROSTAT) 0.4 mg SL tablet PLACE 1 TABLET UNDER THE TONGUE EVERY 5 MINS AS NEEDED FOR CHEST PAIN    NIFEdipine ER (ADALAT CC) 30 mg ER tablet TAKE 1 TABLET EVERY DAY  ON AN EMPTY STOMACH    Calcium-Cholecalciferol, D3, (Calcium 600 with Vitamin D3) 600 mg(1,500mg) -400 unit chew Take 600 mg by mouth two (2) times a day.     OTHER Indications: RA Col-rite for constipation    hydrocortisone (ANUSOL-HC) 2.5 % rectal cream Insert  into rectum four (4) times daily. (Patient not taking: Reported on 11/19/2019)    simethicone (MYLICON) 80 mg chewable tablet Take 1 Tab by mouth every six (6) hours as needed for Flatulence.  ASPIR-LOW 81 mg tablet Take 81 mg by mouth daily.  naloxone (NARCAN) 4 mg/actuation nasal spray Use 1 spray intranasally into 1 nostril. Use a new Narcan nasal spray for subsequent doses and administer into alternating nostrils. May repeat every 2 to 3 minutes as needed.  MISCELLANEOUS MEDICAL SUPPLY (COMPRESSION STOCKINGS) daily. Remove at bedtime     No current facility-administered medications for this visit. There were no vitals filed for this visit.   Social History     Socioeconomic History    Marital status:      Spouse name: Not on file    Number of children: Not on file    Years of education: Not on file    Highest education level: Not on file   Occupational History    Not on file   Social Needs    Financial resource strain: Not on file    Food insecurity     Worry: Not on file     Inability: Not on file    Transportation needs     Medical: Not on file     Non-medical: Not on file   Tobacco Use    Smoking status: Never Smoker    Smokeless tobacco: Never Used   Substance and Sexual Activity    Alcohol use: Yes     Comment: occasional     Drug use: Yes     Types: Prescription, OTC    Sexual activity: Never   Lifestyle    Physical activity     Days per week: Not on file     Minutes per session: Not on file    Stress: Not on file   Relationships    Social connections     Talks on phone: Not on file     Gets together: Not on file     Attends Mandaen service: Not on file     Active member of club or organization: Not on file     Attends meetings of clubs or organizations: Not on file     Relationship status: Not on file    Intimate partner violence     Fear of current or ex partner: Not on file     Emotionally abused: Not on file     Physically abused: Not on file     Forced sexual activity: Not on file   Other Topics Concern    Not on file   Social History Narrative    ** Merged History Encounter **            I have reviewed the nurses notes, vitals, problem list, allergy list, medical history, family, social history and medications. Review of Symptoms  11 systems reviewed, negative other than as stated in the HPI      Physical Exam:      General: Well developed, in no acute distress, cooperative and alert  HEENT: No carotid bruits, no JVD, trach is midline. Neck Supple, PERRL, EOM intact. Heart:  Normal S1/S2 negative S3 or S4. Regular, no murmur, gallop or rub. Respiratory: Clear bilaterally x 4, no wheezing or rales  Abdomen:   Soft, non-tender, no masses, bowel sounds are active. Extremities: +2 nonpitting bilateral ankle edema, normal cap refill, no cyanosis, atraumatic. Neuro: A&Ox3, speech clear, gait slow and cautious assisted with rolling walker  Skin: Skin color is normal. No rashes or lesions.  Non diaphoretic  Vascular: 2+ pulses symmetric in lateral radial pulses    Cardiographics    ECG: This rhythm with first-degree heart block        Cardiology Labs:  Lab Results   Component Value Date/Time    Cholesterol, total 280 (H) 09/11/2020 10:48 AM    HDL Cholesterol 66 09/11/2020 10:48 AM    LDL, calculated 189 (H) 09/11/2020 10:48 AM    LDL, calculated 93 09/09/2019 11:32 AM    Triglyceride 138 09/11/2020 10:48 AM    CHOL/HDL Ratio 4.5 08/19/2010 12:27 PM       Lab Results   Component Value Date/Time    Sodium 143 09/11/2020 10:48 AM    Potassium 4.2 09/11/2020 10:48 AM    Chloride 105 09/11/2020 10:48 AM    CO2 24 09/11/2020 10:48 AM    Anion gap 7 06/07/2012 03:00 AM    Glucose 102 (H) 09/11/2020 10:48 AM    BUN 33 (H) 09/11/2020 10:48 AM    Creatinine 1.46 (H) 09/11/2020 10:48 AM    BUN/Creatinine ratio 23 09/11/2020 10:48 AM    GFR est AA 37 (L) 09/11/2020 10:48 AM GFR est non-AA 32 (L) 09/11/2020 10:48 AM    Calcium 10.0 09/11/2020 10:48 AM    Bilirubin, total 0.4 09/11/2020 10:48 AM    Alk. phosphatase 75 09/11/2020 10:48 AM    Protein, total 7.1 09/11/2020 10:48 AM    Albumin 4.5 09/11/2020 10:48 AM    Globulin 4.0 11/12/2011 11:03 AM    A-G Ratio 1.7 09/11/2020 10:48 AM    ALT (SGPT) 10 09/11/2020 10:48 AM           Assessment:     Assessment:     Diagnoses and all orders for this visit:    1. Coronary artery disease involving native coronary artery of native heart without angina pectoris    2. Essential hypertension, benign    3. Chronic kidney failure, stage 3 (moderate)    4. S/P angioplasty with stent--RCA 6/12    5. Type 2 diabetes mellitus with nephropathy (HCC)    6. Stage 3 chronic kidney disease, unspecified whether stage 3a or 3b CKD        ICD-10-CM ICD-9-CM    1. Coronary artery disease involving native coronary artery of native heart without angina pectoris  I25.10 414.01    2. Essential hypertension, benign  I10 401.1    3. Chronic kidney failure, stage 3 (moderate)  N18.30 585.3    4. S/P angioplasty with stent--RCA 6/12  Z95.820 V45.89    5. Type 2 diabetes mellitus with nephropathy (HCC)  E11.21 250.40      583.81    6. Stage 3 chronic kidney disease, unspecified whether stage 3a or 3b CKD  N18.30 585.3      No orders of the defined types were placed in this encounter. Plan:     1. Atherosclerotic heart disease: History of PTCA stenting 2012 mid RCA , nuclear stress test negative for ischemia 9/2020. Normal ejection fraction on echo, asymptomatic of CAD today. 2.  Hypertension:  Mildly hypotensive 96/58 rechecking with manual cuff 108/60. Patient had reported some intermittent lightheadedness with positional change will discontinue nifedipine  3. Hyperlipidemia: 9/11/2020  patient placed on Crestor, patient sent to [facility] lab after this visit for repeat lipid panel  4. CKD:  Stage IIIb, GFR 37 creatinine 1.46, repeat CMP today  5. Type II DM: 5.8% 9/11/2020  6. Chronic bilateral lower extremity edema response to diuresis:  Continue torsemide, will also discontinue nifedipine which can also contribute to her lower extremity edema  Stable from cardiac standpoint follow-up in 6 months. Pino Rivers NP      Please note that this dictation was completed with [employer], the computer voice recognition software. Quite often unanticipated grammatical, syntax, homophones, and other interpretive errors are inadvertently transcribed by the computer software. Please disregard these errors. Please excuse any errors that have escaped final proofreading. Thank you.

## 2021-03-12 ENCOUNTER — OFFICE VISIT (OUTPATIENT)
Dept: CARDIOLOGY CLINIC | Age: 86
End: 2021-03-12
Payer: MEDICARE

## 2021-03-12 VITALS
TEMPERATURE: 97.8 F | OXYGEN SATURATION: 98 % | WEIGHT: 199.2 LBS | SYSTOLIC BLOOD PRESSURE: 96 MMHG | DIASTOLIC BLOOD PRESSURE: 59 MMHG | BODY MASS INDEX: 33.19 KG/M2 | HEART RATE: 66 BPM | RESPIRATION RATE: 16 BRPM | HEIGHT: 65 IN

## 2021-03-12 DIAGNOSIS — N18.30 CHRONIC KIDNEY FAILURE, STAGE 3 (MODERATE) (HCC): ICD-10-CM

## 2021-03-12 DIAGNOSIS — I25.10 CORONARY ARTERY DISEASE INVOLVING NATIVE CORONARY ARTERY OF NATIVE HEART WITHOUT ANGINA PECTORIS: Primary | ICD-10-CM

## 2021-03-12 DIAGNOSIS — N18.30 STAGE 3 CHRONIC KIDNEY DISEASE, UNSPECIFIED WHETHER STAGE 3A OR 3B CKD (HCC): ICD-10-CM

## 2021-03-12 DIAGNOSIS — Z95.820 S/P ANGIOPLASTY WITH STENT: ICD-10-CM

## 2021-03-12 DIAGNOSIS — I10 ESSENTIAL HYPERTENSION, BENIGN: ICD-10-CM

## 2021-03-12 DIAGNOSIS — E11.21 TYPE 2 DIABETES MELLITUS WITH NEPHROPATHY (HCC): ICD-10-CM

## 2021-03-12 PROCEDURE — 1090F PRES/ABSN URINE INCON ASSESS: CPT | Performed by: NURSE PRACTITIONER

## 2021-03-12 PROCEDURE — G8427 DOCREV CUR MEDS BY ELIG CLIN: HCPCS | Performed by: NURSE PRACTITIONER

## 2021-03-12 PROCEDURE — 93000 ELECTROCARDIOGRAM COMPLETE: CPT | Performed by: NURSE PRACTITIONER

## 2021-03-12 PROCEDURE — 1101F PT FALLS ASSESS-DOCD LE1/YR: CPT | Performed by: NURSE PRACTITIONER

## 2021-03-12 PROCEDURE — G8536 NO DOC ELDER MAL SCRN: HCPCS | Performed by: NURSE PRACTITIONER

## 2021-03-12 PROCEDURE — 99214 OFFICE O/P EST MOD 30 MIN: CPT | Performed by: NURSE PRACTITIONER

## 2021-03-12 PROCEDURE — G8417 CALC BMI ABV UP PARAM F/U: HCPCS | Performed by: NURSE PRACTITIONER

## 2021-03-12 PROCEDURE — G8432 DEP SCR NOT DOC, RNG: HCPCS | Performed by: NURSE PRACTITIONER

## 2021-03-12 RX ORDER — TRAMADOL HYDROCHLORIDE 50 MG/1
50 TABLET ORAL
COMMUNITY
End: 2021-05-07 | Stop reason: SDUPTHER

## 2021-03-12 NOTE — PROGRESS NOTES
Identified pt with two pt identifiers(name and ). Reviewed record in preparation for visit and have obtained necessary documentation. Chief Complaint   Patient presents with    Follow-up    Coronary Artery Disease    Hypertension           Visit Vitals  BP (!) 96/59 (BP 1 Location: Left upper arm, BP Patient Position: Sitting, BP Cuff Size: Large adult)   Pulse 66   Temp 97.8 °F (36.6 °C) (Temporal)   Resp 16   Ht 5' 5\" (1.651 m)   Wt 199 lb 3.2 oz (90.4 kg)   SpO2 98%   BMI 33.15 kg/m²     Pain Scale: 0 - No pain/10    Coordination of Care Questionnaire:  :   1. Have you been to the ER, urgent care clinic since your last visit? Hospitalized since your last visit?2020 [facility] ER for leg pain    2. Have you seen or consulted any other health care providers outside of the [address] [place] [address] since your last visit? Include any pap smears or colon screening.  No

## 2021-03-30 RX ORDER — EZETIMIBE 10 MG/1
10 TABLET ORAL DAILY
Qty: 90 TAB | Refills: 3 | Status: SHIPPED | OUTPATIENT
Start: 2021-03-30 | End: 2021-12-22

## 2021-04-29 RX ORDER — NIFEDIPINE 30 MG/1
TABLET, FILM COATED, EXTENDED RELEASE ORAL
Qty: 90 TAB | Refills: 3 | Status: SHIPPED | OUTPATIENT
Start: 2021-04-29 | End: 2021-09-17 | Stop reason: SINTOL

## 2021-05-07 ENCOUNTER — OFFICE VISIT (OUTPATIENT)
Dept: FAMILY MEDICINE CLINIC | Age: 86
End: 2021-05-07
Payer: MEDICARE

## 2021-05-07 VITALS
TEMPERATURE: 97.5 F | SYSTOLIC BLOOD PRESSURE: 143 MMHG | HEART RATE: 74 BPM | HEIGHT: 65 IN | RESPIRATION RATE: 18 BRPM | BODY MASS INDEX: 32.96 KG/M2 | WEIGHT: 197.8 LBS | DIASTOLIC BLOOD PRESSURE: 77 MMHG | OXYGEN SATURATION: 98 %

## 2021-05-07 DIAGNOSIS — I25.83 CORONARY ARTERY DISEASE DUE TO LIPID RICH PLAQUE: Primary | ICD-10-CM

## 2021-05-07 DIAGNOSIS — E66.01 SEVERE OBESITY (BMI 35.0-39.9) WITH COMORBIDITY (HCC): ICD-10-CM

## 2021-05-07 DIAGNOSIS — R06.02 SOB (SHORTNESS OF BREATH) ON EXERTION: ICD-10-CM

## 2021-05-07 DIAGNOSIS — Z95.820 S/P ANGIOPLASTY WITH STENT: ICD-10-CM

## 2021-05-07 DIAGNOSIS — F11.288 OPIOID DEPENDENCE WITH OTHER OPIOID-INDUCED DISORDER (HCC): ICD-10-CM

## 2021-05-07 DIAGNOSIS — I25.10 CORONARY ARTERY DISEASE DUE TO LIPID RICH PLAQUE: Primary | ICD-10-CM

## 2021-05-07 LAB
COMMENT, HOLDF: NORMAL
SAMPLES BEING HELD,HOLD: NORMAL

## 2021-05-07 PROCEDURE — G8417 CALC BMI ABV UP PARAM F/U: HCPCS | Performed by: FAMILY MEDICINE

## 2021-05-07 PROCEDURE — 99214 OFFICE O/P EST MOD 30 MIN: CPT | Performed by: FAMILY MEDICINE

## 2021-05-07 PROCEDURE — G8510 SCR DEP NEG, NO PLAN REQD: HCPCS | Performed by: FAMILY MEDICINE

## 2021-05-07 PROCEDURE — 1101F PT FALLS ASSESS-DOCD LE1/YR: CPT | Performed by: FAMILY MEDICINE

## 2021-05-07 PROCEDURE — G8536 NO DOC ELDER MAL SCRN: HCPCS | Performed by: FAMILY MEDICINE

## 2021-05-07 PROCEDURE — 1090F PRES/ABSN URINE INCON ASSESS: CPT | Performed by: FAMILY MEDICINE

## 2021-05-07 PROCEDURE — G8427 DOCREV CUR MEDS BY ELIG CLIN: HCPCS | Performed by: FAMILY MEDICINE

## 2021-05-07 RX ORDER — DICLOFENAC SODIUM 10 MG/G
GEL TOPICAL 4 TIMES DAILY
COMMUNITY
End: 2021-05-07 | Stop reason: SDUPTHER

## 2021-05-07 RX ORDER — DICLOFENAC SODIUM 10 MG/G
GEL TOPICAL 4 TIMES DAILY
Qty: 450 G | Refills: 4 | Status: SHIPPED | OUTPATIENT
Start: 2021-05-07 | End: 2022-05-10

## 2021-05-07 RX ORDER — COLCHICINE 0.6 MG/1
0.6 TABLET ORAL DAILY
Qty: 30 TAB | Refills: 1 | Status: SHIPPED | OUTPATIENT
Start: 2021-05-07 | End: 2021-07-26 | Stop reason: SDUPTHER

## 2021-05-07 RX ORDER — PREDNISONE 10 MG/1
10 TABLET ORAL
Qty: 48 TAB | Refills: 0 | Status: SHIPPED | OUTPATIENT
Start: 2021-05-07 | End: 2021-09-17

## 2021-05-07 RX ORDER — TRAMADOL HYDROCHLORIDE 50 MG/1
50 TABLET ORAL
Qty: 90 TAB | Refills: 1 | Status: SHIPPED | OUTPATIENT
Start: 2021-05-07 | End: 2021-06-06

## 2021-05-07 RX ORDER — NITROGLYCERIN 0.4 MG/1
TABLET SUBLINGUAL
Qty: 25 TAB | Refills: 11 | Status: SHIPPED | OUTPATIENT
Start: 2021-05-07

## 2021-05-07 NOTE — PROGRESS NOTES
Chief Complaint   Patient presents with   [facility] ED Follow-up     hand pain     1. Have you been to the ER, urgent care clinic since your last visit? Hospitalized since your last visit? No    2. Have you seen or consulted any other health care providers outside of the [address] [place] [address] since your last visit? Include any pap smears or colon screening. Yes When: 4/18/21 Where: [facility] Reason for visit: hands swelling    Verified patient with two type of identifiers.  Granddaughter present seen by [facility] on 4/18/21 for abundio hand pain,  Dx with gout, given  Prednisone and advised to see pcp

## 2021-05-07 NOTE — LETTER
AGREEMENT for controlled medication treatment Santos Fuller, have agreed to a course of treatment that includes taking controlled medication. For this treatment I designate _____________________________________ as the [facility] Provider.  The purpose of this agreement is to prevent misunderstandings about controlled medications I may be taking for treatment, and to comply with applicable laws. I understand this agreement is essential to the trust and confidence necessary in a provider-patient relationship and that the designated provider will treat me in accordance with the statements below. As part of my treatment I agree to the followin.  USE 
a. I will take the controlled medication as instructed by the designated provider and avoid improper use of controlled medications. b.   I will not share, sell or trade controlled medication with anyone as this is a criminal offense.  
c.   I will not use any illegal controlled substance, including marijuana, cocaine, etc. as this is a criminal offense. 2.  PROVIDERS 
a. I will only obtain controlled medication from the designated provider. b.   I will not attempt to obtain the same or similar controlled medications, such as opioid pain medication, stimulants, anti-anxiety or hypnotics from any other provider as this is a criminal offense. 
c.   I will inform the designated provider about all other licensed professionals providing medical care to me and authorize communication between all providers to coordinate care, particularly prescribing or dispensing of controlled medications. 3.  PHARMACY 
a.   I will only fill controlled medication prescriptions at the approved pharmacy as listed below. 4.  OFFICE VISITS 
a. I agree to attend scheduled office visit appointments. b.   I am aware my office visits may be monthly or as determined necessary by the designated provider.  
c.   I will communicate fully with the designated provider about the character and intensity of my symptoms, the effect of the symptoms on my daily life, and how well the controlled medication is helping to relieve the cause of my symptoms. 5.  REFILLS OR CALL-IN PRESCRIPTIONS OF CONTROLLED MEDICATIONS 
a. I agree that refills of my prescriptions for controlled medications will be made at the time of an office visit during regular office hours. No refills will be available during evenings or on weekends. Abusive or inappropriate behavior related to medication refills will not be tolerated. b.   I will not call the office repeatedly to inquire about my controlled medication. I understand that medications will be written on the due date and not before. Calling the office repeatedly will be considered harassment, and I may be discharged from the practice. c.   Controlled medications may not be called in for refill, but doing so is at the discretion of the designated provider. d.   I am aware that only I must  prescriptions for controlled medications at the office but the designated provider may allow a designee to  the prescription from the office under very specific circumstance that may develop. 6.  TOXICOLOGY SCREENING 
a. I agree to random urine toxicology screenings in order to comply with government and [address] [place] [address] regulations. I understand that I will be financially responsible for any charges incurred for the urine toxicology screening, which may not be covered by my insurance. Failure to do so will be considered non-compliance and I may be discharged. b. In some cases an oral swab or hair sample may be substituted for a urine screen. This is at the discretion of the designated provider.   I understand that I will be financially responsible for any charges incurred as well. 
c.   I understand that if the urine toxicology does not show my medications prescribed to me by the designated provider, or it shows any illegal substance or any other medications NOT prescribed by the designated providers, I may be discharged from this practice at the discretion of the designated provider and no further controlled medications will be prescribed or follow-up appointments scheduled. Also, if any illegal substances are detected on the urine toxicology, this information may be provided to local law enforcement. 7. PILL COUNTS 
a. I am aware I may be called at any time to come into the office for a count of all my remaining controlled medications in order to help the designated provider understand the rate at which I use my controlled medications and to more effectively adjust dosage. b. I agree that I will use my medication at a rate NO greater than the prescribed rate and that use of my medication at a greater rate will result in my being without medication for the period of time until next expected due date. c. I will bring in the containers with the medication prescribed by all providers, including the designated provider, to each office visit even if there is no medication remaining. All controlled medication will be in the original containers from the pharmacy for each medication. Failure to do so will be considered non-compliance and I may be discharged from the practice. 8.  LOSS OR THEFT OF MEDICATION 
a. I will safeguard my controlled medication from loss or theft. b.   Lost or stolen controlled medication may not be replaced. This includes a prescription that has not yet been filled at the pharmacy. c.   In the event my controlled medications are stolen or lost, I will notify the designated providers office immediately. If such event occurs during the night, weekend or holiday, I will leave a detailed message on the answering machine or answering service at the number listed above. 
d.   I will file and produce an official police report for any effort to replace controlled medications prescribed. 9.  AGENCY COLLABORATION I authorize the designated provider and the authorized pharmacy/pharmacist to cooperate fully with any city, state, or federal law enforcement agency in the investigation of any possible misuse, sale or other diversion of my controlled medication. 10.  TREATMENT I understand that if I violate any of the conditions, my controlled medication and/or treatment will be terminated. If the violation involves obtaining controlled substances and/or dangerous drugs from another source, the incident may be reported to other medical facilities and authorities, including law enforcement. In this case, the designated provider may taper off the medication over a period of several days, as necessary, to avoid withdrawal symptoms or will suggest alternate treatment facilities. Also, a drug dependence treatment program may be recommended. 11.  AGREEMENT I agree to follow these guidelines that have been fully explained to me. All of my questions and concerns regarding treatment have been adequately answered. A copy of this document has been given to me. I agree to use ______________________________ Authorized Pharmacy located at _________________________________________________________________ Telephone ________________________________for filling ALL controlled medication prescriptions. This agreement is entered into on 5/7/2021 Patient signature__________________________________________________________ Legal Guardian signature___________________________________________________ Provider signature_________________________________________________________ Witness signature_________________________________________________________

## 2021-05-07 NOTE — PATIENT INSTRUCTIONS

## 2021-05-07 NOTE — PROGRESS NOTES
HISTORY OF PRESENT ILLNESS  Sophy Saini  is a 80 y.o. female. Chronic upper back, knees shoulders, rt/lt rheumatoid hands pain The patient's pain has been an ongoing struggling concerning problem, present for refills, never abused any prescribed meds, currently on Walker and canes, unable to stands secondary to severe knee pain,  no hx of illicit nor etoh abuse, the pain has been bothering the pt for many yrs, pt aware that there are no other alternative approach for the current pain that exist except for the available opioid based meds with their huge addictive properties,   Patient states that the current dosage of the meds given, not strong enough, but it is helping,   Zero count,  Pt is compliant with the current medications, and take it as directed,  the pt capable of doing things specially the activity of the daily living,   Pt's pain persist without medication, is a chronic condition,   keeps meds at a safe place, on stool softener, 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 Current Outpatient Medications   Medication Sig Dispense Refill    traMADoL (ULTRAM) 50 mg tablet Take 1 Tab by mouth every eight (8) hours as needed for Pain for up to 30 days. Max Daily Amount: 150 mg. 90 Tab 1    nitroglycerin (NITROSTAT) 0.4 mg SL tablet PLACE 1 TABLET UNDER THE TONGUE EVERY 5 MINS AS NEEDED FOR CHEST PAIN 25 Tab 11    diclofenac (VOLTAREN) 1 % gel Apply  to affected area four (4) times daily. As needed 450 g 4    traMADoL (ULTRAM) 50 mg tablet Take 1 Tab by mouth two (2) times daily as needed for Pain for up to 30 days. Max Daily Amount: 100 mg. 90 Tab 1    colchicine 0.6 mg tablet Take 1 Tab by mouth daily. 30 Tab 1    predniSONE (DELTASONE) 10 mg tablet Take 10 mg by mouth daily (with breakfast). As directed for 12 days 48 Tab 0    ezetimibe (ZETIA) 10 mg tablet Take 1 Tab by mouth daily.  90 Tab 3    potassium chloride (KLOR-CON) 10 mEq tablet TAKE 1 TABLET EVERY DAY 90 Tab 2    atenoloL (TENORMIN) 50 mg tablet TAKE 1 TABLET EVERY DAY 90 Tab 2    candesartan (ATACAND) 8 mg tablet TAKE 1 TABLET EVERY DAY 90 Tab 2    torsemide (DEMADEX) 20 mg tablet TAKE 1 TABLET EVERY DAY 90 Tab 2    pravastatin (PRAVACHOL) 20 mg tablet Take 1 Tab by mouth nightly. 90 Tab 3    Calcium-Cholecalciferol, D3, (Calcium 600 with Vitamin D3) 600 mg(1,500mg) -400 unit chew Take 600 mg by mouth two (2) times a day. 180 Tab 3    ASPIR-LOW 81 mg tablet Take 81 mg by mouth daily.  MISCELLANEOUS MEDICAL SUPPLY (COMPRESSION STOCKINGS) daily. Remove at bedtime      NIFEdipine ER (ADALAT CC) 30 mg ER tablet TAKE 1 TABLET EVERY DAY ON AN EMPTY STOMACH (Patient not taking: Reported on 5/7/2021) 90 Tab 3    OTHER Indications: RA Col-rite for constipation      simethicone (MYLICON) 80 mg chewable tablet Take 1 Tab by mouth every six (6) hours as needed for Flatulence. 40 Tab 1    naloxone (NARCAN) 4 mg/actuation nasal spray Use 1 spray intranasally into 1 nostril. Use a new Narcan nasal spray for subsequent doses and administer into alternating nostrils. May repeat every 2 to 3 minutes as needed.  2 Each 11     Allergies   Allergen Reactions    Diclofenac Unknown (comments)    Lisinopril Cough    Lisinopril-Hydrochlorothiazide Unknown (comments)    Rosuvastatin Diarrhea and Other (comments)     Abdominal pain      Past Medical History:   Diagnosis Date    Arthritis 3/25/2010    Back pain 3/26/2010    CAD (coronary artery disease)     Chest pain, unspecified     [facility] kidney failure, stage 3 (moderate) ([facility]) 12/5/2017    Chronic pain     Chronic pain of both knees 2/6/2018    Edema     Essential hypertension     High cholesterol 3/25/2010    HTN (hypertension) 3/25/2010    Kidney failure, acute ([facility] [address] 4/6/2012    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    Onychomycosis 8/19/2010    Other acute and subacute form of ischemic heart disease     Other and unspecified angina pectoris     Other ill-defined conditions(799.89)     excessive watering of right eye    Patient is full code 8/6/2018    Shortness of breath     Shoulder pain, bilateral 3/26/2010     Past Surgical History:   Procedure Laterality Date    HX HEART CATHETERIZATION      HX HEENT      bilat cateract extraction    HX HYSTERECTOMY      HX KNEE REPLACEMENT      HX LUMBAR FUSION      HX ORTHOPAEDIC      bilateral TKR    HX ORTHOPAEDIC      neck and back surgery    HX ORTHOPAEDIC      bilateral bunionectomy    HX ORTHOPAEDIC      bilateral cataracts removed    HX PTCA       Family History   Problem Relation Age of Onset    Hypertension Mother     Stroke Mother     Other Mother         arthritis    Coronary Artery Disease Father     Heart Disease Father     Hypertension Sister     Diabetes Sister     Coronary Artery Disease Sister     Cancer Sister         lung    Coronary Artery Disease Brother     Cancer Brother         lung    Heart Disease Brother      Social History     Tobacco Use    Smoking status: Never Smoker    Smokeless tobacco: Never Used   Substance Use Topics    Alcohol use: Yes     Comment: occasional       Lab Results   Component Value Date/Time    WBC 6.3 09/11/2020 10:48 AM    HGB 13.9 09/11/2020 10:48 AM    HCT 41.5 09/11/2020 10:48 AM    PLATELET 630 29/50/7790 10:48 AM    MCV 90 09/11/2020 10:48 AM     Lab Results   Component Value Date/Time    TSH 1.980 09/11/2020 10:48 AM    TSH 1.190 02/04/2019 09:22 AM         Review of Systems   Constitutional: Negative for chills and fever. HENT: Negative for ear pain and nosebleeds. Eyes: Negative for blurred vision, pain and discharge. Respiratory: Negative for shortness of breath. Cardiovascular: Negative for chest pain and leg swelling. Gastrointestinal: Negative for constipation, diarrhea, nausea and vomiting. Genitourinary: Negative for frequency. Musculoskeletal: Positive for back pain, joint pain and myalgias.    Skin: Negative for itching and rash. Neurological: Positive for weakness and headaches. Psychiatric/Behavioral: Negative for depression. The patient has insomnia. The patient is not nervous/anxious. Physical Exam  Vitals signs and nursing note reviewed. Constitutional:       Appearance: She is well-developed. HENT:      Head: Normocephalic and atraumatic. Eyes:      Conjunctiva/sclera: Conjunctivae normal.      Pupils: Pupils are equal, round, and reactive to light. Neck:      Musculoskeletal: Normal range of motion and neck supple. Thyroid: No thyromegaly. Vascular: No JVD. Cardiovascular:      Rate and Rhythm: Normal rate and regular rhythm. Heart sounds: Normal heart sounds. No murmur. No friction rub. No gallop. Pulmonary:      Effort: Pulmonary effort is normal. No respiratory distress. Breath sounds: Normal breath sounds. No stridor. No wheezing or rales. Abdominal:      General: Bowel sounds are normal. There is no distension. Palpations: Abdomen is soft. There is no mass. Tenderness: There is no abdominal tenderness. Musculoskeletal:         General: Tenderness, deformity and signs of injury present. Right hip: She exhibits decreased range of motion, decreased strength, tenderness and crepitus. Right knee: She exhibits decreased range of motion, swelling and deformity. Tenderness found. Left knee: She exhibits decreased range of motion, swelling and bony tenderness. Tenderness found. Thoracic back: She exhibits decreased range of motion, tenderness, pain and spasm. Lumbar back: She exhibits decreased range of motion, tenderness, pain and spasm. Right lower leg: Edema present. Left lower leg: Edema present. Lymphadenopathy:      Cervical: No cervical adenopathy. Skin:     Findings: No erythema or rash. Neurological:      Mental Status: She is alert and oriented to person, place, and time.       Cranial Nerves: No cranial nerve deficit. Deep Tendon Reflexes: Reflexes are normal and symmetric. Psychiatric:         Behavior: Behavior normal.         ASSESSMENT and PLAN  Diagnoses and all orders for this visit:    1. Coronary artery disease due to lipid rich plaque  -     traMADoL (ULTRAM) 50 mg tablet; Take 1 Tab by mouth every eight (8) hours as needed for Pain for up to 30 days. Max Daily Amount: 150 mg.  -     nitroglycerin (NITROSTAT) 0.4 mg SL tablet; PLACE 1 TABLET UNDER THE TONGUE EVERY 5 MINS AS NEEDED FOR CHEST PAIN  -     CBC W/O DIFF; Future  -     IRON PROFILE; Future  -     FERRITIN; Future  -     URIC ACID; Future  -     HEMOGLOBIN A1C WITH EAG; Future  -     METABOLIC PANEL, COMPREHENSIVE; Future  -     TSH 3RD GENERATION; Future  -     REFERRAL TO RHEUMATOLOGY  -     traMADoL (ULTRAM) 50 mg tablet; Take 1 Tab by mouth two (2) times daily as needed for Pain for up to 30 days. Max Daily Amount: 100 mg.    2. S/P angioplasty with stent--RCA 6/12  -     traMADoL (ULTRAM) 50 mg tablet; Take 1 Tab by mouth every eight (8) hours as needed for Pain for up to 30 days. Max Daily Amount: 150 mg.  -     nitroglycerin (NITROSTAT) 0.4 mg SL tablet; PLACE 1 TABLET UNDER THE TONGUE EVERY 5 MINS AS NEEDED FOR CHEST PAIN  -     CBC W/O DIFF; Future  -     IRON PROFILE; Future  -     FERRITIN; Future  -     URIC ACID; Future  -     HEMOGLOBIN A1C WITH EAG; Future  -     METABOLIC PANEL, COMPREHENSIVE; Future  -     TSH 3RD GENERATION; Future  -     REFERRAL TO RHEUMATOLOGY  -     traMADoL (ULTRAM) 50 mg tablet; Take 1 Tab by mouth two (2) times daily as needed for Pain for up to 30 days. Max Daily Amount: 100 mg.    3. Opioid dependence with other opioid-induced disorder (HCC)  -     traMADoL (ULTRAM) 50 mg tablet; Take 1 Tab by mouth every eight (8) hours as needed for Pain for up to 30 days. Max Daily Amount: 150 mg.  -     CBC W/O DIFF; Future  -     IRON PROFILE; Future  -     FERRITIN; Future  -     URIC ACID;  Future  - HEMOGLOBIN A1C WITH EAG; Future  -     METABOLIC PANEL, COMPREHENSIVE; Future  -     TSH 3RD GENERATION; Future  -     REFERRAL TO RHEUMATOLOGY  -     traMADoL (ULTRAM) 50 mg tablet; Take 1 Tab by mouth two (2) times daily as needed for Pain for up to 30 days. Max Daily Amount: 100 mg.    4. Type II diabetes mellitus with complication, uncontrolled (HCC)  -     traMADoL (ULTRAM) 50 mg tablet; Take 1 Tab by mouth every eight (8) hours as needed for Pain for up to 30 days. Max Daily Amount: 150 mg.  -     CBC W/O DIFF; Future  -     IRON PROFILE; Future  -     FERRITIN; Future  -     URIC ACID; Future  -     HEMOGLOBIN A1C WITH EAG; Future  -     METABOLIC PANEL, COMPREHENSIVE; Future  -     TSH 3RD GENERATION; Future  -     REFERRAL TO RHEUMATOLOGY  -     traMADoL (ULTRAM) 50 mg tablet; Take 1 Tab by mouth two (2) times daily as needed for Pain for up to 30 days. Max Daily Amount: 100 mg.    5. Severe obesity (BMI 35.0-39. 9) with comorbidity (HCC)  -     traMADoL (ULTRAM) 50 mg tablet; Take 1 Tab by mouth every eight (8) hours as needed for Pain for up to 30 days. Max Daily Amount: 150 mg.  -     CBC W/O DIFF; Future  -     IRON PROFILE; Future  -     FERRITIN; Future  -     URIC ACID; Future  -     HEMOGLOBIN A1C WITH EAG; Future  -     METABOLIC PANEL, COMPREHENSIVE; Future  -     TSH 3RD GENERATION; Future  -     REFERRAL TO RHEUMATOLOGY  -     traMADoL (ULTRAM) 50 mg tablet; Take 1 Tab by mouth two (2) times daily as needed for Pain for up to 30 days. Max Daily Amount: 100 mg.    6. SOB (shortness of breath) on exertion  -     nitroglycerin (NITROSTAT) 0.4 mg SL tablet; PLACE 1 TABLET UNDER THE TONGUE EVERY 5 MINS AS NEEDED FOR CHEST PAIN    Other orders  -     diclofenac (VOLTAREN) 1 % gel; Apply  to affected area four (4) times daily. As needed  -     colchicine 0.6 mg tablet; Take 1 Tab by mouth daily. -     predniSONE (DELTASONE) 10 mg tablet; Take 10 mg by mouth daily (with breakfast).  As directed for 12 days      Follow-up and Dispositions    · Return if symptoms worsen or fail to improve.

## 2021-05-08 LAB
ALBUMIN SERPL-MCNC: 3.8 G/DL (ref 3.5–5)
ALBUMIN/GLOB SERPL: 1.2 {RATIO} (ref 1.1–2.2)
ALP SERPL-CCNC: 82 U/L (ref 45–117)
ALT SERPL-CCNC: 23 U/L (ref 12–78)
ANION GAP SERPL CALC-SCNC: 7 MMOL/L (ref 5–15)
AST SERPL-CCNC: 23 U/L (ref 15–37)
BILIRUB SERPL-MCNC: 0.6 MG/DL (ref 0.2–1)
BUN SERPL-MCNC: 24 MG/DL (ref 6–20)
BUN/CREAT SERPL: 21 (ref 12–20)
CALCIUM SERPL-MCNC: 10.5 MG/DL (ref 8.5–10.1)
CHLORIDE SERPL-SCNC: 105 MMOL/L (ref 97–108)
CO2 SERPL-SCNC: 30 MMOL/L (ref 21–32)
CREAT SERPL-MCNC: 1.16 MG/DL (ref 0.55–1.02)
ERYTHROCYTE [DISTWIDTH] IN BLOOD BY AUTOMATED COUNT: 13.6 % (ref 11.5–14.5)
EST. AVERAGE GLUCOSE BLD GHB EST-MCNC: 117 MG/DL
FERRITIN SERPL-MCNC: 238 NG/ML (ref 26–388)
GLOBULIN SER CALC-MCNC: 3.3 G/DL (ref 2–4)
GLUCOSE SERPL-MCNC: 109 MG/DL (ref 65–100)
HBA1C MFR BLD: 5.7 % (ref 4–5.6)
HCT VFR BLD AUTO: 45.4 % (ref 35–47)
HGB BLD-MCNC: 14.2 G/DL (ref 11.5–16)
IRON SATN MFR SERPL: 17 % (ref 20–50)
IRON SERPL-MCNC: 50 UG/DL (ref 35–150)
MCH RBC QN AUTO: 29.2 PG (ref 26–34)
MCHC RBC AUTO-ENTMCNC: 31.3 G/DL (ref 30–36.5)
MCV RBC AUTO: 93.2 FL (ref 80–99)
NRBC # BLD: 0 K/UL (ref 0–0.01)
NRBC BLD-RTO: 0 PER 100 WBC
PLATELET # BLD AUTO: 285 K/UL (ref 150–400)
PMV BLD AUTO: 10.2 FL (ref 8.9–12.9)
POTASSIUM SERPL-SCNC: 4 MMOL/L (ref 3.5–5.1)
PROT SERPL-MCNC: 7.1 G/DL (ref 6.4–8.2)
RBC # BLD AUTO: 4.87 M/UL (ref 3.8–5.2)
SODIUM SERPL-SCNC: 142 MMOL/L (ref 136–145)
TIBC SERPL-MCNC: 290 UG/DL (ref 250–450)
TSH SERPL DL<=0.05 MIU/L-ACNC: 0.92 UIU/ML (ref 0.36–3.74)
URATE SERPL-MCNC: 10.1 MG/DL (ref 2.6–6)
WBC # BLD AUTO: 9.7 K/UL (ref 3.6–11)

## 2021-07-07 DIAGNOSIS — M25.561 CHRONIC PAIN OF BOTH KNEES: ICD-10-CM

## 2021-07-07 DIAGNOSIS — M25.511 CHRONIC PAIN OF BOTH SHOULDERS: ICD-10-CM

## 2021-07-07 DIAGNOSIS — M25.512 CHRONIC PAIN OF BOTH SHOULDERS: ICD-10-CM

## 2021-07-07 DIAGNOSIS — R60.0 BILATERAL EDEMA OF LOWER EXTREMITY: ICD-10-CM

## 2021-07-07 DIAGNOSIS — M15.9 PRIMARY OSTEOARTHRITIS INVOLVING MULTIPLE JOINTS: ICD-10-CM

## 2021-07-07 DIAGNOSIS — I10 ESSENTIAL HYPERTENSION: ICD-10-CM

## 2021-07-07 DIAGNOSIS — M25.562 CHRONIC PAIN OF BOTH KNEES: ICD-10-CM

## 2021-07-07 DIAGNOSIS — G89.29 CHRONIC PAIN OF BOTH KNEES: ICD-10-CM

## 2021-07-07 DIAGNOSIS — G89.29 CHRONIC PAIN OF BOTH SHOULDERS: ICD-10-CM

## 2021-07-08 RX ORDER — TORSEMIDE 20 MG/1
TABLET ORAL
Qty: 90 TABLET | Refills: 2 | Status: SHIPPED | OUTPATIENT
Start: 2021-07-08 | End: 2022-02-16

## 2021-07-16 RX ORDER — MULTIVITAMIN
TABLET ORAL
Qty: 180 TABLET | Refills: 3 | Status: SHIPPED | OUTPATIENT
Start: 2021-07-16 | End: 2021-09-17

## 2021-07-26 NOTE — TELEPHONE ENCOUNTER
Last visit: 5/7/21  Next visit:not scheduled  Previous refill 5/7/21(30+1R)    Requested Prescriptions     Pending Prescriptions Disp Refills    colchicine 0.6 mg tablet 30 Tablet 1     Sig: Take 1 Tablet by mouth daily.

## 2021-07-27 RX ORDER — COLCHICINE 0.6 MG/1
0.6 TABLET ORAL DAILY
Qty: 30 TABLET | Refills: 1 | Status: SHIPPED | OUTPATIENT
Start: 2021-07-27 | End: 2021-10-07

## 2021-08-12 RX ORDER — PRAVASTATIN SODIUM 20 MG/1
TABLET ORAL
Qty: 90 TABLET | Refills: 3 | Status: SHIPPED | OUTPATIENT
Start: 2021-08-12

## 2021-08-27 RX ORDER — CANDESARTAN 8 MG/1
TABLET ORAL
Qty: 90 TABLET | Refills: 2 | Status: SHIPPED | OUTPATIENT
Start: 2021-08-27 | End: 2021-09-17

## 2021-09-16 NOTE — PROGRESS NOTES
[facility] @ [address], [place]  Subjective/HPI:     Osmany Dalton is a 80 y.o. female  history of atherosclerotic heart disease.  2012 PTCA stenting of the mid RCA, hypertension, hyperlipidemia, chronic kidney disease  followed by nephrology last appointment mid summer no med changes occurred. Is here for routine f/u. The patient denies chest pain/ shortness of breath, orthopnea, PND,, palpitations, syncope, presyncope or fatigue. At last visit discontinued nifedipine, has seen some improvement in her lower extremity edema has maintained torsemide 20 mg. Home blood pressure readings in the afternoon and evening systolics have been below 100 as low as 83mm/hg with a wrist blood pressure cuff. She denies associated lightheadedness or dizziness when hypotensive. 3/2021  1.  Atherosclerotic heart disease: History of PTCA stenting 2012 mid RCA , nuclear stress test negative for ischemia 9/2020. Normal ejection fraction on echo, asymptomatic of CAD today. 2.  Hypertension:  Mildly hypotensive 96/58 rechecking with manual cuff 108/60. Patient had reported some intermittent lightheadedness with positional change will discontinue nifedipine  3.  Hyperlipidemia: 9/11/2020  patient placed on Crestor, patient sent to [facility] lab after this visit for repeat lipid panel  4.  CKD:  Stage IIIb, GFR 37 creatinine 1.46, repeat CMP today  5. Type II DM: 5.8% 9/11/2020  6.  Chronic bilateral lower extremity edema response to diuresis:  Continue torsemide, will also discontinue nifedipine which can also contribute to her lower extremity edema  Stable from cardiac standpoint follow-up in 6 months. ECHO 9/2020  Left Ventricle Normal cavity size and systolic function (ejection fraction normal). Mild concentric hypertrophy.    Wall motion: normal. The estimated EF is 55 - 60%. Visually measured ejection fraction. Left Atrium Normal cavity size.    Right Ventricle Normal cavity size and global systolic function. Right Atrium Normal cavity size. Aortic Valve Normal valve structure, no stenosis and no regurgitation. Mitral Valve Normal valve structure, no stenosis and no regurgitation. Tricuspid Valve Normal valve structure, no stenosis and no regurgitation. Pulmonic Valve Pulmonic valve not well visualized. Aorta Normal aortic root. Pericardium No evidence of pericardial effusion.      Nuclear Stress Test 9/2020  IMPRESSION: No ischemia demonstrated. No infarct visible. LVEF 70%.       Cardiac Cath 6/2012  SUMMARY:     --  CARDIAC STRUCTURES:   --  Global left ventricular function was normal.     --  CORONARY CIRCULATION:   --  Mid RCA: There was a tubular 95 % stenosis. There was BAN grade 2   flow through the vessel (partial perfusion). --  1ST LESION INTERVENTIONS:   --  A successful drug-eluting stent was performed on the 95 % lesion in   the mid RCA. Following intervention there was an excellent angiographic   appearance with a 0 % residual stenosis. --  Balloon angioplasty was   performed for post dilatation, using a NC Trek RX 3.5x8mm balloon, with 2   inflations and a maximum inflation pressure of 12 brenda. --  A 3.5 x 18   Resolute drug-eluting stent was placed across the lesion and successfully   deployed at a maximum inflation pressure of 12 brenda.      PCP Provider  Aleksander Bryan MD  Past Medical History:   Diagnosis Date    Arthritis 3/25/2010    Back pain 3/26/2010    CAD (coronary artery disease)     Chest pain, unspecified     Chronic kidney failure, stage 3 (moderate) (HCC) 12/5/2017    Chronic pain     Chronic pain of both knees 2/6/2018    Edema     Essential hypertension     High cholesterol 3/25/2010    HTN (hypertension) 3/25/2010    Kidney failure, acute ([address] 4/6/2012    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    Onychomycosis 8/19/2010    Other acute and subacute form of ischemic heart disease     Other and unspecified angina pectoris     Other ill-defined conditions(799.89)     excessive watering of right eye    Patient is full code 8/6/2018    Shortness of breath     Shoulder pain, bilateral 3/26/2010      Past Surgical History:   Procedure Laterality Date    HX HEART CATHETERIZATION      HX HEENT      bilat cateract extraction    HX HYSTERECTOMY      HX KNEE REPLACEMENT      HX LUMBAR FUSION      HX ORTHOPAEDIC      bilateral TKR    HX ORTHOPAEDIC      neck and back surgery    HX ORTHOPAEDIC      bilateral bunionectomy    HX ORTHOPAEDIC      bilateral cataracts removed    HX PTCA       Allergies   Allergen Reactions    Diclofenac Unknown (comments)    Lisinopril Cough    Lisinopril-Hydrochlorothiazide Unknown (comments)    Rosuvastatin Diarrhea and Other (comments)     Abdominal pain       Family History   Problem Relation Age of Onset    Hypertension Mother     Stroke Mother     Other Mother         arthritis    Coronary Artery Disease Father     Heart Disease Father     Hypertension Sister     Diabetes Sister     Coronary Artery Disease Sister     Cancer Sister         lung    Coronary Artery Disease Brother     Cancer Brother         lung    Heart Disease Brother       Current Outpatient Medications   Medication Sig    candesartan (ATACAND) 8 mg tablet TAKE 1 TABLET EVERY DAY    pravastatin (PRAVACHOL) 20 mg tablet TAKE 1 TABLET EVERY NIGHT    colchicine 0.6 mg tablet Take 1 Tablet by mouth daily.  calcium-cholecalciferol, D3, (CALTRATE 600+D) tablet TAKE 1 TABLET TWICE DAILY    torsemide (DEMADEX) 20 mg tablet TAKE 1 TABLET EVERY DAY    nitroglycerin (NITROSTAT) 0.4 mg SL tablet PLACE 1 TABLET UNDER THE TONGUE EVERY 5 MINS AS NEEDED FOR CHEST PAIN    diclofenac (VOLTAREN) 1 % gel Apply  to affected area four (4) times daily. As needed    predniSONE (DELTASONE) 10 mg tablet Take 10 mg by mouth daily (with breakfast).  As directed for 12 days    NIFEdipine ER (ADALAT CC) 30 mg ER tablet TAKE 1 TABLET EVERY DAY ON AN EMPTY STOMACH (Patient not taking: Reported on 5/7/2021)    ezetimibe (ZETIA) 10 mg tablet Take 1 Tab by mouth daily.  potassium chloride (KLOR-CON) 10 mEq tablet TAKE 1 TABLET EVERY DAY    atenoloL (TENORMIN) 50 mg tablet TAKE 1 TABLET EVERY DAY    Calcium-Cholecalciferol, D3, (Calcium 600 with Vitamin D3) 600 mg(1,500mg) -400 unit chew Take 600 mg by mouth two (2) times a day.  OTHER Indications: RA Col-rite for constipation    simethicone (MYLICON) 80 mg chewable tablet Take 1 Tab by mouth every six (6) hours as needed for Flatulence.  ASPIR-LOW 81 mg tablet Take 81 mg by mouth daily.  naloxone (NARCAN) 4 mg/actuation nasal spray Use 1 spray intranasally into 1 nostril. Use a new Narcan nasal spray for subsequent doses and administer into alternating nostrils. May repeat every 2 to 3 minutes as needed.  MISCELLANEOUS MEDICAL SUPPLY (COMPRESSION STOCKINGS) daily. Remove at bedtime     No current facility-administered medications for this visit. There were no vitals filed for this visit.   Social History     Socioeconomic History    Marital status:      Spouse name: Not on file    Number of children: Not on file    Years of education: Not on file    Highest education level: Not on file   Occupational History    Not on file   Tobacco Use    Smoking status: Never Smoker    Smokeless tobacco: Never Used   Substance and Sexual Activity    Alcohol use: Yes     Comment: occasional     Drug use: Yes     Types: Prescription, OTC    Sexual activity: Never   Other Topics Concern    Not on file   Social History Narrative    ** Merged History Encounter **          Social Determinants of Health     Financial Resource Strain:     Difficulty of Paying Living Expenses:    Food Insecurity:     Worried About Running Out of Food in the Last Year:     [address] in the Last Year:    Transportation Needs:     Lack of Transportation (Medical):  Lack of Transportation (Non-Medical):    Physical Activity:     Days of Exercise per Week:     Minutes of Exercise per Session:    Stress:     Feeling of Stress :    Social Connections:     Frequency of Communication with Friends and Family:     Frequency of Social Gatherings with Friends and Family:     Attends Taoist Services:     Active Member of Clubs or Organizations:     Attends Club or Organization Meetings:     Marital Status:    Intimate Partner Violence:     Fear of Current or Ex-Partner:     Emotionally Abused:     Physically Abused:     Sexually Abused:        I have reviewed the nurses notes, vitals, problem list, allergy list, medical history, family, social history and medications. Review of Symptoms  11 systems reviewed, negative other than as stated in the HPI      Physical Exam:      General: Well developed, in no acute distress, cooperative and alert  HEENT: No carotid bruits, no JVD, trach is midline. Neck Supple,   Heart:  Normal S1/S2 negative S3 or S4. Regular, no murmur, gallop or rub. Respiratory: Clear bilaterally x 4, no wheezing or rales  Abdomen:   Soft, non-tender, no masses, bowel sounds are active. Extremities: Pitting lower extremity edema, normal cap refill, no cyanosis, atraumatic. Neuro: A&Ox3, speech clear, gait slow and cautious with the use of cane  Skin: Skin color is normal. No rashes or lesions.  Non diaphoretic  Vascular: 2+ pulses symmetric in all extremities    Cardiographics    ECG:         Cardiology Labs:  Lab Results   Component Value Date/Time    Cholesterol, total 250 (H) 03/12/2021 10:14 AM    HDL Cholesterol 65 03/12/2021 10:14 AM    LDL, calculated 150.6 (H) 03/12/2021 10:14 AM    Triglyceride 172 (H) 03/12/2021 10:14 AM    CHOL/HDL Ratio 3.8 03/12/2021 10:14 AM       Lab Results   Component Value Date/Time    Sodium 142 05/07/2021 10:57 AM    Potassium 4.0 05/07/2021 10:57 AM    Chloride 105 05/07/2021 10:57 AM    CO2 30 05/07/2021 10:57 AM    Anion gap 7 05/07/2021 10:57 AM    Glucose 109 (H) 05/07/2021 10:57 AM    BUN 24 (H) 05/07/2021 10:57 AM    Creatinine 1.16 (H) 05/07/2021 10:57 AM    BUN/Creatinine ratio 21 (H) 05/07/2021 10:57 AM    GFR est AA 53 (L) 05/07/2021 10:57 AM    GFR est non-AA 44 (L) 05/07/2021 10:57 AM    Calcium 10.5 (H) 05/07/2021 10:57 AM    Bilirubin, total 0.6 05/07/2021 10:57 AM    Alk. phosphatase 82 05/07/2021 10:57 AM    Protein, total 7.1 05/07/2021 10:57 AM    Albumin 3.8 05/07/2021 10:57 AM    Globulin 3.3 05/07/2021 10:57 AM    A-G Ratio 1.2 05/07/2021 10:57 AM    ALT (SGPT) 23 05/07/2021 10:57 AM           Assessment:     Assessment:     Diagnoses and all orders for this visit:    1. Coronary artery disease involving native coronary artery of native heart without angina pectoris    2. Essential hypertension, benign    3. Type 2 diabetes mellitus without complication, without long-term current use of insulin ([facility]    4. Chronic kidney failure, stage 3 (moderate) ([facility])    5. S/P angioplasty with stent--RCA 6/12        ICD-10-CM ICD-9-CM    1. Coronary artery disease involving native coronary artery of native heart without angina pectoris  I25.10 414.01    2. Essential hypertension, benign  I10 401.1    3. Type 2 diabetes mellitus without complication, without long-term current use of insulin ([facility])  E11.9 250.00    4. Chronic kidney failure, stage 3 (moderate) ([facility])  N18.30 585.3    5. S/P angioplasty with stent--RCA 6/12  Z95.820 V45.89      No orders of the defined types were placed in this encounter. Plan:     1.  Atherosclerotic heart disease: History of PTCA stenting 2012 mid RCA , nuclear stress test negative for ischemia 9/2020. Normal ejection fraction on echo, asymptomatic of CAD today. Continue aspirin/beta-blocker/statin  2.  Hypertension: Afternoon and evening hypotensive events, reduced Atacand from 8 mg to 4 mg.   3.  Hyperlipidemia:  Tolerating pravastatin 10 mg and Zetia 10 mg.  4.  CKD:  Stabe IIIa Creatine 1.16 GFR 53 improved from Stage IIIb, GFR 37 creatinine 1.46. Has follow-up with nephrology later this year  5. Type II DM: 5.7% 5/2021  6.  Chronic bilateral lower extremity edema response to diuresis:   Lower extremity edema much improved compared to last visit off of nifedipine, renal function stable continue torsemide. Patient has had both COVID-19 vaccines, plans on getting influenza vaccine. Follow-up in 6 months. Yolande Sun, NP      Please note that this dictation was completed with [employer], the [employer] voice recognition software. Quite often unanticipated grammatical, syntax, homophones, and other interpretive errors are inadvertently transcribed by the computer software. Please disregard these errors. Please excuse any errors that have escaped final proofreading. Thank you.

## 2021-09-17 ENCOUNTER — OFFICE VISIT (OUTPATIENT)
Dept: CARDIOLOGY CLINIC | Age: 86
End: 2021-09-17
Payer: MEDICARE

## 2021-09-17 VITALS
SYSTOLIC BLOOD PRESSURE: 123 MMHG | DIASTOLIC BLOOD PRESSURE: 68 MMHG | OXYGEN SATURATION: 96 % | BODY MASS INDEX: 31.99 KG/M2 | HEIGHT: 65 IN | WEIGHT: 192 LBS | HEART RATE: 63 BPM | RESPIRATION RATE: 18 BRPM

## 2021-09-17 DIAGNOSIS — I25.10 CORONARY ARTERY DISEASE INVOLVING NATIVE CORONARY ARTERY OF NATIVE HEART WITHOUT ANGINA PECTORIS: Primary | ICD-10-CM

## 2021-09-17 DIAGNOSIS — I10 ESSENTIAL HYPERTENSION, BENIGN: ICD-10-CM

## 2021-09-17 DIAGNOSIS — E11.9 TYPE 2 DIABETES MELLITUS WITHOUT COMPLICATION, WITHOUT LONG-TERM CURRENT USE OF INSULIN (HCC): ICD-10-CM

## 2021-09-17 DIAGNOSIS — Z95.820 S/P ANGIOPLASTY WITH STENT: ICD-10-CM

## 2021-09-17 DIAGNOSIS — N18.30 CHRONIC KIDNEY FAILURE, STAGE 3 (MODERATE) (HCC): ICD-10-CM

## 2021-09-17 PROCEDURE — G8536 NO DOC ELDER MAL SCRN: HCPCS | Performed by: NURSE PRACTITIONER

## 2021-09-17 PROCEDURE — G8427 DOCREV CUR MEDS BY ELIG CLIN: HCPCS | Performed by: NURSE PRACTITIONER

## 2021-09-17 PROCEDURE — 1090F PRES/ABSN URINE INCON ASSESS: CPT | Performed by: NURSE PRACTITIONER

## 2021-09-17 PROCEDURE — G8432 DEP SCR NOT DOC, RNG: HCPCS | Performed by: NURSE PRACTITIONER

## 2021-09-17 PROCEDURE — 1101F PT FALLS ASSESS-DOCD LE1/YR: CPT | Performed by: NURSE PRACTITIONER

## 2021-09-17 PROCEDURE — G8417 CALC BMI ABV UP PARAM F/U: HCPCS | Performed by: NURSE PRACTITIONER

## 2021-09-17 PROCEDURE — 99214 OFFICE O/P EST MOD 30 MIN: CPT | Performed by: NURSE PRACTITIONER

## 2021-09-17 RX ORDER — CANDESARTAN 4 MG/1
4 TABLET ORAL DAILY
Qty: 90 TABLET | Refills: 3 | Status: SHIPPED | OUTPATIENT
Start: 2021-09-17 | End: 2022-07-25

## 2021-09-17 RX ORDER — TRAMADOL HYDROCHLORIDE 50 MG/1
TABLET ORAL
COMMUNITY
End: 2021-10-26 | Stop reason: SDUPTHER

## 2021-09-17 RX ORDER — ALLOPURINOL 100 MG/1
100 TABLET ORAL DAILY
COMMUNITY
Start: 2021-07-30 | End: 2021-10-26 | Stop reason: SDUPTHER

## 2021-09-17 NOTE — PROGRESS NOTES
1. Have you been to the ER, urgent care clinic since your last visit? Hospitalized since your last visit? No    2. Have you seen or consulted any other health care providers outside of the [address] [place] [address] since your last visit? Include any pap smears or colon screening. Seen by Nephrologist, Dr. Elizabeth Reid in July 2021 at [facility]. Chief Complaint   Patient presents with    Follow-up     6 mo appt. Patient denies other cardiac symptoms.      Leg Swelling     Bilateral

## 2021-10-07 RX ORDER — COLCHICINE 0.6 MG/1
CAPSULE ORAL
Qty: 30 CAPSULE | Refills: 2 | Status: SHIPPED | OUTPATIENT
Start: 2021-10-07 | End: 2021-10-26 | Stop reason: SDUPTHER

## 2021-10-26 ENCOUNTER — OFFICE VISIT (OUTPATIENT)
Dept: FAMILY MEDICINE CLINIC | Age: 86
End: 2021-10-26
Payer: MEDICARE

## 2021-10-26 VITALS
SYSTOLIC BLOOD PRESSURE: 117 MMHG | BODY MASS INDEX: 34.37 KG/M2 | HEIGHT: 65 IN | DIASTOLIC BLOOD PRESSURE: 68 MMHG | OXYGEN SATURATION: 98 % | HEART RATE: 61 BPM | WEIGHT: 206.3 LBS | RESPIRATION RATE: 16 BRPM

## 2021-10-26 DIAGNOSIS — M79.671 PAIN IN BOTH FEET: ICD-10-CM

## 2021-10-26 DIAGNOSIS — B35.1 DERMATOPHYTOSIS OF NAIL: ICD-10-CM

## 2021-10-26 DIAGNOSIS — Z00.00 MEDICARE ANNUAL WELLNESS VISIT, SUBSEQUENT: ICD-10-CM

## 2021-10-26 DIAGNOSIS — M79.672 PAIN IN BOTH FEET: ICD-10-CM

## 2021-10-26 DIAGNOSIS — Z23 NEEDS FLU SHOT: Primary | ICD-10-CM

## 2021-10-26 DIAGNOSIS — Z71.89 ADVANCED DIRECTIVES, COUNSELING/DISCUSSION: ICD-10-CM

## 2021-10-26 DIAGNOSIS — F11.99 OPIOID USE, UNSPECIFIED WITH UNSPECIFIED OPIOID-INDUCED DISORDER (HCC): ICD-10-CM

## 2021-10-26 PROCEDURE — 99213 OFFICE O/P EST LOW 20 MIN: CPT | Performed by: FAMILY MEDICINE

## 2021-10-26 PROCEDURE — 90694 VACC AIIV4 NO PRSRV 0.5ML IM: CPT | Performed by: FAMILY MEDICINE

## 2021-10-26 PROCEDURE — G8510 SCR DEP NEG, NO PLAN REQD: HCPCS | Performed by: FAMILY MEDICINE

## 2021-10-26 PROCEDURE — G8427 DOCREV CUR MEDS BY ELIG CLIN: HCPCS | Performed by: FAMILY MEDICINE

## 2021-10-26 PROCEDURE — G8536 NO DOC ELDER MAL SCRN: HCPCS | Performed by: FAMILY MEDICINE

## 2021-10-26 PROCEDURE — 1101F PT FALLS ASSESS-DOCD LE1/YR: CPT | Performed by: FAMILY MEDICINE

## 2021-10-26 PROCEDURE — 1090F PRES/ABSN URINE INCON ASSESS: CPT | Performed by: FAMILY MEDICINE

## 2021-10-26 PROCEDURE — G0008 ADMIN INFLUENZA VIRUS VAC: HCPCS | Performed by: FAMILY MEDICINE

## 2021-10-26 PROCEDURE — G8417 CALC BMI ABV UP PARAM F/U: HCPCS | Performed by: FAMILY MEDICINE

## 2021-10-26 PROCEDURE — G0439 PPPS, SUBSEQ VISIT: HCPCS | Performed by: FAMILY MEDICINE

## 2021-10-26 PROCEDURE — 11721 DEBRIDE NAIL 6 OR MORE: CPT | Performed by: FAMILY MEDICINE

## 2021-10-26 PROCEDURE — 99497 ADVNCD CARE PLAN 30 MIN: CPT | Performed by: FAMILY MEDICINE

## 2021-10-26 RX ORDER — ALLOPURINOL 100 MG/1
100 TABLET ORAL DAILY
Qty: 90 TABLET | Refills: 3 | Status: SHIPPED | OUTPATIENT
Start: 2021-10-26 | End: 2022-08-09

## 2021-10-26 RX ORDER — ZOSTER VACCINE RECOMBINANT, ADJUVANTED 50 MCG/0.5
KIT INTRAMUSCULAR
Qty: 0.5 ML | Refills: 1 | Status: SHIPPED | OUTPATIENT
Start: 2021-10-26 | End: 2022-07-27

## 2021-10-26 RX ORDER — ZOSTER VACCINE LIVE 19400 [PFU]/.65ML
0.65 INJECTION, POWDER, LYOPHILIZED, FOR SUSPENSION SUBCUTANEOUS ONCE
Qty: 0.65 ML | Refills: 0 | Status: SHIPPED | OUTPATIENT
Start: 2021-10-26 | End: 2021-10-26

## 2021-10-26 RX ORDER — COLCHICINE 0.6 MG/1
0.6 CAPSULE ORAL DAILY
Qty: 90 CAPSULE | Refills: 2 | Status: SHIPPED | OUTPATIENT
Start: 2021-10-26 | End: 2022-05-31

## 2021-10-26 RX ORDER — TRAMADOL HYDROCHLORIDE 50 MG/1
50 TABLET ORAL
Qty: 90 TABLET | Refills: 1 | Status: SHIPPED | OUTPATIENT
Start: 2021-10-26 | End: 2021-11-25

## 2021-10-26 NOTE — PATIENT INSTRUCTIONS
Vaccine Information Statement    Influenza (Flu) Vaccine (Inactivated or Recombinant): What You Need to Know    Many vaccine information statements are available in [place] and other languages. See www.immunize.org/vis. Hojas de información sobre vacunas están disponibles en español y en muchos otros idiomas. Visite www.immunize.org/vis. 1. Why get vaccinated? Influenza vaccine can prevent influenza (flu). Flu is a contagious disease that spreads around the [facility] every year, usually between October and May. Anyone can get the flu, but it is more dangerous for some people. Infants and young children, people 72 years and older, pregnant people, and people with certain health conditions or a weakened immune system are at greatest risk of flu complications. Pneumonia, bronchitis, sinus infections, and ear infections are examples of flu-related complications. If you have a medical condition, such as heart disease, cancer, or diabetes, flu can make it worse. Flu can cause fever and chills, sore throat, muscle aches, fatigue, cough, headache, and runny or stuffy nose. Some people may have vomiting and diarrhea, though this is more common in children than adults. In an average year, thousands of people in the [facility] die from flu, and many more are hospitalized. Flu vaccine prevents millions of illnesses and flu-related visits to the doctor each year. 2. Influenza vaccines     CDC recommends everyone 6 months and older get vaccinated every flu season. Children 6 months through 6years of age may need 2 doses during a single flu season. Everyone else needs only 1 dose each flu season. It takes about 2 weeks for protection to develop after vaccination. There are many flu viruses, and they are always changing. Each year a new flu vaccine is made to protect against the influenza viruses believed to be likely to cause disease in the upcoming flu season.  Even when the vaccine doesnt exactly match these viruses, it may still provide some protection. Influenza vaccine does not cause flu. Influenza vaccine may be given at the same time as other vaccines. 3. Talk with your health care provider    Tell your vaccination provider if the person getting the vaccine:   Has had an allergic reaction after a previous dose of influenza vaccine, or has any severe, life-threatening allergies    Has ever had Guillain-Barré Syndrome (also called GBS)    In some cases, your health care provider may decide to postpone influenza vaccination until a future visit. Influenza vaccine can be administered at any time during pregnancy. People who are or will be pregnant during influenza season should receive inactivated influenza vaccine. People with minor illnesses, such as a cold, may be vaccinated. People who are moderately or severely ill should usually wait until they recover before getting influenza vaccine. Your health care provider can give you more information. 4. Risks of a vaccine reaction     Soreness, redness, and swelling where the shot is given, fever, muscle aches, and headache can happen after influenza vaccination.  There may be a very small increased risk of Guillain-Barré Syndrome (GBS) after inactivated influenza vaccine (the flu shot). Brisa Speck children who get the flu shot along with pneumococcal vaccine (PCV13) and/or DTaP vaccine at the same time might be slightly more likely to have a seizure caused by fever. Tell your health care provider if a child who is getting flu vaccine has ever had a seizure. People sometimes faint after medical procedures, including vaccination. Tell your provider if you feel dizzy or have vision changes or ringing in the ears. As with any medicine, there is a very remote chance of a vaccine causing a severe allergic reaction, other serious injury, or death. 5. What if there is a serious problem?     An allergic reaction could occur after the vaccinated person leaves the clinic. If you see signs of a severe allergic reaction (hives, swelling of the face and throat, difficulty breathing, a fast heartbeat, dizziness, or weakness), call 9-1-1 and get the person to the nearest hospital.    For other signs that concern you, call your health care provider. Adverse reactions should be reported to the Vaccine Adverse Event Reporting System (VAERS). Your health care provider will usually file this report, or you can do it yourself. Visit the VAERS website at www.vaers. [facility].gov or call 9-282.328.7032. VAERS is only for reporting reactions, and VAERS staff members do not give medical advice. 6. The National Vaccine Injury Compensation Program    The [facility] Vaccine Injury Compensation Program (VICP) is a federal program that was created to compensate people who may have been injured by certain vaccines. Claims regarding alleged injury or death due to vaccination have a time limit for filing, which may be as short as two years. Visit the VICP website at www.[facility].gov/vaccinecompensation or call 4-764.310.3877 to learn about the program and about filing a claim. 7. How can I learn more?  Ask your health care provider.  Call your local or state health department.  Visit the website of the Food and Drug Administration (FDA) for vaccine package inserts and additional information at www.fda.gov/vaccines-blood-biologics/vaccines.  Contact the Centers for Disease Control and Prevention (CDC):  - Call 8-823.185.9468 (9-515-NPC-INFO) or  - Visit CDCs influenza website at www.cdc.gov/flu. Vaccine Information Statement   Inactivated Influenza Vaccine   8/6/2021  [address] [id number]-33   Department of Health and Human Services  Centers for Disease Control and Prevention    Office Use Only      Medicare Wellness Visit, Female     The best way to live healthy is to have a lifestyle where you eat a well-balanced diet, exercise regularly, limit alcohol use, and quit all forms of tobacco/nicotine, if applicable. Regular preventive services are another way to keep healthy. Preventive services (vaccines, screening tests, monitoring & exams) can help personalize your care plan, which helps you manage your own care. Screening tests can find health problems at the earliest stages, when they are easiest to treat. [facility] follows the current, evidence-based guidelines published by the [facility] ([facility]) when recommending preventive services for our patients. Because we follow these guidelines, sometimes recommendations change over time as research supports it. (For example, mammograms used to be recommended annually. Even though Medicare will still pay for an annual mammogram, the newer guidelines recommend a mammogram every two years for women of average risk). Of course, you and your doctor may decide to screen more often for some diseases, based on your risk and your co-morbidities (chronic disease you are already diagnosed with). Preventive services for you include:  - Medicare offers their members a free annual wellness visit, which is time for you and your primary care provider to discuss and plan for your preventive service needs. Take advantage of this benefit every year!  -All adults over the age of 72 should receive the recommended pneumonia vaccines. Current USPSTF guidelines recommend a series of two vaccines for the best pneumonia protection.   -All adults should have a flu vaccine yearly and a tetanus vaccine every 10 years.   -All adults age 48 and older should receive the shingles vaccines (series of two vaccines).       -All adults age 38-68 who are overweight should have a diabetes screening test once every three years.   -All adults born between [birth year] and 1965 should be screened once for Hepatitis C.  -Other screening tests and preventive services for persons with diabetes include: an eye exam to screen for diabetic retinopathy, a kidney function test, a foot exam, and stricter control over your cholesterol.   -Cardiovascular screening for adults with routine risk involves an electrocardiogram (ECG) at intervals determined by your doctor.   -Colorectal cancer screenings should be done for adults age 54-65 with no increased risk factors for colorectal cancer. There are a number of acceptable methods of screening for this type of cancer. Each test has its own benefits and drawbacks. Discuss with your doctor what is most appropriate for you during your annual wellness visit. The different tests include: colonoscopy (considered the best screening method), a fecal occult blood test, a fecal DNA test, and sigmoidoscopy.    -A bone mass density test is recommended when a woman turns 65 to screen for osteoporosis. This test is only recommended one time, as a screening. Some providers will use this same test as a disease monitoring tool if you already have osteoporosis. -Breast cancer screenings are recommended every other year for women of normal risk, age 54-69.  -Cervical cancer screenings for women over age 72 are only recommended with certain risk factors.      Here is a list of your current Health Maintenance items (your personalized list of preventive services) with a due date:  Health Maintenance Due   Topic Date Due    Shingles Vaccine (1 of 2) Never done    Diabetic Foot Care  09/29/2018    Eye Exam  09/11/2019    Albumin Urine Test  11/15/2020    Yearly Flu Vaccine (1) 09/01/2021    COVID-19 Vaccine (3 - Pfizer booster) 10/21/2021

## 2021-10-26 NOTE — PROGRESS NOTES
Chief Complaint   Patient presents with   [facility] Annual Wellness Visit     1. Have you been to the ER, urgent care clinic since your last visit? Hospitalized since your last visit? No    2. Have you seen or consulted any other health care providers outside of the [address] [place] [address] since your last visit? Include any pap smears or colon screening. Yes When: 07/21 Where: nephrology Reason for visit: follow up     Verified patient with two type of identifiers. Granddaughter present,   denies c/o at present    After obtaining consent, and per orders of , flu vaccine  given to  Left deltoid im  . Patient instructed to remain in clinic for 15 minutes afterwards, and to report any adverse reaction to me immediately.  Patient did not have any adverse reactions during this office visit

## 2021-10-26 NOTE — PROGRESS NOTES
This is the Subsequent Medicare Annual Wellness Exam, performed 12 months or more after the Initial AWV or the last Subsequent AWV    I have reviewed the patient's medical history in detail and updated the computerized patient record. Patient unable to drive no recent accident, Patient compare self health better to others with the same age,   patient with normal hearing normal vision able to do all ADL , having had no fall and no incontinence having normal appetite no weight loss since last seen eating fruits and vegetables every day does not do exercises denies any substance abuse,     Unfortunately patient has couple other problems and concerns which were not included in annual wellness exam visit,      Chronic upper back, knees shoulders, rt hands pain The patient's pain has been an ongoing struggling concerning problem, present for refills, never abused any prescribed meds, currently on WC, unable to stands secondary to severe knee pain,  no hx of illicit nor etoh abuse, the pain has been bothering the pt for many yrs, pt aware that there are no other alternative approach for the current pain that exist except for the available opioid based meds with their huge addictive properties,   Patient states that the current dosage of the meds given, not strong enough, but it is helping,   Zero count,  Pt is compliant with the current medications, and take it as directed,  the pt capable of doing things specially the activity of the daily living,   Pt's pain persist without medication, is a chronic condition,   keeps meds at a safe place, on stool softener, 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 Constitutional: no chills and fever, obese, nad     HENT: no ear head pain and nosebleeds. No blurred vision, pain and discharge. Respiratory: no shortness of breath, wheezing cough nor sore throat. Cardiovascular: Has no chest pain, leg swelling ,and racing heart . Gastrointestinal: No constipation, diarrhea, nausea and vomiting. Genitourinary: No frequency. Musculoskeletal: Positive for multiple joint pain skin: no itching, pimples or acne rash. Neurological: Negative for dizziness, no tremors  Psychiatric/Behavioral: Negative for depression has normal interest to do things and not depressed the patient is not nervous/anxious. General: Patient alert cooperative appears stated for the age  [de-identified]; normocephalic and atraumatic PERRLA extraocular motor intact normal tympanic membrane normal external ear bilaterally normal sinuses with mucosal normal no drainage  Neck: supple no adenopathy no JVD no bruit  Lungs: Clear to auscultation bilaterally no rales rhonchi or wheezes  Heart: Normal regular S1-S2 no gallops rubs or clicks no murmur  Breast exam deferred  Abdomen: Soft nontender normal bowel sounds no organomegaly  Extremities: Abnormal range of motion w/ point tenderness normal pulses no edema  Pelvic/: No lesion, no lymphadenopathy  Skin: Normal no lesion no rash      Assessment/Plan   Education and counseling provided:  Are appropriate based on today's review and evaluation  End-of-Life planning (with patient's consent)  Influenza Vaccine  Medical nutrition therapy for individuals with diabetes or renal disease    1. Needs flu shot  -     FLU (FLUAD QUAD INFLUENZA VACCINE,QUAD,ADJUVANTED)  -     traMADoL (ULTRAM) 50 mg tablet; Take 1 Tablet by mouth every eight (8) hours as needed for Pain for up to 30 days. Max Daily Amount: 150 mg. Indications: pain, Print, Disp-90 Tablet, R-1  -     HM DIABETES FOOT EXAM  -     REFERRAL TO OPTOMETRY  -     MICROALBUMIN, UR, RAND W/ MICROALB/CREAT RATIO; Future  2. Advanced directives, counseling/discussion  -     traMADoL (ULTRAM) 50 mg tablet; Take 1 Tablet by mouth every eight (8) hours as needed for Pain for up to 30 days. Max Daily Amount: 150 mg.  Indications: pain, Print, Disp-90 Tablet, R-1  -     HM DIABETES FOOT EXAM  -     REFERRAL TO OPTOMETRY  -     MICROALBUMIN, UR, RAND W/ MICROALB/CREAT RATIO; Future  -     FULL CODE  -     ADVANCE CARE PLANNING FIRST 30 MINS  3. Medicare annual wellness visit, subsequent  -     traMADoL (ULTRAM) 50 mg tablet; Take 1 Tablet by mouth every eight (8) hours as needed for Pain for up to 30 days. Max Daily Amount: 150 mg. Indications: pain, Print, Disp-90 Tablet, R-1  -     HM DIABETES FOOT EXAM  -     REFERRAL TO OPTOMETRY  -     MICROALBUMIN, UR, RAND W/ MICROALB/CREAT RATIO; Future  -     varicella-zoster recombinant, PF, (Shingrix, PF,) 50 mcg/0.5 mL susr injection; 0.5mL by IntraMUSCular route once now and then repeat in 2-6 months, Print, Disp-0.5 mL, R-1  4. Body mass index 34.0-34.9, adult  -     traMADoL (ULTRAM) 50 mg tablet; Take 1 Tablet by mouth every eight (8) hours as needed for Pain for up to 30 days. Max Daily Amount: 150 mg. Indications: pain, Print, Disp-90 Tablet, R-1  -     HM DIABETES FOOT EXAM  -     REFERRAL TO OPTOMETRY  -     MICROALBUMIN, UR, RAND W/ MICROALB/CREAT RATIO; Future  5. Opioid use, unspecified with unspecified opioid-induced disorder  6. Dermatophytosis of nail  -     DC DEBRIDEMENT OF NAILS, 6 OR MORE  7. Pain in both feet  -     DC DEBRIDEMENT OF NAILS, 6 OR MORE       Depression Risk Factor Screening     3 most recent PHQ Screens 10/26/2021   Little interest or pleasure in doing things Not at all   Feeling down, depressed, irritable, or hopeless Not at all   Total Score PHQ 2 0       Alcohol Risk Screen    Do you average more than 1 drink per night or more than 7 drinks a week:  No    On any one occasion in the past three months have you have had more than 3 drinks containing alcohol:  No        Functional Ability and Level of Safety    Hearing: Hearing is good. Activities of Daily Living:   The home contains: handrails, grab bars and rugs  Patient does total self care      Ambulation: with difficulty, uses a cane     Fall Risk:  Fall Risk Assessment, last 12 mths 10/26/2021   Able to walk? Yes   Fall in past 12 months? 0   Do you feel unsteady? 0   Are you worried about falling 0   Number of falls in past 12 months -   Fall with injury? -      Abuse Screen:  Patient is not abused       Cognitive Screening    Has your family/caregiver stated any concerns about your memory: no     Cognitive Screening: Normal - MMSE (Mini Mental Status Exam)    Health Maintenance Due     Health Maintenance Due   Topic Date Due    Shingrix Vaccine Age 49> (1 of 2) Never done    Foot Exam Q1  09/29/2018    Eye Exam Retinal or Dilated  09/11/2019    MICROALBUMIN Q1  11/15/2020    Flu Vaccine (1) 09/01/2021    COVID-19 Vaccine (3 - Pfizer booster) 10/21/2021       Patient Care Team   Patient Care Team:  Marcin Goldman MD as PCP - General (Family Medicine)  Marcin Goldman MD as PCP - REHABILITATION [facility] Empaneled Provider    History     Patient Active Problem List   Diagnosis Code    Primary osteoarthritis involving multiple joints M89.49    Shoulder pain, bilateral M25.511, M25.512    Back pain M54.9    Onychomycosis B35.1    Cough R05.9    Tick bite, infected W57. Velvet Sour    Decreased vision H54.7    Edema leg R60.0    Heme positive stool R19.5    Abdominal pain, other specified site R10.9    Inguinal hernia, left K40.90    SOB (shortness of breath) on exertion R06.02    Vitamin D deficiency E55.9    Kidney failure, acute (HCC) N17.9    Chest pain, unspecified R07.9    Mixed hyperlipidemia E78.2    Essential hypertension, benign I10    Abnormal nuclear stress test R94.39    CAD (coronary artery disease) I25.10    Post PTCA Z98.61    Type 2 diabetes mellitus without complication (HCC) S06.5    S/P angioplasty with stent--RCA 6/12 Z95.820    Multiple bruises T07. 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 9) with comorbidity ([facility] [address] E66.01    Patient is full code Z78.9     Past Medical History:   Diagnosis Date    Arthritis 3/25/2010    Back pain 3/26/2010    CAD (coronary artery disease)     Chest pain, unspecified     Chronic kidney failure, stage 3 (moderate) ([facility]) 12/5/2017    Chronic pain     Chronic pain of both knees 2/6/2018    Edema     Essential hypertension     High cholesterol 3/25/2010    HTN (hypertension) 3/25/2010    Kidney failure, acute ([facility] [address] 4/6/2012    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    Onychomycosis 8/19/2010    Other acute and subacute form of ischemic heart disease     Other and unspecified angina pectoris     Other ill-defined conditions(799.89)     excessive watering of right eye    Patient is full code 8/6/2018    Shortness of breath     Shoulder pain, bilateral 3/26/2010      Past Surgical History:   Procedure Laterality Date    HX HEART CATHETERIZATION      HX HEENT      bilat cateract extraction    HX HYSTERECTOMY      HX KNEE REPLACEMENT      HX LUMBAR FUSION      HX ORTHOPAEDIC      bilateral TKR    HX ORTHOPAEDIC      neck and back surgery    HX ORTHOPAEDIC      bilateral bunionectomy    HX ORTHOPAEDIC      bilateral cataracts removed    HX PTCA       Current Outpatient Medications   Medication Sig Dispense Refill    varicella-zoster recombinant, PF, (Shingrix, PF,) 50 mcg/0.5 mL susr injection 0.5mL by IntraMUSCular route once now and then repeat in 2-6 months 0.5 mL 1    Mitigare 0.6 mg capsule TAKE 1 CAPSULE BY MOUTH DAILY 30 Capsule 2    allopurinoL (ZYLOPRIM) 100 mg tablet Take 100 mg by mouth daily.       traMADoL (ULTRAM) 50 mg tablet tramadol 50 mg tablet   TAKE 1 TABLET BY MOUTH EVERY 8 HOURS AS NEEDED FOR PAIN. MAX DAILY AMOUNT 150 MG      candesartan (ATACAND) 4 mg tablet Take 1 Tablet by mouth daily. Reduced 9/17/21 90 Tablet 3    pravastatin (PRAVACHOL) 20 mg tablet TAKE 1 TABLET EVERY NIGHT 90 Tablet 3    torsemide (DEMADEX) 20 mg tablet TAKE 1 TABLET EVERY DAY 90 Tablet 2    nitroglycerin (NITROSTAT) 0.4 mg SL tablet PLACE 1 TABLET UNDER THE TONGUE EVERY 5 MINS AS NEEDED FOR CHEST PAIN 25 Tab 11    ezetimibe (ZETIA) 10 mg tablet Take 1 Tab by mouth daily. 90 Tab 3    potassium chloride (KLOR-CON) 10 mEq tablet TAKE 1 TABLET EVERY DAY 90 Tab 2    atenoloL (TENORMIN) 50 mg tablet TAKE 1 TABLET EVERY DAY 90 Tab 2    OTHER As needed  Indications: RA Col-rite for constipation      simethicone (MYLICON) 80 mg chewable tablet Take 1 Tab by mouth every six (6) hours as needed for Flatulence. 40 Tab 1    ASPIR-LOW 81 mg tablet Take 81 mg by mouth daily.  diclofenac (VOLTAREN) 1 % gel Apply  to affected area four (4) times daily. As needed 450 g 4    naloxone (NARCAN) 4 mg/actuation nasal spray Use 1 spray intranasally into 1 nostril. Use a new Narcan nasal spray for subsequent doses and administer into alternating nostrils. May repeat every 2 to 3 minutes as needed. 2 Each 11    MISCELLANEOUS MEDICAL SUPPLY (COMPRESSION STOCKINGS) daily.  Remove at bedtime       Allergies   Allergen Reactions    Diclofenac Unknown (comments)    Lisinopril Cough    Lisinopril-Hydrochlorothiazide Unknown (comments)    Rosuvastatin Diarrhea and Other (comments)     Abdominal pain        Family History   Problem Relation Age of Onset    Hypertension Mother     Stroke Mother     Other Mother         arthritis    Coronary Artery Disease Father     Heart Disease Father     Hypertension Sister     Diabetes Sister     Coronary Artery Disease Sister     Cancer Sister         lung    Coronary Artery Disease Brother     Cancer Brother         lung    Heart Disease Brother      Social History Tobacco Use    Smoking status: Never Smoker    Smokeless tobacco: Never Used   Substance Use Topics    Alcohol use: Yes     Comment: occasional          Anabelle Mulligan MD

## 2021-10-26 NOTE — ACP (ADVANCE CARE PLANNING)
Advance Care Planning   Conversation Outcomes / Follow-Up Plan:            Advance Care Planning (ACP) Physician       Conversation Conducted with:   Patient with Decision Making Capacity     Other Legally Authorized Decision Maker would be daughter Meaghan Ocasio as SDM     For My patients who has currently great Decision Making Capacity:     Patient is on presence of no family member,, stated that the pt wants to be not DNR at this time,  The pt likes to be a full code individual,  The patient states that there is a lot of will to live,  Pt was given the form,   will sign and bring us a copy on the later date.       Conversation Outcomes / Follow-Up Plan:   Completed new Advance Directive      Length of ACP Conversation in minutes:  16 minutes            Jessika Ceja MD

## 2021-10-27 DIAGNOSIS — R60.0 BILATERAL EDEMA OF LOWER EXTREMITY: ICD-10-CM

## 2021-10-27 DIAGNOSIS — I10 ESSENTIAL HYPERTENSION: ICD-10-CM

## 2021-10-27 DIAGNOSIS — G89.29 CHRONIC PAIN OF BOTH SHOULDERS: ICD-10-CM

## 2021-10-27 DIAGNOSIS — M15.9 PRIMARY OSTEOARTHRITIS INVOLVING MULTIPLE JOINTS: ICD-10-CM

## 2021-10-27 DIAGNOSIS — M25.512 CHRONIC PAIN OF BOTH SHOULDERS: ICD-10-CM

## 2021-10-27 DIAGNOSIS — I25.83 CORONARY ARTERY DISEASE DUE TO LIPID RICH PLAQUE: ICD-10-CM

## 2021-10-27 DIAGNOSIS — M25.562 CHRONIC PAIN OF BOTH KNEES: ICD-10-CM

## 2021-10-27 DIAGNOSIS — M25.561 CHRONIC PAIN OF BOTH KNEES: ICD-10-CM

## 2021-10-27 DIAGNOSIS — M25.511 CHRONIC PAIN OF BOTH SHOULDERS: ICD-10-CM

## 2021-10-27 DIAGNOSIS — I25.10 CORONARY ARTERY DISEASE DUE TO LIPID RICH PLAQUE: ICD-10-CM

## 2021-10-27 DIAGNOSIS — G89.29 CHRONIC PAIN OF BOTH KNEES: ICD-10-CM

## 2021-10-27 DIAGNOSIS — Z95.820 S/P ANGIOPLASTY WITH STENT: ICD-10-CM

## 2021-10-28 RX ORDER — POTASSIUM CHLORIDE 750 MG/1
TABLET, EXTENDED RELEASE ORAL
Qty: 90 TABLET | Refills: 2 | Status: SHIPPED | OUTPATIENT
Start: 2021-10-28 | End: 2022-06-13

## 2021-10-28 RX ORDER — ATENOLOL 50 MG/1
TABLET ORAL
Qty: 90 TABLET | Refills: 2 | Status: SHIPPED | OUTPATIENT
Start: 2021-10-28 | End: 2022-06-13

## 2021-11-04 ENCOUNTER — PATIENT OUTREACH (OUTPATIENT)
Dept: CASE MANAGEMENT | Age: 86
End: 2021-11-04

## 2021-11-04 NOTE — PROGRESS NOTES
Initial Contact Social Work Note - Ambulatory  11/4/2021      Date of referral: 11/1/2021  Referral received from: Coy Pierce LPN/Renetta Ramsey RN  Reason for referral: Ramp    Previous SW Referral: No      Two Identifiers Verified: Yes    Insurance Provider: [facility] Medicare Gold Plus (HMO)    Support System: Adult Child/Children, Grandchildren      Status: N/A    Community Providers: N/A     ADL Assistance: Independent, family helps as needed    Housing/Living Concerns or Home Modification Needs: Ramp needed at entry     Transportation Concern: None    Medication Cost Concern: None    Medication Education or Adherence Concern: None    Financial Concern(s): N/A    Income (only if applicable): N/A     Ability to Read/Write: Yes    Advance Care Plan:  Deferred to future conversation    Other:   Referral received for assistance with ramp. Chart reviewed. Contacted Ms. Rober Hoffman for initial assessment. Introduced self, social work care management services, and purpose of call. Ms. Rober Hoffman stated that she is in need of a ramp to assist with entry/exit of home. She stated that she has difficulty with the stairs and uses a cane for ambulation. She reported that she is independent with ADLs and her family is supportive and assists as needed. Ms. Rober Hoffman stated that her granddaughter takes her to doctor's appointments. SW requested permission to speak with daughter and granddaughter re: ramp process and needs. Advised that her release of PHI form needed to be updated. Ms. Rober Hoffman provided verbal consent for SW to speak with daughter and granddaughter. Contacted patient's daughter, Juan Diego Lomeli. Two identifiers used. Ms. Kasia Fofana stated that she spoke with [facility] [employer] and they indicated that the patient would have a 20% copay and needed to fax a prescription in to the company. She stated that she has a company available to put the ramp up as early as next week.   Advised that SW will follow-up with [facility] and PCP office re: coverage for ramp and prescription. Also spoke with daughter re: need to update release of PHI form. SW to forward to daughter. [facility] and [facility] with [facility] on provider line. Asked about coverage for ramp. She stated that she would need procedure code to determine benefits. She provided benefit information for general DME supplies. Contacted [employer] a second time and spoke with several representatives. Confirmed that patient does not have . Also advised a second time that the company is unable to provide specific benefit information for ramp without a code. Identified Needs:      Ramp   Updated Release of PHI form    Social Work Plan:     Attempt to confirm ramp coverage     Method of Communication with Provider (if appropriate): none       Goals Addressed                    This Visit's Progress       General      Ramp (pt-stated)         11/4/21  Patient has difficulty with steps to enter/exit home and is interested in a ramp for entrance to home. SW attempted to clarify coverage for ramp. Will assist family with ramp process.   MINE Burt, MSW, ACSW, [facility]    Ambulatory Care Management   [phone]

## 2021-11-05 ENCOUNTER — PATIENT OUTREACH (OUTPATIENT)
Dept: CASE MANAGEMENT | Age: 86
End: 2021-11-05

## 2021-11-05 NOTE — PROGRESS NOTES
Social Work Note    Patient has graduated from the Complex Case Management  program on 11/5/2021. At this time all Social Work goals have been completed and the patient/family has the ability to self-manage. No further Social Work follow-up scheduled. Patient/family has Social Work contact information for further questions, concerns, or needs. Blank release of PHI form sent to patient/family via daughter's email address. No patient information included. Family instructed to complete and return to PCP office. Goals Addressed                    This Visit's Progress       General      Ramp (pt-stated)         11/5/21   Spoke with patient's daughter, Ms. Cali Mauro re: SW contact with [employer] re: coverage for ramp. SW advised that traditional Medicare does not cover ramps. Advised that coverage could not be verified and that information provided to family and SW re: coverage may be for general durable medical equipment only. Suggested that family contact insurance again to request verification of coverage in writing prior to beginning project.  Received additional call from Ms. Cali Mauro. She stated that she spoke with [facility] [employer] again today and was advised that the ramp was not covered. Offered information regarding volunteer agencies assisting with ramp installation. Daughter advised that she has individual ready to start building and will work with them.  Offered connection to resources as needed. AML    11/4/21  Patient has difficulty with steps to enter/exit home and is interested in a ramp for entrance to home. SW attempted to clarify coverage for ramp. Will assist family with ramp process.   AML          Upcoming patient visits:    Future Appointments   Date Time Provider Jia Barnhart   11/22/2021  1:00 PM Justo Ramirez MD [facility] BS AMB   3/18/2022 10:00 AM Halle Padron NP [facility] BS [address] MSW, ACSW, [facility]    Ambulatory Care Management   [phone]

## 2021-12-21 ENCOUNTER — OFFICE VISIT (OUTPATIENT)
Dept: FAMILY MEDICINE CLINIC | Age: 86
End: 2021-12-21
Payer: MEDICARE

## 2021-12-21 VITALS
DIASTOLIC BLOOD PRESSURE: 71 MMHG | RESPIRATION RATE: 18 BRPM | BODY MASS INDEX: 33.86 KG/M2 | WEIGHT: 203.5 LBS | HEART RATE: 61 BPM | SYSTOLIC BLOOD PRESSURE: 144 MMHG | OXYGEN SATURATION: 99 %

## 2021-12-21 DIAGNOSIS — G89.29 HIP PAIN, CHRONIC, LEFT: Primary | ICD-10-CM

## 2021-12-21 DIAGNOSIS — M25.552 HIP PAIN, CHRONIC, LEFT: Primary | ICD-10-CM

## 2021-12-21 DIAGNOSIS — Z71.89 ADVICE GIVEN ABOUT COVID-19 VIRUS INFECTION: ICD-10-CM

## 2021-12-21 PROCEDURE — G8536 NO DOC ELDER MAL SCRN: HCPCS | Performed by: FAMILY MEDICINE

## 2021-12-21 PROCEDURE — G8417 CALC BMI ABV UP PARAM F/U: HCPCS | Performed by: FAMILY MEDICINE

## 2021-12-21 PROCEDURE — 1090F PRES/ABSN URINE INCON ASSESS: CPT | Performed by: FAMILY MEDICINE

## 2021-12-21 PROCEDURE — G8510 SCR DEP NEG, NO PLAN REQD: HCPCS | Performed by: FAMILY MEDICINE

## 2021-12-21 PROCEDURE — G8427 DOCREV CUR MEDS BY ELIG CLIN: HCPCS | Performed by: FAMILY MEDICINE

## 2021-12-21 PROCEDURE — 99214 OFFICE O/P EST MOD 30 MIN: CPT | Performed by: FAMILY MEDICINE

## 2021-12-21 PROCEDURE — 1101F PT FALLS ASSESS-DOCD LE1/YR: CPT | Performed by: FAMILY MEDICINE

## 2021-12-21 RX ORDER — HYDROCODONE BITARTRATE AND ACETAMINOPHEN 5; 325 MG/1; MG/1
1 TABLET ORAL
Qty: 40 TABLET | Refills: 0 | Status: SHIPPED | OUTPATIENT
Start: 2021-12-21 | End: 2022-01-04

## 2021-12-21 RX ORDER — METHYLPREDNISOLONE 4 MG/1
TABLET ORAL
Qty: 1 DOSE PACK | Refills: 0 | Status: ON HOLD | OUTPATIENT
Start: 2021-12-21 | End: 2022-07-27

## 2021-12-21 NOTE — PROGRESS NOTES
Chief Complaint   Patient presents with    Leg Pain     left      1. Have you been to the ER, urgent care clinic since your last visit? Hospitalized since your last visit? No    2. Have you seen or consulted any other health care providers outside of the [address] [place] [address] since your last visit? Include any pap smears or colon screening. No    Verified patient with two type of identifiers. grand daughter present,  C/o  Dragging left leg x 3 weeks due to pain.   Unable to walk without pain  Tramadol  And ibuprofen is not helping

## 2021-12-21 NOTE — PROGRESS NOTES
HISTORY OF PRESENT ILLNESS  Sophy Taylor is a 80 y.o. female. Today's Covid vaccinated Pt main concerns were provided in the clinic,    pt is w/ comorbid history and   Pt has been trying to wear mask most of the times,      Chronic low-mid upper back, knees shoulders muscle and feet. Legs and hands pain The patient's pain has been an ongoing struggling concerning problem, present for refills, never abused any prescribed meds, no hx of illicit nor etoh abuse, the pain has been bothering the pt for many yrs, pt aware that there are no other alternative approach for the current pain that exist except for the available opioid based meds with their huge addictive properties,  they all Started few yrs ago with hx of worsening months after months and not responding to other offered therapy by the specialists. the pt's wt started and the current BMI is not a helpfull contributor to the pt current joints pain,    Patient states that the current dosage of the meds given, not strong enough, but it is helping,      with the current medications,  the pt capable of doing things specially the activity of the daily living, and also they are improving the quality of the pt's life,   this pt has tried all the other Non- Narcotic meds including surgical repairs and procedures, stating that none have been able to ease down the pain,  Based on the [place] PM report, the pt is not DOC shopping, and the pt is aware of random [facility] drug screen,  if foul finding pt would be terminated, for which the pt agreed    The intensity of the pain is at10/10 w/out med,  Pt's pain persist without medication, is a chronic condition,  compliant with medication takes it as prescribed, keeps meds at a safe place, on stool softener, 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                 Current Outpatient Medications   Medication Sig Dispense Refill    atenoloL (TENORMIN) 50 mg tablet TAKE 1 TABLET EVERY DAY 90 Tablet 2    potassium chloride (KLOR-CON) 10 mEq tablet TAKE 1 TABLET EVERY DAY 90 Tablet 2    varicella-zoster recombinant, PF, (Shingrix, PF,) 50 mcg/0.5 mL susr injection 0.5mL by IntraMUSCular route once now and then repeat in 2-6 months 0.5 mL 1    allopurinoL (ZYLOPRIM) 100 mg tablet Take 1 Tablet by mouth daily. 90 Tablet 3    Mitigare 0.6 mg capsule Take 1 Capsule by mouth daily. 90 Capsule 2    candesartan (ATACAND) 4 mg tablet Take 1 Tablet by mouth daily. Reduced 9/17/21 90 Tablet 3    pravastatin (PRAVACHOL) 20 mg tablet TAKE 1 TABLET EVERY NIGHT 90 Tablet 3    torsemide (DEMADEX) 20 mg tablet TAKE 1 TABLET EVERY DAY 90 Tablet 2    nitroglycerin (NITROSTAT) 0.4 mg SL tablet PLACE 1 TABLET UNDER THE TONGUE EVERY 5 MINS AS NEEDED FOR CHEST PAIN 25 Tab 11    diclofenac (VOLTAREN) 1 % gel Apply  to affected area four (4) times daily. As needed 450 g 4    ezetimibe (ZETIA) 10 mg tablet Take 1 Tab by mouth daily. 90 Tab 3    OTHER As needed  Indications: RA Col-rite for constipation      simethicone (MYLICON) 80 mg chewable tablet Take 1 Tab by mouth every six (6) hours as needed for Flatulence. 40 Tab 1    ASPIR-LOW 81 mg tablet Take 81 mg by mouth daily.  naloxone (NARCAN) 4 mg/actuation nasal spray Use 1 spray intranasally into 1 nostril. Use a new Narcan nasal spray for subsequent doses and administer into alternating nostrils. May repeat every 2 to 3 minutes as needed. 2 Each 11    MISCELLANEOUS MEDICAL SUPPLY (COMPRESSION STOCKINGS) daily.  Remove at bedtime       Allergies   Allergen Reactions    Diclofenac Unknown (comments)    Lisinopril Cough    Lisinopril-Hydrochlorothiazide Unknown (comments)    Rosuvastatin Diarrhea and Other (comments)     Abdominal pain      Past Medical History:   Diagnosis Date    Arthritis 3/25/2010    Back pain 3/26/2010    CAD (coronary artery disease)     Chest pain, unspecified     [facility] kidney failure, stage 3 (moderate) ([facility]) 12/5/2017    Chronic pain     Chronic pain of both knees 2/6/2018    Edema     Essential hypertension     High cholesterol 3/25/2010    HTN (hypertension) 3/25/2010    Kidney failure, acute ([facility] [address] 4/6/2012    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    Onychomycosis 8/19/2010    Other acute and subacute form of ischemic heart disease     Other and unspecified angina pectoris     Other ill-defined conditions(799.89)     excessive watering of right eye    Patient is full code 8/6/2018    Shortness of breath     Shoulder pain, bilateral 3/26/2010     Past Surgical History:   Procedure Laterality Date    HX HEART CATHETERIZATION      HX HEENT      bilat cateract extraction    HX HYSTERECTOMY      HX KNEE REPLACEMENT      HX LUMBAR FUSION      HX ORTHOPAEDIC      bilateral TKR    HX ORTHOPAEDIC      neck and back surgery    HX ORTHOPAEDIC      bilateral bunionectomy    HX ORTHOPAEDIC      bilateral cataracts removed    HX PTCA       Family History   Problem Relation Age of Onset    Hypertension Mother     Stroke Mother     Other Mother         arthritis    Coronary Art Dis Father     Heart Disease Father     Hypertension Sister     Diabetes Sister     Coronary Art Dis Sister     Cancer Sister         lung    Coronary Art Dis Brother     Cancer Brother         lung    Heart Disease Brother      Social History     Tobacco Use    Smoking status: Never Smoker    Smokeless tobacco: Never Used   Substance Use Topics    Alcohol use: Yes     Comment: occasional       Lab Results   Component Value Date/Time    Cholesterol, total 250 (H) 03/12/2021 10:14 AM    HDL Cholesterol 65 03/12/2021 10:14 AM    LDL, calculated 150.6 (H) 03/12/2021 10:14 AM    Triglyceride 172 (H) 03/12/2021 10:14 AM    CHOL/HDL Ratio 3.8 03/12/2021 10:14 AM     Lab Results   Component Value Date/Time    ALT (SGPT) 23 05/07/2021 10:57 AM    Alk.  phosphatase 82 05/07/2021 10:57 AM    Bilirubin, direct 0.14 09/09/2019 11:32 AM    Bilirubin, total 0.6 05/07/2021 10:57 AM    Albumin 3.8 05/07/2021 10:57 AM    Protein, total 7.1 05/07/2021 10:57 AM    INR 1.0 06/02/2012 12:00 AM    Prothrombin time 11.2 06/02/2012 12:00 AM    PLATELET 982 57/00/6914 10:57 AM        Review of Systems   Constitutional: Negative for chills and fever. HENT: Negative for congestion and nosebleeds. Eyes: Negative for blurred vision and pain. Respiratory: Negative for cough, shortness of breath and wheezing. Cardiovascular: Negative for chest pain and leg swelling. Gastrointestinal: Negative for constipation, diarrhea, nausea and vomiting. Genitourinary: Negative for dysuria and frequency. Musculoskeletal: Positive for joint pain and myalgias. Skin: Negative for itching and rash. Neurological: Negative for dizziness, loss of consciousness and headaches. Psychiatric/Behavioral: Negative for depression. The patient is not nervous/anxious and does not have insomnia. Physical Exam  Vitals and nursing note reviewed. Constitutional:       Appearance: She is well-developed. HENT:      Head: Normocephalic and atraumatic. Eyes:      Conjunctiva/sclera: Conjunctivae normal.      Pupils: Pupils are equal, round, and reactive to light. Neck:      Thyroid: No thyromegaly. Vascular: No JVD. Cardiovascular:      Rate and Rhythm: Normal rate and regular rhythm. Heart sounds: Normal heart sounds. No murmur heard. No friction rub. No gallop. Pulmonary:      Effort: Pulmonary effort is normal. No respiratory distress. Breath sounds: Normal breath sounds. No stridor. No wheezing or rales. Abdominal:      General: Bowel sounds are normal. There is no distension. Palpations: Abdomen is soft. There is no mass. Tenderness: There is no abdominal tenderness. Musculoskeletal:         General: No tenderness. Normal range of motion. Lymphadenopathy:      Cervical: No cervical adenopathy. Skin:     Findings: No erythema or rash. Neurological:      Mental Status: She is alert and oriented to person, place, and time. Cranial Nerves: No cranial nerve deficit. Deep Tendon Reflexes: Reflexes are normal and symmetric. Psychiatric:         Behavior: Behavior normal.         ASSESSMENT and PLAN  Diagnoses and all orders for this visit:    1. Hip pain, chronic, left  -     XR HIP LT W OR WO PELV 2-3 VWS; Future  -     HYDROcodone-acetaminophen (NORCO) 5-325 mg per tablet; Take 1 Tablet by mouth every eight (8) hours as needed for Pain for up to 14 days. Max Daily Amount: 3 Tablets.  -     REFERRAL TO ORTHOPEDICS    2. Advice given about COVID-19 virus infection    Other orders  -     methylPREDNISolone (MEDROL DOSEPACK) 4 mg tablet; As directed for 6 days one package    Patient was told to lessen the amount of pain medication continue with weight reduction do some massage therapy chiropractor exercise therapy such as resistance banding avoid heavy lifting heavy pushing at this time include ibuprofen and Tylenol over-the-counter topical cream ,    in addition, pt was told to avoid machinary operation and driving while on any pain based medications that will cause dizziness, drowsiness, and sleepiness.   Dependency and tolerancy were also addressed,  meds side effects and compliancy advised,     Concern abdout COVID-19 addressed and detailed, pt was told that the best way to prevent illness is by protection with vaccination, and to Wear a facemask , having social distance, and to get tested if possible,   Pt was also told if develop dyspnea needs to call 911 or go to er, call for jd advise, pt agreed with todays recommendations,

## 2021-12-22 RX ORDER — EZETIMIBE 10 MG/1
TABLET ORAL
Qty: 90 TABLET | Refills: 3 | Status: SHIPPED | OUTPATIENT
Start: 2021-12-22

## 2022-02-16 DIAGNOSIS — M25.561 CHRONIC PAIN OF BOTH KNEES: ICD-10-CM

## 2022-02-16 DIAGNOSIS — M25.562 CHRONIC PAIN OF BOTH KNEES: ICD-10-CM

## 2022-02-16 DIAGNOSIS — M25.511 CHRONIC PAIN OF BOTH SHOULDERS: ICD-10-CM

## 2022-02-16 DIAGNOSIS — G89.29 CHRONIC PAIN OF BOTH KNEES: ICD-10-CM

## 2022-02-16 DIAGNOSIS — R60.0 BILATERAL EDEMA OF LOWER EXTREMITY: ICD-10-CM

## 2022-02-16 DIAGNOSIS — M15.9 PRIMARY OSTEOARTHRITIS INVOLVING MULTIPLE JOINTS: ICD-10-CM

## 2022-02-16 DIAGNOSIS — M25.512 CHRONIC PAIN OF BOTH SHOULDERS: ICD-10-CM

## 2022-02-16 DIAGNOSIS — I10 ESSENTIAL HYPERTENSION: ICD-10-CM

## 2022-02-16 DIAGNOSIS — G89.29 CHRONIC PAIN OF BOTH SHOULDERS: ICD-10-CM

## 2022-02-16 RX ORDER — TORSEMIDE 20 MG/1
TABLET ORAL
Qty: 90 TABLET | Refills: 2 | Status: SHIPPED | OUTPATIENT
Start: 2022-02-16 | End: 2022-09-30

## 2022-03-18 PROBLEM — N18.30 CHRONIC KIDNEY FAILURE, STAGE 3 (MODERATE) (HCC): Status: ACTIVE | Noted: 2017-12-05

## 2022-03-18 PROBLEM — F11.99 OPIOID USE, UNSPECIFIED WITH UNSPECIFIED OPIOID-INDUCED DISORDER (HCC): Status: ACTIVE | Noted: 2021-10-26

## 2022-03-18 PROBLEM — N18.30 CKD (CHRONIC KIDNEY DISEASE) STAGE 3, GFR 30-59 ML/MIN (HCC): Status: ACTIVE | Noted: 2018-01-23

## 2022-03-19 PROBLEM — E11.21 TYPE 2 DIABETES MELLITUS WITH NEPHROPATHY (HCC): Status: ACTIVE | Noted: 2018-01-23

## 2022-03-19 PROBLEM — G89.29 CHRONIC PAIN OF BOTH KNEES: Status: ACTIVE | Noted: 2018-02-06

## 2022-03-19 PROBLEM — M25.562 CHRONIC PAIN OF BOTH KNEES: Status: ACTIVE | Noted: 2018-02-06

## 2022-03-19 PROBLEM — E66.01 SEVERE OBESITY (BMI 35.0-39.9) WITH COMORBIDITY (HCC): Status: ACTIVE | Noted: 2018-05-07

## 2022-03-19 PROBLEM — M25.561 CHRONIC PAIN OF BOTH KNEES: Status: ACTIVE | Noted: 2018-02-06

## 2022-03-20 PROBLEM — Z78.9 PATIENT IS FULL CODE: Status: ACTIVE | Noted: 2018-08-06

## 2022-03-23 NOTE — PROGRESS NOTES
[facility] @ [address] [facility], [place]  Subjective/HPI:     Doris Nelson is a 80 y.o. female  history of atherosclerotic heart disease.  2012 PTCA stenting of the mid RCA, hypertension, hyperlipidemia, chronic kidney disease  followed by nephrology Is here for routine f/u. The patient denies chest pain/resting shortness of breath, orthopnea, PND,, palpitations, syncope, presyncope or fatigue. Patient reports she is having some dyspnea with exertional activities of walking and doing things around her house. She feels it is about her baseline however does have to [employer]" in between her activities. Compliant with medications, continues to have nephrology visits for her CKD. ECHO 9/2020  Left Ventricle Normal cavity size and systolic function (ejection fraction normal). Mild concentric hypertrophy.    Wall motion: normal. The estimated EF is 55 - 60%. Visually measured ejection fraction. Left Atrium Normal cavity size. Right Ventricle Normal cavity size and global systolic function. Right Atrium Normal cavity size. Aortic Valve Normal valve structure, no stenosis and no regurgitation. Mitral Valve Normal valve structure, no stenosis and no regurgitation. Tricuspid Valve Normal valve structure, no stenosis and no regurgitation. Pulmonic Valve Pulmonic valve not well visualized. Aorta Normal aortic root. Pericardium No evidence of pericardial effusion.      Nuclear Stress Test 9/2020  IMPRESSION: No ischemia demonstrated. No infarct visible. LVEF 70%.       Cardiac Cath 6/2012  SUMMARY:     --  CARDIAC STRUCTURES:   --  Global left ventricular function was normal.     --  CORONARY CIRCULATION:   --  Mid RCA: There was a tubular 95 % stenosis. There was BAN grade 2   flow through the vessel (partial perfusion). --  1ST LESION INTERVENTIONS:   --  A successful drug-eluting stent was performed on the 95 % lesion in   the mid RCA. Following intervention there was an excellent angiographic   appearance with a 0 % residual stenosis. --  Balloon angioplasty was   performed for post dilatation, using a NC Trek RX 3.5x8mm balloon, with 2   inflations and a maximum inflation pressure of 12 brenda. --  A 3.5 x 18   Resolute drug-eluting stent was placed across the lesion and successfully   deployed at a maximum inflation pressure of 12 brenda.      PCP Provider  Tanner Reyes MD  Past Medical History:   Diagnosis Date    Arthritis 3/25/2010    Back pain 3/26/2010    CAD (coronary artery disease)     Chest pain, unspecified     Chronic kidney failure, stage 3 (moderate) (HCC) 12/5/2017    Chronic pain     Chronic pain of both knees 2/6/2018    Edema     Essential hypertension     High cholesterol 3/25/2010    HTN (hypertension) 3/25/2010    Kidney failure, acute ([facility] [address] 4/6/2012    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    Onychomycosis 8/19/2010    Other acute and subacute form of ischemic heart disease     Other and unspecified angina pectoris     Other ill-defined conditions(799.89)     excessive watering of right eye    Patient is full code 8/6/2018    Shortness of breath     Shoulder pain, bilateral 3/26/2010      Past Surgical History:   Procedure Laterality Date    HX HEART CATHETERIZATION      HX HEENT      bilat cateract extraction    HX HYSTERECTOMY      HX KNEE REPLACEMENT      HX LUMBAR FUSION      HX ORTHOPAEDIC      bilateral TKR    HX ORTHOPAEDIC      neck and back surgery    HX ORTHOPAEDIC      bilateral bunionectomy    HX ORTHOPAEDIC      bilateral cataracts removed    HX PTCA       Allergies   Allergen Reactions    Diclofenac Unknown (comments)    Lisinopril Cough    Lisinopril-Hydrochlorothiazide Unknown (comments)    Rosuvastatin Diarrhea and Other (comments)     Abdominal pain       Family History   Problem Relation Age of Onset    Hypertension Mother     Stroke Mother    Angelo Dueñas Other Mother arthritis    Coronary Art Dis Father     Heart Disease Father     Hypertension Sister     Diabetes Sister     Coronary Art Dis Sister     Cancer Sister         lung    Coronary Art Dis Brother     Cancer Brother         lung    Heart Disease Brother       Current Outpatient Medications   Medication Sig    torsemide (DEMADEX) 20 mg tablet TAKE 1 TABLET EVERY DAY    ezetimibe (ZETIA) 10 mg tablet TAKE 1 TABLET EVERY DAY    methylPREDNISolone (MEDROL DOSEPACK) 4 mg tablet As directed for 6 days one package    atenoloL (TENORMIN) 50 mg tablet TAKE 1 TABLET EVERY DAY    potassium chloride (KLOR-CON) 10 mEq tablet TAKE 1 TABLET EVERY DAY    varicella-zoster recombinant, PF, (Shingrix, PF,) 50 mcg/0.5 mL susr injection 0.5mL by IntraMUSCular route once now and then repeat in 2-6 months    allopurinoL (ZYLOPRIM) 100 mg tablet Take 1 Tablet by mouth daily.  Mitigare 0.6 mg capsule Take 1 Capsule by mouth daily.  candesartan (ATACAND) 4 mg tablet Take 1 Tablet by mouth daily. Reduced 9/17/21    pravastatin (PRAVACHOL) 20 mg tablet TAKE 1 TABLET EVERY NIGHT    nitroglycerin (NITROSTAT) 0.4 mg SL tablet PLACE 1 TABLET UNDER THE TONGUE EVERY 5 MINS AS NEEDED FOR CHEST PAIN    diclofenac (VOLTAREN) 1 % gel Apply  to affected area four (4) times daily. As needed    OTHER As needed  Indications: RA Col-rite for constipation    simethicone (MYLICON) 80 mg chewable tablet Take 1 Tab by mouth every six (6) hours as needed for Flatulence.  ASPIR-LOW 81 mg tablet Take 81 mg by mouth daily.  naloxone (NARCAN) 4 mg/actuation nasal spray Use 1 spray intranasally into 1 nostril. Use a new Narcan nasal spray for subsequent doses and administer into alternating nostrils. May repeat every 2 to 3 minutes as needed.  MISCELLANEOUS MEDICAL SUPPLY (COMPRESSION STOCKINGS) daily. Remove at bedtime     No current facility-administered medications for this visit.       There were no vitals filed for this visit. Social History     Socioeconomic History    Marital status:      Spouse name: Not on file    Number of children: Not on file    Years of education: Not on file    Highest education level: Not on file   Occupational History    Not on file   Tobacco Use    Smoking status: Never Smoker    Smokeless tobacco: Never Used   Substance and Sexual Activity    Alcohol use: Yes     Comment: occasional     Drug use: Yes     Types: Prescription, OTC    Sexual activity: Never   Other Topics Concern    Not on file   Social History Narrative    ** Merged History Encounter **          Social Determinants of Health     Financial Resource Strain:     Difficulty of Paying Living Expenses: Not on file   Food Insecurity:     Worried About Running Out of Food in the Last Year: Not on file    Judah of Food in the Last Year: Not on file   Transportation Needs:     Lack of Transportation (Medical): Not on file    Lack of Transportation (Non-Medical):  Not on file   Physical Activity:     Days of Exercise per Week: Not on file    Minutes of Exercise per Session: Not on file   Stress:     Feeling of Stress : Not on file   Social Connections:     Frequency of Communication with Friends and Family: Not on file    Frequency of Social Gatherings with Friends and Family: Not on file    Attends Zoroastrianism Services: Not on file    Active Member of [address] [place] [address] [employer] or Organizations: Not on file    Attends Club or Organization Meetings: Not on file    Marital Status: Not on file   Intimate Partner Violence:     Fear of Current or Ex-Partner: Not on file    Emotionally Abused: Not on file    Physically Abused: Not on file    Sexually Abused: Not on file   Housing Stability:     Unable to Pay for Housing in the Last Year: Not on file    Number of [place] in the Last Year: Not on file    Unstable Housing in the Last Year: Not on file       I have reviewed the nurses notes, vitals, problem list, allergy list, medical history, family, social history and medications. Review of Symptoms  11 systems reviewed, negative other than as stated in the HPI      Physical Exam:      General: Well developed, in no acute distress, cooperative and alert  HEENT: No carotid bruits, no JVD, trach is midline. Neck Supple,   Heart:  Normal S1/S2 negative S3 or S4. Regular, no murmur, gallop or rub. Respiratory: Clear bilaterally x 4, no wheezing or rales  Abdomen:   Soft, non-tender, no masses, bowel sounds are active. Extremities: Bilateral nonpitting lower extremity edema, normal cap refill, no cyanosis, atraumatic. Neuro: A&Ox3, speech clear, gait slow and cautious with the use of cane  Skin: Skin color is normal. No rashes or lesions. Non diaphoretic  Vascular: 2+ pulses symmetric in all extremities    Cardiographics    ECG: Sinus rhythm        Cardiology Labs:  Lab Results   Component Value Date/Time    Cholesterol, total 250 (H) 03/12/2021 10:14 AM    HDL Cholesterol 65 03/12/2021 10:14 AM    LDL, calculated 150.6 (H) 03/12/2021 10:14 AM    Triglyceride 172 (H) 03/12/2021 10:14 AM    CHOL/HDL Ratio 3.8 03/12/2021 10:14 AM       Lab Results   Component Value Date/Time    Sodium 142 05/07/2021 10:57 AM    Potassium 4.0 05/07/2021 10:57 AM    Chloride 105 05/07/2021 10:57 AM    CO2 30 05/07/2021 10:57 AM    Anion gap 7 05/07/2021 10:57 AM    Glucose 109 (H) 05/07/2021 10:57 AM    BUN 24 (H) 05/07/2021 10:57 AM    Creatinine 1.16 (H) 05/07/2021 10:57 AM    BUN/Creatinine ratio 21 (H) 05/07/2021 10:57 AM    GFR est AA 53 (L) 05/07/2021 10:57 AM    GFR est non-AA 44 (L) 05/07/2021 10:57 AM    Calcium 10.5 (H) 05/07/2021 10:57 AM    Bilirubin, total 0.6 05/07/2021 10:57 AM    Alk.  phosphatase 82 05/07/2021 10:57 AM    Protein, total 7.1 05/07/2021 10:57 AM    Albumin 3.8 05/07/2021 10:57 AM    Globulin 3.3 05/07/2021 10:57 AM    A-G Ratio 1.2 05/07/2021 10:57 AM    ALT (SGPT) 23 05/07/2021 10:57 AM           Assessment:     Assessment: Diagnoses and all orders for this visit:    1. Coronary artery disease involving native coronary artery of native heart without angina pectoris    2. Essential hypertension, benign    3. Type 2 diabetes mellitus without complication, without long-term current use of insulin (HCC)    4. Stage 3 chronic kidney disease, unspecified whether stage 3a or 3b CKD ([facility] [address]    5. Mixed hyperlipidemia        ICD-10-CM ICD-9-CM    1. Coronary artery disease involving native coronary artery of native heart without angina pectoris  I25.10 414.01    2. Essential hypertension, benign  I10 401.1    3. Type 2 diabetes mellitus without complication, without long-term current use of insulin (HCC)  E11.9 250.00    4. Stage 3 chronic kidney disease, unspecified whether stage 3a or 3b CKD ([facility])  N18.30 585.3    5. Mixed hyperlipidemia  E78.2 272.2      No orders of the defined types were placed in this encounter. Plan:       1.  Atherosclerotic heart disease: History of PTCA stenting 2012 mid RCA , patient reporting some increased dyspnea on exertion since last visit. Will evaluate for CAD progression with Lexiscan nuclear stress test and 2D echocardiogram.  Testing to be done in the [place] office. 2.  Hypertension: 126/62  3.  Hyperlipidemia:  Tolerating pravastatin 10 mg and Zetia 10 mg. Will be due for lipids at next visit unless has been recently checked either by nephrology or PCP  4.  CKD:  Stabe IIIa follow by Nephrology has appointment next week lab work was done today. 15-year-old female with atherosclerotic heart disease reporting her baseline but does sound like she is having a bit more dyspnea on exertion. Will evaluate with Lexiscan and echocardiogram.  Follow-up in 6 months unless sooner follow-up as needed due to abnormal test results. Alray Skiff, NP      Please note that this dictation was completed with [employer], the [employer] voice recognition software.   Quite often unanticipated grammatical, syntax, homophones, and other interpretive errors are inadvertently transcribed by the computer software. Please disregard these errors. Please excuse any errors that have escaped final proofreading. Thank you.

## 2022-03-24 ENCOUNTER — OFFICE VISIT (OUTPATIENT)
Dept: FAMILY MEDICINE CLINIC | Age: 87
End: 2022-03-24

## 2022-03-24 ENCOUNTER — OFFICE VISIT (OUTPATIENT)
Dept: CARDIOLOGY CLINIC | Age: 87
End: 2022-03-24
Payer: MEDICARE

## 2022-03-24 VITALS
OXYGEN SATURATION: 97 % | HEIGHT: 65 IN | BODY MASS INDEX: 34.57 KG/M2 | WEIGHT: 207.5 LBS | HEART RATE: 56 BPM | RESPIRATION RATE: 18 BRPM | TEMPERATURE: 97.9 F | DIASTOLIC BLOOD PRESSURE: 81 MMHG | SYSTOLIC BLOOD PRESSURE: 147 MMHG

## 2022-03-24 VITALS
BODY MASS INDEX: 34.19 KG/M2 | SYSTOLIC BLOOD PRESSURE: 126 MMHG | DIASTOLIC BLOOD PRESSURE: 62 MMHG | OXYGEN SATURATION: 98 % | RESPIRATION RATE: 18 BRPM | HEART RATE: 65 BPM | WEIGHT: 205.2 LBS | HEIGHT: 65 IN

## 2022-03-24 DIAGNOSIS — E11.9 TYPE 2 DIABETES MELLITUS WITHOUT COMPLICATION, WITHOUT LONG-TERM CURRENT USE OF INSULIN (HCC): ICD-10-CM

## 2022-03-24 DIAGNOSIS — G89.4 CHRONIC PAIN SYNDROME: ICD-10-CM

## 2022-03-24 DIAGNOSIS — F11.99 OPIOID USE, UNSPECIFIED WITH UNSPECIFIED OPIOID-INDUCED DISORDER (HCC): ICD-10-CM

## 2022-03-24 DIAGNOSIS — N18.30 CHRONIC KIDNEY FAILURE, STAGE 3 (MODERATE) (HCC): ICD-10-CM

## 2022-03-24 DIAGNOSIS — N18.30 STAGE 3 CHRONIC KIDNEY DISEASE, UNSPECIFIED WHETHER STAGE 3A OR 3B CKD (HCC): ICD-10-CM

## 2022-03-24 DIAGNOSIS — M15.9 PRIMARY OSTEOARTHRITIS INVOLVING MULTIPLE JOINTS: Primary | ICD-10-CM

## 2022-03-24 DIAGNOSIS — I10 ESSENTIAL HYPERTENSION, BENIGN: ICD-10-CM

## 2022-03-24 DIAGNOSIS — E78.2 MIXED HYPERLIPIDEMIA: ICD-10-CM

## 2022-03-24 DIAGNOSIS — I25.10 CORONARY ARTERY DISEASE INVOLVING NATIVE CORONARY ARTERY OF NATIVE HEART WITHOUT ANGINA PECTORIS: Primary | ICD-10-CM

## 2022-03-24 PROCEDURE — 1090F PRES/ABSN URINE INCON ASSESS: CPT | Performed by: FAMILY MEDICINE

## 2022-03-24 PROCEDURE — G8510 SCR DEP NEG, NO PLAN REQD: HCPCS | Performed by: NURSE PRACTITIONER

## 2022-03-24 PROCEDURE — 99214 OFFICE O/P EST MOD 30 MIN: CPT | Performed by: FAMILY MEDICINE

## 2022-03-24 PROCEDURE — G8427 DOCREV CUR MEDS BY ELIG CLIN: HCPCS | Performed by: FAMILY MEDICINE

## 2022-03-24 PROCEDURE — G8536 NO DOC ELDER MAL SCRN: HCPCS | Performed by: NURSE PRACTITIONER

## 2022-03-24 PROCEDURE — 1090F PRES/ABSN URINE INCON ASSESS: CPT | Performed by: NURSE PRACTITIONER

## 2022-03-24 PROCEDURE — 1101F PT FALLS ASSESS-DOCD LE1/YR: CPT | Performed by: FAMILY MEDICINE

## 2022-03-24 PROCEDURE — G8417 CALC BMI ABV UP PARAM F/U: HCPCS | Performed by: FAMILY MEDICINE

## 2022-03-24 PROCEDURE — G8510 SCR DEP NEG, NO PLAN REQD: HCPCS | Performed by: FAMILY MEDICINE

## 2022-03-24 PROCEDURE — 1101F PT FALLS ASSESS-DOCD LE1/YR: CPT | Performed by: NURSE PRACTITIONER

## 2022-03-24 PROCEDURE — G8427 DOCREV CUR MEDS BY ELIG CLIN: HCPCS | Performed by: NURSE PRACTITIONER

## 2022-03-24 PROCEDURE — G8536 NO DOC ELDER MAL SCRN: HCPCS | Performed by: FAMILY MEDICINE

## 2022-03-24 PROCEDURE — 93000 ELECTROCARDIOGRAM COMPLETE: CPT | Performed by: NURSE PRACTITIONER

## 2022-03-24 PROCEDURE — G8417 CALC BMI ABV UP PARAM F/U: HCPCS | Performed by: NURSE PRACTITIONER

## 2022-03-24 PROCEDURE — 99214 OFFICE O/P EST MOD 30 MIN: CPT | Performed by: NURSE PRACTITIONER

## 2022-03-24 RX ORDER — TRAMADOL HYDROCHLORIDE 50 MG/1
50 TABLET ORAL
Qty: 90 TABLET | Refills: 0 | Status: SHIPPED | OUTPATIENT
Start: 2022-03-24 | End: 2022-04-23

## 2022-03-24 RX ORDER — TRAMADOL HYDROCHLORIDE 50 MG/1
50 TABLET ORAL
Qty: 90 TABLET | Refills: 0 | Status: SHIPPED | OUTPATIENT
Start: 2022-05-02 | End: 2022-06-01

## 2022-03-24 NOTE — PROGRESS NOTES
HISTORY OF PRESENT ILLNESS  Sophy Quiroz is a 80 y.o. female. with current medical history of CAD, HTN, PVD, stable diabetic state, Stage 3 CKD,  sees nephrologist, A Covid vaccinated x3,and masked Denies flu like sxs,present with the following complaint and concern of         Chronic lower back,b/l  Knees  The patient's pain has been a chonic problem    present for refills, pt has alot of difficulty walking aournd,   never abused any prescribed meds,  and states that she is much functional , she has no hx of illicit nor etoh abuse,     pt has tried all the other alternative approach for the current pain including PT, Wt management, pain management Orthopedic sx     meds has been kept in safe place, pt has no hx of MH disoorder     pt states that the quality of life has been better since on the current med, CAGE answers no's    Patient states that the current  meds given,  is helping,   Zero count,    Pt is compliant with the current medications, and take it as directed,  the pt capable of doing things specially the activity of the daily living,     currently is not operating  machinery while on this med. Current Outpatient Medications   Medication Sig Dispense Refill    traMADoL (ULTRAM) 50 mg tablet Take 1 Tablet by mouth every eight (8) hours as needed for Pain for up to 30 days. Max Daily Amount: 150 mg. Indications: pain 90 Tablet 0    [START ON 5/2/2022] traMADoL (ULTRAM) 50 mg tablet Take 1 Tablet by mouth every eight (8) hours as needed for Pain for up to 30 days. Max Daily Amount: 150 mg. 90 Tablet 0    torsemide (DEMADEX) 20 mg tablet TAKE 1 TABLET EVERY DAY 90 Tablet 2    ezetimibe (ZETIA) 10 mg tablet TAKE 1 TABLET EVERY DAY 90 Tablet 3    atenoloL (TENORMIN) 50 mg tablet TAKE 1 TABLET EVERY DAY 90 Tablet 2    potassium chloride (KLOR-CON) 10 mEq tablet TAKE 1 TABLET EVERY DAY 90 Tablet 2    allopurinoL (ZYLOPRIM) 100 mg tablet Take 1 Tablet by mouth daily.  90 Tablet 3    Mitigare 0.6 mg capsule Take 1 Capsule by mouth daily. 90 Capsule 2    candesartan (ATACAND) 4 mg tablet Take 1 Tablet by mouth daily. Reduced 9/17/21 90 Tablet 3    pravastatin (PRAVACHOL) 20 mg tablet TAKE 1 TABLET EVERY NIGHT 90 Tablet 3    nitroglycerin (NITROSTAT) 0.4 mg SL tablet PLACE 1 TABLET UNDER THE TONGUE EVERY 5 MINS AS NEEDED FOR CHEST PAIN 25 Tab 11    simethicone (MYLICON) 80 mg chewable tablet Take 1 Tab by mouth every six (6) hours as needed for Flatulence. (Patient not taking: Reported on 3/24/2022) 40 Tab 1    ASPIR-LOW 81 mg tablet Take 81 mg by mouth daily.  naloxone (NARCAN) 4 mg/actuation nasal spray Use 1 spray intranasally into 1 nostril. Use a new Narcan nasal spray for subsequent doses and administer into alternating nostrils. May repeat every 2 to 3 minutes as needed. (Patient not taking: Reported on 3/24/2022) 2 Each 11    MISCELLANEOUS MEDICAL SUPPLY (COMPRESSION STOCKINGS) daily. Remove at bedtime      methylPREDNISolone (MEDROL DOSEPACK) 4 mg tablet As directed for 6 days one package (Patient not taking: Reported on 3/24/2022) 1 Dose Pack 0    varicella-zoster recombinant, PF, (Shingrix, PF,) 50 mcg/0.5 mL susr injection 0.5mL by IntraMUSCular route once now and then repeat in 2-6 months (Patient not taking: Reported on 3/24/2022) 0.5 mL 1    diclofenac (VOLTAREN) 1 % gel Apply  to affected area four (4) times daily.  As needed 450 g 4    OTHER As needed  Indications: RA Col-rite for constipation (Patient not taking: As needed)       Allergies   Allergen Reactions    Diclofenac Unknown (comments)    Lisinopril Cough    Lisinopril-Hydrochlorothiazide Unknown (comments)    Rosuvastatin Diarrhea and Other (comments)     Abdominal pain      Past Medical History:   Diagnosis Date    Arthritis 3/25/2010    Back pain 3/26/2010    CAD (coronary artery disease)     Chest pain, unspecified     [facility] failure, stage 3 (moderate) [facility]) 12/5/2017    Chronic pain  Chronic pain of both knees 2/6/2018    Edema     Essential hypertension     High cholesterol 3/25/2010    HTN (hypertension) 3/25/2010    Kidney failure, acute ([facility] [address] 4/6/2012    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    Onychomycosis 8/19/2010    Other acute and subacute form of ischemic heart disease     Other and unspecified angina pectoris     Other ill-defined conditions(799.89)     excessive watering of right eye    Patient is full code 8/6/2018    Shortness of breath     Shoulder pain, bilateral 3/26/2010     Past Surgical History:   Procedure Laterality Date    HX HEART CATHETERIZATION      HX HEENT      bilat cateract extraction    HX HYSTERECTOMY      HX KNEE REPLACEMENT      HX LUMBAR FUSION      HX ORTHOPAEDIC      bilateral TKR    HX ORTHOPAEDIC      neck and back surgery    HX ORTHOPAEDIC      bilateral bunionectomy    HX ORTHOPAEDIC      bilateral cataracts removed    HX PTCA       Family History   Problem Relation Age of Onset    Hypertension Mother     Stroke Mother     Other Mother         arthritis    Coronary Art Dis Father     Heart Disease Father     Hypertension Sister     Diabetes Sister     Coronary Art Dis Sister     Cancer Sister         lung    Coronary Art Dis Brother     Cancer Brother         lung    Heart Disease Brother      Social History     Tobacco Use    Smoking status: Never Smoker    Smokeless tobacco: Never Used   Substance Use Topics    Alcohol use: Yes     Comment: occasional       Lab Results   Component Value Date/Time    Hemoglobin A1c 5.7 (H) 05/07/2021 10:57 AM    Hemoglobin A1c 5.8 (H) 09/11/2020 10:48 AM    Hemoglobin A1c 6.1 (H) 02/04/2019 09:22 AM    Glucose 109 (H) 05/07/2021 10:57 AM    Microalb/Creat ratio (ug/mg creat.) 19.1 11/15/2019 01:01 PM    LDL, calculated 150.6 (H) 03/12/2021 10:14 AM    Creatinine (POC) 1.3 11/30/2009 01:34 PM    Creatinine 1.16 (H) 05/07/2021 10:57 AM         Review of Systems   Constitutional: Negative for chills and fever. HENT: Negative for ear pain and nosebleeds. Eyes: Negative for blurred vision, pain and discharge. Respiratory: Negative for shortness of breath. Cardiovascular: Negative for chest pain and leg swelling. Gastrointestinal: Negative for constipation, diarrhea, nausea and vomiting. Genitourinary: Negative for frequency. Musculoskeletal: Positive for back pain, joint pain, myalgias and neck pain. Skin: Negative for itching and rash. Neurological: Negative for weakness and headaches. Psychiatric/Behavioral: Negative for depression. The patient has insomnia. The patient is not nervous/anxious. Physical Exam  Vitals and nursing note reviewed. Constitutional:       Appearance: She is well-developed. HENT:      Head: Normocephalic and atraumatic. Eyes:      Conjunctiva/sclera: Conjunctivae normal.      Pupils: Pupils are equal, round, and reactive to light. Neck:      Thyroid: No thyromegaly. Vascular: No JVD. Cardiovascular:      Rate and Rhythm: Normal rate and regular rhythm. Heart sounds: Normal heart sounds. No murmur heard. No friction rub. No gallop. Pulmonary:      Effort: Pulmonary effort is normal. No respiratory distress. Breath sounds: Normal breath sounds. No stridor. No wheezing or rales. Abdominal:      General: Bowel sounds are normal. There is no distension. Palpations: Abdomen is soft. There is no mass. Tenderness: There is no abdominal tenderness. Musculoskeletal:         General: Tenderness present. Cervical back: Normal range of motion and neck supple. Thoracic back: Spasms and tenderness present. Decreased range of motion. Lumbar back: Spasms and tenderness present. Decreased range of motion. Right knee: Swelling and deformity present. Decreased range of motion. Tenderness present. Left knee: Swelling and bony tenderness present. Decreased range of motion.  Tenderness present. Lymphadenopathy:      Cervical: No cervical adenopathy. Skin:     Findings: No erythema or rash. Neurological:      Mental Status: She is alert and oriented to person, place, and time. Cranial Nerves: No cranial nerve deficit. Deep Tendon Reflexes: Reflexes are normal and symmetric. Psychiatric:         Behavior: Behavior normal.         ASSESSMENT and PLAN  Diagnoses and all orders for this visit:    1. Primary osteoarthritis involving multiple joints  -     traMADoL (ULTRAM) 50 mg tablet; Take 1 Tablet by mouth every eight (8) hours as needed for Pain for up to 30 days. Max Daily Amount: 150 mg. Indications: pain  -     10-DRUG SCREEN, URINE W RFLX CONFIRMATION; Future  -     traMADoL (ULTRAM) 50 mg tablet; Take 1 Tablet by mouth every eight (8) hours as needed for Pain for up to 30 days. Max Daily Amount: 150 mg.    2. Opioid use, unspecified with unspecified opioid-induced disorder (HCC)  -     traMADoL (ULTRAM) 50 mg tablet; Take 1 Tablet by mouth every eight (8) hours as needed for Pain for up to 30 days. Max Daily Amount: 150 mg. Indications: pain  -     10-DRUG SCREEN, URINE W RFLX CONFIRMATION; Future  -     traMADoL (ULTRAM) 50 mg tablet; Take 1 Tablet by mouth every eight (8) hours as needed for Pain for up to 30 days. Max Daily Amount: 150 mg.    3. Type II diabetes mellitus with complication, uncontrolled (HCC)  -     traMADoL (ULTRAM) 50 mg tablet; Take 1 Tablet by mouth every eight (8) hours as needed for Pain for up to 30 days. Max Daily Amount: 150 mg. Indications: pain  -     10-DRUG SCREEN, URINE W RFLX CONFIRMATION; Future  -     traMADoL (ULTRAM) 50 mg tablet; Take 1 Tablet by mouth every eight (8) hours as needed for Pain for up to 30 days. Max Daily Amount: 150 mg.    4. Chronic kidney failure, stage 3 (moderate) (HCC)  -     traMADoL (ULTRAM) 50 mg tablet; Take 1 Tablet by mouth every eight (8) hours as needed for Pain for up to 30 days. Max Daily Amount: 150 mg. Indications: pain  -     10-DRUG SCREEN, URINE W RFLX CONFIRMATION; Future  -     traMADoL (ULTRAM) 50 mg tablet; Take 1 Tablet by mouth every eight (8) hours as needed for Pain for up to 30 days. Max Daily Amount: 150 mg.    5. Chronic pain syndrome  -     traMADoL (ULTRAM) 50 mg tablet; Take 1 Tablet by mouth every eight (8) hours as needed for Pain for up to 30 days. Max Daily Amount: 150 mg. Indications: pain  -     10-DRUG SCREEN, URINE W RFLX CONFIRMATION; Future  -     traMADoL (ULTRAM) 50 mg tablet; Take 1 Tablet by mouth every eight (8) hours as needed for Pain for up to 30 days. Max Daily Amount: 150 mg.             With risk-benefit analysis , pt was informed  considering available nonpharmacologic       Patient was told that the medication is not safe was told not to operate any machinery while taking the medication meanwhile emphasized that the pt should take the medication as needed not on a regular basis avoid alcohol intake and avoid other medication that could potentiate its effect, regardless patient was told any other medication given by any other doctor the pt need to call primary care for further advice`  Signed informed consent,   signed cs treatment agreement,   patient acknowledged understanding and agreed with today's recommendation

## 2022-03-24 NOTE — PROGRESS NOTES
Pascual Landry is a 80 y.o. female  Chief Complaint   Patient presents with    Follow Up Chronic Condition     Health Maintenance Due   Topic Date Due    Shingrix Vaccine Age 49> (1 of 2) Never done    Eye Exam Retinal or Dilated  09/11/2019    MICROALBUMIN Q1  11/15/2020    Lipid Screen  03/12/2022   1. \"Have you been to the ER,  no  urgent care clinic since your last visit? no   Hospitalized since your last visit?  no  2. \"Have you seen or consulted any other health care providers outside of the [address] [place] [address] since your last visit? no    3. For patients aged 39-70: Has the patient had a colonoscopy / FIT/ Cologuard? N/A    If the patient is female:    4. For patients aged 41-77: Has the patient had a mammogram within the past 2 years? N/A  5. For patients aged 21-65: Has the patient had a pap smear?  N/a

## 2022-03-24 NOTE — PROGRESS NOTES
1. Have you been to the ER, urgent care clinic since your last visit? Hospitalized since your last visit? No    2. Have you seen or consulted any other health care providers outside of the [address] [place] [address] since your last visit? Include any pap smears or colon screening. No      Chief Complaint   Patient presents with    Follow-up     6 mo appt.  Leg Swelling    Shortness of Breath     with physical exertion.

## 2022-03-28 LAB
ALPRAZ UR QL: NEGATIVE
AMPHETAMINES UR QL SCN: NEGATIVE NG/ML
BARBITURATES UR QL SCN: NEGATIVE NG/ML
BENZODIAZ UR QL: NEGATIVE NG/ML
BZE UR QL SCN: NEGATIVE NG/ML
CANNABINOIDS UR QL SCN: NEGATIVE NG/ML
CLONAZEPAM UR QL: NEGATIVE
CREAT UR-MCNC: 161 MG/DL (ref 20–300)
FLURAZEPAM UR QL: NEGATIVE
LORAZEPAM UR QL: NEGATIVE
METHADONE UR QL SCN: NEGATIVE NG/ML
MIDAZOLAM UR QL CFM: NEGATIVE
NORDIAZEPAM UR QL: NEGATIVE
OPIATES UR QL SCN: NEGATIVE NG/ML
OXAZEPAM UR QL: NEGATIVE
OXYCODONE+OXYMORPHONE UR QL SCN: NEGATIVE NG/ML
PCP UR QL: NEGATIVE NG/ML
PH UR: 5.4 [PH] (ref 4.5–8.9)
PLEASE NOTE:, 733157: NORMAL
PROPOXYPH UR QL SCN: NEGATIVE NG/ML
SP GR UR: 1.01
TEMAZEPAM UR QL CFM: NEGATIVE
TRIAZOLAM UR QL: NEGATIVE

## 2022-04-17 NOTE — ED NOTES
Pt resting in bed w/ daughter at pt's bedside, pt appears to be in no distress, will continue to monitor pt. Opt out

## 2022-05-10 RX ORDER — DICLOFENAC SODIUM 10 MG/G
GEL TOPICAL
Qty: 400 G | Refills: 3 | Status: SHIPPED | OUTPATIENT
Start: 2022-05-10

## 2022-05-31 RX ORDER — COLCHICINE 0.6 MG/1
CAPSULE ORAL
Qty: 90 CAPSULE | Refills: 2 | Status: SHIPPED | OUTPATIENT
Start: 2022-05-31

## 2022-06-13 DIAGNOSIS — R60.0 BILATERAL EDEMA OF LOWER EXTREMITY: ICD-10-CM

## 2022-06-13 DIAGNOSIS — M25.511 CHRONIC PAIN OF BOTH SHOULDERS: ICD-10-CM

## 2022-06-13 DIAGNOSIS — I10 ESSENTIAL HYPERTENSION: ICD-10-CM

## 2022-06-13 DIAGNOSIS — M25.512 CHRONIC PAIN OF BOTH SHOULDERS: ICD-10-CM

## 2022-06-13 DIAGNOSIS — M15.9 PRIMARY OSTEOARTHRITIS INVOLVING MULTIPLE JOINTS: ICD-10-CM

## 2022-06-13 DIAGNOSIS — G89.29 CHRONIC PAIN OF BOTH SHOULDERS: ICD-10-CM

## 2022-06-13 DIAGNOSIS — M25.562 CHRONIC PAIN OF BOTH KNEES: ICD-10-CM

## 2022-06-13 DIAGNOSIS — Z95.820 S/P ANGIOPLASTY WITH STENT: ICD-10-CM

## 2022-06-13 DIAGNOSIS — G89.29 CHRONIC PAIN OF BOTH KNEES: ICD-10-CM

## 2022-06-13 DIAGNOSIS — I25.83 CORONARY ARTERY DISEASE DUE TO LIPID RICH PLAQUE: ICD-10-CM

## 2022-06-13 DIAGNOSIS — M25.561 CHRONIC PAIN OF BOTH KNEES: ICD-10-CM

## 2022-06-13 DIAGNOSIS — I25.10 CORONARY ARTERY DISEASE DUE TO LIPID RICH PLAQUE: ICD-10-CM

## 2022-06-13 RX ORDER — POTASSIUM CHLORIDE 750 MG/1
TABLET, EXTENDED RELEASE ORAL
Qty: 90 TABLET | Refills: 2 | Status: SHIPPED | OUTPATIENT
Start: 2022-06-13

## 2022-06-13 RX ORDER — ATENOLOL 50 MG/1
TABLET ORAL
Qty: 90 TABLET | Refills: 2 | Status: SHIPPED | OUTPATIENT
Start: 2022-06-13

## 2022-07-01 ENCOUNTER — OFFICE VISIT (OUTPATIENT)
Dept: FAMILY MEDICINE CLINIC | Age: 87
End: 2022-07-01
Payer: MEDICARE

## 2022-07-01 VITALS
RESPIRATION RATE: 16 BRPM | TEMPERATURE: 98.4 F | BODY MASS INDEX: 35.59 KG/M2 | OXYGEN SATURATION: 96 % | SYSTOLIC BLOOD PRESSURE: 122 MMHG | HEIGHT: 65 IN | WEIGHT: 213.6 LBS | DIASTOLIC BLOOD PRESSURE: 65 MMHG | HEART RATE: 58 BPM

## 2022-07-01 DIAGNOSIS — M25.511 CHRONIC PAIN OF BOTH SHOULDERS: Primary | ICD-10-CM

## 2022-07-01 DIAGNOSIS — M25.512 CHRONIC PAIN OF BOTH SHOULDERS: Primary | ICD-10-CM

## 2022-07-01 DIAGNOSIS — E66.01 SEVERE OBESITY (BMI 35.0-39.9) WITH COMORBIDITY (HCC): ICD-10-CM

## 2022-07-01 DIAGNOSIS — G89.4 CHRONIC PAIN SYNDROME: ICD-10-CM

## 2022-07-01 DIAGNOSIS — G89.29 CHRONIC PAIN OF BOTH SHOULDERS: Primary | ICD-10-CM

## 2022-07-01 LAB — HBA1C MFR BLD HPLC: 6 %

## 2022-07-01 PROCEDURE — G8510 SCR DEP NEG, NO PLAN REQD: HCPCS | Performed by: FAMILY MEDICINE

## 2022-07-01 PROCEDURE — 1101F PT FALLS ASSESS-DOCD LE1/YR: CPT | Performed by: FAMILY MEDICINE

## 2022-07-01 PROCEDURE — G8417 CALC BMI ABV UP PARAM F/U: HCPCS | Performed by: FAMILY MEDICINE

## 2022-07-01 PROCEDURE — 83036 HEMOGLOBIN GLYCOSYLATED A1C: CPT | Performed by: FAMILY MEDICINE

## 2022-07-01 PROCEDURE — 1123F ACP DISCUSS/DSCN MKR DOCD: CPT | Performed by: FAMILY MEDICINE

## 2022-07-01 PROCEDURE — G8427 DOCREV CUR MEDS BY ELIG CLIN: HCPCS | Performed by: FAMILY MEDICINE

## 2022-07-01 PROCEDURE — 99214 OFFICE O/P EST MOD 30 MIN: CPT | Performed by: FAMILY MEDICINE

## 2022-07-01 PROCEDURE — G8536 NO DOC ELDER MAL SCRN: HCPCS | Performed by: FAMILY MEDICINE

## 2022-07-01 PROCEDURE — 1090F PRES/ABSN URINE INCON ASSESS: CPT | Performed by: FAMILY MEDICINE

## 2022-07-01 RX ORDER — TRAMADOL HYDROCHLORIDE AND ACETAMINOPHEN 37.5; 325 MG/1; MG/1
2 TABLET ORAL
Qty: 180 TABLET | Refills: 1 | Status: SHIPPED | OUTPATIENT
Start: 2022-07-01 | End: 2022-07-31

## 2022-07-01 RX ORDER — TRAMADOL HYDROCHLORIDE AND ACETAMINOPHEN 37.5; 325 MG/1; MG/1
TABLET ORAL
COMMUNITY
End: 2022-07-01 | Stop reason: SDUPTHER

## 2022-07-01 NOTE — PROGRESS NOTES
Chief Complaint   Patient presents with    Medication Refill     Htn        1. \"Have you been to the ER, urgent care clinic since your last visit? Hospitalized since your last visit? \" No    2. \"Have you seen or consulted any other health care providers outside of the [address] [place] [address] since your last visit? \" No     3. For patients aged 39-70: Has the patient had a colonoscopy / FIT/ Cologuard? No      If the patient is female:    4. For patients aged 41-77: Has the patient had a mammogram within the past 2 years? NA - based on age or sex      11. For patients aged 21-65: Has the patient had a pap smear?  NA - based on age or sex    Health Maintenance Due   Topic Date Due    Shingrix Vaccine Age 49> (1 of 2) Never done    Pneumococcal 65+ years (2 - PCV) 02/01/2018    Eye Exam Retinal or Dilated  09/11/2019    MICROALBUMIN Q1  11/15/2020    Lipid Screen  03/12/2022

## 2022-07-25 RX ORDER — CANDESARTAN 4 MG/1
TABLET ORAL
Qty: 90 TABLET | Refills: 3 | Status: SHIPPED | OUTPATIENT
Start: 2022-07-25

## 2022-07-27 ENCOUNTER — HOSPITAL ENCOUNTER (OUTPATIENT)
Age: 87
Setting detail: OUTPATIENT SURGERY
Discharge: HOME | End: 2022-07-27
Attending: INTERNAL MEDICINE | Admitting: INTERNAL MEDICINE
Payer: MEDICARE

## 2022-07-27 VITALS
BODY MASS INDEX: 36.15 KG/M2 | HEART RATE: 68 BPM | RESPIRATION RATE: 18 BRPM | SYSTOLIC BLOOD PRESSURE: 157 MMHG | OXYGEN SATURATION: 97 % | HEIGHT: 65 IN | WEIGHT: 217 LBS | DIASTOLIC BLOOD PRESSURE: 71 MMHG | TEMPERATURE: 98.1 F

## 2022-07-27 DIAGNOSIS — R07.9 CHEST PAIN, UNSPECIFIED TYPE: ICD-10-CM

## 2022-07-27 PROCEDURE — 93458 L HRT ARTERY/VENTRICLE ANGIO: CPT | Performed by: INTERNAL MEDICINE

## 2022-07-27 PROCEDURE — 77030030195 HC CATH ANGI DX PRF4 MRTM -A: Performed by: INTERNAL MEDICINE

## 2022-07-27 PROCEDURE — 77030042317 HC BND COMPR HEMSTAT -B: Performed by: INTERNAL MEDICINE

## 2022-07-27 PROCEDURE — C1894 INTRO/SHEATH, NON-LASER: HCPCS | Performed by: INTERNAL MEDICINE

## 2022-07-27 PROCEDURE — 74011000636 HC RX REV CODE- 636: Performed by: INTERNAL MEDICINE

## 2022-07-27 PROCEDURE — 77030004549 HC CATH ANGI DX PRF MRTM -A: Performed by: INTERNAL MEDICINE

## 2022-07-27 PROCEDURE — 77030015766: Performed by: INTERNAL MEDICINE

## 2022-07-27 PROCEDURE — 99153 MOD SED SAME PHYS/QHP EA: CPT | Performed by: INTERNAL MEDICINE

## 2022-07-27 PROCEDURE — 2709999900 HC NON-CHARGEABLE SUPPLY: Performed by: INTERNAL MEDICINE

## 2022-07-27 PROCEDURE — 77030010221 HC SPLNT WR POS TELE -B: Performed by: INTERNAL MEDICINE

## 2022-07-27 PROCEDURE — 77030008543 HC TBNG MON PRSS MRTM -A: Performed by: INTERNAL MEDICINE

## 2022-07-27 PROCEDURE — 74011250636 HC RX REV CODE- 250/636: Performed by: INTERNAL MEDICINE

## 2022-07-27 PROCEDURE — 74011000250 HC RX REV CODE- 250: Performed by: INTERNAL MEDICINE

## 2022-07-27 PROCEDURE — C1769 GUIDE WIRE: HCPCS | Performed by: INTERNAL MEDICINE

## 2022-07-27 PROCEDURE — 99152 MOD SED SAME PHYS/QHP 5/>YRS: CPT | Performed by: INTERNAL MEDICINE

## 2022-07-27 PROCEDURE — 77030019698 HC SYR ANGI MDLON MRTM -A: Performed by: INTERNAL MEDICINE

## 2022-07-27 RX ORDER — LIDOCAINE HYDROCHLORIDE 10 MG/ML
INJECTION INFILTRATION; PERINEURAL AS NEEDED
Status: DISCONTINUED | OUTPATIENT
Start: 2022-07-27 | End: 2022-07-27 | Stop reason: HOSPADM

## 2022-07-27 RX ORDER — HEPARIN SODIUM 200 [USP'U]/100ML
INJECTION, SOLUTION INTRAVENOUS
Status: COMPLETED | OUTPATIENT
Start: 2022-07-27 | End: 2022-07-27

## 2022-07-27 RX ORDER — VERAPAMIL HYDROCHLORIDE 2.5 MG/ML
INJECTION, SOLUTION INTRAVENOUS AS NEEDED
Status: DISCONTINUED | OUTPATIENT
Start: 2022-07-27 | End: 2022-07-27 | Stop reason: HOSPADM

## 2022-07-27 RX ORDER — MIDAZOLAM HYDROCHLORIDE 1 MG/ML
INJECTION, SOLUTION INTRAMUSCULAR; INTRAVENOUS AS NEEDED
Status: DISCONTINUED | OUTPATIENT
Start: 2022-07-27 | End: 2022-07-27 | Stop reason: HOSPADM

## 2022-07-27 RX ORDER — SODIUM CHLORIDE 0.9 % (FLUSH) 0.9 %
5-40 SYRINGE (ML) INJECTION AS NEEDED
Status: CANCELLED | OUTPATIENT
Start: 2022-07-27

## 2022-07-27 RX ORDER — SODIUM CHLORIDE 0.9 % (FLUSH) 0.9 %
5-40 SYRINGE (ML) INJECTION EVERY 8 HOURS
Status: CANCELLED | OUTPATIENT
Start: 2022-07-27

## 2022-07-27 RX ORDER — ATROPINE SULFATE 0.1 MG/ML
INJECTION INTRAVENOUS AS NEEDED
Status: DISCONTINUED | OUTPATIENT
Start: 2022-07-27 | End: 2022-07-27 | Stop reason: HOSPADM

## 2022-07-27 RX ORDER — HEPARIN SODIUM 1000 [USP'U]/ML
INJECTION, SOLUTION INTRAVENOUS; SUBCUTANEOUS AS NEEDED
Status: DISCONTINUED | OUTPATIENT
Start: 2022-07-27 | End: 2022-07-27 | Stop reason: HOSPADM

## 2022-07-27 RX ORDER — FENTANYL CITRATE 50 UG/ML
INJECTION, SOLUTION INTRAMUSCULAR; INTRAVENOUS AS NEEDED
Status: DISCONTINUED | OUTPATIENT
Start: 2022-07-27 | End: 2022-07-27 | Stop reason: HOSPADM

## 2022-07-27 NOTE — H&P
H&P reviewed  Patient examined  No changes have occurred   I discussed the risks/benefits/alternatives of the procedure with the patient. Risks include (but are not limited to) bleeding, infection, cva/mi/tamponade/death. The patient understands and agrees to proceed.

## 2022-07-27 NOTE — CARDIO/PULMONARY
Cardiac Rehab: Chart reviewed, s/p cardiac cath, no intervention needed. Does not qualify for cardiac rehab at this time. Patient has a normal EF and is a nonsmoker.

## 2022-07-27 NOTE — PROGRESS NOTES
Patient ambulated in room with cane and minimal assistance from RN; no complaints of discomfort, dizziness, sob. Right radial site clean dry and intact. Discharge instructions reviewed with patient and patients family; answered questions as needed. Transported patient in wheelchair to car.

## 2022-07-27 NOTE — DISCHARGE INSTRUCTIONS
[facility]  [address]  [phone]  [email or web address]          Patient ID:  Nohemi Alberts  [id number]  83 y.o.  1/14/1933    Admit Date: 7/27/2022    Discharge Date: 7/27/2022     Admitting Physician: [unfilled]     Discharge Physician: [unfilled]    Admission Diagnoses:   Chest pain, unspecified type [R07.9]    Discharge Diagnoses:   [unfilled]    Discharge Condition: Good    Cardiology Procedures this Admission:  Diagnostic left heart catheterization    Disposition: home    Reference discharge instructions provided by nursing for diet and activity. Signed: Ioana Prado MD  7/27/2022  8:13 AM        Radial Cardiac Catheterization/Angiography Discharge Instructions    It is normal to feel tired the first couple days. Take it easy and follow the physicians instructions. CHECK THE CATHETER INSERTION SITE DAILY:    Remove the wrist dressing 24 hours after the procedure. You may shower 24 hours after the procedure. Wash with soap and water and pat dry. Gentle cleaning of the site with soap and water is sufficient, cover with a dry clean dressing or bandage. Do not apply creams or powders to the area. No soaking the wrist for 3 days. Leave the puncture site open to air after 24 hours post-procedure. CALL THE PHYSICIANS:     If the site becomes red, swollen or feels warm to the touch  If there is bleeding or drainage or if there is unusual pain at the radial site. If there is any minor oozing, you may apply a band-aid and remove after 12 hours. If the bleeding continues, hold pressure with the middle finger against the puncture site and the thumb against the back of the wrist,call 911 to be transported to the hospital.  DO NOT DRIVE YOURSELF, [phone].     ACTIVITY:   For the first 24 hours do not manipulate the wrist.  No lifting, pushing or pulling over 3-5 pounds with the affected wrist for 7 daysand no straining the insertion site. Do not life grocery bags or the garbage can, do not run the vacuum  or  for 7 days. Start with short walks as in the hospital and gradually increase as tolerated each day. It is recommended to walk 30 minutes 5-7 days per week. Follow your physicians instructions on activity. Avoid walking outside in extremes of heat or cold. Walk inside when it is cold and windy or hot and humid. Things to keep in mind:  No driving for at least 24 hours, or as designated by your physician. Limit the number of times you go up and down the stairs  Take rests and pace yourself with activity. Be careful and do not strain with bowel movements. MEDICATIONS:    Take all medications as prescribed  Call your physician if you have any questions  Keep an updated list of your medications with you at all times and give a list to your physician and pharmacist    SIGNS AND SYMPTOMS:   Be cautious of symptoms of angina or recurrent symptoms such as chest discomfort, unusual shortness of breath or fatigue. These could be symptoms of restenosis, a new blockage or a heart attack. If your symptoms are relieved with rest it is still recommended that you notify your physician of recurrent chest pain or discomfort. For CHEST PAIN or symptoms of angina not relieved with rest:  If the discomfort is not relieved with rest, and you have been prescribed Nitroglycerin, take as directed (taken under the tongue, one at a time 5 minutes apart for a total of 3 doses). If the discomfort is not relieved after the 3rd nitroglycerin, call 911. If you have not been prescribed Nitroglycerin  and your chest discomfort is not relieved with rest, call 911. AFTER CARE:   Follow up with your physician as instructed.   Follow a heart healthy diet with proper portion control, daily stress management, daily exercise, blood pressure and cholesterol control , and smoking cessation.

## 2022-07-27 NOTE — PROGRESS NOTES
Cardiac Cath Lab Recovery Arrival Note:      Kulwinder Duncan arrived to Cardiac Cath Lab, Recovery Area. Staff introduced to patient. Patient identifiers verified with NAME and DATE OF BIRTH. Procedure verified with patient. Consent forms reviewed and signed by patient or authorized representative and verified. Allergies verified. Patient and family oriented to department. Patient and family informed of procedure and plan of care. Questions answered with review. Patient prepped for procedure, per orders from physician, prior to arrival.    Patient on cardiac monitor, non-invasive blood pressure, SPO2 monitor. On RA. Patient is A&Ox 4. Patient reports chronic back pain. Patient in stretcher, in low position, with side rails up, call bell within reach, patient instructed to call if assistance as needed. Patient prep in: [address], [place] [address]. Patient family has pager # none  Family in: [place] in [facility].    Prep by: Arden Cranker and Ulysses Harding

## 2022-07-27 NOTE — PROGRESS NOTES
TRANSFER - IN REPORT:    Verbal report received from [facility] rn  on [place]  being received from cath lab for routine progression of care. Report consisted of patients Situation, Background, Assessment and Recommendations(SBAR). Information from the following report(s) SBAR was reviewed with the receiving clinician. Opportunity for questions and clarification was provided. Assessment completed upon patients arrival to [address] [place] [address] and care assumed. Cardiac Cath Lab Recovery Arrival Note:    Buck arrived to [facility] recovery area. Patient procedure= heart cath. Patient on cardiac monitor, non-invasive blood pressure, SPO2 monitor. On RA   IV  of nacl on pump at kvo ml/hr. Patient status doing well without problems. Patient is A&Ox 4. Patient reports no complaints. PROCEDURE SITE CHECK:    Procedure site:without any bleeding and no hematoma, no pain/discomfort reported at procedure site. No change in patient status. Continue to monitor patient and status.

## 2022-07-27 NOTE — Clinical Note
TRANSFER - OUT REPORT:     Verbal report given to: kyler. Report consisted of patient's Situation, Background, Assessment and   Recommendations(SBAR). Opportunity for questions and clarification was provided. Patient transported to: [facility].

## 2022-07-27 NOTE — PROGRESS NOTES
Primary Nurse Eamon Bradley and Sally Barreto, RN performed a dual skin assessment on this patient No impairment noted  Aldo score is 23; meplix on sacrum ; small skin tear at right groin

## 2022-07-29 NOTE — PROGRESS NOTES
HISTORY OF PRESENT ILLNESS  Sophy Bishop is a 80 y.o. female, present for her Chronic lower back,b/l  knees second to morb obes , has been taking 2 tabs at the time currently pain not controlled, The patient's pain has been a chonic problem, mostly mobility device dependent due to chronic pain,   present for refills, pt has alot of difficulty walking aournd, as per the family It is very hard to get the pt out of the house, so that refill would be needed,   never abused any prescribed meds, currently working, enjand states that pt is very functional ,   no hx of illicit nor etoh abuse,     pt has tried all the other alternative approach for the current pain including PT, Wt management, pain management Orthopedic sx     meds has been kept in safe place, pt has no hx of MH disoorder    No Etoh or other Illicit use,    pt states that the quality of life has been better since on the current med, CAGE answers no's    Patient states that the current  meds given,  is helping,   Zero count,    Pt is compliant with the current medications, and take it as directed,  the pt capable of doing things specially the activity of the daily living,     currently is not operating  machinery while on this med. Current Outpatient Medications   Medication Sig Dispense Refill    traMADol-acetaminophen (ULTRACET) 37.5-325 mg per tablet Take 2 Tablets by mouth every eight (8) hours as needed for Pain for up to 30 days. Max Daily Amount: 6 Tablets.  180 Tablet 1    atenoloL (TENORMIN) 50 mg tablet TAKE 1 TABLET EVERY DAY 90 Tablet 2    potassium chloride (KLOR-CON M10) 10 mEq tablet TAKE 1 TABLET EVERY DAY 90 Tablet 2    Mitigare 0.6 mg capsule TAKE 1 CAPSULE EVERY DAY 90 Capsule 2    diclofenac (VOLTAREN) 1 % gel APPLY TO THE AFFECTED AREA(S) AS DIRECTED FOUR TIMES DAILY AS NEEDED 400 g 3    torsemide (DEMADEX) 20 mg tablet TAKE 1 TABLET EVERY DAY 90 Tablet 2    ezetimibe (ZETIA) 10 mg tablet TAKE 1 TABLET EVERY DAY 90 Tablet 3 allopurinoL (ZYLOPRIM) 100 mg tablet Take 1 Tablet by mouth daily. 90 Tablet 3    pravastatin (PRAVACHOL) 20 mg tablet TAKE 1 TABLET EVERY NIGHT 90 Tablet 3    nitroglycerin (NITROSTAT) 0.4 mg SL tablet PLACE 1 TABLET UNDER THE TONGUE EVERY 5 MINS AS NEEDED FOR CHEST PAIN 25 Tab 11    ASPIR-LOW 81 mg tablet Take 81 mg by mouth daily. MISCELLANEOUS MEDICAL SUPPLY (COMPRESSION STOCKINGS) daily.  Remove at bedtime      candesartan (ATACAND) 4 mg tablet TAKE 1 TABLET EVERY DAY (DOSE DECREASE) 90 Tablet 3     Allergies   Allergen Reactions    Diclofenac Unknown (comments)    Lisinopril Cough    Lisinopril-Hydrochlorothiazide Unknown (comments)    Rosuvastatin Diarrhea and Other (comments)     Abdominal pain      Past Medical History:   Diagnosis Date    Arthritis 3/25/2010    Back pain 3/26/2010    CAD (coronary artery disease)     Chest pain, unspecified     Chronic kidney failure, stage 3 (moderate) (HCC) 12/5/2017    Chronic pain     Chronic pain of both knees 2/6/2018    Edema     Essential hypertension     High cholesterol 3/25/2010    HTN (hypertension) 3/25/2010    Kidney failure, acute ([facility] 4/6/2012    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    Onychomycosis 8/19/2010    Other acute and subacute form of ischemic heart disease     Other and unspecified angina pectoris     Other ill-defined conditions(799.89)     excessive watering of right eye    Patient is full code 8/6/2018    Shortness of breath     Shoulder pain, bilateral 3/26/2010     Past Surgical History:   Procedure Laterality Date    HX HEART CATHETERIZATION      HX HEENT      bilat cateract extraction    HX HYSTERECTOMY      HX KNEE REPLACEMENT      HX LUMBAR FUSION      HX ORTHOPAEDIC      bilateral TKR    HX ORTHOPAEDIC      neck and back surgery    HX ORTHOPAEDIC      bilateral bunionectomy    HX ORTHOPAEDIC      bilateral cataracts removed    HX PTCA       Family History   Problem Relation Age of Onset    Hypertension Mother     Stroke Mother Other Mother         arthritis    Coronary Art Dis Father     Heart Disease Father     Hypertension Sister     Diabetes Sister     Coronary Art Dis Sister     Cancer Sister         lung    Coronary Art Dis Brother     Cancer Brother         lung    Heart Disease Brother      Social History     Tobacco Use    Smoking status: Never    Smokeless tobacco: Never   Substance Use Topics    Alcohol use: Yes     Comment: occasional       Lab Results   Component Value Date/Time    Hemoglobin A1c 5.7 (H) 05/07/2021 10:57 AM    Hemoglobin A1c 5.8 (H) 09/11/2020 10:48 AM    Hemoglobin A1c 6.1 (H) 02/04/2019 09:22 AM    Glucose 109 (H) 05/07/2021 10:57 AM    Microalb/Creat ratio (ug/mg creat.) 19.1 11/15/2019 01:01 PM    LDL, calculated 150.6 (H) 03/12/2021 10:14 AM    Creatinine (POC) 1.3 11/30/2009 01:34 PM    Creatinine 1.16 (H) 05/07/2021 10:57 AM      Lab Results   Component Value Date/Time    Cholesterol, total 250 (H) 03/12/2021 10:14 AM    HDL Cholesterol 65 03/12/2021 10:14 AM    LDL, calculated 150.6 (H) 03/12/2021 10:14 AM    Triglyceride 172 (H) 03/12/2021 10:14 AM    CHOL/HDL Ratio 3.8 03/12/2021 10:14 AM        Review of Systems   Constitutional:  Negative for chills and fever. HENT:  Negative for congestion, ear pain and nosebleeds. Eyes:  Negative for blurred vision, pain and discharge. Respiratory:  Negative for cough, shortness of breath and wheezing. Cardiovascular:  Negative for chest pain and leg swelling. Gastrointestinal:  Negative for constipation, diarrhea, nausea and vomiting. Genitourinary:  Negative for dysuria and frequency. Musculoskeletal:  Positive for back pain. Negative for joint pain and myalgias. Skin:  Negative for itching and rash. Neurological:  Positive for weakness. Negative for dizziness, loss of consciousness and headaches. Psychiatric/Behavioral:  Negative for depression. The patient is not nervous/anxious and does not have insomnia.       Physical Exam  Vitals and nursing note reviewed. Constitutional:       Appearance: She is well-developed. HENT:      Head: Normocephalic and atraumatic. Eyes:      Conjunctiva/sclera: Conjunctivae normal.      Pupils: Pupils are equal, round, and reactive to light. Neck:      Thyroid: No thyromegaly. Vascular: No JVD. Cardiovascular:      Rate and Rhythm: Normal rate and regular rhythm. Heart sounds: Normal heart sounds. No murmur heard. No friction rub. No gallop. Pulmonary:      Effort: Pulmonary effort is normal. No respiratory distress. Breath sounds: Normal breath sounds. No stridor. No wheezing or rales. Abdominal:      General: Bowel sounds are normal. There is no distension. Palpations: Abdomen is soft. There is no mass. Tenderness: There is no abdominal tenderness. Musculoskeletal:         General: Tenderness present. Cervical back: Normal range of motion and neck supple. Thoracic back: Spasms and tenderness present. Decreased range of motion. Lumbar back: Spasms and tenderness present. Decreased range of motion. Right knee: Swelling and deformity present. Decreased range of motion. Tenderness present. Left knee: Swelling and bony tenderness present. Decreased range of motion. Tenderness present. Lymphadenopathy:      Cervical: No cervical adenopathy. Skin:     Findings: No erythema or rash. Neurological:      Mental Status: She is alert and oriented to person, place, and time. Cranial Nerves: No cranial nerve deficit. Deep Tendon Reflexes: Reflexes are normal and symmetric. Psychiatric:         Behavior: Behavior normal.       ASSESSMENT and PLAN    ICD-10-CM ICD-9-CM    1. Chronic pain of both shoulders  M25.511 719.41 traMADol-acetaminophen (ULTRACET) 37.5-325 mg per tablet    G89.29 338.29 AMB POC HEMOGLOBIN A1C    M25.512        2.  Chronic pain syndrome  G89.4 338.4 traMADol-acetaminophen (ULTRACET) 37.5-325 mg per tablet      AMB POC HEMOGLOBIN A1C      3. Severe obesity (BMI 35.0-39. 9) with comorbidity ([facility]  E66.01 278.01         lab results and schedule of future lab studies reviewed with patient      With risk-benefit analysis, cs med was prescribed and was told to be considering available nonpharmacologic,   Patient was told that the medication is not safe was told not to operate any machinery while taking the medication meanwhile emphasized that the pt should take the medication as needed not on a regular basis avoid alcohol intake and avoid other medication that could potentiate its effect, regardless patient was told any other medication given by any other doctor the pt need to call primary care for further advice`  Signed informed consent,   signed cs treatment agreement,   patient acknowledged understanding and agreed with today's recommendation

## 2022-08-09 RX ORDER — ALLOPURINOL 100 MG/1
TABLET ORAL
Qty: 90 TABLET | Refills: 3 | Status: SHIPPED | OUTPATIENT
Start: 2022-08-09 | End: 2022-10-31 | Stop reason: SDUPTHER

## 2022-09-30 DIAGNOSIS — M25.512 CHRONIC PAIN OF BOTH SHOULDERS: ICD-10-CM

## 2022-09-30 DIAGNOSIS — M25.561 CHRONIC PAIN OF BOTH KNEES: ICD-10-CM

## 2022-09-30 DIAGNOSIS — G89.29 CHRONIC PAIN OF BOTH SHOULDERS: ICD-10-CM

## 2022-09-30 DIAGNOSIS — M25.511 CHRONIC PAIN OF BOTH SHOULDERS: ICD-10-CM

## 2022-09-30 DIAGNOSIS — I10 ESSENTIAL HYPERTENSION: ICD-10-CM

## 2022-09-30 DIAGNOSIS — R60.0 BILATERAL EDEMA OF LOWER EXTREMITY: ICD-10-CM

## 2022-09-30 DIAGNOSIS — G89.29 CHRONIC PAIN OF BOTH KNEES: ICD-10-CM

## 2022-09-30 DIAGNOSIS — M25.562 CHRONIC PAIN OF BOTH KNEES: ICD-10-CM

## 2022-09-30 DIAGNOSIS — M15.9 PRIMARY OSTEOARTHRITIS INVOLVING MULTIPLE JOINTS: ICD-10-CM

## 2022-09-30 RX ORDER — TORSEMIDE 20 MG/1
TABLET ORAL
Qty: 90 TABLET | Refills: 2 | Status: SHIPPED | OUTPATIENT
Start: 2022-09-30

## 2022-10-18 RX ORDER — ACETAMINOPHEN 500 MG
500 TABLET ORAL
COMMUNITY

## 2022-10-18 NOTE — PERIOP NOTES
[facility]  Ambulatory Surgery Unit  Pre-operative Instructions    Surgery/Procedure Date  Tuesday 11/15/2022            Tentative Arrival Time TBD      1. On the day of your surgery/procedure, please report to the Ambulatory Surgery Unit Registration Desk and sign in at your designated time. The Ambulatory Surgery Unit is located in [facility] on the [facility] side of the [facility] across from the [facility]. Please have all of your health insurance cards, co-payment, and a photo ID.    **TWO adults may accompany you the day of the procedure. We have limited seating available. If our waiting room is at capacity, your ride may be asked to remain in their vehicle. No one under 15 is allowed in the waiting room. 2. You must have someone with you to drive you home, as you should not drive a car for 24 hours following anesthesia. Please make arrangements for a responsible adult friend or family member to stay with you for at least the first 24 hours after your surgery. 3. Do not have anything to eat or drink (including water, gum, mints, coffee, juice) after 11:59 PM on Monday 11/14. This may not apply to medications prescribed by your physician. (Please note below the special instructions with medications to take the morning of surgery, if applicable.)    4. We recommend you do not drink any alcoholic beverages for 24 hours before and after your surgery. 5. Contact your surgeons office for instructions on the following medications: non-steroidal anti-inflammatory drugs (i.e. Advil, Aleve), vitamins, and supplements. (Some surgeons will want you to stop these medications prior to surgery and others may allow you to take them)   **If you are currently taking Plavix, Coumadin, Aspirin and/or other blood-thinning agents, contact your surgeon for instructions. ** Your surgeon will partner with the physician prescribing these medications to determine if it is safe to stop or if you need to continue taking. Please do not stop taking these medications without instructions from your surgeon. 6. In an effort to help prevent surgical site infection, we ask that you shower with an anti-bacterial soap (i.e. Dial/Safeguard, or the soap provided to you at your preadmission testing appointment) for 3 days prior to and on the morning of surgery, using a fresh towel after each shower. (Please begin this process with fresh bed linens.) Do not apply any lotions, powders, or deodorants after the shower on the day of your procedure. If applicable, please do not shave the operative site for 48 hours prior to surgery. 7. Wear comfortable clothes. Wear glasses instead of contacts. Do not bring any jewelry or money (other than copays or fees as instructed). Do not wear make-up, particularly mascara, the morning of your surgery. Do not wear nail polish, particularly if you are having foot /hand surgery. Wear your hair loose or down, no ponytails, buns, anastasia pins or clips. All body piercings must be removed. 8. You should understand that if you do not follow these instructions your surgery may be cancelled. If your physical condition changes (i.e. fever, cold or flu) please contact your surgeon as soon as possible. 9. It is important that you be on time. If a situation occurs where you may be late, or if you have any questions or problems, please call [phone].    10. Your surgery time may be subject to change. You will receive a phone call the day prior to surgery to confirm your arrival time. 11. Pediatric patients: please bring a change of clothes, diapers, bottle/sippy cup, pacifier, etc.      Special Instructions: Take all medications and inhalers, as prescribed, on the morning of surgery with a sip of water      Insulin Dependent Diabetic patients: Take your diabetic medications as prescribed the day before surgery. Hold all diabetic medications the day of surgery.     If you are scheduled to arrive for surgery after 8:00 AM, and your AM blood sugar is >200, please call Ambulatory Surgery. I understand a pre-operative phone call will be made to verify my surgery time. In the event that I am not available, I give permission for a message to be left on my answering service and/or with another person?       Yes             ___________________      ___________________      ________________  (Signature of Patient)          (Witness)                   (Date and Time)

## 2022-10-18 NOTE — PERIOP NOTES
Patient given permission to call and speak to her daughter Rena Razo in regards to any and all information

## 2022-10-24 ENCOUNTER — OFFICE VISIT (OUTPATIENT)
Dept: FAMILY MEDICINE CLINIC | Age: 87
End: 2022-10-24
Payer: MEDICARE

## 2022-10-24 VITALS
DIASTOLIC BLOOD PRESSURE: 58 MMHG | HEART RATE: 62 BPM | OXYGEN SATURATION: 98 % | TEMPERATURE: 97.4 F | WEIGHT: 212.6 LBS | BODY MASS INDEX: 35.42 KG/M2 | SYSTOLIC BLOOD PRESSURE: 129 MMHG | HEIGHT: 65 IN

## 2022-10-24 DIAGNOSIS — R73.03 PREDIABETES: ICD-10-CM

## 2022-10-24 DIAGNOSIS — Z01.818 PREOPERATIVE CLEARANCE: Primary | ICD-10-CM

## 2022-10-24 DIAGNOSIS — M80.0AXA AGE-RELATED OSTEOPOROSIS WITH CURRENT PATHOLOGICAL FRACTURE, OTHER SITE, INITIAL ENCOUNTER FOR FRACTURE: ICD-10-CM

## 2022-10-24 DIAGNOSIS — R63.5 ABNORMAL WEIGHT GAIN: ICD-10-CM

## 2022-10-24 DIAGNOSIS — Z01.818 PRE-OP EVALUATION: ICD-10-CM

## 2022-10-24 DIAGNOSIS — R93.3 ABNORMAL FINDINGS ON DIAGNOSTIC IMAGING OF OTHER PARTS OF DIGESTIVE TRACT: ICD-10-CM

## 2022-10-24 PROBLEM — D63.1 ANEMIA IN CHRONIC KIDNEY DISEASE: Status: ACTIVE | Noted: 2020-09-25

## 2022-10-24 PROBLEM — M10.9 GOUT: Status: ACTIVE | Noted: 2021-07-30

## 2022-10-24 PROBLEM — N18.9 ANEMIA IN CHRONIC KIDNEY DISEASE: Status: ACTIVE | Noted: 2020-09-25

## 2022-10-24 PROBLEM — E83.52 HYPERCALCEMIA: Status: ACTIVE | Noted: 2021-07-30

## 2022-10-24 PROCEDURE — 1090F PRES/ABSN URINE INCON ASSESS: CPT | Performed by: FAMILY MEDICINE

## 2022-10-24 PROCEDURE — 99213 OFFICE O/P EST LOW 20 MIN: CPT | Performed by: FAMILY MEDICINE

## 2022-10-24 RX ORDER — TRAMADOL HYDROCHLORIDE AND ACETAMINOPHEN 37.5; 325 MG/1; MG/1
1 TABLET ORAL
COMMUNITY
End: 2022-10-24 | Stop reason: SDUPTHER

## 2022-10-24 RX ORDER — TRAMADOL HYDROCHLORIDE AND ACETAMINOPHEN 37.5; 325 MG/1; MG/1
1 TABLET ORAL
Qty: 90 TABLET | Refills: 1 | Status: SHIPPED | OUTPATIENT
Start: 2022-10-24 | End: 2022-11-08 | Stop reason: SDUPTHER

## 2022-10-24 NOTE — PROGRESS NOTES
Preoperative Evaluation    Date of Exam: 10/24/2022    [facility] is a 80 y.o. female (:1933) who presents for preoperative evaluation. Procedure/Surgery: hammer toe repairs  Date of Procedure/Surgery: 11/15/2022  Surgeon: Dr. Raad Lomeli: [facility]  Primary Physician: Colonel Doreen MD  Latex Allergy: no    Problem List:     Patient Active Problem List    Diagnosis Date Noted    Opioid use, unspecified with unspecified opioid-induced disorder 10/26/2021    Hypercalcemia 2021    Gout 2021    Anemia in chronic kidney disease 2020    Patient is full code 2018    Severe obesity (BMI 35.0-39. 9) with comorbidity ([address] 2018    Chronic pain of both knees 2018    Type 2 diabetes mellitus with nephropathy ([address] 2018    CKD (chronic kidney disease) stage 3, GFR 30-59 ml/min ([facility]) 2018    Chronic kidney failure, stage 3 (moderate) ([address] 2017    Lacrimal passage stenosis 2014    Prediabetes 2014    Abnormal urinalysis 2013    Lacrimal duct stenosis 2013    Conjunctivitis 05/10/2013    Watery eyes 2012    S/P angioplasty with stent--RCA 2012    Multiple bruises 2012    CAD (coronary artery disease) 2012    Post PTCA 2012    Mixed hyperlipidemia 2012    Essential hypertension, benign 2012    Abnormal nuclear stress test 2012    Chest pain, unspecified 2012    Type 2 diabetes mellitus without complication ([address]     SOB (shortness of breath) on exertion 2012    Vitamin D deficiency 2012    Kidney failure, acute ([address] 2012    Heme positive stool 2011    Abdominal pain, other specified site 2011    Inguinal hernia, left 2011    Decreased vision 10/12/2011    Edema leg 10/12/2011    Cough 2011    Tick bite, infected 2011    Onychomycosis 2010    Shoulder pain, bilateral 03/26/2010    Back pain 03/26/2010    Primary osteoarthritis involving multiple joints 03/25/2010     Medical History:     Past Medical History:   Diagnosis Date    Arthritis 03/25/2010    Back pain 03/26/2010    Borderline diabetes     CAD (coronary artery disease)     Chest pain, unspecified     Chronic kidney failure, stage 3 (moderate) (HCC) 12/05/2017    Chronic pain     Chronic pain of both knees 02/06/2018    COVID-19 2022    Edema     Essential hypertension     Gout     High cholesterol 03/25/2010    HTN (hypertension) 03/25/2010    Kidney failure, acute ([facility] 04/06/2012    Lacrimal passage stenosis 04/17/2014    Onychomycosis 08/19/2010    Other acute and subacute form of ischemic heart disease     Other and unspecified angina pectoris     Other ill-defined conditions(799.89)     excessive watering of right eye    Patient is full code 08/06/2018    Shortness of breath     Shoulder pain, bilateral 03/26/2010     Allergies: Allergies   Allergen Reactions    Diclofenac Unknown (comments)    Lisinopril Cough    Lisinopril-Hydrochlorothiazide Unknown (comments)    Rosuvastatin Diarrhea and Other (comments)     Abdominal pain       Medications:     Current Outpatient Medications   Medication Sig    traMADol-acetaminophen (ULTRACET) 37.5-325 mg per tablet Take 1 Tablet by mouth every eight (8) hours as needed for Pain for up to 30 days. Max Daily Amount: 3 Tablets. acetaminophen (TYLENOL) 500 mg tablet Take 500 mg by mouth every six (6) hours as needed for Pain.    torsemide (DEMADEX) 20 mg tablet TAKE 1 TABLET EVERY DAY (Patient taking differently: Every morning.)    allopurinoL (ZYLOPRIM) 100 mg tablet TAKE 1 TABLET EVERY DAY (Patient taking differently: Every morning.)    candesartan (ATACAND) 4 mg tablet TAKE 1 TABLET EVERY DAY (DOSE DECREASE) (Patient taking differently: Every morning.)    atenoloL (TENORMIN) 50 mg tablet TAKE 1 TABLET EVERY DAY (Patient taking differently: Every morning.  TAKE 1 TABLET EVERY DAY)    potassium chloride (KLOR-CON M10) 10 mEq tablet TAKE 1 TABLET EVERY DAY (Patient taking differently: Every morning.)    Mitigare 0.6 mg capsule TAKE 1 CAPSULE EVERY DAY (Patient taking differently: Every morning.)    diclofenac (VOLTAREN) 1 % gel APPLY TO THE AFFECTED AREA(S) AS DIRECTED FOUR TIMES DAILY AS NEEDED    ezetimibe (ZETIA) 10 mg tablet TAKE 1 TABLET EVERY DAY    pravastatin (PRAVACHOL) 20 mg tablet TAKE 1 TABLET EVERY NIGHT    nitroglycerin (NITROSTAT) 0.4 mg SL tablet PLACE 1 TABLET UNDER THE TONGUE EVERY 5 MINS AS NEEDED FOR CHEST PAIN    ASPIR-LOW 81 mg tablet Take 81 mg by mouth Every morning. Indications: takes for heart - cardiologist prescribed    MISCELLANEOUS MEDICAL SUPPLY (COMPRESSION STOCKINGS) daily. Remove at bedtime     No current facility-administered medications for this visit. Surgical History:     Past Surgical History:   Procedure Laterality Date    HX HEART CATHETERIZATION      with stent placed    HX HEENT      bilat cateract extraction    HX HYSTERECTOMY      HX KNEE REPLACEMENT      HX LUMBAR FUSION      HX ORTHOPAEDIC      bilateral TKR    HX ORTHOPAEDIC      neck and back surgery    HX ORTHOPAEDIC      bilateral bunionectomy    HX ORTHOPAEDIC      bilateral cataracts removed    HX PTCA       Social History:     Social History     Socioeconomic History    Marital status:    Tobacco Use    Smoking status: Never    Smokeless tobacco: Never   Vaping Use    Vaping Use: Never used   Substance and Sexual Activity    Alcohol use:  Yes     Alcohol/week: 3.0 standard drinks     Types: 1 Glasses of wine, 1 Cans of beer, 1 Shots of liquor per week     Comment: once per month    Drug use: Yes     Types: Prescription, OTC    Sexual activity: Never   Social History Narrative    ** Merged History Encounter **            Recent use of: ASA    Tetanus up to date: last tetanus booster within 10 years      Anesthesia Complications: None  History of abnormal bleeding : None  History of Blood Transfusions: no  Health Care Directive or Living Will: yes    REVIEW OF SYSTEMS:  A comprehensive review of systems was negative except for that written in the HPI. EXAM:   Visit Vitals  BP (!) 129/58 (BP 1 Location: Left upper arm, BP Patient Position: Sitting, BP Cuff Size: Adult)   Pulse 62   Temp 97.4 °F (36.3 °C) (Oral)   Ht 5' 5\" (1.651 m)   Wt 212 lb 9.6 oz (96.4 kg)   SpO2 98%   BMI 35.38 kg/m²     General appearance - alert, well appearing, and in no distress  Mental status - alert, oriented to person, place, and time  Eyes - pupils equal and reactive, extraocular eye movements intact  Ears - bilateral TM's and external ear canals normal  Nose - normal and patent, no erythema, discharge or polyps  Mouth - mucous membranes moist, pharynx normal without lesions  Neck - supple, no significant adenopathy  Lymphatics - no palpable lymphadenopathy, no hepatosplenomegaly  Chest - clear to auscultation, no wheezes, rales or rhonchi, symmetric air entry  Heart - normal rate, regular rhythm, normal S1, S2, no murmurs, rubs, clicks or gallops  Abdomen - soft, nontender, nondistended, no masses or organomegaly  Breasts - breasts appear normal, no suspicious masses, no skin or nipple changes or axillary nodes  Pelvic - exam declined by the patient  Back exam - full range of motion, no tenderness, palpable spasm or pain on motion  Neurological - alert, oriented, normal speech, no focal findings or movement disorder noted  Musculoskeletal - no joint tenderness, deformity or swelling  Extremities - peripheral pulses normal, no pedal edema, no clubbing or cyanosis  Skin - normal coloration and turgor, no rashes, no suspicious skin lesions noted      DIAGNOSTICS:   1. EKG: EKG FINDINGS - normal EKG, normal sinus rhythm, unchanged from previous tracings  2. CXR: was negative for apical capping  3.  Labs:   Lab Results   Component Value Date/Time    WBC 5.2 10/24/2022 11:39 AM    HGB 14.4 10/24/2022 11:39 AM    HCT 45.5 10/24/2022 11:39 AM    PLATELET 913 48/23/2913 11:39 AM    MCV 95.8 10/24/2022 11:39 AM     Lab Results   Component Value Date/Time    Hemoglobin A1c 6.2 (H) 10/24/2022 11:39 AM    Hemoglobin A1c 5.7 (H) 05/07/2021 10:57 AM    Hemoglobin A1c 5.8 (H) 09/11/2020 10:48 AM    Glucose 111 (H) 10/24/2022 11:39 AM    Microalb/Creat ratio (ug/mg creat.) 19.1 11/15/2019 01:01 PM    LDL, calculated 104.4 (H) 10/24/2022 11:39 AM    Creatinine (POC) 1.3 11/30/2009 01:34 PM    Creatinine 1.89 (H) 10/24/2022 11:39 AM        IMPRESSION:   Coronary disease  Congestive heart failure  Renal dysfunction  Surgery should be delayed until pt has been evaluated by the specialist for a better outcome,              Kitty Jose MD   10/24/2022

## 2022-10-24 NOTE — PROGRESS NOTES
Chief Complaint   Patient presents with    Pre-op Exam     Pre op for left 2nd toe amputation on 11/1/22 by Dr. Yaakov Saeed     1. \"Have you been to the ER, urgent care clinic since your last visit? Hospitalized since your last visit? \" No    2. \"Have you seen or consulted any other health care providers outside of the [address] [place] [address] since your last visit? \" No     3. For patients aged 39-70: Has the patient had a colonoscopy / FIT/ Cologuard? NA - based on age or sex      If the patient is female:    3. For patients aged 41-77: Has the patient had a mammogram within the past 2 years? NA - based on age or sex      11. For patients aged 21-65: Has the patient had a pap smear?  NA - based on age or sex

## 2022-10-24 NOTE — PATIENT INSTRUCTIONS
No baby aspirin 7 days before procedure   No blood pressure medication the day of the procedure  No pain medication  24-72 hrs before procedure  No NSAIDs, before procedures

## 2022-10-25 LAB
ALBUMIN SERPL-MCNC: 4.3 G/DL (ref 3.5–5)
ALBUMIN/GLOB SERPL: 1.3 {RATIO} (ref 1.1–2.2)
ALP SERPL-CCNC: 108 U/L (ref 45–117)
ALT SERPL-CCNC: 36 U/L (ref 12–78)
ANION GAP SERPL CALC-SCNC: 8 MMOL/L (ref 5–15)
APPEARANCE UR: CLEAR
AST SERPL-CCNC: 41 U/L (ref 15–37)
BACTERIA URNS QL MICRO: NEGATIVE /HPF
BILIRUB SERPL-MCNC: 0.7 MG/DL (ref 0.2–1)
BILIRUB UR QL: NEGATIVE
BUN SERPL-MCNC: 58 MG/DL (ref 6–20)
BUN/CREAT SERPL: 31 (ref 12–20)
CALCIUM SERPL-MCNC: 9.7 MG/DL (ref 8.5–10.1)
CHLORIDE SERPL-SCNC: 104 MMOL/L (ref 97–108)
CHOLEST SERPL-MCNC: 209 MG/DL
CO2 SERPL-SCNC: 28 MMOL/L (ref 21–32)
COLOR UR: ABNORMAL
CREAT SERPL-MCNC: 1.89 MG/DL (ref 0.55–1.02)
EPITH CASTS URNS QL MICRO: ABNORMAL /LPF
ERYTHROCYTE [DISTWIDTH] IN BLOOD BY AUTOMATED COUNT: 14.2 % (ref 11.5–14.5)
EST. AVERAGE GLUCOSE BLD GHB EST-MCNC: 131 MG/DL
GLOBULIN SER CALC-MCNC: 3.4 G/DL (ref 2–4)
GLUCOSE SERPL-MCNC: 111 MG/DL (ref 65–100)
GLUCOSE UR STRIP.AUTO-MCNC: NEGATIVE MG/DL
HBA1C MFR BLD: 6.2 % (ref 4–5.6)
HCT VFR BLD AUTO: 45.5 % (ref 35–47)
HDLC SERPL-MCNC: 67 MG/DL
HDLC SERPL: 3.1 {RATIO} (ref 0–5)
HGB BLD-MCNC: 14.4 G/DL (ref 11.5–16)
HGB UR QL STRIP: NEGATIVE
INR PPP: 1.1 (ref 0.9–1.1)
KETONES UR QL STRIP.AUTO: NEGATIVE MG/DL
LDLC SERPL CALC-MCNC: 104.4 MG/DL (ref 0–100)
LEUKOCYTE ESTERASE UR QL STRIP.AUTO: ABNORMAL
MCH RBC QN AUTO: 30.3 PG (ref 26–34)
MCHC RBC AUTO-ENTMCNC: 31.6 G/DL (ref 30–36.5)
MCV RBC AUTO: 95.8 FL (ref 80–99)
NITRITE UR QL STRIP.AUTO: NEGATIVE
NRBC # BLD: 0 K/UL (ref 0–0.01)
NRBC BLD-RTO: 0 PER 100 WBC
PH UR STRIP: 5.5 [PH] (ref 5–8)
PLATELET # BLD AUTO: 156 K/UL (ref 150–400)
PMV BLD AUTO: 11.6 FL (ref 8.9–12.9)
POTASSIUM SERPL-SCNC: 4.9 MMOL/L (ref 3.5–5.1)
PROT SERPL-MCNC: 7.7 G/DL (ref 6.4–8.2)
PROT UR STRIP-MCNC: NEGATIVE MG/DL
PROTHROMBIN TIME: 11.4 SEC (ref 9–11.1)
RBC # BLD AUTO: 4.75 M/UL (ref 3.8–5.2)
RBC #/AREA URNS HPF: ABNORMAL /HPF (ref 0–5)
SODIUM SERPL-SCNC: 140 MMOL/L (ref 136–145)
SP GR UR REFRACTOMETRY: 1.02 (ref 1–1.03)
T4 FREE SERPL-MCNC: 1 NG/DL (ref 0.8–1.5)
TRIGL SERPL-MCNC: 188 MG/DL (ref ?–150)
TSH SERPL DL<=0.05 MIU/L-ACNC: 1.67 UIU/ML (ref 0.36–3.74)
URATE SERPL-MCNC: 8.5 MG/DL (ref 2.6–6)
UROBILINOGEN UR QL STRIP.AUTO: 0.2 EU/DL (ref 0.2–1)
VLDLC SERPL CALC-MCNC: 37.6 MG/DL
WBC # BLD AUTO: 5.2 K/UL (ref 3.6–11)
WBC URNS QL MICRO: ABNORMAL /HPF (ref 0–4)

## 2022-10-28 NOTE — PERIOP NOTES
Per Jaqueline Dunn in Dr. Josemanuel Madden office, no PAT needed as labs have been done by PCP and EKG by cardio.   Pt's daughter notified by phone

## 2022-10-31 RX ORDER — ALLOPURINOL 100 MG/1
200 TABLET ORAL DAILY
Qty: 180 TABLET | Refills: 3 | Status: SHIPPED | OUTPATIENT
Start: 2022-10-31

## 2022-11-07 NOTE — PERIOP NOTES
Called nephrologist office and spoke to Gera cross, requested office notes and clearance to be faxed to [facility] pat

## 2022-11-08 DIAGNOSIS — Z01.818 PREOPERATIVE CLEARANCE: ICD-10-CM

## 2022-11-08 RX ORDER — TRAMADOL HYDROCHLORIDE AND ACETAMINOPHEN 37.5; 325 MG/1; MG/1
1 TABLET ORAL
Qty: 90 TABLET | Refills: 0 | Status: SHIPPED | OUTPATIENT
Start: 2022-11-08 | End: 2022-12-08

## 2022-11-09 NOTE — PERIOP NOTES
Desean Dudley with Dr Ward Shay (neprologist) office called and stated she was faxing over note with clearance on it from Dr Ward Shay office ok for patient to proceed with surgery with new labs attached as well.

## 2022-11-09 NOTE — PERIOP NOTES
Returned call to patient's daughter Yash re: location on DOS and answered some pre-op questions re: pre-op instructions. Able to verbalized understanding .

## 2022-11-14 ENCOUNTER — ANESTHESIA EVENT (OUTPATIENT)
Dept: SURGERY | Age: 87
End: 2022-11-14
Payer: MEDICARE

## 2022-11-15 ENCOUNTER — ANESTHESIA (OUTPATIENT)
Dept: SURGERY | Age: 87
End: 2022-11-15
Payer: MEDICARE

## 2022-11-15 ENCOUNTER — HOSPITAL ENCOUNTER (OUTPATIENT)
Age: 87
Setting detail: OUTPATIENT SURGERY
Discharge: HOME | End: 2022-11-15
Attending: STUDENT IN AN ORGANIZED HEALTH CARE EDUCATION/TRAINING PROGRAM | Admitting: STUDENT IN AN ORGANIZED HEALTH CARE EDUCATION/TRAINING PROGRAM
Payer: MEDICARE

## 2022-11-15 VITALS
HEIGHT: 65 IN | RESPIRATION RATE: 10 BRPM | HEART RATE: 67 BPM | SYSTOLIC BLOOD PRESSURE: 132 MMHG | BODY MASS INDEX: 34.99 KG/M2 | WEIGHT: 210 LBS | DIASTOLIC BLOOD PRESSURE: 60 MMHG | TEMPERATURE: 98.8 F | OXYGEN SATURATION: 99 %

## 2022-11-15 DIAGNOSIS — M20.62 DEFORMITY OF TOE OF LEFT FOOT: Primary | ICD-10-CM

## 2022-11-15 DIAGNOSIS — Z01.818 PREOPERATIVE CLEARANCE: ICD-10-CM

## 2022-11-15 PROCEDURE — 77030040922 HC BLNKT HYPOTHRM STRY -A: Performed by: STUDENT IN AN ORGANIZED HEALTH CARE EDUCATION/TRAINING PROGRAM

## 2022-11-15 PROCEDURE — 74011250636 HC RX REV CODE- 250/636: Performed by: NURSE ANESTHETIST, CERTIFIED REGISTERED

## 2022-11-15 PROCEDURE — 74011250636 HC RX REV CODE- 250/636: Performed by: STUDENT IN AN ORGANIZED HEALTH CARE EDUCATION/TRAINING PROGRAM

## 2022-11-15 PROCEDURE — 76210000040 HC AMBSU PH I REC FIRST 0.5 HR: Performed by: STUDENT IN AN ORGANIZED HEALTH CARE EDUCATION/TRAINING PROGRAM

## 2022-11-15 PROCEDURE — 77030042316 HC BLD SAW -B: Performed by: STUDENT IN AN ORGANIZED HEALTH CARE EDUCATION/TRAINING PROGRAM

## 2022-11-15 PROCEDURE — 76030000001 HC AMB SURG OR TIME 1 TO 1.5: Performed by: STUDENT IN AN ORGANIZED HEALTH CARE EDUCATION/TRAINING PROGRAM

## 2022-11-15 PROCEDURE — 76060000062 HC AMB SURG ANES 1 TO 1.5 HR: Performed by: STUDENT IN AN ORGANIZED HEALTH CARE EDUCATION/TRAINING PROGRAM

## 2022-11-15 PROCEDURE — 77030031139 HC SUT VCRL2 J&J -A: Performed by: STUDENT IN AN ORGANIZED HEALTH CARE EDUCATION/TRAINING PROGRAM

## 2022-11-15 PROCEDURE — 74011000250 HC RX REV CODE- 250: Performed by: STUDENT IN AN ORGANIZED HEALTH CARE EDUCATION/TRAINING PROGRAM

## 2022-11-15 PROCEDURE — 74011000250 HC RX REV CODE- 250: Performed by: NURSE ANESTHETIST, CERTIFIED REGISTERED

## 2022-11-15 PROCEDURE — 76210000046 HC AMBSU PH II REC FIRST 0.5 HR: Performed by: STUDENT IN AN ORGANIZED HEALTH CARE EDUCATION/TRAINING PROGRAM

## 2022-11-15 PROCEDURE — 74011250636 HC RX REV CODE- 250/636: Performed by: ANESTHESIOLOGY

## 2022-11-15 PROCEDURE — 77030002916 HC SUT ETHLN J&J -A: Performed by: STUDENT IN AN ORGANIZED HEALTH CARE EDUCATION/TRAINING PROGRAM

## 2022-11-15 PROCEDURE — 77030002933 HC SUT MCRYL J&J -A: Performed by: STUDENT IN AN ORGANIZED HEALTH CARE EDUCATION/TRAINING PROGRAM

## 2022-11-15 PROCEDURE — 2709999900 HC NON-CHARGEABLE SUPPLY: Performed by: STUDENT IN AN ORGANIZED HEALTH CARE EDUCATION/TRAINING PROGRAM

## 2022-11-15 PROCEDURE — 77030040361 HC SLV COMPR DVT MDII -B: Performed by: STUDENT IN AN ORGANIZED HEALTH CARE EDUCATION/TRAINING PROGRAM

## 2022-11-15 RX ORDER — OXYCODONE HYDROCHLORIDE 5 MG/1
2.5-5 TABLET ORAL
Qty: 10 TABLET | Refills: 0 | Status: SHIPPED | OUTPATIENT
Start: 2022-11-15 | End: 2022-11-20

## 2022-11-15 RX ORDER — ONDANSETRON 2 MG/ML
4 INJECTION INTRAMUSCULAR; INTRAVENOUS AS NEEDED
Status: DISCONTINUED | OUTPATIENT
Start: 2022-11-15 | End: 2022-11-15 | Stop reason: HOSPADM

## 2022-11-15 RX ORDER — EPHEDRINE SULFATE/0.9% NACL/PF 50 MG/5 ML
SYRINGE (ML) INTRAVENOUS AS NEEDED
Status: DISCONTINUED | OUTPATIENT
Start: 2022-11-15 | End: 2022-11-15 | Stop reason: HOSPADM

## 2022-11-15 RX ORDER — FENTANYL CITRATE 50 UG/ML
INJECTION, SOLUTION INTRAMUSCULAR; INTRAVENOUS AS NEEDED
Status: DISCONTINUED | OUTPATIENT
Start: 2022-11-15 | End: 2022-11-15 | Stop reason: HOSPADM

## 2022-11-15 RX ORDER — PHENYLEPHRINE HCL IN 0.9% NACL 0.4MG/10ML
SYRINGE (ML) INTRAVENOUS AS NEEDED
Status: DISCONTINUED | OUTPATIENT
Start: 2022-11-15 | End: 2022-11-15 | Stop reason: HOSPADM

## 2022-11-15 RX ORDER — ONDANSETRON 2 MG/ML
INJECTION INTRAMUSCULAR; INTRAVENOUS AS NEEDED
Status: DISCONTINUED | OUTPATIENT
Start: 2022-11-15 | End: 2022-11-15 | Stop reason: HOSPADM

## 2022-11-15 RX ORDER — ROPIVACAINE HYDROCHLORIDE 5 MG/ML
20 INJECTION, SOLUTION EPIDURAL; INFILTRATION; PERINEURAL ONCE
Status: DISCONTINUED | OUTPATIENT
Start: 2022-11-15 | End: 2022-11-15 | Stop reason: HOSPADM

## 2022-11-15 RX ORDER — LIDOCAINE HYDROCHLORIDE 10 MG/ML
0.1 INJECTION, SOLUTION EPIDURAL; INFILTRATION; INTRACAUDAL; PERINEURAL AS NEEDED
Status: DISCONTINUED | OUTPATIENT
Start: 2022-11-15 | End: 2022-11-15 | Stop reason: HOSPADM

## 2022-11-15 RX ORDER — DIPHENHYDRAMINE HYDROCHLORIDE 50 MG/ML
12.5 INJECTION, SOLUTION INTRAMUSCULAR; INTRAVENOUS AS NEEDED
Status: DISCONTINUED | OUTPATIENT
Start: 2022-11-15 | End: 2022-11-15 | Stop reason: HOSPADM

## 2022-11-15 RX ORDER — ROPIVACAINE HYDROCHLORIDE 5 MG/ML
INJECTION, SOLUTION EPIDURAL; INFILTRATION; PERINEURAL AS NEEDED
Status: DISCONTINUED | OUTPATIENT
Start: 2022-11-15 | End: 2022-11-15 | Stop reason: HOSPADM

## 2022-11-15 RX ORDER — SODIUM CHLORIDE 0.9 % (FLUSH) 0.9 %
5-40 SYRINGE (ML) INJECTION EVERY 8 HOURS
Status: DISCONTINUED | OUTPATIENT
Start: 2022-11-15 | End: 2022-11-15 | Stop reason: HOSPADM

## 2022-11-15 RX ORDER — ONDANSETRON 4 MG/1
4 TABLET, ORALLY DISINTEGRATING ORAL
Qty: 5 TABLET | Refills: 0 | Status: SHIPPED | OUTPATIENT
Start: 2022-11-15

## 2022-11-15 RX ORDER — OXYCODONE AND ACETAMINOPHEN 5; 325 MG/1; MG/1
1 TABLET ORAL
Status: DISCONTINUED | OUTPATIENT
Start: 2022-11-15 | End: 2022-11-15 | Stop reason: HOSPADM

## 2022-11-15 RX ORDER — LIDOCAINE HYDROCHLORIDE 10 MG/ML
INJECTION INFILTRATION; PERINEURAL AS NEEDED
Status: DISCONTINUED | OUTPATIENT
Start: 2022-11-15 | End: 2022-11-15 | Stop reason: HOSPADM

## 2022-11-15 RX ORDER — SODIUM CHLORIDE 0.9 % (FLUSH) 0.9 %
5-40 SYRINGE (ML) INJECTION AS NEEDED
Status: DISCONTINUED | OUTPATIENT
Start: 2022-11-15 | End: 2022-11-15 | Stop reason: HOSPADM

## 2022-11-15 RX ORDER — SODIUM CHLORIDE, SODIUM LACTATE, POTASSIUM CHLORIDE, CALCIUM CHLORIDE 600; 310; 30; 20 MG/100ML; MG/100ML; MG/100ML; MG/100ML
25 INJECTION, SOLUTION INTRAVENOUS CONTINUOUS
Status: DISCONTINUED | OUTPATIENT
Start: 2022-11-15 | End: 2022-11-15 | Stop reason: HOSPADM

## 2022-11-15 RX ORDER — PROPOFOL 10 MG/ML
INJECTION, EMULSION INTRAVENOUS
Status: DISCONTINUED | OUTPATIENT
Start: 2022-11-15 | End: 2022-11-15 | Stop reason: HOSPADM

## 2022-11-15 RX ORDER — LIDOCAINE HYDROCHLORIDE 10 MG/ML
20 INJECTION, SOLUTION EPIDURAL; INFILTRATION; INTRACAUDAL; PERINEURAL ONCE
Status: DISCONTINUED | OUTPATIENT
Start: 2022-11-15 | End: 2022-11-15 | Stop reason: HOSPADM

## 2022-11-15 RX ORDER — FENTANYL CITRATE 50 UG/ML
25 INJECTION, SOLUTION INTRAMUSCULAR; INTRAVENOUS
Status: DISCONTINUED | OUTPATIENT
Start: 2022-11-15 | End: 2022-11-15 | Stop reason: HOSPADM

## 2022-11-15 RX ADMIN — Medication 40 MCG: at 08:11

## 2022-11-15 RX ADMIN — PROPOFOL 30 MCG/KG/MIN: 10 INJECTION, EMULSION INTRAVENOUS at 07:31

## 2022-11-15 RX ADMIN — SODIUM CHLORIDE 40 MCG/MIN: 900 INJECTION, SOLUTION INTRAVENOUS at 08:14

## 2022-11-15 RX ADMIN — Medication 80 MCG: at 08:02

## 2022-11-15 RX ADMIN — Medication 10 MG: at 08:20

## 2022-11-15 RX ADMIN — FENTANYL CITRATE 25 MCG: 50 INJECTION, SOLUTION INTRAMUSCULAR; INTRAVENOUS at 07:30

## 2022-11-15 RX ADMIN — ONDANSETRON HYDROCHLORIDE 4 MG: 2 INJECTION, SOLUTION INTRAMUSCULAR; INTRAVENOUS at 08:26

## 2022-11-15 RX ADMIN — SODIUM CHLORIDE, POTASSIUM CHLORIDE, SODIUM LACTATE AND CALCIUM CHLORIDE 25 ML/HR: 600; 310; 30; 20 INJECTION, SOLUTION INTRAVENOUS at 07:09

## 2022-11-15 RX ADMIN — Medication 10 MG: at 08:09

## 2022-11-15 RX ADMIN — FENTANYL CITRATE 25 MCG: 50 INJECTION, SOLUTION INTRAMUSCULAR; INTRAVENOUS at 08:04

## 2022-11-15 RX ADMIN — Medication 80 MCG: at 07:43

## 2022-11-15 RX ADMIN — WATER 2 G: 1 INJECTION INTRAMUSCULAR; INTRAVENOUS; SUBCUTANEOUS at 07:30

## 2022-11-15 RX ADMIN — Medication 40 MCG: at 07:56

## 2022-11-15 RX ADMIN — Medication 40 MCG: at 08:06

## 2022-11-15 NOTE — ANESTHESIA PREPROCEDURE EVALUATION
Relevant Problems   RESPIRATORY SYSTEM   (+) SOB (shortness of breath) on exertion      CARDIOVASCULAR   (+) CAD (coronary artery disease)   (+) Essential hypertension, benign      RENAL FAILURE   (+) CKD (chronic kidney disease) stage 3, GFR 30-59 ml/min (HCC)   (+) Chronic kidney failure, stage 3 (moderate) (HCC)   (+) Kidney failure, acute (HCC)      ENDOCRINE   (+) Severe obesity (BMI 35.0-39. 9) with comorbidity (HCC)   (+) Type 2 diabetes mellitus with nephropathy (HCC)   (+) Type 2 diabetes mellitus without complication (HCC)      HEMATOLOGY   (+) Anemia in chronic kidney disease       Anesthetic History   No history of anesthetic complications            Review of Systems / Medical History  Patient summary reviewed, nursing notes reviewed and pertinent labs reviewed    Pulmonary          Shortness of breath    Pertinent negatives: Recent URI: with exertion.      Neuro/Psych   Within defined limits           Cardiovascular    Hypertension          CAD, cardiac stents (to RCA, 2012) and hyperlipidemia    Exercise tolerance: <4 METS  Comments: 02/22 ECHO= EF 55-60%, normal    07/22 Cath: no significant CAD, patent RCA stent   GI/Hepatic/Renal         Renal disease (Stage III): CRI       Endo/Other    Diabetes (Borderline)    Obesity, arthritis and anemia (chronic)     Other Findings   Comments: Left crossover toe, Hallux Valgus    Chronic Pain in back  Gout         Physical Exam    Airway  Mallampati: I  TM Distance: 4 - 6 cm  Neck ROM: decreased range of motion   Mouth opening: Normal     Cardiovascular    Rhythm: regular  Rate: normal         Dental    Dentition: Full lower dentures and Full upper dentures     Pulmonary  Breath sounds clear to auscultation               Abdominal  GI exam deferred       Other Findings            Anesthetic Plan    ASA: 3  Anesthesia type: MAC          Induction: Intravenous  Anesthetic plan and risks discussed with: Patient      Took BB yesterday at 8 am

## 2022-11-15 NOTE — PERIOP NOTES
Sophyreymundo Newman  1/14/1933  [id number]    Situation:  Verbal report given from: C, [address] and Klaus ESCOBEDO RN  Procedure: Procedure(s):  LEFT SECOND TOE AMPUTATION (MAC/ANKLE BLOCK)    Background:    Preoperative diagnosis: LEFT CROSSOVER TOE, HALLUX VALGUS    Postoperative diagnosis: LEFT CROSSOVER TOE, [address]    :  Dr. Sun Jarrett    Assistant(s): Circ-1: Mayank Barton RN  Scrub RN-1: Monae Vincent RN  Surg Asst-1: Lam Villanueva    Specimens: * No specimens in log *    Assessment:  Intra-procedure medications         Anesthesia gave intra-procedure sedation and medications, see anesthesia flow sheet     Intravenous fluids: LR@ KVO     Vital signs stable       Recommendation:    Permission to share finding with  : Sheldon Leyva

## 2022-11-15 NOTE — ANESTHESIA POSTPROCEDURE EVALUATION
Procedure(s):  LEFT SECOND TOE AMPUTATION (MAC/ANKLE BLOCK).     MAC    Anesthesia Post Evaluation      Multimodal analgesia: multimodal analgesia used between 6 hours prior to anesthesia start to PACU discharge  Patient location during evaluation: PACU  Patient participation: complete - patient participated  Level of consciousness: awake and alert  Pain score: 0  Airway patency: patent  Anesthetic complications: no  Cardiovascular status: acceptable  Respiratory status: acceptable  Hydration status: acceptable  Post anesthesia nausea and vomiting:  none  Final Post Anesthesia Temperature Assessment:  Normothermia (36.0-37.5 degrees C)      INITIAL Post-op Vital signs:   Vitals Value Taken Time   /63 11/15/22 0900   Temp 36.4 °C (97.5 °F) 11/15/22 0848   Pulse 67 11/15/22 0900   Resp 14 11/15/22 0900   SpO2 99 % 11/15/22 0900

## 2022-11-15 NOTE — DISCHARGE INSTRUCTIONS
Weightbearing:  Weightbearing as tolerated on your operative extremity in the post-op shoe. You may use crutches, a rolling knee scooter, or walker to help with ambulation. Dressings:    Leave your dressing/splint in place until your post-operative visit. Keep it clean and dry. Mild to moderate blood or drainage after surgery is expected. Pain Control: For pain control, you have been prescribed a narcotic pain medication (oxycodone). While taking the narcotic medication, you should NOT take your tramadol. You may also take tylenol and NSAIDs such as ibuprofen for pain control. You should wean from the narcotic medication as you are able. Do not drink alcohol or drive while using narcotic pain medication. You should also keep your foot elevated as much as possible. Avoid pressure under the heel by placing pillows under the calf, not your foot. Constipation:    While taking narcotics, you may have constipation. A stool softener is recommended. You may use over-the-counter stool softeners such as Miralax, Colace, and Senna. Nausea and Vomiting:    Sometimes after surgery, patients have nausea or vomiting. A medicine (Zofran) has been prescribed for this. If you do not have nausea or vomiting, then you do you not need to take this. General Considerations:  1. Keep your foot elevated as much as possible after surgery. It is ideal to have it positioned above your heart to allow swelling to subside. You can place it on several pillows or on the back of the couch. While eating, rest it on another chair at the dinner table. The worst swelling occurs the frst week or two after surgery, but can take much longer up to months to fully subside. 2. Placing ice packs on the outside of the bandage may help with pain and swelling. We recommend 20-30 minutes on and then 90 minutes off.   Also, do not place ice packs directly on the skin or on the toes themselves (which can impair circulation to the toes). 3. Keep your bandage or dressing dry. If it becomes wet, please contact our office. It will likely be necessary to have you come in to be evaluated and have it changed to a dry one. A wet, saturated dressing can lead to problems with stitches and wound healing. 4. Bathing or showering can be challenging. It is important to keep the dressing or cast dry, so covering it with a cast cover or garbage bag can help. It is important to avoid submerging the leg in case the cover leaks. Sitting in the tub or on a shower chair is also recommended, as it is not safe to balance or stand on one leg.  5. Once your sutures have been removed, it is helpful to massage your scar(s) two to three times daily. This can help moisturize the incision, decrease sensitivity, and break up scar tissue. We recommend you use common over-the-counter products such as cocoa butter, vitamin E oil, or Mederma. Rub along the scar as well across the scar side to side. 6. Do not drive until instructed by your surgeon. You should not drive while taking narcotic pain medications. Even if surgery was performed on your left foot, driving for long periods can lead to swelling and discomfort due to the foot being down. Once you are allowed to drive, try shorter trips to get accustomed to operating a car again. It may help to practice in an empty parking lot to get used to controlling the pedals. TAKE NARCOTIC PAIN MEDICATIONS WITH FOOD     Narcotics tend to be constipating, we suggest taking a stool softener such as Colace or Miralax (follow package instructions). DO NOT DRIVE WHILE TAKING NARCOTIC PAIN MEDICATIONS. DO NOT TAKE SLEEPING MEDICATIONS OR ANTIANXIETY MEDICATIONS WHILE TAKING NARCOTIC PAIN MEDICATIONS,  ESPECIALLY THE NIGHT OF ANESTHESIA! CPAP PATIENTS BE SURE TO WEAR MACHINE WHENEVER NAPPING OR SLEEPING!     DISCHARGE SUMMARY from Nurse    The following personal items collected during your admission are returned to you:   Dental Appliance: Dental Appliances: With patient  Vision: Visual Aid: Glasses  Hearing Aid:    Jewelry: Jewelry: None  Clothing: Clothing: With patient  Other Valuables: Other Valuables: Cane, Eyeglasses (cane-stretcher, glasses-pacu)  Valuables sent to safe:        PATIENT INSTRUCTIONS:    After General Anesthesia or Intravenous Sedation, for 24 hours or while taking prescription Narcotics:        Someone should be with you for the next 24 hours. For your own safety, a responsible adult must drive you home. Limit your activities  Recommended activity: Rest today, up with assistance today. Do not climb stairs or shower unattended for the next 24 hours. Please start with a soft bland diet and advance as tolerated (no nausea) to regular diet. If you have a sore throat you should try the following: fluids, warm salt water gargles, or throat lozenges. If it does not improve after several days please follow up with your primary physician. Do not drive and operate hazardous machinery  Do not make important personal or business decisions  Do  not drink alcoholic beverages  If you have not urinated within 8 hours after discharge, please contact your surgeon on call. Report the following to your surgeon:  Excessive pain, swelling, redness or odor of or around the surgical area  Temperature over 100.5  Nausea and vomiting lasting longer than 4 hours or if unable to take medications  Any signs of decreased circulation or nerve impairment to extremity: change in color, persistent  numbness, tingling, coldness or increase pain      You will receive a Post Operative Call from one of the Recovery Room Nurses on the day after your surgery to check on you. It is very important for us to know how you are recovering after your surgery. If you have an issue or need to speak with someone, please call your surgeon, do not wait for the post operative call.     You may receive an e-mail or letter in the mail from [employer] regarding your experience with us in the Ambulatory Surgery Unit. Your feedback is valuable to us and we appreciate your participation in the survey. If the above instructions are not adequate or you are having problems after your surgery, call the physician at their office number. We wish you a speedy recovery ? What to do at Home:      *  Please give a list of your current medications to your Primary Care Provider. *  Please update this list whenever your medications are discontinued, doses are      changed, or new medications (including over-the-counter products) are added. *  Please carry medication information at all times in case of emergency situations. If you have not received your influenza and/or pneumococcal vaccine, please follow up with your primary care physician. The discharge information has been reviewed with the patient and caregiver. The patient and caregiver verbalized understanding. TO PREVENT AN INFECTION      [address] YOUR HANDS    To prevent infection, good handwashing is the most important thing you or your caregiver can do. Wash your hands with soap and water or use the hand  we gave you before you touch any wounds. SHOWER    Use the antibacterial soap we gave you when you take a shower. Shower with this soap until your wounds are healed. To reach all areas of your body, you may need someone to help you. Dont forget to clean your belly button with every shower. USE CLEAN SHEETS    Use freshly cleaned sheets on your bed after surgery. To keep the surgery site clean, do not allow pets to sleep with you while your wound is still healing. STOP SMOKING    Stop smoking, or at least cut back on smoking    Smoking slows your healing. CONTROL YOUR BLOOD SUGAR    High blood sugars slow wound healing. If you are diabetic, control your blood sugar levels before and after your surgery.

## 2022-11-15 NOTE — PERIOP NOTES
Permission received to review discharge instructions and discuss private health information with granddaughterJaida and will have someone with them after discharge   Patient states that family/friend will be with them for at least 24 hours following today's procedure.    Air Warming blanket placed on pt; turned on for comfort

## 2022-11-15 NOTE — BRIEF OP NOTE
Brief Postoperative Note    Patient: Jack Dillon  YOB: 1933  MRN: [id number]    Date of Procedure: 11/15/2022     Pre-Op Diagnosis: LEFT CROSSOVER TOE, HALLUX VALGUS    Post-Op Diagnosis: Same as preoperative diagnosis. Procedure(s):  LEFT SECOND TOE AMPUTATION (MAC/ANKLE BLOCK)    Surgeon(s):  Zohaib Knapp MD    Surgical Assistant: Surg Asst-1: Minnie Escobar    Anesthesia: MAC     Estimated Blood Loss (mL): Minimal    Complications: None    Specimens: * No specimens in log *     Implants: * No implants in log *    Drains: * No LDAs found *    Findings: Crossover second toe.   Good capillary refill of skin flaps after releasing tourniquet    Electronically Signed by Adama Patel MD on 11/15/2022 at 8:45 AM

## 2022-11-15 NOTE — H&P
HISTORY AND PHYSICAL     NAME: Pascual Landry   :  1933   MRN:  [id number]     Date/Time:  11/15/2022 7:09 AM    ________________________________________________________________________   Assessment:    Plan:  -Left crossover 2nd toe  -Left hallux valgus Left 2nd toe amputation        Subjective:   CHIEF COMPLAINT:  left 2nd toe pain    HISTORY OF PRESENT ILLNESS:     Eleni Woodard is a 80 y.o.  female whom presents with complaint noted above. She has had a hallux valgus deformity and a painful second crossover toe. We discussed risks and benefits of surgical correction of hallux and 2nd toe vs 2nd toe amputation. Given that her symptoms were related to the second toe, they elected to proceed with 2nd toe amputation.     Past Medical History:   Diagnosis Date    Arthritis 2010    Back pain 2010    Borderline diabetes     CAD (coronary artery disease)     Chest pain, unspecified     Chronic kidney failure, stage 3 (moderate) (HCC) 2017    Chronic pain     Chronic pain of both knees 2018    COVID-19     Edema     Essential hypertension     Gout     High cholesterol 2010    HTN (hypertension) 2010    Kidney failure, acute ([facility] [address] 2012    Lacrimal passage stenosis 2014    Onychomycosis 2010    Other acute and subacute form of ischemic heart disease     Other and unspecified angina pectoris     Other ill-defined conditions(799.89)     excessive watering of right eye    Patient is full code 2018    Shortness of breath     Shoulder pain, bilateral 2010      Past Surgical History:   Procedure Laterality Date    HX HEART CATHETERIZATION      with stent placed    HX HEENT      bilat cateract extraction    HX HYSTERECTOMY      HX KNEE REPLACEMENT      HX LUMBAR FUSION      HX ORTHOPAEDIC      bilateral TKR    HX ORTHOPAEDIC      neck and back surgery    HX ORTHOPAEDIC      bilateral bunionectomy    HX ORTHOPAEDIC      bilateral cataracts removed    HX PTCA Social History     Tobacco Use    Smoking status: Never    Smokeless tobacco: Never   Substance Use Topics    Alcohol use: Yes     Alcohol/week: 3.0 standard drinks     Types: 1 Glasses of wine, 1 Cans of beer, 1 Shots of liquor per week     Comment: once per month      Family History   Problem Relation Age of Onset    Hypertension Mother     Stroke Mother     Other Mother         arthritis    Coronary Art Dis Father     Heart Disease Father     Hypertension Sister     Diabetes Sister     Coronary Art Dis Sister     Cancer Sister         lung    Coronary Art Dis Brother     Cancer Brother         lung    Heart Disease Brother       Allergies   Allergen Reactions    Diclofenac Unknown (comments)    Lisinopril Cough    Lisinopril-Hydrochlorothiazide Unknown (comments)    Rosuvastatin Diarrhea and Other (comments)     Abdominal pain       Prior to Admission medications    Medication Sig Start Date End Date Taking? Authorizing Provider   docusate sodium (COLACE) 50 mg capsule Take 2 Capsules by mouth two (2) times daily as needed for Constipation. 11/15/22  Yes Ruthie Huber MD   ondansetron (ZOFRAN ODT) 4 mg disintegrating tablet Take 1 Tablet by mouth every eight (8) hours as needed for Nausea or Vomiting. 11/15/22  Yes Ruthie Huber MD   oxyCODONE IR (ROXICODONE) 5 mg immediate release tablet Take 0.5-1 Tablets by mouth every four (4) hours as needed for Pain for up to 5 days. Max Daily Amount: 30 mg. 11/15/22 11/20/22 Yes Ruthie Huber MD   traMADol-acetaminophen (ULTRACET) 37.5-325 mg per tablet Take 1 Tablet by mouth every eight (8) hours as needed for Pain for up to 30 days. Max Daily Amount: 3 Tablets. 11/8/22 12/8/22 Yes Stephon Lu MD   allopurinoL (ZYLOPRIM) 100 mg tablet Take 2 Tablets by mouth daily. 10/31/22  Yes Stephon Lu MD   acetaminophen (TYLENOL) 500 mg tablet Take 500 mg by mouth every six (6) hours as needed for Pain.    Yes Provider, Historical   torsemide (DEMADEX) 20 mg tablet TAKE 1 TABLET EVERY DAY  Patient taking differently: Every morning. 9/30/22  Yes Ryan Murray MD   candesartan (ATACAND) 4 mg tablet TAKE 1 TABLET EVERY DAY (DOSE DECREASE)  Patient taking differently: Every morning. 7/25/22  Yes Jaquan Lacy NP   atenoloL (TENORMIN) 50 mg tablet TAKE 1 TABLET EVERY DAY  Patient taking differently: Every morning. TAKE 1 TABLET EVERY DAY 6/13/22  Yes Ryan Murray MD   potassium chloride (KLOR-CON M10) 10 mEq tablet TAKE 1 TABLET EVERY DAY  Patient taking differently: Every morning. 6/13/22  Yes Ryan Murray MD   Mitigare 0.6 mg capsule TAKE 1 CAPSULE EVERY DAY  Patient taking differently: Every morning. 5/31/22  Yes Ryan Murray MD   diclofenac (VOLTAREN) 1 % gel APPLY TO THE AFFECTED AREA(S) AS DIRECTED FOUR TIMES DAILY AS NEEDED 5/10/22  Yes Ryan Murray MD   ezetimibe (ZETIA) 10 mg tablet TAKE 1 TABLET EVERY DAY 12/22/21  Yes Jaquan Lacy NP   pravastatin (PRAVACHOL) 20 mg tablet TAKE 1 TABLET EVERY NIGHT 8/12/21  Yes Jaquan Lacy NP   ASPIR-LOW 81 mg tablet Take 81 mg by mouth Every morning. Indications: takes for heart - cardiologist prescribed 7/5/18  Yes Provider, Historical   MISCELLANEOUS MEDICAL SUPPLY (COMPRESSION STOCKINGS) daily.  Remove at bedtime 5/20/16  Yes Provider, Historical   nitroglycerin (NITROSTAT) 0.4 mg SL tablet PLACE 1 TABLET UNDER THE TONGUE EVERY 5 MINS AS NEEDED FOR CHEST PAIN 5/7/21   Ryan Murray MD     REVIEW OF SYSTEMS:     Total of 12 systems reviewed as follows:  Constitutional: negative fever, negative chills, negative weight loss  Eyes:   negative visual changes  ENT:   negative sore throat, tongue or lip swelling  Respiratory:  negative cough, negative dyspnea  Cards:  negative for chest pain, palpitations, lower extremity edema  GI:   negative for nausea, vomiting, diarrhea, and abdominal pain  :  negative for frequency, dysuria  Integument:  negative for rash and pruritus  Heme:  negative for easy bruising and gum/nose bleeding  Musculoskel: negative for myalgias,  back pain and muscle weakness  Neuro:  negative for headaches, dizziness, vertigo  Psych:  negative for feelings of anxiety, depression   Travel?: none    Objective:   VITALS:    Visit Vitals  BP (!) 162/72 (BP 1 Location: Right arm, BP Patient Position: At rest)   Pulse 73   Temp 97.8 °F (36.6 °C)   Resp 22   Ht 5' 5\" (1.651 m)   Wt 95.3 kg (210 lb)   SpO2 99%   BMI 34.95 kg/m²     PHYSICAL EXAM:  Gen:  []      WD [x]      WN [x]      cachectic []      thin []      obese []     pain []                  []      ill appearing  []      Critical  []      Chronic    []      No acute distress    HEENT:   [x]      NC/AT/PERRLA/EOMI    []      pink conjunctivae      []      pale conjunctivae                  PERRL  []      yes  []      no      []      moist mucosa    []      dry mucosa    hearing intact to voice []      yes  []       no                 NECK:   supple [x]      yes  []      no        masses []      yes  []      No               thyroid  []      non tender  []      tender    RESP:   []        CTA bilaterally/no wheezing/rhonchi/rales/crackles    [x]        clear bilaterally  []      wheezing   []      rhonchi   []      crackles     use of accessory muscles []        yes  []        no    CARD:   [x]        Pulses with regular rate    murmur  []      yes ()  []      no          Rubs  []      yes  []      no    Gallops []      yes  []      no    Rate []      regular  []      irregular    carotid bruits  []      Right  []      Left                 LE edema []      yes  []      no       JVP  []      yes   []      no    ABD:    [x]        soft/non distended/non tender/+bowel sounds/no HSM    []        Rigid    tenderness []      yes []      no      Liver enlargement  []      yes []      no                Spleen enlargement  []      yes []      no         distended []      yes []      no     bowel sound  [] hypoactive   []      hyperactive    SKIN:  No wounds         Lower Extremity:    Hallux valgus deformity with 2nd crossover toe.   Toes WWP  Sensation intact              ______________________________________________________________________  Attending Physician: Brenda Crystal MD

## 2022-11-19 NOTE — OP NOTES
OPERATIVE REPORT     PATIENT:  Shobha Doherty                                        MRN:  [id number]  :  1933  ADMITTING PHYSICIAN:  Wilfredo Jacinto MD  ______________________________________________________________________     DATE OF SURGERY:  11/15/2022  SURGEON:  Wilfredo Jacinto MD        ASSISTANT:  Surg Asst-1: Irena Gordon     PREOPERATIVE DIAGNOSIS(ES):  LEFT CROSSOVER TOE, HALLUX VALGUS     POSTOPERATIVE DIAGNOSIS(ES):  Same     PROCEDURE(S) PERFORMED:  Procedure(s):  LEFT SECOND TOE AMPUTATION at MTP joint (MAC/ANKLE BLOCK)    ANESTHESIA:   MAC.     TOURNIQUET TIME:    Total Tourniquet Time Documented: Ankle (Left) - 33 minutes  Total: Ankle (Left) - 33 minutes       IMPLANTS:    * No implants in log *       INDICATIONS FOR THE PROCEDURE:  The patient is an 70-year-old female who had a painful crossover 2nd toe as well as hallux valgus and varus alignment of the 3rd toe. She had failed conservative treatment including shoe wear modifications and toe spacers. She continued to have difficulty with shoe wear and pain. We discussed the risks and benefits of continue non operative intervention as well as surgical intervention with a forefoot reconstruction versus 2nd toe amputation. We reviewed the postoperative recovery of each procedure. After the discussion, she elected to proceed with a 2nd toe amputation. DESCRIPTION OF PROCEDURE:    The correct patient, extremity, and operation were all identified in the preoperative holding area. I marked the operative extremity. The patient was taken back to the operating room and placed supine on the operating room table. Sedation and monitored anesthesia care was performed. Local anesthesia was then administered.    A preoperative time-out was called and again the correct patient operation and extremity were all identified, preoperative antibiotics Ancef) were given IV within 30 minutes of the incision, all parties were in agreement and we proceeded with the operation. An Esmarch was used to exsanguinate the foot and left in place as a well-padded supra malleolar Esmarch tourniquet. We then localized the 2nd MTP joint on fluoroscopy. We then planned our fishmouth type incision ensuring that we left enough skin for soft tissue coverage. Sharp dissection was carried out through the skin directly down to bone. This was carried out circumferentially. The capsule and ligamentous and tendinous structures were transected at the MTP joint. The 2nd toe was then removed in its entirety at the MTP joint. We then identified the digital nerves and transected them as proximal as possible in an effort to reduce her risk of developing a neuroma. The wound was then copiously irrigated with sterile saline. Excess skin was excised. Deep fascial layers were closed with 2-0 Vicryl. Subcuticular layers were closed with 3-0 Monocryl. The skin was closed with 3-0 nylon. A sterile dressing was then applied followed by a postop shoe. She was then awoken from sedation and transferred to PACU in stable condition. COMPLICATIONS:  None. POSTOPERATIVE PLAN:  She will be heel weight-bearing on her operative extremity. I will plan to see her back in approximately 10-14 days for repeat evaluation and likely suture removal.  She does not require x-rays at that visit.

## 2023-02-22 ENCOUNTER — VIRTUAL VISIT (OUTPATIENT)
Dept: FAMILY MEDICINE CLINIC | Age: 88
End: 2023-02-22
Payer: MEDICARE

## 2023-02-22 DIAGNOSIS — G89.29 CHRONIC PAIN OF BOTH KNEES: ICD-10-CM

## 2023-02-22 DIAGNOSIS — I25.83 CORONARY ARTERY DISEASE DUE TO LIPID RICH PLAQUE: ICD-10-CM

## 2023-02-22 DIAGNOSIS — E11.65 TYPE 2 DIABETES MELLITUS WITH HYPERGLYCEMIA, WITHOUT LONG-TERM CURRENT USE OF INSULIN (HCC): ICD-10-CM

## 2023-02-22 DIAGNOSIS — I10 ESSENTIAL HYPERTENSION: ICD-10-CM

## 2023-02-22 DIAGNOSIS — M25.511 CHRONIC PAIN OF BOTH SHOULDERS: ICD-10-CM

## 2023-02-22 DIAGNOSIS — M25.562 CHRONIC PAIN OF BOTH KNEES: ICD-10-CM

## 2023-02-22 DIAGNOSIS — G89.29 CHRONIC PAIN OF BOTH SHOULDERS: ICD-10-CM

## 2023-02-22 DIAGNOSIS — R06.02 SOB (SHORTNESS OF BREATH) ON EXERTION: ICD-10-CM

## 2023-02-22 DIAGNOSIS — R60.0 BILATERAL EDEMA OF LOWER EXTREMITY: ICD-10-CM

## 2023-02-22 DIAGNOSIS — M25.561 CHRONIC PAIN OF BOTH KNEES: ICD-10-CM

## 2023-02-22 DIAGNOSIS — M15.9 PRIMARY OSTEOARTHRITIS INVOLVING MULTIPLE JOINTS: ICD-10-CM

## 2023-02-22 DIAGNOSIS — Z95.820 S/P ANGIOPLASTY WITH STENT: ICD-10-CM

## 2023-02-22 DIAGNOSIS — I25.10 CORONARY ARTERY DISEASE DUE TO LIPID RICH PLAQUE: ICD-10-CM

## 2023-02-22 DIAGNOSIS — F11.99 OPIOID USE, UNSPECIFIED WITH UNSPECIFIED OPIOID-INDUCED DISORDER (HCC): ICD-10-CM

## 2023-02-22 DIAGNOSIS — N18.30 CHRONIC KIDNEY FAILURE, STAGE 3 (MODERATE) (HCC): ICD-10-CM

## 2023-02-22 DIAGNOSIS — M25.512 CHRONIC PAIN OF BOTH SHOULDERS: ICD-10-CM

## 2023-02-22 PROCEDURE — G8432 DEP SCR NOT DOC, RNG: HCPCS | Performed by: FAMILY MEDICINE

## 2023-02-22 PROCEDURE — 1101F PT FALLS ASSESS-DOCD LE1/YR: CPT | Performed by: FAMILY MEDICINE

## 2023-02-22 PROCEDURE — G8427 DOCREV CUR MEDS BY ELIG CLIN: HCPCS | Performed by: FAMILY MEDICINE

## 2023-02-22 PROCEDURE — 99214 OFFICE O/P EST MOD 30 MIN: CPT | Performed by: FAMILY MEDICINE

## 2023-02-22 PROCEDURE — 1090F PRES/ABSN URINE INCON ASSESS: CPT | Performed by: FAMILY MEDICINE

## 2023-02-22 PROCEDURE — 1123F ACP DISCUSS/DSCN MKR DOCD: CPT | Performed by: FAMILY MEDICINE

## 2023-02-22 RX ORDER — PREDNISONE 10 MG/1
30 TABLET ORAL SEE ADMIN INSTRUCTIONS
Qty: 15 TABLET | Refills: 1 | Status: SHIPPED | OUTPATIENT
Start: 2023-02-22 | End: 2023-02-27

## 2023-02-22 RX ORDER — TORSEMIDE 20 MG/1
40 TABLET ORAL DAILY
Qty: 90 TABLET | Refills: 2
Start: 2023-02-22

## 2023-02-22 RX ORDER — ALBUTEROL SULFATE 90 UG/1
1 AEROSOL, METERED RESPIRATORY (INHALATION)
Qty: 1 EACH | Refills: 2 | Status: SHIPPED | OUTPATIENT
Start: 2023-02-22

## 2023-02-22 RX ORDER — NITROGLYCERIN 0.4 MG/1
TABLET SUBLINGUAL
Qty: 25 TABLET | Refills: 11 | Status: SHIPPED | OUTPATIENT
Start: 2023-02-22

## 2023-02-22 RX ORDER — POTASSIUM CHLORIDE 750 MG/1
10 TABLET, EXTENDED RELEASE ORAL DAILY
Qty: 90 TABLET | Refills: 2
Start: 2023-02-22

## 2023-02-22 RX ORDER — AMOXICILLIN 500 MG/1
500 CAPSULE ORAL 3 TIMES DAILY
Qty: 21 CAPSULE | Refills: 0 | Status: SHIPPED | OUTPATIENT
Start: 2023-02-22 | End: 2023-03-01

## 2023-02-22 NOTE — PROGRESS NOTES
Identified pt with two pt identifiers(name and ). Reviewed record in preparation for visit and have obtained necessary documentation. Chief Complaint   Patient presents with    Shortness of Breath        There were no vitals filed for this visit. Health Maintenance Due   Topic    Shingles Vaccine (1 of 2)    Pneumococcal 65+ years (2 - PCV)    COVID-19 Vaccine (4 - Booster for [employer] series)    Medicare Yearly Exam        Coordination of Care Questionnaire:  :   1) Have you been to an emergency room, urgent care, or hospitalized since your last visit? If yes, where when, and reason for visit? no       2. Have seen or consulted any other health care provider since your last visit? If yes, where when, and reason for visit? NO      Patient is accompanied by self I have received verbal consent from Clementina Mckinnon to discuss any/all medical information while they are present in the room.

## 2023-02-22 NOTE — PROGRESS NOTES
Misael Recio (: 1933) is a 80 y.o. female, established patient, here for evaluation of the following chief complaint(s):   Shortness of Breath    started 2-3 wks, has had no sick exposure  but with cough which is new, wet, clear, no fever, nl appetite, nicely vaccinated,       today patient present with a complaint of sob on ambulation, stating that she also ran out of her nitroglycerin with some chest discomfort on exertion as well stating that it last few seconds , goes away with resting   as per the patient episode started almost 3 weeks ago lasting 5 min no spinning sensation, patient has no sweating no chest pain no LOC, having good family support able to go to the kitchen bathroom without trouble ,    Patient also complains of edema. Of the lower legs bilateral, ankles bilateral, feet bilateral, it has been moderate,  denies any leg pain,     Constitutional: no chills and fever,  , nad     HENT: no ear pain or nosebleeds. No blurred vision  Respiratory: no shortness of breath, wheezing cough   Cardiovascular: Has no chest pain, ,and racing heart . Gastrointestinal: No constipation, diarrhea, nausea and vomiting. Genitourinary: No frequency. Musculoskeletal: Negative for joint pain. Skin: no itching, no rash. Neurological: Negative for dizziness, no tremors  Psychiatric/Behavioral: Negative for depression   is not nervous/anxious. and not depressed the patient is not nervous/anxious.         General:  alert cooperative appears stated for the age  [de-identified]; normocephalic and atraumatic PERRLA extraocular motor intact normal tympanic membrane normal external ear bilaterally, mucosal normal no drainage  Neck: supple no adenopathy no JVD no bruit  Lungs: Clear to auscultation bilaterally no rales rhonchi or wheezes  Heart: Normal regular S1-S2 ,  no murmur  Breast exam deferred  Abdomen: Soft nontender normal bowel sounds   Extremities: Normal range of motion no point tenderness normal pulses no edema  Pelvic/: No lesion, no lymphadenopathy  Skin: Normal no lesion no rash      ASSESSMENT/PLAN:  Below is the assessment and plan developed based on review of pertinent history, labs, studies, and medications. 1. Bilateral edema of lower extremity  -     torsemide (DEMADEX) 20 mg tablet; Take 2 Tablets by mouth daily. , No Print, Disp-90 Tablet, R-2  -     potassium chloride (KLOR-CON M10) 10 mEq tablet; Take 1 Tablet by mouth daily. , No Print, Disp-90 Tablet, R-2  -     CBC W/O DIFF; Future  -     HEMOGLOBIN A1C WITH EAG; Future  -     METABOLIC PANEL, COMPREHENSIVE; Future  -     FERRITIN; Future  -     TSH 3RD GENERATION; Future  -     XR CHEST PA LAT; Future  2. Essential hypertension  -     torsemide (DEMADEX) 20 mg tablet; Take 2 Tablets by mouth daily. , No Print, Disp-90 Tablet, R-2  -     potassium chloride (KLOR-CON M10) 10 mEq tablet; Take 1 Tablet by mouth daily. , No Print, Disp-90 Tablet, R-2  -     CBC W/O DIFF; Future  -     HEMOGLOBIN A1C WITH EAG; Future  -     METABOLIC PANEL, COMPREHENSIVE; Future  -     FERRITIN; Future  -     TSH 3RD GENERATION; Future  3. Chronic pain of both knees  -     torsemide (DEMADEX) 20 mg tablet; Take 2 Tablets by mouth daily. , No Print, Disp-90 Tablet, R-2  -     potassium chloride (KLOR-CON M10) 10 mEq tablet; Take 1 Tablet by mouth daily. , No Print, Disp-90 Tablet, R-2  -     CBC W/O DIFF; Future  -     HEMOGLOBIN A1C WITH EAG; Future  -     METABOLIC PANEL, COMPREHENSIVE; Future  -     FERRITIN; Future  -     TSH 3RD GENERATION; Future  4. Chronic pain of both shoulders  -     torsemide (DEMADEX) 20 mg tablet; Take 2 Tablets by mouth daily. , No Print, Disp-90 Tablet, R-2  -     potassium chloride (KLOR-CON M10) 10 mEq tablet; Take 1 Tablet by mouth daily. , No Print, Disp-90 Tablet, R-2  -     CBC W/O DIFF; Future  -     HEMOGLOBIN A1C WITH EAG; Future  -     METABOLIC PANEL, COMPREHENSIVE; Future  -     FERRITIN; Future  -     TSH 3RD GENERATION; Future  5. Primary osteoarthritis involving multiple joints  -     torsemide (DEMADEX) 20 mg tablet; Take 2 Tablets by mouth daily. , No Print, Disp-90 Tablet, R-2  -     potassium chloride (KLOR-CON M10) 10 mEq tablet; Take 1 Tablet by mouth daily. , No Print, Disp-90 Tablet, R-2  -     CBC W/O DIFF; Future  -     HEMOGLOBIN A1C WITH EAG; Future  -     METABOLIC PANEL, COMPREHENSIVE; Future  -     FERRITIN; Future  -     TSH 3RD GENERATION; Future  6. Opioid use, unspecified with unspecified opioid-induced disorder [facility])  Assessment & Plan:   at goal, continue current medications, continue current treatment plan  Orders:  -     CBC W/O DIFF; Future  -     HEMOGLOBIN A1C WITH EAG; Future  -     METABOLIC PANEL, COMPREHENSIVE; Future  -     FERRITIN; Future  -     TSH 3RD GENERATION; Future  7. Type 2 diabetes mellitus with hyperglycemia, without long-term current use of insulin (HCC)  -     CBC W/O DIFF; Future  -     HEMOGLOBIN A1C WITH EAG; Future  -     METABOLIC PANEL, COMPREHENSIVE; Future  -     FERRITIN; Future  -     TSH 3RD GENERATION; Future  8. Chronic kidney failure, stage 3 (moderate) (HCC)  Assessment & Plan:   monitored by specialist. No acute findings meriting change in the plan  Orders:  -     CBC W/O DIFF; Future  -     HEMOGLOBIN A1C WITH EAG; Future  -     METABOLIC PANEL, COMPREHENSIVE; Future  -     FERRITIN; Future  -     TSH 3RD GENERATION; Future  9. SOB (shortness of breath) on exertion  -     CBC W/O DIFF; Future  -     HEMOGLOBIN A1C WITH EAG; Future  -     METABOLIC PANEL, COMPREHENSIVE; Future  -     FERRITIN; Future  -     TSH 3RD GENERATION; Future  -     predniSONE (STERAPRED DS) 10 mg dose pack; Take 3 Tablets by mouth See Admin Instructions for 5 days.  See administration instruction per 10mg dose pack, Normal, Disp-15 Tablet, R-1  -     nitroglycerin (NITROSTAT) 0.4 mg SL tablet; PLACE 1 TABLET UNDER THE TONGUE EVERY 5 MINS AS NEEDED FOR CHEST PAIN, Normal, Disp-25 Tablet, R-11  -     XR CHEST PA LAT; Future  -     albuterol (PROVENTIL HFA, VENTOLIN HFA, PROAIR HFA) 90 mcg/actuation inhaler; Take 1 Puff by inhalation every four (4) hours as needed for Wheezing., Normal, Disp-1 Each, R-2  -     amoxicillin (AMOXIL) 500 mg capsule; Take 1 Capsule by mouth three (3) times daily for 7 days. , Normal, Disp-21 Capsule, R-0  10. Coronary artery disease due to lipid rich plaque  -     CBC W/O DIFF; Future  -     HEMOGLOBIN A1C WITH EAG; Future  -     METABOLIC PANEL, COMPREHENSIVE; Future  -     FERRITIN; Future  -     TSH 3RD GENERATION; Future  -     nitroglycerin (NITROSTAT) 0.4 mg SL tablet; PLACE 1 TABLET UNDER THE TONGUE EVERY 5 MINS AS NEEDED FOR CHEST PAIN, Normal, Disp-25 Tablet, R-11  11. S/P angioplasty with stent--RCA 6/12  -     CBC W/O DIFF; Future  -     HEMOGLOBIN A1C WITH EAG; Future  -     METABOLIC PANEL, COMPREHENSIVE; Future  -     FERRITIN; Future  -     TSH 3RD GENERATION; Future  -     nitroglycerin (NITROSTAT) 0.4 mg SL tablet; PLACE 1 TABLET UNDER THE TONGUE EVERY 5 MINS AS NEEDED FOR CHEST PAIN, Normal, Disp-25 Tablet, R-11    Return if symptoms worsen or fail to improve. Patient was offered to go to the emergency room or call 911 immediately unfortunately patient and the granddaughter opted at this time stated would rather get treated before going to emergency room,       Charu Hernandez, was evaluated through a synchronous (real-time) audio-video encounter. The patient (or guardian if applicable) is aware that this is a billable service, which includes applicable co-pays. This Virtual Visit was conducted with patient's (and/or legal guardian's) consent. The visit was conducted pursuant to the emergency declaration under the [address] [place] [address], [address] [place] [address] authority and the [employer] General Act. Patient identification was verified, and a caregiver was present when appropriate.   The patient was located at: Home: [address] [facility] [address]  The provider was located at: Facility [facility] [address] [facility] [address]       An electronic signature was used to authenticate this note.   -- Valerie Borden MD

## 2023-02-24 ENCOUNTER — TELEPHONE (OUTPATIENT)
Dept: FAMILY MEDICINE CLINIC | Age: 88
End: 2023-02-24

## 2023-02-24 LAB
ALBUMIN SERPL-MCNC: 4.1 G/DL (ref 3.5–5)
ALBUMIN/GLOB SERPL: 1.4 (ref 1.1–2.2)
ALP SERPL-CCNC: 101 U/L (ref 45–117)
ALT SERPL-CCNC: 30 U/L (ref 12–78)
ANION GAP SERPL CALC-SCNC: 3 MMOL/L (ref 5–15)
AST SERPL-CCNC: 36 U/L (ref 15–37)
BILIRUB SERPL-MCNC: 0.6 MG/DL (ref 0.2–1)
BUN SERPL-MCNC: 37 MG/DL (ref 6–20)
BUN/CREAT SERPL: 28 (ref 12–20)
CALCIUM SERPL-MCNC: 9.6 MG/DL (ref 8.5–10.1)
CHLORIDE SERPL-SCNC: 107 MMOL/L (ref 97–108)
CO2 SERPL-SCNC: 32 MMOL/L (ref 21–32)
CREAT SERPL-MCNC: 1.31 MG/DL (ref 0.55–1.02)
ERYTHROCYTE [DISTWIDTH] IN BLOOD BY AUTOMATED COUNT: 14 % (ref 11.5–14.5)
EST. AVERAGE GLUCOSE BLD GHB EST-MCNC: 126 MG/DL
FERRITIN SERPL-MCNC: 129 NG/ML (ref 26–388)
GLOBULIN SER CALC-MCNC: 3 G/DL (ref 2–4)
GLUCOSE SERPL-MCNC: 102 MG/DL (ref 65–100)
HBA1C MFR BLD: 6 % (ref 4–5.6)
HCT VFR BLD AUTO: 45.7 % (ref 35–47)
HGB BLD-MCNC: 13.9 G/DL (ref 11.5–16)
MCH RBC QN AUTO: 29.7 PG (ref 26–34)
MCHC RBC AUTO-ENTMCNC: 30.4 G/DL (ref 30–36.5)
MCV RBC AUTO: 97.6 FL (ref 80–99)
NRBC # BLD: 0 K/UL (ref 0–0.01)
NRBC BLD-RTO: 0 PER 100 WBC
PLATELET # BLD AUTO: 189 K/UL (ref 150–400)
PMV BLD AUTO: 11.3 FL (ref 8.9–12.9)
POTASSIUM SERPL-SCNC: 4.1 MMOL/L (ref 3.5–5.1)
PROT SERPL-MCNC: 7.1 G/DL (ref 6.4–8.2)
RBC # BLD AUTO: 4.68 M/UL (ref 3.8–5.2)
SODIUM SERPL-SCNC: 142 MMOL/L (ref 136–145)
TSH SERPL DL<=0.05 MIU/L-ACNC: 1.51 UIU/ML (ref 0.36–3.74)
WBC # BLD AUTO: 5.6 K/UL (ref 3.6–11)

## 2023-02-24 NOTE — TELEPHONE ENCOUNTER
----- Message from Ebonie Cowan sent at 2/24/2023  2:20 PM EST -----  Subject: Message to Provider    QUESTIONS  Information for Provider? Pt would like a nurse to call her in regards to   test results. Please call her back  ---------------------------------------------------------------------------  --------------  Janice Turcios INFO  [phone]; OK to leave message on voicemail  ---------------------------------------------------------------------------  --------------  SCRIPT ANSWERS  Relationship to Patient?  Self

## 2023-02-27 ENCOUNTER — TELEPHONE (OUTPATIENT)
Dept: FAMILY MEDICINE CLINIC | Age: 88
End: 2023-02-27

## 2023-02-27 NOTE — TELEPHONE ENCOUNTER
----- Message from Elvin Grossman sent at 2/27/2023  3:09 PM EST -----  Subject: Results Request    QUESTIONS  Results: Chest x-ray and labs;  Ordered by:   Date Performed: 2023-02-23  ---------------------------------------------------------------------------  --------------  Chanda SAUER    [phone]; OK to leave message on voicemail  ---------------------------------------------------------------------------  --------------

## 2023-04-18 NOTE — Clinical Note
ID band present and verified. Family is in the waiting area. O-Z Flap Text: The defect edges were debeveled with a #15 scalpel blade.  Given the location of the defect, shape of the defect and the proximity to free margins an O-Z flap was deemed most appropriate.  Using a sterile surgical marker, an appropriate transposition flap was drawn incorporating the defect and placing the expected incisions within the relaxed skin tension lines where possible. The area thus outlined was incised deep to adipose tissue with a #15 scalpel blade.  The skin margins were undermined to an appropriate distance in all directions utilizing iris scissors.

## 2023-04-24 DIAGNOSIS — I25.10 CORONARY ARTERY DISEASE DUE TO LIPID RICH PLAQUE: ICD-10-CM

## 2023-04-24 DIAGNOSIS — I25.83 CORONARY ARTERY DISEASE DUE TO LIPID RICH PLAQUE: ICD-10-CM

## 2023-04-24 DIAGNOSIS — Z95.820 S/P ANGIOPLASTY WITH STENT: ICD-10-CM

## 2023-04-25 RX ORDER — ATENOLOL 50 MG/1
TABLET ORAL
Qty: 90 TABLET | Refills: 2 | Status: SHIPPED | OUTPATIENT
Start: 2023-04-25

## 2023-04-27 RX ORDER — COLCHICINE 0.6 MG/1
CAPSULE ORAL
Qty: 90 CAPSULE | Refills: 2 | Status: SHIPPED | OUTPATIENT
Start: 2023-04-27

## 2023-06-19 DIAGNOSIS — M15.9 PRIMARY OSTEOARTHRITIS INVOLVING MULTIPLE JOINTS: Primary | ICD-10-CM

## 2023-06-19 NOTE — TELEPHONE ENCOUNTER
Last appointment: 2/22/23  Next appointment: none  Previous refill encounter(s): 3/24/22 #90    Requested Prescriptions     Pending Prescriptions Disp Refills    traMADol (ULTRAM) 50 MG tablet 90 tablet 0     Sig: Take 1 tablet by mouth every 8 hours as needed for Pain for up to 30 days. Max Daily Amount: 150 mg         For Pharmacy Admin Tracking Only    Program: Medication Refill  CPA in place:    Recommendation Provided To:    Intervention Detail: New Rx: 1, reason: Patient Preference  Intervention Accepted By:   Hailey Dillon Closed?:    Time Spent (min): 5

## 2023-06-21 RX ORDER — TRAMADOL HYDROCHLORIDE 50 MG/1
50 TABLET ORAL EVERY 8 HOURS PRN
Qty: 90 TABLET | Refills: 0 | Status: SHIPPED | OUTPATIENT
Start: 2023-06-21 | End: 2023-07-21

## 2023-06-22 NOTE — TELEPHONE ENCOUNTER
Ultram was sent on 6/21/23 for #90    For Pharmacy Admin Tracking Only    Program: Medication Refill  CPA in place:    Recommendation Provided To:    Intervention Detail: Discontinued Rx: 1, reason: Duplicate Therapy  Intervention Accepted By:   Pietro Crow Closed?:    Time Spent (min): 5

## 2023-07-27 NOTE — TELEPHONE ENCOUNTER
[facility] call requesting new prescription on Torsemide 20 mg.  Last refill 3/20/23 last visit 2/20/23

## 2023-07-28 RX ORDER — TORSEMIDE 20 MG/1
40 TABLET ORAL DAILY
Qty: 90 TABLET | Refills: 0 | Status: SHIPPED | OUTPATIENT
Start: 2023-07-28

## 2023-08-14 DIAGNOSIS — M15.9 PRIMARY OSTEOARTHRITIS INVOLVING MULTIPLE JOINTS: ICD-10-CM

## 2023-08-17 RX ORDER — TRAMADOL HYDROCHLORIDE 50 MG/1
TABLET ORAL
Qty: 90 TABLET | Refills: 0 | Status: SHIPPED | OUTPATIENT
Start: 2023-08-17 | End: 2023-09-16

## 2023-08-21 RX ORDER — PRAVASTATIN SODIUM 40 MG
TABLET ORAL
Qty: 90 TABLET | OUTPATIENT
Start: 2023-08-21

## 2023-08-21 RX ORDER — EZETIMIBE 10 MG/1
TABLET ORAL
Qty: 90 TABLET | OUTPATIENT
Start: 2023-08-21

## 2023-09-06 RX ORDER — ALLOPURINOL 100 MG/1
200 TABLET ORAL DAILY
Qty: 180 TABLET | Refills: 3 | Status: SHIPPED | OUTPATIENT
Start: 2023-09-06

## 2023-09-06 RX ORDER — TORSEMIDE 20 MG/1
TABLET ORAL
Qty: 180 TABLET | Refills: 1 | Status: SHIPPED | OUTPATIENT
Start: 2023-09-06

## 2023-09-06 NOTE — TELEPHONE ENCOUNTER
Last appointment: 2/22/23  Next appointment: none  Previous refill encounter(s): 10/31/22 Zyloprim #180 with 3 refills, 9/30/22 Demadex #90 with 2 refills    Requested Prescriptions     Pending Prescriptions Disp Refills    torsemide (DEMADEX) 20 MG tablet [Pharmacy Med Name: TORSEMIDE 20 MG Tablet] 180 tablet 1     Sig: TAKE 2 TABLETS EVERY DAY    allopurinol (ZYLOPRIM) 100 MG tablet [Pharmacy Med Name: ALLOPURINOL 100 MG Tablet] 180 tablet 3     Sig: Take 2 tablets by mouth daily         For Pharmacy Admin Tracking Only    Program: Medication Refill  CPA in place:    Recommendation Provided To:    Intervention Detail: New Rx: 2, reason: Patient Preference  Intervention Accepted By:   An Hoover Closed?:    Time Spent (min): 5

## 2023-12-19 RX ORDER — POTASSIUM CHLORIDE 750 MG/1
10 TABLET, EXTENDED RELEASE ORAL DAILY
Qty: 90 TABLET | Refills: 3 | OUTPATIENT
Start: 2023-12-19

## 2023-12-19 NOTE — TELEPHONE ENCOUNTER
Duplicate      For Pharmacy Admin Tracking Only    Program: Medication Refill  CPA in place:    Recommendation Provided To:   Intervention Detail: Discontinued Rx: 1, reason: Duplicate Therapy  Intervention Accepted By:   Gap Closed?:    Time Spent (min): 5

## 2024-01-12 ENCOUNTER — OFFICE VISIT (OUTPATIENT)
Age: 89
End: 2024-01-12
Payer: MEDICARE

## 2024-01-12 VITALS
HEART RATE: 65 BPM | WEIGHT: 217 LBS | SYSTOLIC BLOOD PRESSURE: 116 MMHG | TEMPERATURE: 97.8 F | DIASTOLIC BLOOD PRESSURE: 69 MMHG | BODY MASS INDEX: 36.11 KG/M2 | OXYGEN SATURATION: 96 % | RESPIRATION RATE: 18 BRPM

## 2024-01-12 DIAGNOSIS — Z91.81 AT HIGH RISK FOR FALLS: ICD-10-CM

## 2024-01-12 DIAGNOSIS — M25.561 CHRONIC PAIN OF BOTH KNEES: ICD-10-CM

## 2024-01-12 DIAGNOSIS — M25.562 CHRONIC PAIN OF BOTH KNEES: ICD-10-CM

## 2024-01-12 DIAGNOSIS — E11.22 TYPE 2 DIABETES MELLITUS WITH STAGE 3A CHRONIC KIDNEY DISEASE, UNSPECIFIED WHETHER LONG TERM INSULIN USE (HCC): ICD-10-CM

## 2024-01-12 DIAGNOSIS — E11.65 TYPE 2 DIABETES MELLITUS WITH HYPERGLYCEMIA, WITHOUT LONG-TERM CURRENT USE OF INSULIN (HCC): ICD-10-CM

## 2024-01-12 DIAGNOSIS — F11.99 OPIOID USE, UNSPECIFIED WITH UNSPECIFIED OPIOID-INDUCED DISORDER (HCC): ICD-10-CM

## 2024-01-12 DIAGNOSIS — M15.9 PRIMARY OSTEOARTHRITIS INVOLVING MULTIPLE JOINTS: ICD-10-CM

## 2024-01-12 DIAGNOSIS — G89.29 CHRONIC PAIN OF BOTH KNEES: ICD-10-CM

## 2024-01-12 DIAGNOSIS — Z00.00 MEDICARE ANNUAL WELLNESS VISIT, SUBSEQUENT: Primary | ICD-10-CM

## 2024-01-12 DIAGNOSIS — N18.31 TYPE 2 DIABETES MELLITUS WITH STAGE 3A CHRONIC KIDNEY DISEASE, UNSPECIFIED WHETHER LONG TERM INSULIN USE (HCC): ICD-10-CM

## 2024-01-12 DIAGNOSIS — I10 ESSENTIAL (PRIMARY) HYPERTENSION: ICD-10-CM

## 2024-01-12 DIAGNOSIS — N18.32 STAGE 3B CHRONIC KIDNEY DISEASE (HCC): ICD-10-CM

## 2024-01-12 DIAGNOSIS — E11.21 TYPE 2 DIABETES MELLITUS WITH DIABETIC NEPHROPATHY, UNSPECIFIED WHETHER LONG TERM INSULIN USE (HCC): ICD-10-CM

## 2024-01-12 DIAGNOSIS — E11.9 TYPE 2 DIABETES MELLITUS WITHOUT COMPLICATION, WITHOUT LONG-TERM CURRENT USE OF INSULIN (HCC): ICD-10-CM

## 2024-01-12 PROCEDURE — G0439 PPPS, SUBSEQ VISIT: HCPCS | Performed by: FAMILY MEDICINE

## 2024-01-12 PROCEDURE — 1123F ACP DISCUSS/DSCN MKR DOCD: CPT | Performed by: FAMILY MEDICINE

## 2024-01-12 PROCEDURE — 99213 OFFICE O/P EST LOW 20 MIN: CPT | Performed by: FAMILY MEDICINE

## 2024-01-12 PROCEDURE — G8484 FLU IMMUNIZE NO ADMIN: HCPCS | Performed by: FAMILY MEDICINE

## 2024-01-12 PROCEDURE — 1036F TOBACCO NON-USER: CPT | Performed by: FAMILY MEDICINE

## 2024-01-12 PROCEDURE — 1090F PRES/ABSN URINE INCON ASSESS: CPT | Performed by: FAMILY MEDICINE

## 2024-01-12 PROCEDURE — G8427 DOCREV CUR MEDS BY ELIG CLIN: HCPCS | Performed by: FAMILY MEDICINE

## 2024-01-12 PROCEDURE — G8417 CALC BMI ABV UP PARAM F/U: HCPCS | Performed by: FAMILY MEDICINE

## 2024-01-12 RX ORDER — TRAMADOL HYDROCHLORIDE 50 MG/1
50 TABLET ORAL EVERY 8 HOURS PRN
Qty: 90 TABLET | Refills: 1 | Status: SHIPPED | OUTPATIENT
Start: 2024-01-12 | End: 2024-03-12

## 2024-01-12 RX ORDER — COLCHICINE 0.6 MG/1
0.6 CAPSULE ORAL DAILY
Qty: 30 CAPSULE | Refills: 3 | Status: SHIPPED | OUTPATIENT
Start: 2024-01-12

## 2024-01-12 RX ORDER — AMLODIPINE BESYLATE 2.5 MG/1
1 TABLET ORAL DAILY
COMMUNITY
End: 2024-01-12 | Stop reason: SDUPTHER

## 2024-01-12 RX ORDER — AMLODIPINE BESYLATE 2.5 MG/1
2.5 TABLET ORAL DAILY
Qty: 30 TABLET | Refills: 4 | Status: SHIPPED | OUTPATIENT
Start: 2024-01-12

## 2024-01-12 RX ORDER — ATENOLOL 50 MG/1
50 TABLET ORAL DAILY
Qty: 30 TABLET | Refills: 2 | Status: SHIPPED | OUTPATIENT
Start: 2024-01-12

## 2024-01-12 SDOH — ECONOMIC STABILITY: FOOD INSECURITY: WITHIN THE PAST 12 MONTHS, YOU WORRIED THAT YOUR FOOD WOULD RUN OUT BEFORE YOU GOT MONEY TO BUY MORE.: NEVER TRUE

## 2024-01-12 SDOH — ECONOMIC STABILITY: INCOME INSECURITY: HOW HARD IS IT FOR YOU TO PAY FOR THE VERY BASICS LIKE FOOD, HOUSING, MEDICAL CARE, AND HEATING?: NOT VERY HARD

## 2024-01-12 SDOH — ECONOMIC STABILITY: FOOD INSECURITY: WITHIN THE PAST 12 MONTHS, THE FOOD YOU BOUGHT JUST DIDN'T LAST AND YOU DIDN'T HAVE MONEY TO GET MORE.: NEVER TRUE

## 2024-01-12 SDOH — ECONOMIC STABILITY: HOUSING INSECURITY
IN THE LAST 12 MONTHS, WAS THERE A TIME WHEN YOU DID NOT HAVE A STEADY PLACE TO SLEEP OR SLEPT IN A SHELTER (INCLUDING NOW)?: NO

## 2024-01-12 ASSESSMENT — LIFESTYLE VARIABLES
HOW MANY STANDARD DRINKS CONTAINING ALCOHOL DO YOU HAVE ON A TYPICAL DAY: 1 OR 2
HOW OFTEN DO YOU HAVE A DRINK CONTAINING ALCOHOL: MONTHLY OR LESS

## 2024-01-12 ASSESSMENT — PATIENT HEALTH QUESTIONNAIRE - PHQ9
SUM OF ALL RESPONSES TO PHQ QUESTIONS 1-9: 0
SUM OF ALL RESPONSES TO PHQ9 QUESTIONS 1 & 2: 0
SUM OF ALL RESPONSES TO PHQ QUESTIONS 1-9: 0
SUM OF ALL RESPONSES TO PHQ QUESTIONS 1-9: 0
1. LITTLE INTEREST OR PLEASURE IN DOING THINGS: 0
SUM OF ALL RESPONSES TO PHQ QUESTIONS 1-9: 0
2. FEELING DOWN, DEPRESSED OR HOPELESS: 0

## 2024-01-12 NOTE — PROGRESS NOTES
On the basis of positive falls risk screening, assessment and plan is as follows: home safety tips provided.  Medicare Annual Wellness Visit    Fely Marcano is here for Medicare AWV    Assessment & Plan   Medicare annual wellness visit, subsequent  Primary osteoarthritis involving multiple joints  -     Lipid Panel; Future  -     traMADol (ULTRAM) 50 MG tablet; Take 1 tablet by mouth every 8 hours as needed for Pain for up to 60 days. Max Daily Amount: 150 mg, Disp-90 tablet, R-1Normal  -     atenolol (TENORMIN) 50 MG tablet; Take 1 tablet by mouth daily, Disp-30 tablet, R-2Normal  -     amLODIPine (NORVASC) 2.5 MG tablet; Take 1 tablet by mouth daily, Disp-30 tablet, R-4Normal  -     colchicine (MITIGARE) 0.6 MG capsule; Take 1 capsule by mouth daily, Disp-30 capsule, R-3Normal  -     CBC; Future  -     Hemoglobin A1C; Future  -     Comprehensive Metabolic Panel; Future  -     TSH; Future  -     Lipid Panel; Future  Type 2 diabetes mellitus without complication, without long-term current use of insulin (HCC)  -     Lipid Panel; Future  -     traMADol (ULTRAM) 50 MG tablet; Take 1 tablet by mouth every 8 hours as needed for Pain for up to 60 days. Max Daily Amount: 150 mg, Disp-90 tablet, R-1Normal  -     atenolol (TENORMIN) 50 MG tablet; Take 1 tablet by mouth daily, Disp-30 tablet, R-2Normal  -     amLODIPine (NORVASC) 2.5 MG tablet; Take 1 tablet by mouth daily, Disp-30 tablet, R-4Normal  -     colchicine (MITIGARE) 0.6 MG capsule; Take 1 capsule by mouth daily, Disp-30 capsule, R-3Normal  -     CBC; Future  -     Hemoglobin A1C; Future  -     Comprehensive Metabolic Panel; Future  -     TSH; Future  -     Lipid Panel; Future  Essential (primary) hypertension  -     Lipid Panel; Future  -     traMADol (ULTRAM) 50 MG tablet; Take 1 tablet by mouth every 8 hours as needed for Pain for up to 60 days. Max Daily Amount: 150 mg, Disp-90 tablet, R-1Normal  -     atenolol (TENORMIN) 50 MG tablet; Take 1 tablet by

## 2024-01-12 NOTE — PROGRESS NOTES
Chief Complaint   Patient presents with    Medicare AWV       \"Have you been to the ER, urgent care clinic since your last visit?  Hospitalized since your last visit?\"    NO    “Have you seen or consulted any other health care providers outside of [facility] since your last visit?”    NO            Vitals:    24 1026 24 1041   BP: (!) 154/78 116/69   Site: Left Upper Arm Left Upper Arm   Position: Sitting Sitting   Cuff Size: Large Adult Large Adult   Pulse: 65    Resp: 18    Temp: 97.8 °F (36.6 °C)    TempSrc: Temporal    SpO2: 96%    Weight: 98.4 kg (217 lb)         Health Maintenance Due   Topic Date Due    Lipids  10/24/2023    Depression Screen  10/24/2023    Annual Wellness Visit (Medicare Advantage)  Never done        The patient, Fely Marcano, identity was verified by name and

## 2024-01-18 ENCOUNTER — TELEPHONE (OUTPATIENT)
Age: 89
End: 2024-01-18

## 2024-01-18 NOTE — TELEPHONE ENCOUNTER
Returned call to Joe , spoke with Janet,  stated no assist needed.  Pharmacy dispensed mitigare brand per pts formulary.

## 2024-01-18 NOTE — TELEPHONE ENCOUNTER
----- Message from Lexi Styles sent at 1/16/2024  1:11 PM EST -----  Subject: Message to Provider    QUESTIONS  Information for Provider? Evelina hearn [employer]. calling in wanting to   know about medications for pt . listed Colchicine 0.6mg capsule. needs   answer by 17th of Jan end of day. Please call her [phone]. Fax #   [phone]. Referance # [id number]  ---------------------------------------------------------------------------  --------------  CALL BACK INFO  [phone]; Do not leave any message, patient will call back for answer  ---------------------------------------------------------------------------  --------------  SCRIPT ANSWERS  Relationship to Patient? Covered Entity  Covered Entity Type? Health Insurance?  Representative Name? ashutosh cantrell

## 2024-01-24 NOTE — PATIENT INSTRUCTIONS
the bathroom with you.   Where can you learn more?  Go to [email or web address].net/patientEd and enter G117 to learn more about \"Preventing Falls: Care Instructions.\"  Current as of: July 18, 2023               Content Version: 13.9  © 0128-5002 [employer].   Care instructions adapted under license by [employer]. If you have questions about a medical condition or this instruction, always ask your healthcare professional. [employer] disclaims any warranty or liability for your use of this information.           Substance Use Disorder: Care Instructions  Overview     You can improve your life and health by stopping your use of alcohol or drugs. When you don't drink or use drugs, you may feel and sleep better. You may get along better with your family, friends, and coworkers. There are medicines and programs that can help with substance use disorder.  How can you care for yourself at home?  Here are some ways to help you stay sober and prevent relapse.  If you have been given medicine to help keep you sober or reduce your cravings, be sure to take it exactly as prescribed.  Talk to your doctor about programs that can help you stop using drugs or drinking alcohol.  Do not keep alcohol or drugs in your home.  Plan ahead. Think about what you'll say if other people ask you to drink or use drugs. Try not to spend time with people who drink or use drugs.  Use the time and money spent on drinking or drugs to do something that's important to you.  Preventing a relapse  Have a plan to deal with relapse. Learn to recognize changes in your thinking that lead you to drink or use drugs. Get help before you start to drink or use drugs again.  Try to stay away from situations, friends, or places that may lead you to drink or use drugs.  If you feel the need to drink alcohol or use drugs again, seek help right away. Call a trusted friend or family member. Some people get support from organizations such

## 2024-02-01 LAB
ALBUMIN SERPL-MCNC: 4.8 G/DL (ref 3.6–4.6)
ALBUMIN/GLOB SERPL: 1.8 {RATIO} (ref 1.2–2.2)
ALP SERPL-CCNC: 121 IU/L (ref 44–121)
ALT SERPL-CCNC: 25 IU/L (ref 0–32)
AST SERPL-CCNC: 39 IU/L (ref 0–40)
BASOPHILS # BLD AUTO: 0.1 X10E3/UL (ref 0–0.2)
BASOPHILS NFR BLD AUTO: 1 %
BILIRUB SERPL-MCNC: 0.5 MG/DL (ref 0–1.2)
BUN SERPL-MCNC: 21 MG/DL (ref 10–36)
BUN/CREAT SERPL: 19 (ref 12–28)
CALCIUM SERPL-MCNC: 9.9 MG/DL (ref 8.7–10.3)
CHLORIDE SERPL-SCNC: 101 MMOL/L (ref 96–106)
CHOLEST SERPL-MCNC: 186 MG/DL (ref 100–199)
CO2 SERPL-SCNC: 26 MMOL/L (ref 20–29)
CREAT SERPL-MCNC: 1.1 MG/DL (ref 0.57–1)
EGFRCR SERPLBLD CKD-EPI 2021: 47 ML/MIN/1.73
EOSINOPHIL # BLD AUTO: 0.4 X10E3/UL (ref 0–0.4)
EOSINOPHIL NFR BLD AUTO: 6 %
ERYTHROCYTE [DISTWIDTH] IN BLOOD BY AUTOMATED COUNT: 13.3 % (ref 11.7–15.4)
GLOBULIN SER CALC-MCNC: 2.6 G/DL (ref 1.5–4.5)
GLUCOSE SERPL-MCNC: 90 MG/DL (ref 70–99)
HBA1C MFR BLD: 6.6 % (ref 4.8–5.6)
HCT VFR BLD AUTO: 45.1 % (ref 34–46.6)
HDLC SERPL-MCNC: 66 MG/DL
HGB BLD-MCNC: 15.2 G/DL (ref 11.1–15.9)
IMM GRANULOCYTES # BLD AUTO: 0 X10E3/UL (ref 0–0.1)
IMM GRANULOCYTES NFR BLD AUTO: 0 %
LDLC SERPL CALC-MCNC: 98 MG/DL (ref 0–99)
LYMPHOCYTES # BLD AUTO: 2.1 X10E3/UL (ref 0.7–3.1)
LYMPHOCYTES NFR BLD AUTO: 34 %
Lab: NORMAL
MCH RBC QN AUTO: 30.6 PG (ref 26.6–33)
MCHC RBC AUTO-ENTMCNC: 33.7 G/DL (ref 31.5–35.7)
MCV RBC AUTO: 91 FL (ref 79–97)
MONOCYTES # BLD AUTO: 0.6 X10E3/UL (ref 0.1–0.9)
MONOCYTES NFR BLD AUTO: 9 %
NEUTROPHILS # BLD AUTO: 3 X10E3/UL (ref 1.4–7)
NEUTROPHILS NFR BLD AUTO: 50 %
PLATELET # BLD AUTO: 203 X10E3/UL (ref 150–450)
POTASSIUM SERPL-SCNC: 3.5 MMOL/L (ref 3.5–5.2)
PROT SERPL-MCNC: 7.4 G/DL (ref 6–8.5)
RBC # BLD AUTO: 4.96 X10E6/UL (ref 3.77–5.28)
REPORT: NORMAL
SODIUM SERPL-SCNC: 145 MMOL/L (ref 134–144)
TRIGL SERPL-MCNC: 128 MG/DL (ref 0–149)
TSH SERPL DL<=0.005 MIU/L-ACNC: 1.27 UIU/ML (ref 0.45–4.5)
VLDLC SERPL CALC-MCNC: 22 MG/DL (ref 5–40)
WBC # BLD AUTO: 6.1 X10E3/UL (ref 3.4–10.8)

## 2024-04-23 NOTE — TELEPHONE ENCOUNTER
Last appointment: 1/12/24  Next appointment: Advised to follow-up 7/12/24  Previous refill encounter(s): 9/6/23 #180 with 1 refill    Requested Prescriptions     Pending Prescriptions Disp Refills    torsemide (DEMADEX) 20 MG tablet [Pharmacy Med Name: TORSEMIDE 20 MG Tablet] 180 tablet 3     Sig: TAKE 2 TABLETS EVERY DAY         For Pharmacy Admin Tracking Only    Program: Medication Refill  CPA in place:    Recommendation Provided To:   Intervention Detail: New Rx: 1, reason: Patient Preference  Intervention Accepted By:   Gap Closed?:    Time Spent (min): 5

## 2024-04-24 RX ORDER — TORSEMIDE 20 MG/1
TABLET ORAL
Qty: 180 TABLET | Refills: 3 | Status: SHIPPED | OUTPATIENT
Start: 2024-04-24

## 2024-05-03 DIAGNOSIS — M25.562 CHRONIC PAIN OF BOTH KNEES: ICD-10-CM

## 2024-05-03 DIAGNOSIS — E11.9 TYPE 2 DIABETES MELLITUS WITHOUT COMPLICATION, WITHOUT LONG-TERM CURRENT USE OF INSULIN (HCC): ICD-10-CM

## 2024-05-03 DIAGNOSIS — M15.9 PRIMARY OSTEOARTHRITIS INVOLVING MULTIPLE JOINTS: ICD-10-CM

## 2024-05-03 DIAGNOSIS — G89.29 CHRONIC PAIN OF BOTH KNEES: ICD-10-CM

## 2024-05-03 DIAGNOSIS — M25.561 CHRONIC PAIN OF BOTH KNEES: ICD-10-CM

## 2024-05-03 DIAGNOSIS — I10 ESSENTIAL (PRIMARY) HYPERTENSION: ICD-10-CM

## 2024-05-03 RX ORDER — ATENOLOL 50 MG/1
50 TABLET ORAL DAILY
Qty: 90 TABLET | Refills: 3 | Status: SHIPPED | OUTPATIENT
Start: 2024-05-03

## 2024-05-03 RX ORDER — AMLODIPINE BESYLATE 2.5 MG/1
2.5 TABLET ORAL DAILY
Qty: 90 TABLET | Refills: 3 | Status: SHIPPED | OUTPATIENT
Start: 2024-05-03

## 2024-05-03 RX ORDER — COLCHICINE 0.6 MG/1
0.6 CAPSULE ORAL DAILY
Qty: 90 CAPSULE | Refills: 3 | Status: SHIPPED | OUTPATIENT
Start: 2024-05-03

## 2024-05-03 NOTE — TELEPHONE ENCOUNTER
Last appointment: 1/12/24  Next appointment: Advised to follow-up 7/12/24  Previous refill encounter(s): 1/12/24 Norvasc #30 with 4 refills, Tenormin #30 with 2 refills, Mitigare #30 with 3 refills    Requested Prescriptions     Pending Prescriptions Disp Refills    amLODIPine (NORVASC) 2.5 MG tablet [Pharmacy Med Name: AMLODIPINE BESYLATE 2.5 MG Tablet] 90 tablet 3     Sig: TAKE 1 TABLET EVERY DAY    atenolol (TENORMIN) 50 MG tablet [Pharmacy Med Name: ATENOLOL 50 MG Tablet] 90 tablet 3     Sig: TAKE 1 TABLET EVERY DAY    MITIGARE 0.6 MG capsule [Pharmacy Med Name: MITIGARE 0.6 MG Capsule] 90 capsule 3     Sig: TAKE 1 CAPSULE EVERY DAY         For Pharmacy Admin Tracking Only    Program: Medication Refill  CPA in place:    Recommendation Provided To:   Intervention Detail: New Rx: 3, reason: Patient Preference  Intervention Accepted By:   Gap Closed?:    Time Spent (min): 5

## 2024-07-28 DIAGNOSIS — M25.561 CHRONIC PAIN OF BOTH KNEES: ICD-10-CM

## 2024-07-28 DIAGNOSIS — M25.562 CHRONIC PAIN OF BOTH KNEES: ICD-10-CM

## 2024-07-28 DIAGNOSIS — F11.99 OPIOID USE, UNSPECIFIED WITH UNSPECIFIED OPIOID-INDUCED DISORDER (HCC): ICD-10-CM

## 2024-07-28 DIAGNOSIS — M15.9 PRIMARY OSTEOARTHRITIS INVOLVING MULTIPLE JOINTS: Primary | ICD-10-CM

## 2024-07-28 DIAGNOSIS — G89.29 CHRONIC PAIN OF BOTH KNEES: ICD-10-CM

## 2024-07-30 NOTE — TELEPHONE ENCOUNTER
Last appointment: 1/12/24  Next appointment: Advised to follow-up 7/12/24  Previous refill encounter(s): 1/12/24 #90 with 1 refill    Requested Prescriptions     Pending Prescriptions Disp Refills    traMADol (ULTRAM) 50 MG tablet [Pharmacy Med Name: TRAMADOL HYDROCHLORIDE 50 MG Tablet] 90 tablet 1     Sig: Take 1 tablet by mouth every 8 hours as needed for Pain for up to 60 days. Max Daily Amount: 150 mg         For Pharmacy Admin Tracking Only    Program: Medication Refill  CPA in place:    Recommendation Provided To:   Intervention Detail: New Rx: 1, reason: Patient Preference  Intervention Accepted By:   Gap Closed?:    Time Spent (min): 5

## 2024-08-06 RX ORDER — TRAMADOL HYDROCHLORIDE 50 MG/1
50 TABLET ORAL EVERY 8 HOURS PRN
Qty: 90 TABLET | Refills: 1 | Status: SHIPPED | OUTPATIENT
Start: 2024-08-06 | End: 2024-10-05

## 2024-08-28 ENCOUNTER — TRANSCRIBE ORDERS (OUTPATIENT)
Facility: HOSPITAL | Age: 89
End: 2024-08-28

## 2024-08-28 DIAGNOSIS — I89.0 LYMPHEDEMA: Primary | ICD-10-CM

## 2024-09-24 RX ORDER — ALLOPURINOL 100 MG/1
200 TABLET ORAL DAILY
Qty: 180 TABLET | Refills: 0 | Status: SHIPPED | OUTPATIENT
Start: 2024-09-24

## 2024-10-15 NOTE — TELEPHONE ENCOUNTER
Last appointment: 1/12/24  Next appointment: Advised to follow-up 7/12/24  Previous refill encounter(s): 12/21/23 #90 with 3 refills    Requested Prescriptions     Pending Prescriptions Disp Refills    potassium chloride (KLOR-CON M) 10 MEQ extended release tablet 90 tablet 0     Sig: Take 1 tablet by mouth daily         For Pharmacy Admin Tracking Only    Program: Medication Refill  CPA in place:    Recommendation Provided To:   Intervention Detail: New Rx: 1, reason: Patient Preference  Intervention Accepted By:   Gap Closed?:    Time Spent (min): 5

## 2024-10-17 RX ORDER — POTASSIUM CHLORIDE 750 MG/1
10 TABLET, EXTENDED RELEASE ORAL DAILY
Qty: 90 TABLET | Refills: 0 | Status: SHIPPED | OUTPATIENT
Start: 2024-10-17

## 2024-11-29 ENCOUNTER — TELEPHONE (OUTPATIENT)
Age: 88
End: 2024-11-29

## 2024-11-29 NOTE — TELEPHONE ENCOUNTER
Pt suspects that she has had shingles for the past week. Please give her a call back at [phone]

## 2024-12-02 NOTE — TELEPHONE ENCOUNTER
Patient's grandchild Megan (EC) requesting a call back at [phone] to discuss getting an appointment for shingles

## 2024-12-06 ENCOUNTER — TELEMEDICINE (OUTPATIENT)
Age: 88
End: 2024-12-06
Payer: MEDICARE

## 2024-12-06 DIAGNOSIS — M10.40 OTHER SECONDARY GOUT, UNSPECIFIED CHRONICITY, UNSPECIFIED SITE: ICD-10-CM

## 2024-12-06 DIAGNOSIS — R21 RASH AND NONSPECIFIC SKIN ERUPTION: Primary | ICD-10-CM

## 2024-12-06 DIAGNOSIS — I25.84 CORONARY ARTERY DISEASE DUE TO CALCIFIED CORONARY LESION: ICD-10-CM

## 2024-12-06 DIAGNOSIS — B02.9 HERPES ZOSTER WITHOUT COMPLICATION: ICD-10-CM

## 2024-12-06 DIAGNOSIS — I25.10 CORONARY ARTERY DISEASE DUE TO CALCIFIED CORONARY LESION: ICD-10-CM

## 2024-12-06 PROCEDURE — 1090F PRES/ABSN URINE INCON ASSESS: CPT | Performed by: FAMILY MEDICINE

## 2024-12-06 PROCEDURE — 99213 OFFICE O/P EST LOW 20 MIN: CPT | Performed by: FAMILY MEDICINE

## 2024-12-06 PROCEDURE — G8427 DOCREV CUR MEDS BY ELIG CLIN: HCPCS | Performed by: FAMILY MEDICINE

## 2024-12-06 PROCEDURE — 1159F MED LIST DOCD IN RCRD: CPT | Performed by: FAMILY MEDICINE

## 2024-12-06 PROCEDURE — 1160F RVW MEDS BY RX/DR IN RCRD: CPT | Performed by: FAMILY MEDICINE

## 2024-12-06 PROCEDURE — 1123F ACP DISCUSS/DSCN MKR DOCD: CPT | Performed by: FAMILY MEDICINE

## 2024-12-06 RX ORDER — COLCHICINE 0.6 MG/1
0.6 TABLET ORAL DAILY
Qty: 90 TABLET | Refills: 1 | Status: SHIPPED | OUTPATIENT
Start: 2024-12-06

## 2024-12-06 RX ORDER — TRIAMCINOLONE ACETONIDE 1 MG/G
CREAM TOPICAL
Qty: 45 G | Refills: 3 | Status: SHIPPED | OUTPATIENT
Start: 2024-12-06

## 2024-12-06 RX ORDER — NITROGLYCERIN 0.4 MG/1
0.4 TABLET SUBLINGUAL EVERY 5 MIN PRN
Qty: 25 TABLET | Refills: 3 | Status: SHIPPED | OUTPATIENT
Start: 2024-12-06

## 2024-12-06 ASSESSMENT — PATIENT HEALTH QUESTIONNAIRE - PHQ9
SUM OF ALL RESPONSES TO PHQ9 QUESTIONS 1 & 2: 0
SUM OF ALL RESPONSES TO PHQ QUESTIONS 1-9: 0
1. LITTLE INTEREST OR PLEASURE IN DOING THINGS: NOT AT ALL
SUM OF ALL RESPONSES TO PHQ QUESTIONS 1-9: 0
2. FEELING DOWN, DEPRESSED OR HOPELESS: NOT AT ALL

## 2024-12-06 NOTE — ASSESSMENT & PLAN NOTE
Chronic, at goal (stable), continue current treatment plan    Orders:    nitroGLYCERIN (NITROSTAT) 0.4 MG SL tablet; Place 1 tablet under the tongue every 5 minutes as needed for Chest pain up to max of 3 total doses. If no relief after 1 dose, call 911.

## 2024-12-06 NOTE — ASSESSMENT & PLAN NOTE
Chronic, at goal (stable), continue current treatment plan    Orders:    colchicine (COLCRYS) 0.6 MG tablet; Take 1 tablet by mouth daily

## 2024-12-06 NOTE — PROGRESS NOTES
Chief Complaint   Patient presents with    Follow-up     Seen at urgent care on 24 with c/o rash,   dx with shingles.        \"Have you been to the ER, urgent care clinic since your last visit?  Hospitalized since your last visit?\"    YES - When: approximately 1  weeks ago.  Where and Why: [facility] .    “Have you seen or consulted any other health care providers outside of [facility] since your last visit?”    NO        Click Here for Release of Records Request     No results found for this visit on 24.   There were no vitals filed for this visit.   Health Maintenance Due   Topic Date Due    Flu vaccine (1) 2024    COVID-19 Vaccine ( season) 2024        The patient, Fely Marcano, identity was verified by name and .   
-     Pulmonary/Chest: [x] Respiratory effort normal   [x] No visualized signs of difficulty breathing or respiratory distress        [] Abnormal -      Musculoskeletal:   [x] Normal gait with no signs of ataxia         [x] Normal range of motion of neck        [] Abnormal -     Neurological:        [x] No Facial Asymmetry (Cranial nerve 7 motor function) (limited exam due to video visit)          [x] No gaze palsy        [] Abnormal -          Skin:        [] No significant exanthematous lesions or discoloration noted on facial skin         [x] Abnormal -            Psychiatric:       [x] Normal Affect [] Abnormal -        [x] No Hallucinations    Other pertinent observable physical exam findings:-             --Angel Engel MD

## 2024-12-17 NOTE — TELEPHONE ENCOUNTER
Last appointment: 12/6/24  Next appointment: none  Previous refill encounter(s): 9/24/24 #180    Requested Prescriptions     Pending Prescriptions Disp Refills    allopurinol (ZYLOPRIM) 100 MG tablet [Pharmacy Med Name: Allopurinol Oral Tablet 100 MG] 180 tablet 0     Sig: TAKE 2 TABLETS EVERY DAY         For Pharmacy Admin Tracking Only    Program: Medication Refill  CPA in place:    Recommendation Provided To:   Intervention Detail: New Rx: 1, reason: Patient Preference  Intervention Accepted By:   Gap Closed?:    Time Spent (min): 5

## 2024-12-18 RX ORDER — ALLOPURINOL 100 MG/1
200 TABLET ORAL DAILY
Qty: 180 TABLET | Refills: 0 | Status: SHIPPED | OUTPATIENT
Start: 2024-12-18

## 2025-01-05 RX ORDER — POTASSIUM CHLORIDE 750 MG/1
10 TABLET, EXTENDED RELEASE ORAL DAILY
Qty: 90 TABLET | Refills: 3 | Status: SHIPPED | OUTPATIENT
Start: 2025-01-05

## 2025-03-04 NOTE — TELEPHONE ENCOUNTER
Last appointment: 12/6/24  Next appointment: none  Previous refill encounter(s): 12/18/24 #180    Requested Prescriptions     Pending Prescriptions Disp Refills    allopurinol (ZYLOPRIM) 100 MG tablet [Pharmacy Med Name: Allopurinol Oral Tablet 100 MG] 180 tablet 3     Sig: TAKE 2 TABLETS EVERY DAY         For Pharmacy Admin Tracking Only    Program: Medication Refill  CPA in place:    Recommendation Provided To:   Intervention Detail: New Rx: 1, reason: Patient Preference  Intervention Accepted By:   Gap Closed?:    Time Spent (min): 5   Routed to MD to advise

## 2025-03-06 RX ORDER — ALLOPURINOL 100 MG/1
200 TABLET ORAL DAILY
Qty: 180 TABLET | Refills: 3 | Status: SHIPPED | OUTPATIENT
Start: 2025-03-06

## 2025-03-11 DIAGNOSIS — M25.562 CHRONIC PAIN OF BOTH KNEES: ICD-10-CM

## 2025-03-11 DIAGNOSIS — E11.9 TYPE 2 DIABETES MELLITUS WITHOUT COMPLICATION, WITHOUT LONG-TERM CURRENT USE OF INSULIN (HCC): ICD-10-CM

## 2025-03-11 DIAGNOSIS — G89.29 CHRONIC PAIN OF BOTH KNEES: ICD-10-CM

## 2025-03-11 DIAGNOSIS — I10 ESSENTIAL (PRIMARY) HYPERTENSION: ICD-10-CM

## 2025-03-11 DIAGNOSIS — M15.0 PRIMARY OSTEOARTHRITIS INVOLVING MULTIPLE JOINTS: ICD-10-CM

## 2025-03-11 DIAGNOSIS — M25.561 CHRONIC PAIN OF BOTH KNEES: ICD-10-CM

## 2025-03-12 NOTE — TELEPHONE ENCOUNTER
Last appointment: 12/6/24  Next appointment: none  Previous refill encounter(s): 4/24/24 Demadex #180 with 3 refills, 5/3/24 Norvasc & Tenormin #90 with 3 refills    Requested Prescriptions     Pending Prescriptions Disp Refills    torsemide (DEMADEX) 20 MG tablet [Pharmacy Med Name: Torsemide Oral Tablet 20 MG] 180 tablet 3     Sig: TAKE 2 TABLETS EVERY DAY    amLODIPine (NORVASC) 2.5 MG tablet [Pharmacy Med Name: amLODIPine Besylate Oral Tablet 2.5 MG] 90 tablet 3     Sig: TAKE 1 TABLET EVERY DAY    atenolol (TENORMIN) 50 MG tablet [Pharmacy Med Name: Atenolol Oral Tablet 50 MG] 90 tablet 3     Sig: TAKE 1 TABLET EVERY DAY         For Pharmacy Admin Tracking Only    Program: Medication Refill  CPA in place:    Recommendation Provided To:   Intervention Detail: New Rx: 3, reason: Patient Preference  Intervention Accepted By:   Gap Closed?:    Time Spent (min): 5

## 2025-03-14 RX ORDER — TORSEMIDE 20 MG/1
40 TABLET ORAL DAILY
Qty: 180 TABLET | Refills: 3 | Status: SHIPPED | OUTPATIENT
Start: 2025-03-14

## 2025-03-14 RX ORDER — ATENOLOL 50 MG/1
50 TABLET ORAL DAILY
Qty: 90 TABLET | Refills: 3 | Status: SHIPPED | OUTPATIENT
Start: 2025-03-14

## 2025-03-14 RX ORDER — AMLODIPINE BESYLATE 2.5 MG/1
2.5 TABLET ORAL DAILY
Qty: 90 TABLET | Refills: 3 | Status: SHIPPED | OUTPATIENT
Start: 2025-03-14

## 2025-03-19 ENCOUNTER — TELEPHONE (OUTPATIENT)
Age: 89
End: 2025-03-19

## 2025-03-19 NOTE — TELEPHONE ENCOUNTER
Returned call to [facility], spoke with Alba,  stated pts medications were shipped on 3/17/25, pt and daughter notified and stated understanding

## 2025-03-19 NOTE — TELEPHONE ENCOUNTER
Patient daughter Arlette states that [facility] has not received the RX refill  torsemide (DEMADEX) 20 MG tablet   atenolol (TENORMIN) 50 MG tablet   amLODIPine (NORVASC) 2.5 MG tablet she can be reached @ [phone]

## 2025-03-28 DIAGNOSIS — I25.84 CORONARY ARTERY DISEASE DUE TO CALCIFIED CORONARY LESION: ICD-10-CM

## 2025-03-28 DIAGNOSIS — I25.10 CORONARY ARTERY DISEASE DUE TO CALCIFIED CORONARY LESION: ICD-10-CM

## 2025-03-31 RX ORDER — NITROGLYCERIN 0.4 MG/1
TABLET SUBLINGUAL
Qty: 25 TABLET | Refills: 11 | Status: SHIPPED | OUTPATIENT
Start: 2025-03-31

## 2025-04-22 ENCOUNTER — TRANSCRIBE ORDERS (OUTPATIENT)
Facility: HOSPITAL | Age: 89
End: 2025-04-22

## 2025-04-22 DIAGNOSIS — I89.0 LYMPHEDEMA: Primary | ICD-10-CM

## 2025-05-13 DIAGNOSIS — M10.40 OTHER SECONDARY GOUT, UNSPECIFIED CHRONICITY, UNSPECIFIED SITE: ICD-10-CM

## 2025-05-14 NOTE — TELEPHONE ENCOUNTER
Last appointment: 12/6/24  Next appointment: none  Previous refill encounter(s): 12/6/24 #90 with 1 refill    Requested Prescriptions     Pending Prescriptions Disp Refills    colchicine (COLCRYS) 0.6 MG tablet [Pharmacy Med Name: Colchicine Oral Tablet 0.6 MG] 90 tablet 3     Sig: TAKE 1 TABLET EVERY DAY         For Pharmacy Admin Tracking Only    Program: Medication Refill  CPA in place:    Recommendation Provided To:   Intervention Detail: New Rx: 1, reason: Patient Preference  Intervention Accepted By:   Gap Closed?:    Time Spent (min): 5

## 2025-05-16 RX ORDER — COLCHICINE 0.6 MG/1
0.6 TABLET ORAL DAILY
Qty: 90 TABLET | Refills: 3 | Status: SHIPPED | OUTPATIENT
Start: 2025-05-16

## 2025-05-28 DIAGNOSIS — M25.561 CHRONIC PAIN OF BOTH KNEES: ICD-10-CM

## 2025-05-28 DIAGNOSIS — M15.0 PRIMARY OSTEOARTHRITIS INVOLVING MULTIPLE JOINTS: ICD-10-CM

## 2025-05-28 DIAGNOSIS — G89.29 CHRONIC PAIN OF BOTH KNEES: ICD-10-CM

## 2025-05-28 DIAGNOSIS — M25.562 CHRONIC PAIN OF BOTH KNEES: ICD-10-CM

## 2025-05-28 DIAGNOSIS — F11.99 OPIOID USE, UNSPECIFIED WITH UNSPECIFIED OPIOID-INDUCED DISORDER (HCC): ICD-10-CM

## 2025-05-28 RX ORDER — TRAMADOL HYDROCHLORIDE 50 MG/1
TABLET ORAL
Qty: 90 TABLET | OUTPATIENT
Start: 2025-05-28

## 2025-06-09 NOTE — TELEPHONE ENCOUNTER
Pt's daughter, Arlette, left a message asking why this was denied. It appears pt is overdue for an appt.      For Pharmacy Admin Tracking Only    Program: Medication Refill  CPA in place:    Recommendation Provided To:   Intervention Detail: Discontinued Rx: 1, reason: Non-adherence  Intervention Accepted By:   Gap Closed?:    Time Spent (min): 5

## 2025-06-10 DIAGNOSIS — M15.0 PRIMARY OSTEOARTHRITIS INVOLVING MULTIPLE JOINTS: Primary | ICD-10-CM

## 2025-06-10 DIAGNOSIS — M25.562 CHRONIC PAIN OF BOTH KNEES: ICD-10-CM

## 2025-06-10 DIAGNOSIS — F11.99 OPIOID USE, UNSPECIFIED WITH UNSPECIFIED OPIOID-INDUCED DISORDER (HCC): ICD-10-CM

## 2025-06-10 DIAGNOSIS — M25.561 CHRONIC PAIN OF BOTH KNEES: ICD-10-CM

## 2025-06-10 DIAGNOSIS — G89.29 CHRONIC PAIN OF BOTH KNEES: ICD-10-CM

## 2025-06-10 RX ORDER — TRAMADOL HYDROCHLORIDE 50 MG/1
50 TABLET ORAL EVERY 8 HOURS PRN
Qty: 90 TABLET | Refills: 0 | Status: SHIPPED | OUTPATIENT
Start: 2025-06-10 | End: 2025-07-10

## 2025-06-10 NOTE — TELEPHONE ENCOUNTER
Patient daughter Arlette states that her mother need a RX refill  traMADol (ULTRAM) 50 MG tablet please send to [facility] on [address] she can be reached @ [phone]

## 2025-06-10 NOTE — TELEPHONE ENCOUNTER
Pt's daughter, Arlette, states she really needs this refill.    Last appointment: 12/6/24  Next appointment: none  Previous refill encounter(s): 8/6/24 #90 with 1 refill    Requested Prescriptions     Pending Prescriptions Disp Refills    traMADol (ULTRAM) 50 MG tablet [Pharmacy Med Name: TRAMADOL HCL 50MG TAB] 90 tablet 0     Sig: Take 1 tablet by mouth every 8 hours as needed for Pain for up to 30 days. Patient needs an appointment for further refills Max Daily Amount: 150 mg         For Pharmacy Admin Tracking Only    Program: Medication Refill  CPA in place:    Recommendation Provided To:   Intervention Detail: New Rx: 1, reason: Patient Preference  Intervention Accepted By:   Gap Closed?:    Time Spent (min): 5

## (undated) DEVICE — PROVE COVER: Brand: UNBRANDED

## (undated) DEVICE — MARKER,SKIN,WI/RULER AND LABELS: Brand: MEDLINE

## (undated) DEVICE — INTENDED FOR TISSUE SEPARATION, AND OTHER PROCEDURES THAT REQUIRE A SHARP SURGICAL BLADE TO PUNCTURE OR CUT.: Brand: BARD-PARKER ® CARBON RIB-BACK BLADES

## (undated) DEVICE — BANDAGE COBAN 4 IN COMPR W4INXL5YD FOAM COHESIVE QUIK STK SELF ADH SFT

## (undated) DEVICE — GLIDESHEATH SLENDER ACCESS KIT: Brand: GLIDESHEATH SLENDER

## (undated) DEVICE — C-ARM: Brand: UNBRANDED

## (undated) DEVICE — EXTREMITY-MRMC: Brand: MEDLINE INDUSTRIES, INC.

## (undated) DEVICE — SOL INJ L R 1000ML BG --

## (undated) DEVICE — ADULT SPO2 SENSOR: Brand: NELLCOR

## (undated) DEVICE — SYRINGE ANGIO 10 CC BRL STD PRNT POLYCARB LT BLU MEDALLION

## (undated) DEVICE — SUTURE MCRYL SZ 3-0 L27IN ABSRB UD L19MM PS-2 3/8 CIR PRIM Y427H

## (undated) DEVICE — SYR 10ML LUER LOK 1/5ML GRAD --

## (undated) DEVICE — SUT VCRL 2-0 36IN CT1 UD --

## (undated) DEVICE — HI-TORQUE VERSACORE FLOPPY GUIDE WIRE SYSTEM 145 CM: Brand: HI-TORQUE VERSACORE

## (undated) DEVICE — HYPODERMIC SAFETY NEEDLE: Brand: MONOJECT

## (undated) DEVICE — SUT ETHLN 3-0 18IN PS2 BLK --

## (undated) DEVICE — GLOVE SURG SZ 7 L12IN FNGR THK79MIL GRN LTX FREE

## (undated) DEVICE — BASIC SINGLE BASIN BTC-LF: Brand: MEDLINE INDUSTRIES, INC.

## (undated) DEVICE — PREP SKN CHLRAPRP APL 26ML STR --

## (undated) DEVICE — CATHETER ETER ANGIO L110CM OD5FR ID046IN L75CM 038IN 145DEG CARD

## (undated) DEVICE — 3M™ TEGADERM™ TRANSPARENT FILM DRESSING FRAME STYLE, 1626W, 4 IN X 4-3/4 IN (10 CM X 12 CM), 50/CT 4CT/CASE: Brand: 3M™ TEGADERM™

## (undated) DEVICE — COVER LT HNDL PLAS RIG 1 PER PK

## (undated) DEVICE — SYSTEM EKG CBL L144IN 2 CONN 5 LD

## (undated) DEVICE — KIT ACCS INTRO 4FR L10CM NDL 21GA L7CM GWIRE L40CM

## (undated) DEVICE — CATHETER ETER IV 22GA L1IN POLYUR STR RADPQ INTROCAN SFTY

## (undated) DEVICE — TOWEL,OR,DSP,ST,BLUE,STD,4/PK,20PK/CS: Brand: MEDLINE

## (undated) DEVICE — SOLUTION IRRIG 1000ML 0.9% SOD CHL USP POUR PLAS BTL

## (undated) DEVICE — DRESSING,GAUZE,XEROFORM,CURAD,1"X8",ST: Brand: CURAD

## (undated) DEVICE — SUTURE VCRL SZ 2-0 L27IN ABSRB UD L26MM SH 1/2 CIR J417H

## (undated) DEVICE — TUBING PRSS MON L6IN PVC M FEM CONN

## (undated) DEVICE — BANDAGE,ELASTIC,ESMARK,STERILE,4"X9',LF: Brand: MEDLINE

## (undated) DEVICE — RADIFOCUS OPTITORQUE ANGIOGRAPHIC CATHETER: Brand: OPTITORQUE

## (undated) DEVICE — DEVICE COMPR REG 24 CM VASC BND

## (undated) DEVICE — THIN OFFSET (9.0 X 0.38 X 25.0MM)

## (undated) DEVICE — DRAPE,REIN 53X77,STERILE: Brand: MEDLINE

## (undated) DEVICE — GUIDEWIRE VASC L260CM 0.035IN J TIP L3MM PTFE FIX COR NAMIC

## (undated) DEVICE — CATHETER ANGIO JR4 AD 5 FRX100 CM 25 CM PERFORMA

## (undated) DEVICE — GARMENT,MEDLINE,DVT,INT,CALF,MED, GEN2: Brand: MEDLINE

## (undated) DEVICE — SYR 20ML LL STRL LF --

## (undated) DEVICE — GLOVE SURG SZ 65 THK91MIL LTX FREE SYN POLYISOPRENE

## (undated) DEVICE — BLANKET WRM AD PREM MISTRAL-AIR

## (undated) DEVICE — SPLINT WR POS F/ARTERIAL ACC -- BX/10

## (undated) DEVICE — SOLUTION IV SODIUM CHL 0.9% 250 ML INJ FLX CONTAINER